# Patient Record
Sex: MALE | Race: BLACK OR AFRICAN AMERICAN | NOT HISPANIC OR LATINO
[De-identification: names, ages, dates, MRNs, and addresses within clinical notes are randomized per-mention and may not be internally consistent; named-entity substitution may affect disease eponyms.]

---

## 2024-03-15 ENCOUNTER — RESULT REVIEW (OUTPATIENT)
Age: 54
End: 2024-03-15

## 2024-03-15 ENCOUNTER — INPATIENT (INPATIENT)
Facility: HOSPITAL | Age: 54
LOS: 1 days | Discharge: LEFT BEFORE DISCHARGE | End: 2024-03-17
Attending: STUDENT IN AN ORGANIZED HEALTH CARE EDUCATION/TRAINING PROGRAM | Admitting: STUDENT IN AN ORGANIZED HEALTH CARE EDUCATION/TRAINING PROGRAM
Payer: MEDICAID

## 2024-03-15 VITALS
OXYGEN SATURATION: 96 % | TEMPERATURE: 98 F | RESPIRATION RATE: 22 BRPM | SYSTOLIC BLOOD PRESSURE: 96 MMHG | HEART RATE: 85 BPM | DIASTOLIC BLOOD PRESSURE: 60 MMHG

## 2024-03-15 DIAGNOSIS — N17.9 ACUTE KIDNEY FAILURE, UNSPECIFIED: ICD-10-CM

## 2024-03-15 DIAGNOSIS — Z29.9 ENCOUNTER FOR PROPHYLACTIC MEASURES, UNSPECIFIED: ICD-10-CM

## 2024-03-15 DIAGNOSIS — J84.9 INTERSTITIAL PULMONARY DISEASE, UNSPECIFIED: ICD-10-CM

## 2024-03-15 DIAGNOSIS — L03.90 CELLULITIS, UNSPECIFIED: ICD-10-CM

## 2024-03-15 DIAGNOSIS — I73.9 PERIPHERAL VASCULAR DISEASE, UNSPECIFIED: ICD-10-CM

## 2024-03-15 DIAGNOSIS — B20 HUMAN IMMUNODEFICIENCY VIRUS [HIV] DISEASE: ICD-10-CM

## 2024-03-15 LAB
ADD ON TEST-SPECIMEN IN LAB: SIGNIFICANT CHANGE UP
ADD ON TEST-SPECIMEN IN LAB: SIGNIFICANT CHANGE UP
ALBUMIN SERPL ELPH-MCNC: 2.9 G/DL — LOW (ref 3.3–5)
ALP SERPL-CCNC: 241 U/L — HIGH (ref 40–120)
ALT FLD-CCNC: 65 U/L — HIGH (ref 4–41)
ANION GAP SERPL CALC-SCNC: 12 MMOL/L — SIGNIFICANT CHANGE UP (ref 7–14)
APPEARANCE UR: CLEAR — SIGNIFICANT CHANGE UP
APTT BLD: 27.4 SEC — SIGNIFICANT CHANGE UP (ref 24.5–35.6)
AST SERPL-CCNC: 35 U/L — SIGNIFICANT CHANGE UP (ref 4–40)
BASE EXCESS BLDV CALC-SCNC: -4 MMOL/L — LOW (ref -2–3)
BASOPHILS # BLD AUTO: 0.1 K/UL — SIGNIFICANT CHANGE UP (ref 0–0.2)
BASOPHILS NFR BLD AUTO: 0.9 % — SIGNIFICANT CHANGE UP (ref 0–2)
BILIRUB SERPL-MCNC: 0.3 MG/DL — SIGNIFICANT CHANGE UP (ref 0.2–1.2)
BILIRUB UR-MCNC: NEGATIVE — SIGNIFICANT CHANGE UP
BLOOD GAS VENOUS COMPREHENSIVE RESULT: SIGNIFICANT CHANGE UP
BUN SERPL-MCNC: 26 MG/DL — HIGH (ref 7–23)
CALCIUM SERPL-MCNC: 9.1 MG/DL — SIGNIFICANT CHANGE UP (ref 8.4–10.5)
CHLORIDE BLDV-SCNC: 108 MMOL/L — SIGNIFICANT CHANGE UP (ref 96–108)
CHLORIDE SERPL-SCNC: 104 MMOL/L — SIGNIFICANT CHANGE UP (ref 98–107)
CO2 BLDV-SCNC: 24.5 MMOL/L — SIGNIFICANT CHANGE UP (ref 22–26)
CO2 SERPL-SCNC: 20 MMOL/L — LOW (ref 22–31)
COLOR SPEC: YELLOW — SIGNIFICANT CHANGE UP
CREAT SERPL-MCNC: 2.21 MG/DL — HIGH (ref 0.5–1.3)
DIFF PNL FLD: NEGATIVE — SIGNIFICANT CHANGE UP
EGFR: 35 ML/MIN/1.73M2 — LOW
EOSINOPHIL # BLD AUTO: 0.06 K/UL — SIGNIFICANT CHANGE UP (ref 0–0.5)
EOSINOPHIL NFR BLD AUTO: 0.5 % — SIGNIFICANT CHANGE UP (ref 0–6)
FLUAV AG NPH QL: SIGNIFICANT CHANGE UP
FLUBV AG NPH QL: SIGNIFICANT CHANGE UP
GAS PNL BLDV: 136 MMOL/L — SIGNIFICANT CHANGE UP (ref 136–145)
GLUCOSE BLDV-MCNC: 100 MG/DL — HIGH (ref 70–99)
GLUCOSE SERPL-MCNC: 101 MG/DL — HIGH (ref 70–99)
GLUCOSE UR QL: NEGATIVE MG/DL — SIGNIFICANT CHANGE UP
HCO3 BLDV-SCNC: 23 MMOL/L — SIGNIFICANT CHANGE UP (ref 22–29)
HCT VFR BLD CALC: 35.6 % — LOW (ref 39–50)
HCT VFR BLDA CALC: 38 % — LOW (ref 39–51)
HGB BLD CALC-MCNC: 12.7 G/DL — SIGNIFICANT CHANGE UP (ref 12.6–17.4)
HGB BLD-MCNC: 12.2 G/DL — LOW (ref 13–17)
IANC: 8.46 K/UL — HIGH (ref 1.8–7.4)
IMM GRANULOCYTES NFR BLD AUTO: 0.7 % — SIGNIFICANT CHANGE UP (ref 0–0.9)
INR BLD: 1 RATIO — SIGNIFICANT CHANGE UP (ref 0.85–1.18)
KETONES UR-MCNC: NEGATIVE MG/DL — SIGNIFICANT CHANGE UP
LACTATE BLDV-MCNC: 1.4 MMOL/L — SIGNIFICANT CHANGE UP (ref 0.5–2)
LEUKOCYTE ESTERASE UR-ACNC: NEGATIVE — SIGNIFICANT CHANGE UP
LYMPHOCYTES # BLD AUTO: 1.93 K/UL — SIGNIFICANT CHANGE UP (ref 1–3.3)
LYMPHOCYTES # BLD AUTO: 16.9 % — SIGNIFICANT CHANGE UP (ref 13–44)
MCHC RBC-ENTMCNC: 33.8 PG — SIGNIFICANT CHANGE UP (ref 27–34)
MCHC RBC-ENTMCNC: 34.3 GM/DL — SIGNIFICANT CHANGE UP (ref 32–36)
MCV RBC AUTO: 98.6 FL — SIGNIFICANT CHANGE UP (ref 80–100)
MONOCYTES # BLD AUTO: 0.78 K/UL — SIGNIFICANT CHANGE UP (ref 0–0.9)
MONOCYTES NFR BLD AUTO: 6.8 % — SIGNIFICANT CHANGE UP (ref 2–14)
NEUTROPHILS # BLD AUTO: 8.46 K/UL — HIGH (ref 1.8–7.4)
NEUTROPHILS NFR BLD AUTO: 74.2 % — SIGNIFICANT CHANGE UP (ref 43–77)
NITRITE UR-MCNC: NEGATIVE — SIGNIFICANT CHANGE UP
NRBC # BLD: 0 /100 WBCS — SIGNIFICANT CHANGE UP (ref 0–0)
NRBC # FLD: 0 K/UL — SIGNIFICANT CHANGE UP (ref 0–0)
NT-PROBNP SERPL-SCNC: 877 PG/ML — HIGH
PCO2 BLDV: 49 MMHG — SIGNIFICANT CHANGE UP (ref 42–55)
PH BLDV: 7.28 — LOW (ref 7.32–7.43)
PH UR: 6 — SIGNIFICANT CHANGE UP (ref 5–8)
PLATELET # BLD AUTO: 410 K/UL — HIGH (ref 150–400)
PO2 BLDV: 28 MMHG — SIGNIFICANT CHANGE UP (ref 25–45)
POTASSIUM BLDV-SCNC: 4 MMOL/L — SIGNIFICANT CHANGE UP (ref 3.5–5.1)
POTASSIUM SERPL-MCNC: 4.1 MMOL/L — SIGNIFICANT CHANGE UP (ref 3.5–5.3)
POTASSIUM SERPL-SCNC: 4.1 MMOL/L — SIGNIFICANT CHANGE UP (ref 3.5–5.3)
PROT SERPL-MCNC: 8.2 G/DL — SIGNIFICANT CHANGE UP (ref 6–8.3)
PROT UR-MCNC: SIGNIFICANT CHANGE UP MG/DL
PROTHROM AB SERPL-ACNC: 11.3 SEC — SIGNIFICANT CHANGE UP (ref 9.5–13)
RBC # BLD: 3.61 M/UL — LOW (ref 4.2–5.8)
RBC # FLD: 16 % — HIGH (ref 10.3–14.5)
RSV RNA NPH QL NAA+NON-PROBE: SIGNIFICANT CHANGE UP
SAO2 % BLDV: 40.4 % — LOW (ref 67–88)
SARS-COV-2 RNA SPEC QL NAA+PROBE: SIGNIFICANT CHANGE UP
SODIUM SERPL-SCNC: 136 MMOL/L — SIGNIFICANT CHANGE UP (ref 135–145)
SP GR SPEC: 1.03 — SIGNIFICANT CHANGE UP (ref 1–1.03)
TROPONIN T, HIGH SENSITIVITY RESULT: 50 NG/L — SIGNIFICANT CHANGE UP
UROBILINOGEN FLD QL: 0.2 MG/DL — SIGNIFICANT CHANGE UP (ref 0.2–1)
WBC # BLD: 11.41 K/UL — HIGH (ref 3.8–10.5)
WBC # FLD AUTO: 11.41 K/UL — HIGH (ref 3.8–10.5)

## 2024-03-15 PROCEDURE — 99285 EMERGENCY DEPT VISIT HI MDM: CPT

## 2024-03-15 PROCEDURE — 99223 1ST HOSP IP/OBS HIGH 75: CPT

## 2024-03-15 PROCEDURE — 93922 UPR/L XTREMITY ART 2 LEVELS: CPT | Mod: 26,59

## 2024-03-15 PROCEDURE — 73630 X-RAY EXAM OF FOOT: CPT | Mod: 26,LT,RT

## 2024-03-15 PROCEDURE — 93970 EXTREMITY STUDY: CPT | Mod: 26

## 2024-03-15 PROCEDURE — 71046 X-RAY EXAM CHEST 2 VIEWS: CPT | Mod: 26

## 2024-03-15 PROCEDURE — 71275 CT ANGIOGRAPHY CHEST: CPT | Mod: 26,MC

## 2024-03-15 PROCEDURE — 93923 UPR/LXTR ART STDY 3+ LVLS: CPT | Mod: 26

## 2024-03-15 RX ORDER — PIPERACILLIN AND TAZOBACTAM 4; .5 G/20ML; G/20ML
3.38 INJECTION, POWDER, LYOPHILIZED, FOR SOLUTION INTRAVENOUS EVERY 8 HOURS
Refills: 0 | Status: DISCONTINUED | OUTPATIENT
Start: 2024-03-15 | End: 2024-03-15

## 2024-03-15 RX ORDER — NICOTINE POLACRILEX 2 MG
1 GUM BUCCAL DAILY
Refills: 0 | Status: DISCONTINUED | OUTPATIENT
Start: 2024-03-15 | End: 2024-03-17

## 2024-03-15 RX ORDER — TRAMADOL HYDROCHLORIDE 50 MG/1
25 TABLET ORAL ONCE
Refills: 0 | Status: DISCONTINUED | OUTPATIENT
Start: 2024-03-15 | End: 2024-03-15

## 2024-03-15 RX ORDER — BICTEGRAVIR SODIUM, EMTRICITABINE, AND TENOFOVIR ALAFENAMIDE FUMARATE 30; 120; 15 MG/1; MG/1; MG/1
1 TABLET ORAL DAILY
Refills: 0 | Status: DISCONTINUED | OUTPATIENT
Start: 2024-03-15 | End: 2024-03-17

## 2024-03-15 RX ORDER — ACETAMINOPHEN 500 MG
650 TABLET ORAL EVERY 6 HOURS
Refills: 0 | Status: DISCONTINUED | OUTPATIENT
Start: 2024-03-15 | End: 2024-03-17

## 2024-03-15 RX ORDER — PIPERACILLIN AND TAZOBACTAM 4; .5 G/20ML; G/20ML
3.38 INJECTION, POWDER, LYOPHILIZED, FOR SOLUTION INTRAVENOUS ONCE
Refills: 0 | Status: COMPLETED | OUTPATIENT
Start: 2024-03-15 | End: 2024-03-15

## 2024-03-15 RX ORDER — SODIUM CHLORIDE 9 MG/ML
1000 INJECTION INTRAMUSCULAR; INTRAVENOUS; SUBCUTANEOUS ONCE
Refills: 0 | Status: COMPLETED | OUTPATIENT
Start: 2024-03-15 | End: 2024-03-15

## 2024-03-15 RX ORDER — ACETAMINOPHEN 500 MG
975 TABLET ORAL ONCE
Refills: 0 | Status: COMPLETED | OUTPATIENT
Start: 2024-03-15 | End: 2024-03-15

## 2024-03-15 RX ORDER — HEPARIN SODIUM 5000 [USP'U]/ML
5000 INJECTION INTRAVENOUS; SUBCUTANEOUS EVERY 8 HOURS
Refills: 0 | Status: DISCONTINUED | OUTPATIENT
Start: 2024-03-15 | End: 2024-03-16

## 2024-03-15 RX ORDER — PIPERACILLIN AND TAZOBACTAM 4; .5 G/20ML; G/20ML
3.38 INJECTION, POWDER, LYOPHILIZED, FOR SOLUTION INTRAVENOUS EVERY 12 HOURS
Refills: 0 | Status: DISCONTINUED | OUTPATIENT
Start: 2024-03-16 | End: 2024-03-17

## 2024-03-15 RX ORDER — LANOLIN ALCOHOL/MO/W.PET/CERES
3 CREAM (GRAM) TOPICAL AT BEDTIME
Refills: 0 | Status: DISCONTINUED | OUTPATIENT
Start: 2024-03-15 | End: 2024-03-17

## 2024-03-15 RX ADMIN — PIPERACILLIN AND TAZOBACTAM 25 GRAM(S): 4; .5 INJECTION, POWDER, LYOPHILIZED, FOR SOLUTION INTRAVENOUS at 19:22

## 2024-03-15 RX ADMIN — TRAMADOL HYDROCHLORIDE 25 MILLIGRAM(S): 50 TABLET ORAL at 22:47

## 2024-03-15 RX ADMIN — PIPERACILLIN AND TAZOBACTAM 200 GRAM(S): 4; .5 INJECTION, POWDER, LYOPHILIZED, FOR SOLUTION INTRAVENOUS at 14:08

## 2024-03-15 RX ADMIN — Medication 975 MILLIGRAM(S): at 11:13

## 2024-03-15 RX ADMIN — Medication 650 MILLIGRAM(S): at 17:39

## 2024-03-15 RX ADMIN — HEPARIN SODIUM 5000 UNIT(S): 5000 INJECTION INTRAVENOUS; SUBCUTANEOUS at 22:47

## 2024-03-15 RX ADMIN — SODIUM CHLORIDE 1000 MILLILITER(S): 9 INJECTION INTRAMUSCULAR; INTRAVENOUS; SUBCUTANEOUS at 11:12

## 2024-03-15 RX ADMIN — Medication 975 MILLIGRAM(S): at 14:44

## 2024-03-15 NOTE — H&P ADULT - ASSESSMENT
53M Hx of HIV on Biktarvy unknown CD4 count, questionable diagnosis of blood clots in bilateral lower extremity presents to the ER after leaving AMA from [facility] with complaint of bilateral feet pain and swelling x 3 weeks. Exam c/w PAD.

## 2024-03-15 NOTE — ED PROVIDER NOTE - CARE PLAN
1 Principal Discharge DX:	Cellulitis   Principal Discharge DX:	Cellulitis  Secondary Diagnosis:	SOB (shortness of breath)

## 2024-03-15 NOTE — H&P ADULT - NSHPPHYSICALEXAM_GEN_ALL_CORE
PHYSICAL EXAM:  GENERAL: NAD, well-groomed, well-developed  HEAD:  Atraumatic, Normocephalic  EYES: EOMI, PERRLA, conjunctiva and sclera clear  ENMT: No tonsillar erythema, exudates, or enlargement; Moist mucous membranes  NECK: Supple, No JVD, Normal thyroid  HEART: Regular rate and rhythm; No murmurs, rubs, or gallops  RESPIRATORY: CTA B/L, No W/R/R  ABDOMEN: Soft, Nontender, Nondistended; Bowel sounds present  NEUROLOGY: A&Ox3, nonfocal, moving all extremities  EXTREMITIES:  1+ b/l LE Pulses, necrotic toes

## 2024-03-15 NOTE — ED PROVIDER NOTE - ATTENDING APP SHARED VISIT CONTRIBUTION OF CARE
53M Hx of HIV on Biktarvy unknown CD4 count, questionable diagnosis of blood clots in bilateral lower extremity presents to the ER after leaving AMA from [facility] with for bilateral feet pain and swelling x 3 weeks. Patient also notes feeling chills, cold.  Denies chest pain, abdominal pain, nausea, vomiting, urinary symptoms.  PE good pulses discolored but warm LEs   Plan  ? whether etiology of LLE infection JOVON   labs blood cultures vascular consults Antibiotics TBA for same and ECHO .

## 2024-03-15 NOTE — H&P ADULT - PROBLEM SELECTOR PLAN 1
- Left forefoot ischemic changes to digits 1-5, not demarcated, no malodor. Maceration of left foot 4th interspace. Left plantar hallux hyperkeratotic lesion and left plantar lateral midfoot hyperkeratotic lesion. No open lesions or acute signs of infection of right foot.   - s/p debridement via Pod (follow up with Dr. Davila ([phone]) within 1 week of discharge)  - Bilateral xray: no OM, no gas, no fracture   - vasc consult placed  - DENIS-PVR

## 2024-03-15 NOTE — ED PROVIDER NOTE - PHYSICAL EXAMINATION
Vital signs reviewed.   CONSTITUTIONAL: ill appearing, tremulous. Speaking in full sentences, responding appropriately to questions.   HEAD: Normocephalic, atraumatic.  EYES: PERRL, EOM intact, conjunctiva and sclera WNL.  ENT: normal nose; no rhinorrhea; normal pharynx with no tonsillar hypertrophy, no erythema, no exudate, no lymphadenopathy.  NECK/LYMPH: Supple; non-tender; no cervical lymphadenopathy.  CARD: Normal S1, S2; no murmurs, rubs, or gallops noted.  RESP: Normal chest excursion with respiration; breath sounds clear and equal bilaterally; no wheezes, rhonchi, or rales.  ABD/GI: soft, non-distended; non-tender; no palpable organomegaly, no pulsatile mass.  EXT/MS: moves all extremities; distal pulses are normal, no pedal edema.  SKIN: Normal for age and race; warm; dry; good turgor; no apparent lesions or exudate noted.  NEURO: Awake, alert, oriented x 3, no gross deficits, CN II-XII grossly intact, no motor or sensory deficit noted.  PSYCH: Normal mood; appropriate affect. Vital signs reviewed.   CONSTITUTIONAL: ill appearing, tremulous. Speaking in full sentences, responding appropriately to questions.   HEAD: Normocephalic, atraumatic.  EYES: PERRL, EOM intact, conjunctiva and sclera WNL.  ENT: normal nose; no rhinorrhea; normal pharynx with no tonsillar hypertrophy, no erythema, no exudate, no lymphadenopathy.  NECK/LYMPH: Supple; non-tender; no cervical lymphadenopathy.  CARD: Normal S1, S2; no murmurs, rubs, or gallops noted.  RESP: Normal chest excursion with respiration; breath sounds clear and equal bilaterally; no wheezes, rhonchi, or rales.  ABD/GI: soft, non-distended; non-tender; no palpable organomegaly, no pulsatile mass.  EXT/MS: moves all extremities; distal pulses are normal, Left toes. erythematous warm, extending up dorsum of foot, blood noted to 5 th digit without noted wound.   SKIN: Normal for age and race; warm; dry; good turgor; no apparent lesions or exudate noted.  NEURO: Awake, alert, oriented x 3, no gross deficits, CN II-XII grossly intact, no motor or sensory deficit noted.  PSYCH: Normal mood; appropriate affect.

## 2024-03-15 NOTE — H&P ADULT - NSHPREVIEWOFSYSTEMS_GEN_ALL_CORE
REVIEW OF SYSTEMS:    CONSTITUTIONAL: chills  EYES/ENT: No visual changes;  No vertigo or throat pain   NECK: No pain or stiffness  RESPIRATORY: No cough, wheezing, hemoptysis; No shortness of breath  CARDIOVASCULAR: No chest pain or palpitations  GASTROINTESTINAL: No abdominal or epigastric pain. No nausea, vomiting, or hematemesis; No diarrhea or constipation. No melena or hematochezia.  GENITOURINARY: No dysuria, frequency or hematuria  NEUROLOGICAL: No numbness or weakness  SKIN: No itching, rashes  MUSCULOSKELETAL: Foot pain

## 2024-03-15 NOTE — ED PROVIDER NOTE - PROGRESS NOTE DETAILS
ROBERTO Heard: Patient was a transfer from [facility], inpatient team aware. Concern for septic embolic, transfer for HERON. patient received cultures and zosyn. Can admit to Dr. Tsai

## 2024-03-15 NOTE — ED PROVIDER NOTE - RATE
Patient arrives ambulatory to unit complaining of possible contractions. Shown to LT02 and advised to obtain a CCU and change into a gown.    77

## 2024-03-15 NOTE — H&P ADULT - NSHPLABSRESULTS_GEN_ALL_CORE
LABORATORY DATA                        12.2   11.41 )-----------( 410      ( 15 Mar 2024 11:13 )             35.6     03-15    136  |  104  |  26<H>  ----------------------------<  101<H>  4.1   |  20<L>  |  2.21<H>    Ca    9.1      15 Mar 2024 11:13    TPro  8.2  /  Alb  2.9<L>  /  TBili  0.3  /  DBili  x   /  AST  35  /  ALT  65<H>  /  AlkPhos  241<H>  03-15    PT/INR - ( 15 Mar 2024 11:13 )   PT: 11.3 sec;   INR: 1.00 ratio         PTT - ( 15 Mar 2024 11:13 )  PTT:27.4 sec      Urinalysis Basic - ( 15 Mar 2024 11:13 )    Color: x / Appearance: x / SG: x / pH: x  Gluc: 101 mg/dL / Ketone: x  / Bili: x / Urobili: x   Blood: x / Protein: x / Nitrite: x   Leuk Esterase: x / RBC: x / WBC x   Sq Epi: x / Non Sq Epi: x / Bacteria: x      CAPILLARY BLOOD GLUCOSE      POCT Blood Glucose.: 135 mg/dL (15 Mar 2024 09:41)      IMAGING REVIEW  < from: Xray Foot AP + Lateral + Oblique, Bilat (03.15.24 @ 12:46) >    IMPRESSION:  No tracking soft tissue gas collections, radiopaque foreign bodies, or   gross radiographic evidence for osteomyelitis.    No fractures or dislocations.    Tarsometatarsal alignment maintained without evidence for a Lisfranc   injury.    Preserved visualized joint spaces and no joint margin erosions.    Slight posterosuperior calcaneal Amy deformities otherwise   unremarkable distal Achilles tendon shadows.    No lytic or blastic lesions.    < end of copied text >    < from: CT Angio Chest PE Protocol w/ IV Cont (03.15.24 @ 12:20) >    IMPRESSION:    No main or lobar pulmonary embolus. The segmental/subsegmental branches   of the pulmonary arteries are not evaluated secondary to poor   opacification, motion, and streak artifact.    Upper lung predominant emphysema.    Multiple air cysts within mid to lower lungs.    < end of copied text >    < from: US Duplex Venous Lower Ext Complete, Bilateral (03.15.24 @ 11:39) >      IMPRESSION:  No evidence of deep venous thrombosis in either lower extremity.    < end of copied text >      MICROBIOLOGY REVIEW      CARDIOLOGY REVIEW

## 2024-03-15 NOTE — H&P ADULT - HISTORY OF PRESENT ILLNESS
53M Hx of HIV on Biktarvy unknown CD4 count, questionable diagnosis of blood clots in bilateral lower extremity presents to the ER after leaving AMA from [facility] with complaint of bilateral feet pain and swelling x 3 weeks. Patient also notes feeling chills, cold.  Denies chest pain, abdominal pain, nausea, vomiting, urinary symptoms.  Pt was seen at [facility] but left AMA in lieu of long wait time to be seen and get a room.

## 2024-03-15 NOTE — CONSULT NOTE ADULT - SUBJECTIVE AND OBJECTIVE BOX
Patient is a 53y old  Male who presents with a chief complaint of left foot pain     HPI:  53-year-old male with past medical history of HIV on Biktarvy unknown CD4 count, questionable diagnosis of blood clots in bilateral lower extremity presents to the ER after leaving AMA from [facility] with complaint of bilateral feet pain and swelling and shortness of breath x 3 weeks.  Patient notes feeling chills, cold.  Denies chest pain, abdominal pain, nausea, vomiting, urinary symptoms.  patient notes he was seen by specialties at [facility], vascular did not find any blockages, patient was supposed to be transferred here for echo? but did not want to wait. so AMAed and came here.    PAST MEDICAL & SURGICAL HISTORY:  HIV disease          MEDICATIONS  (STANDING):  piperacillin/tazobactam IVPB.. 3.375 Gram(s) IV Intermittent Once    MEDICATIONS  (PRN):      Allergies    No Known Allergies    Intolerances        VITALS:    Vital Signs Last 24 Hrs  T(C): 36.5 (15 Mar 2024 11:19), Max: 37.1 (15 Mar 2024 10:33)  T(F): 97.7 (15 Mar 2024 11:19), Max: 98.8 (15 Mar 2024 10:33)  HR: 88 (15 Mar 2024 11:19) (85 - 88)  BP: 120/89 (15 Mar 2024 11:19) (96/60 - 120/89)  BP(mean): --  RR: 18 (15 Mar 2024 11:19) (18 - 22)  SpO2: 98% (15 Mar 2024 11:19) (96% - 98%)    Parameters below as of 15 Mar 2024 11:19  Patient On (Oxygen Delivery Method): room air        LABS:                          12.2   11.41 )-----------( 410      ( 15 Mar 2024 11:13 )             35.6       03-15    136  |  104  |  26<H>  ----------------------------<  101<H>  4.1   |  20<L>  |  2.21<H>    Ca    9.1      15 Mar 2024 11:13    TPro  8.2  /  Alb  2.9<L>  /  TBili  0.3  /  DBili  x   /  AST  35  /  ALT  65<H>  /  AlkPhos  241<H>  03-15      CAPILLARY BLOOD GLUCOSE      POCT Blood Glucose.: 135 mg/dL (15 Mar 2024 09:41)      PT/INR - ( 15 Mar 2024 11:13 )   PT: 11.3 sec;   INR: 1.00 ratio         PTT - ( 15 Mar 2024 11:13 )  PTT:27.4 sec    LOWER EXTREMITY PHYSICAL EXAM:    Vascular: DP/PT 2/4, B/L, CFT <3 seconds B/L, Temperature gradient warm to cool, B/L.   Neuro: Epicritic sensation diminished to the level of digits, B/L.  Musculoskeletal/Ortho: unremarkable   Skin: Left forefoot ischemic changes to digits 1-5, not demarcated, no malodor. Maceration of left foot 4th interspace. No open lesions or acute signs of infection of right foot.       RADIOLOGY & ADDITIONAL STUDIES:    < from: Xray Foot AP + Lateral + Oblique, Bilat (03.15.24 @ 12:46) >    ACC: [id number] EXAM:  XR FOOT COMP MIN 3 VIEWS BI   ORDERED BY: JOLIE ROBISON     PROCEDURE DATE:  03/15/2024          INTERPRETATION:  CLINICAL INDICATION: bilateral foot pain    EXAM:  Frontal oblique lateral views of both feet from 3/15/2024 at 1246. No   similar prior studies available for comparison.    IMPRESSION:  No tracking soft tissue gas collections, radiopaque foreign bodies, or   gross radiographic evidence for osteomyelitis.    No fractures or dislocations.    Tarsometatarsal alignment maintained without evidence for a Lisfranc   injury.    Preserved visualized joint spaces and no joint margin erosions.    Slight posterosuperior calcaneal Amy deformities otherwise   unremarkable distal Achilles tendon shadows.    No lytic or blastic lesions.    --- End of Report ---            KAILEE BEAN MD; Attending Radiologist  This document has been electronically signed. Mar 15 2024  1:11PM    < end of copied text >  
VASCULAR SURGERY CONSULT NOTE  --------------------------------------------------------------------------------------------    HPI:   Mr. Hester is a 53 year old man with PMH of HIV (Biktarvy) and possible DM who reports a history of LLE "blood clot" s/p failed attempted removal and second procedure who was recently admitted to [facility] for 2 weeks for ongoing L foot pain. He reports chronic pain in the L foot that became acutely worse 2 weeks ago for which he has been admitted to OSH- unclear treatments but no vascular intervention. he is baseline ambulatory up until the pain 2 weeks ago which has progressed to rest pain. He denied numbness or tingling. He is an active smoker, 1ppd for 20 years.     ROS: 10-system review is otherwise negative except HPI above.      PAST MEDICAL & SURGICAL HISTORY:  HIV disease      FAMILY HISTORY:  [] Family history not pertinent as reviewed with the patient and family      ALLERGIES: No Known Allergies      SOCIAL HISTORY:   Lives with his wife    --------------------------------------------------------------------------------------------    Vitals:   T(C): 36.7 (03-15-24 @ 19:26), Max: 37.1 (03-15-24 @ 10:33)  HR: 80 (03-15-24 @ 19:26) (66 - 88)  BP: 114/82 (03-15-24 @ 19:26) (96/60 - 120/89)  RR: 18 (03-15-24 @ 19:26) (16 - 22)  SpO2: 99% (03-15-24 @ 19:26) (96% - 99%)  CAPILLARY BLOOD GLUCOSE      POCT Blood Glucose.: 135 mg/dL (15 Mar 2024 09:41)      Physical Examination:  GEN: Upset, resting quietly  NEURO: AAOx3, CN II-XII grossly intact, no focal deficits  PULM: symmetric chest rise bilaterally, no increased WOB  ABD: soft, nontender, nondistended  EXTR: LLE with distal ischemic changes, especially over the 1/2 MTP with violaceous discoloration, some blanching erythema between 1/2 toe, tender to palpation, +DP/PT signals, +palp fem, RLE +DP/PT, some chronic discoloration of the R toes but not acutely inflamed/infected   --------------------------------------------------------------------------------------------    LABS  CBC (03-15 @ 11:13)                              12.2<L>                         11.41<H>  )----------------(  410<H>     74.2  % Neutrophils, 16.9  % Lymphocytes, ANC: 8.46<H>                              35.6<L>    BMP (03-15 @ 11:13)             136     |  104     |  26<H> 		Ca++ --      Ca 9.1                ---------------------------------( 101<H>		Mg --                 4.1     |  20<L>   |  2.21<H>			Ph --        LFTs (03-15 @ 11:13)      TPro 8.2 / Alb 2.9<L> / TBili 0.3 / DBili -- / AST 35 / ALT 65<H> / AlkPhos 241<H>    Coags (03-15 @ 11:13)  aPTT 27.4 / INR 1.00 / PT 11.3        VBG (03-15 @ 11:13)     7.28<L> / 49 / 28 / 23 / -4.0<L> / 40.4<L>%     Lactate: 1.4    --------------------------------------------------------------------------------------------    MICROBIOLOGY  Urinalysis (03-15 @ 11:13):     Color:  / Appearance:  / SG:  / pH:  / Gluc: 101<H> / Ketones:  / Bili:  / Urobili:  / Protein : / Nitrites:  / Leuk.Est:  / RBC:  / WBC:  / Sq Epi:  / Non Sq Epi:  / Bacteria          --------------------------------------------------------------------------------------------    IMAGING  < from: US Duplex Venous Lower Ext Complete, Bilateral (03.15.24 @ 11:39) >  FINDINGS:    RIGHT:  Normal compressibility of the RIGHT common femoral, femoral and popliteal   veins.  Doppler examination shows normal spontaneous and phasic flow.  Limited visualized of the RIGHT calf veins. No RIGHT calf vein thrombosis   detected.    LEFT:  Normal compressibility of the LEFT common femoral, femoral and popliteal   veins.  Doppler examination shows normal spontaneous and phasic flow.  Limited visualized of the LEFT calf veins. No LEFT calf vein thrombosis   detected.    IMPRESSION:  No evidence of deep venous thrombosis in either lower extremity.    < end of copied text >      --------------------------------------------------------------------------------------------    ASSESSMENT:  Mr. Hester is a 53 year old active smoker with PMH of HIV (Biktarvy) and possible DM who reports a history of LLE "blood clot" s/p failed attempted removal and second procedure 2y/a who was recently admitted to [facility] for 2 weeks for acute on chronic L foot pain.    PLAN:    - appreciate podiatry evaluation, no acute intervention at this time  - recommend DENIS/PVR with arterial duplex, will f/u results  - agree with empiric antibiotics w/ WBC 11  - will continue to follow    Tanner Ryan, PGY3  C Team Surgery   [id number]

## 2024-03-15 NOTE — ED ADULT NURSE NOTE - CHIEF COMPLAINT QUOTE
c/oi chest pressure and SOB for 3 weeks, reports was admitted to another hospital and left AMA yesterday, was diginose with blood clots in B/l  lower extremities./ pxh of DM, HIV. pt with tremorts, able to communicate with full and complete sentences, however, visibly becomes dyspneic at the end of a sentence.

## 2024-03-15 NOTE — ED ADULT NURSE REASSESSMENT NOTE - NS ED NURSE REASSESS COMMENT FT1
Wife- Kia- [phone]
Patient is resting comfortably at this time, NAD, RR even and unlabored, NSR on CM, no c/o pain, pending ortho consult, plan of care reviewed, safety maintained, call bell within reach, will continue to monitor patient.

## 2024-03-15 NOTE — ED ADULT NURSE NOTE - OBJECTIVE STATEMENT
Patient presented to ED with a chief complaint of SOB and left and right b/l foot pain. Patient states that he was at another facility this AM and left AMA. States he received antibiotics and pain medications there for predominantly the pain in his left food. Patient state that he has poor circulation in his lower extremities and have been noticing discoloration to his left toes and foot. Patient states that his left foot is tender to touch . States hx of HIV, PAD, "infection in brain". Denies fever, chills, n/v/d, recent sick contacts.

## 2024-03-15 NOTE — ED ADULT NURSE NOTE - NSFALLHARMRISKINTERV_ED_ALL_ED

## 2024-03-15 NOTE — ED PROVIDER NOTE - OBJECTIVE STATEMENT
53-year-old male with past medical history of HIV on Biktarvy unknown CD4 count, questionable diagnosis of blood clots in bilateral lower extremity presents to the ER after leaving AMA from [facility] with complaint of bilateral feet pain and swelling and shortness of breath x 3 weeks.  Patient notes feeling chills, cold.  Denies chest pain, abdominal pain, nausea, vomiting, urinary symptoms. 53-year-old male with past medical history of HIV on Biktarvy unknown CD4 count, questionable diagnosis of blood clots in bilateral lower extremity presents to the ER after leaving AMA from [facility] with complaint of bilateral feet pain and swelling and shortness of breath x 3 weeks.  Patient notes feeling chills, cold.  Denies chest pain, abdominal pain, nausea, vomiting, urinary symptoms.    patient notes he was seen by specialties at [facility], vascular did not find any blockages, patient was supposed to be transferred here for echo? but did not want to wait. so AMAed and came here.

## 2024-03-15 NOTE — H&P ADULT - ATTENDING COMMENTS
53-year-old male with past medical history of HIV on Biktarvy unknown CD4 count, w/ recent admission at [facility] for LLE pain s/p vasc eval, rec'd HERON 2/2 c/f ?microthrombi. Left AMA, now p/w ongoing LLE pain and swelling.      PHYSICAL:  GEN: NAD  CHEST: CTA B/L , normal respiratory effort  HEART: S1S2 normal  EXT: Left foot w/ ischemic changes digits 1-5, +warm to touch, +swelling. No discharge or drainage. Dec peripheral pulses B/L    #LLE Pain  --Suspect i/s/o PAD. Per chart review, OSH w/ c/f ?septic emboli. Unclear if pt w/ bacteremia. Unclear if found to have clots at OSH  --Obtain collateral  --Can resume empiric IV Zosyn pending Cx  --s/p  debridement w/ podiatry. XR w/o OM  --F/u blood cx x 2  --Repeat VA duplex  --Vasc surg eval  --Consider Echo pending above work up     #Leukocytosis  --Noted. Suspect reactive i/s/o ischemic process vs. cellulitis  --XRay w/o evidence of OM  --Trend leukocytosis  --Rest of mgt as above     HIV  --Resume Biktarvy  --Obtain T-Cell subset, Viral load    #Abnormal imaging  --CTA chest w Upper lung predominant emphysema. Also w Multiple air cysts within mid to lower lungs.  --Unclear etiology. 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 Reasonable to r/o opportunistic infections  --At this time w/o respiratory sx  --Obtain Pneumocystis PCR, QuantiFeron,  CMV  --Further testing pending CD4 count      DVT: HSQ if no planned intervention   DIET: DASH/TLC  DISPO: Pending hospital course 53-year-old male with past medical history of HIV on Biktarvy unknown CD4 count, w/ recent admission at [facility] for LLE pain s/p vasc eval, rec'd HERON 2/2 c/f ?microthrombi. Left AMA, now p/w ongoing LLE pain and swelling.      PHYSICAL:  GEN: NAD  CHEST: CTA B/L , normal respiratory effort  HEART: S1S2 normal  EXT: Left foot w/ ischemic changes digits 1-5, +warm to touch, +swelling. No discharge or drainage. Dec peripheral pulses B/L    #LLE Pain  --Suspect i/s/o PAD. Per chart review, OSH w/ c/f ?septic emboli. Unclear if pt w/ bacteremia. Unclear if found to have clots at OSH  --Obtain collateral  --Can resume empiric IV Zosyn pending Cx  --s/p  debridement w/ podiatry. XR w/o OM  --F/u blood cx x 2  --Obtain DENIS /PVR, VA duplex  --Vasc surg eval  --Consider Echo pending above work up     #Leukocytosis  --Noted. Suspect reactive i/s/o ischemic process +/-. cellulitis  --XRay w/o evidence of OM  --Trend leukocytosis  --Rest of mgt as above     HIV  --Resume Biktarvy  --Obtain T-Cell subset, Viral load    #Abnormal imaging  --CTA chest w Upper lung predominant emphysema. Also w Multiple air cysts within mid to lower lungs.  --Unclear etiology. 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 Reasonable to r/o opportunistic infections  --At this time w/o respiratory sx  --Obtain Pneumocystis PCR, QuantiFeron,  CMV  --Further testing pending CD4 count      DVT: HSQ if no planned intervention   DIET: DASH/TLC  DISPO: Pending hospital course

## 2024-03-15 NOTE — ED ADULT NURSE NOTE - NSSUHOSCREENINGYN_ED_ALL_ED
Telephone Encounter by Leilani Corcoran CMA at 06/27/17 07:46 AM     Author:  Leilani Corcoran CMA Service:  (none) Author Type:  Certified Medical Assistant     Filed:  06/27/17 07:47 AM Encounter Date:  6/25/2017 Status:  Signed     :  Leilani Corcoran CMA (Certified Medical Assistant)            Last Visit[HS1.1T]  6/23/16 upcoming 8/17/17[HS1.1M]    Last Refill[HS1.1T]  celecoxib (CELEBREX) 200 MG Cap 180 Cap 1 12/27/2016  No   Sig: TAKE 1 CAPSULE BY MOUTH TWICE DAILY AS DIRECTED[HS1.1C]            Medication has been pended in encounter for provider approval.[HS1.1T]        Revision History        User Key Date/Time User Provider Type Action    > HS1.1 06/27/17 07:47 AM Leilani Corcoran CMA Certified Medical Assistant Sign    C - Copied, M - Manual, T - Template             Yes - the patient is able to be screened

## 2024-03-15 NOTE — ED PROVIDER NOTE - CLINICAL SUMMARY MEDICAL DECISION MAKING FREE TEXT BOX
53-year-old male with past medical history of HIV on Biktarvy unknown CD4 count, questionable diagnosis of blood clots in bilateral lower extremity presents to the ER after leaving AMA from [facility] with complaint of bilateral feet pain and swelling and shortness of breath x 3 weeks.  Patient notes feeling chills, cold.  Denies chest pain, abdominal pain, nausea, vomiting, urinary symptoms.

## 2024-03-15 NOTE — H&P ADULT - PROBLEM SELECTOR PLAN 4
CTA Chest with air cysts present concerning for possible ILD 2/2 HIV vs occult infection  - may warrant biopsy if infection r/o

## 2024-03-15 NOTE — H&P ADULT - TIME BILLING
reviewing laboratory data, consultants' recommendations, documentation in [place], performing medically appropriate examinations/evaluations, discussion with patient/family/RN/ACP/Residents and interdisciplinary staff (such as , social workers, etc), counseling patient/family/care giver, ordering medically appropriate medication, tests, or procedures

## 2024-03-15 NOTE — CONSULT NOTE ADULT - ASSESSMENT
53M presents with left forefoot ischemic changes   - Patient seen and evaluated   - Afebrile, WBC 11.41   - Left forefoot ischemic changes to digits 1-5, not demarcated, no malodor. Maceration of left foot 4th interspace. Left plantar hallux hyperkeratotic lesion and left plantar lateral midfoot hyperkeratotic lesion. No open lesions or acute signs of infection of right foot.   - Bilateral xray: no OM, no gas, no fracture   - Recommend vasc consult   - After obtaining verbal consent, excisional debridement of left foot hyperkeratotic lesion down to and not including dermis with sterile #15 blade, no open wounds. Patient tolerated procedure well.   - Wound care orders placed for daily application of betadine on left forefoot digits  - No acute podiatric intervention at this time, please reconsult as needed  - Patient to follow up with Dr. Davila ([phone]) within 1 week of discharge   - Discussed with attending

## 2024-03-15 NOTE — ED PROVIDER NOTE - CHIEF COMPLAINT
"25 [facility]  ICU ADMISSION NOTE       Patient: Dylan Lee Date of Service: 2/17/2024   male, 67year old  Admit Date: (Not on file)   Attending: Mariana Greenberg MD        ATTENDING PHYSICIAN    Sandeep Neff D.O.    PRIMARY CARE PHYSICIAN  Moon Gonzalez PA-C    CHIEF COMPLAINT  fall    HPI  Dylan Lee is a 67year old male with PMH dementia, colon cancer, who was transferred from [facility] for Gastrointestinal team consultation due to concern of Gastrointestinal bleed, and s/p fall. Per Dr. Paris Median note today: ""History obtained from EMS as well as patient's caregiver who is at the bedside. They report that the patient had been in his usual state of health prior to today. This morning the patient was attempting to get out of bed. They think that he lost his balance and then he fell to his left side and struck his left side of his face against a piece of furniture in his bedroom. He tried to get up for second time and began to fall again but a staff member caught him and placed him back in bed. EMS was called and patient was brought to the ED. On arrival to the ED the patient is somnolent but has no complaints of pain. His caregiver states that at baseline he is confused and has dementia but can walk and talk to people. He does have some difficulty taking care of himself including showering himself. Group home and his brother make his medical decisions. Caregiver denies recent infectious symptoms including fever, cough, vomiting, diarrhea, dysuria. \""  Stool occult was + in ER. On ICU arrival patient is unable to give much history. He seems to try to answer, knows he is in [facility], [address], but can't tell me why he is here, and doesn't answer many of my questions. He seems to be denying abdominal pain, but doesn't answer directly. No n/v reported. He can't tell me if stools have changed. Called brother Alcideshola Cleveland at listed number an it has been disconnected.   I called brother Dionte Chamorro, " legal guardian. He reports patient sounds like he is at his neurologic baseline. He does concur that pt has trouble swallowing at baseline so has a special diet, and worried he is going to choke. PAST MEDICAL HISTORY    Past Medical History:   Diagnosis Date    Diabetes mellitus (CMD)     Essential (primary) hypertension     Gastroesophageal reflux disease     Hyperlipidemia     Malignant neoplasm (CMD)     colon ca    OCD (obsessive compulsive disorder)     Pneumonia     aspiration    Schizophrenia (CMD)     Stroke (CMD)     2008, 2012       SURGICAL HISTORY    Past Surgical History:   Procedure Laterality Date    Bowel resection      sigmoid       FAMILY HISTORY    No family history on file. SOCIAL HISTORY    Social History     Tobacco Use    Smoking status: Former     Current packs/day: 0.50     Average packs/day: 0.5 packs/day for 3.0 years (1.5 ttl pk-yrs)     Types: Cigarettes    Smokeless tobacco: Never   Substance Use Topics    Alcohol use: No    Drug use: No       PRIOR TO ARRIVAL MEDICATIONS  Prior to Admission medications    Medication Sig Start Date End Date Taking? Authorizing Provider   aspirin 81 MG EC tablet Take 1 tablet by mouth daily. At 4 PM    Provider, Outside   hydrOXYzine (ATARAX) 25 MG tablet Take 1 tablet by mouth 2 times daily as needed. Provider, Outside   melatonin 10 MG disintegrating tablet melatonin 10 mg disintegrating tablet   Take 1 tablet every day by oral route in the evening. Provider, Outside   montelukast (SINGULAIR) 10 MG tablet Take 1 tablet by mouth every evening. At 8 PM 11/23/22   Provider, Outside   Cholecalciferol (vitamin D) 50 mcg (2,000 units) capsule Take 50 mcg by mouth every morning. Provider, Outside   nicotinic acid (NIACIN) 500 MG tablet Take 500 mg by mouth every evening. Provider, Outside   donepezil (ARICEPT) 10 MG tablet Take 1 tablet by mouth nightly. 12/8/22   Severa Cobia, APNP   MAGNESIUM PO Take 400 mg by mouth daily.  12/8/20 Provider, Outside   polyethylene glycol (MIRALAX) 17 GM/SCOOP powder Take 17 g every day by oral route in the morning. Provider, Outside   psyllium (METAMUCIL MULTIHEALTH FIBER) 58.12 % packet for oral suspension Take by mouth 2 (two) times a day. 12 PM and 8 PM 10/1/21   Provider, Outside   famotidine (PEPCID) 20 MG tablet Take 1 tablet by mouth nightly. Provider, Outside   acetaminophen (TYLENOL) 325 MG tablet Take 325 mg by mouth every 4 hours as needed for Pain. Provider, Outside   guaifenesin 100 MG/5ML Take 10 mL 3 times per day, as needed    Provider, Outside   atorvastatin (LIPITOR) 80 MG tablet Take 1 tablet by mouth nightly. 12/4/20   Nathalie Mcgowan MD   metFORMIN (GLUCOPHAGE-XR) 500 MG 24 hr tablet TAKE 2 TABS AT 8 AM AND 2 TABS AT 5 PM    Provider, Outside   Multiple Vitamins-Minerals (vitamin - therapeutic multivitamins w/minerals) tablet Take 1 tablet by mouth daily. Provider, Outside   OLANZapine (ZyPREXA) 5 MG tablet Take 5 mg by mouth 2 times daily. 8 AM & NOON    Provider, Outside   lactulose (CHRONULAC) 10 GM/15ML solution Take 20 g by mouth every evening. At 4:00 PM    Provider, Outside   olanzapine (ZYPREXA ZYDIS) 20 MG disintegrating tablet Take 20 mg by mouth nightly. AT 8 PM    Provider, Outside   lansoprazole (PREVACID) 15 MG capsule Take 15 mg by mouth daily. Open capsule and sprinkle on applesauce/cottage cheese    Provider, Outside   OLANZapine (ZyPREXA) 20 MG tablet Take 20 mg by mouth daily. at 4 PM    Provider, Outside   lamotrigine (LAMICTAL) 200 MG tablet Take 200 mg by mouth 2 times daily. Provider, Outside   finasteride (PROSCAR) 5 MG tablet Take 5 mg by mouth daily. Provider, Outside   tamsulosin (FLOMAX) 0.4 MG Cap Take 0.4 mg by mouth nightly. Provider, Outside   clopidogrel (PLAVIX) 75 MG tablet Take 75 mg by mouth daily. Provider, Outside   losartan (COZAAR) 25 MG tablet Take 25 mg by mouth every evening.  At 8:00 PM    Provider, Outside   citalopram (CELEXA) 20 MG tablet Take 20 mg by mouth daily. Provider, Outside          ALLERGIES    ALLERGIES:   Allergen Reactions    Simvastatin MYALGIA       REVIEW OF SYSTEMS    Unable to obtain as above    PHYSICAL EXAM    Vital 24 Hour Range Most Recent Value   Temperature Temp  Min: 97.7 Â°F (36.5 Â°C)  Max: 97.7 Â°F (36.5 Â°C)     Pulse Pulse  Min: 60  Max: 82     Respiratory Resp  Min: 13  Max: 22     Blood Pressure BP  Min: 132/66  Max: 158/72     Pulse Oximetry SpO2  Min: 88 %  Max: 99 %     Art. BP No data recorded     O2 O2 Flow Rate (L/min)  Av L/min  Min: 2 L/min   Min taken time: 24 1012  Max: 2 L/min   Max taken time: 24 1012       Vital Most Recent Value First Value   Weight       Height       BMI   N/A       No intake or output data in the 24 hours ending 24 1306      Examination:  Gen:  laying in bed with eyes closed, does open right eye with verbal stimulation. No acute respiratory distress. Head: left periorbital swelling/ecchymosis is notable and he is unable to open that eye. Has laceration with a couple staples to the left scalp superiorly. Pupils symmetric  ENT: Oral mucous membranes appear moist. No nasal drainage. CV: regular rate and rhythm  Chest wall: Normal chest wall exam, no gross abnormalities or deformities  Lungs: no wheezing or notable rhonchi. No stridor. No accessory muscle use. Abd: soft, non-distended, no guarding. Ext: no clubbing or cyanosis, or edema. Neuro: oriented to self and location. Follows commands in 4 extremities and strength is symmetrical  Msk:  well developed, well nourished. Psych:  calm, suboptimal memory and insight.       Recent labs and imaging personally reviewed:    Recent Labs   Lab 24  0834   WBC 9.4   RBC 3.40*   HGB 7.2*   HCT 25.5*      SEG 72     Recent Labs   Lab 24  0834   SODIUM 143   POTASSIUM 4.0   CHLORIDE 106   CO2 28   BUN 14   CREATININE 0.88   GLUCOSE 216*   CALCIUM 9.0   ALBUMIN 3.7   AST 15   GPT "23   BILIRUBIN 0.4   ALKPT 68   INR 1.0       INR (no units)   Date Value   02/17/2024 1.0     No results found    Invalid input(s): ""FI02\""    Echocardiogram 11/2020  Low normal left ventricular systolic function. Left ventricular ejection fraction, 50 %. Mild left ventricular hypertrophy. Grade I/IV diastolic dysfunction (abnormal relaxation filling pattern), normal to mildly elevated filling pressures. Mild mitral valve regurgitation. Findings might suggest vegetation on the mitral valve. Mild pulmonary valve regurgitation. Recommendadtion  LIZETT for further evaluation of the mitral valve    PSG 4/2021  Summary   Mild obstructive sleep apnea with desaturations and snoring. RDI 9     CT FACIAL BONES WO CONTRAST: 2/17/2024  IMPRESSION: Left periorbital soft tissue injury. No acute appearing fracture. CT CERVICAL SPINE WO CONTRAST 2/17/2024  IMPRESSION: No acute appearing osseous abnormality. Multiple indeterminate thyroid nodules. Due to size, consider ultrasound characterization. CT HEAD WO CONTRAST 2/17/2024  . ... Extensive encephalomalacia left MCA territory and temporo-occipital junction is unchanged. There is also small right frontal lobe focus. IMPRESSION: No significant change or acute appearing intracranial abnormality. Scalp injury. XR CHEST PA OR AP 1 VIEW 2/17/2024  IMPRESSION: No significant change or acute appearing intrathoracic abnormality. IMPRESSIONS:   -Gastrointestinal (GI) bleed, suspect slow bleed given hemodynamic stability.  -acute vs chronic blood loss anemia  -Acute metabolic encephalopathy.  Based on my conversation with [facility], it sounds like he is at his neurologic baseline at this time  -fall while trying to get up from bed. with laceration on head  -dysphagia, chronic issue  -dementia due to Parkinson's disease  -colon cancer with prior sigmoid bowel resection 2013  -chronic constipation (reported in records)  -diabetes mellitus type 2, non insulin dependent " with polyneuropathy  -gastroesophageal reflux  -essential hypertension  -schizophrenia  -prior stroke  -hyperlipidemia         PLANS:  -Admit to ICU on continuous telemetry   -serial hemoglobins. Transfuse for goal hb >7 g/dl  -consult Gastrointestinal (GI) team. Discussed with Dr. Марина Lau. It is my understanding he feels this is a chronic issue, and mentions possibly considering scope on Monday. He is aware the consult in placed for his services.  -ct abd/pelvis w iv contrast. Hold off on po contrast due to some records suggesting SLT evaluation would be appropriate. I did order that.  -consult Dr. Aarti Cabezas  -hold antiplatelets  -ppi bid  -tsh  -continue home bowel regimen  -continue home anti psychotics, aricept once medications are verified.  -Pt and OT evals when able  -continue medications as ordered  -further recommendations pending clinical course  -Glc control: insulin sliding scale  -Prophylaxis:   DVT: scd, chemical deep venous thrombosis ppx contraindicated with bleeding  -Sedation/Analgesia:  -Head-of-bed: Elevated 30 degrees  -Nutrition: NPO   -Code status: need to discuss with guardian  -Disposition: Patients prognosis is guarded  with significant risk of sudden and significant deterioration requiring urgent intervention with close monitoring in the IMCU. Is the patient expected to require a two midnight stay in the hospital?  yes    I have personally examined the patient and reviewed all pertinent data, including monitoring of multiple complex databases, coordinating/discussion with ICU Nurse/RT/Pharmacist, and complex medical decision making. Diagnosis and management discussed with Patient and Family. All questions answered.         6 [facility], DO  2/17/2024 The patient is a 53y Male complaining of shortness of breath.

## 2024-03-15 NOTE — ED PROVIDER NOTE - WR ORDER STATUS 1
Resulted Brow Lift Text: A midfrontal incision was made medially to the defect to allow access to the tissues just superior to the left eyebrow. Following careful dissection inferiorly in a supraperiosteal plane to the level of the left eyebrow, several 3-0 monocryl sutures were used to resuspend the eyebrow orbicularis oculi muscular unit to the superior frontal bone periosteum. This resulted in an appropriate reapproximation of static eyebrow symmetry and correction of the left brow ptosis.

## 2024-03-16 VITALS
OXYGEN SATURATION: 97 % | SYSTOLIC BLOOD PRESSURE: 146 MMHG | TEMPERATURE: 98 F | RESPIRATION RATE: 18 BRPM | DIASTOLIC BLOOD PRESSURE: 60 MMHG | HEART RATE: 65 BPM

## 2024-03-16 LAB
4/8 RATIO: 0.61 RATIO — LOW (ref 0.9–3.6)
A1C WITH ESTIMATED AVERAGE GLUCOSE RESULT: 5.9 % — HIGH (ref 4–5.6)
ABS CD8: 539 CELLS/UL — SIGNIFICANT CHANGE UP (ref 142–740)
ALBUMIN SERPL ELPH-MCNC: 2.6 G/DL — LOW (ref 3.3–5)
ALP SERPL-CCNC: 219 U/L — HIGH (ref 40–120)
ALT FLD-CCNC: 51 U/L — HIGH (ref 4–41)
ANION GAP SERPL CALC-SCNC: 10 MMOL/L — SIGNIFICANT CHANGE UP (ref 7–14)
APTT BLD: 30 SEC — SIGNIFICANT CHANGE UP (ref 24.5–35.6)
AST SERPL-CCNC: 25 U/L — SIGNIFICANT CHANGE UP (ref 4–40)
BASOPHILS # BLD AUTO: 0.1 K/UL — SIGNIFICANT CHANGE UP (ref 0–0.2)
BASOPHILS NFR BLD AUTO: 1.4 % — SIGNIFICANT CHANGE UP (ref 0–2)
BILIRUB SERPL-MCNC: 0.3 MG/DL — SIGNIFICANT CHANGE UP (ref 0.2–1.2)
BUN SERPL-MCNC: 26 MG/DL — HIGH (ref 7–23)
CALCIUM SERPL-MCNC: 8.8 MG/DL — SIGNIFICANT CHANGE UP (ref 8.4–10.5)
CD16+CD56+ CELLS NFR BLD: 12 % — SIGNIFICANT CHANGE UP (ref 5–23)
CD16+CD56+ CELLS NFR SPEC: 203 CELLS/UL — SIGNIFICANT CHANGE UP (ref 71–410)
CD19 BLASTS SPEC-ACNC: 35 % — HIGH (ref 6–24)
CD19 BLASTS SPEC-ACNC: 591 CELLS/UL — HIGH (ref 84–469)
CD3 BLASTS SPEC-ACNC: 50 % — LOW (ref 59–83)
CD3 BLASTS SPEC-ACNC: 860 CELLS/UL — SIGNIFICANT CHANGE UP (ref 672–1870)
CD4 %: 19 % — LOW (ref 30–62)
CD8 %: 31 % — SIGNIFICANT CHANGE UP (ref 12–36)
CHLORIDE SERPL-SCNC: 111 MMOL/L — HIGH (ref 98–107)
CHOLEST SERPL-MCNC: 99 MG/DL — SIGNIFICANT CHANGE UP
CMV IGG FLD QL: >10 U/ML — HIGH
CMV IGG SERPL-IMP: POSITIVE
CMV IGM FLD-ACNC: <8 AU/ML — SIGNIFICANT CHANGE UP
CMV IGM SERPL QL: NEGATIVE — SIGNIFICANT CHANGE UP
CO2 SERPL-SCNC: 20 MMOL/L — LOW (ref 22–31)
CREAT SERPL-MCNC: 1.58 MG/DL — HIGH (ref 0.5–1.3)
EGFR: 52 ML/MIN/1.73M2 — LOW
EOSINOPHIL # BLD AUTO: 0.12 K/UL — SIGNIFICANT CHANGE UP (ref 0–0.5)
EOSINOPHIL NFR BLD AUTO: 1.6 % — SIGNIFICANT CHANGE UP (ref 0–6)
ESTIMATED AVERAGE GLUCOSE: 123 — SIGNIFICANT CHANGE UP
GLUCOSE SERPL-MCNC: 145 MG/DL — HIGH (ref 70–99)
HCT VFR BLD CALC: 32.7 % — LOW (ref 39–50)
HDLC SERPL-MCNC: 43 MG/DL — SIGNIFICANT CHANGE UP
HGB BLD-MCNC: 10.9 G/DL — LOW (ref 13–17)
IANC: 4.91 K/UL — SIGNIFICANT CHANGE UP (ref 1.8–7.4)
IMM GRANULOCYTES NFR BLD AUTO: 0.5 % — SIGNIFICANT CHANGE UP (ref 0–0.9)
INR BLD: 0.96 RATIO — SIGNIFICANT CHANGE UP (ref 0.85–1.18)
LIPID PNL WITH DIRECT LDL SERPL: 47 MG/DL — SIGNIFICANT CHANGE UP
LYMPHOCYTES # BLD AUTO: 1.62 K/UL — SIGNIFICANT CHANGE UP (ref 1–3.3)
LYMPHOCYTES # BLD AUTO: 22.2 % — SIGNIFICANT CHANGE UP (ref 13–44)
MAGNESIUM SERPL-MCNC: 1.6 MG/DL — SIGNIFICANT CHANGE UP (ref 1.6–2.6)
MCHC RBC-ENTMCNC: 32 PG — SIGNIFICANT CHANGE UP (ref 27–34)
MCHC RBC-ENTMCNC: 33.3 GM/DL — SIGNIFICANT CHANGE UP (ref 32–36)
MCV RBC AUTO: 95.9 FL — SIGNIFICANT CHANGE UP (ref 80–100)
MONOCYTES # BLD AUTO: 0.5 K/UL — SIGNIFICANT CHANGE UP (ref 0–0.9)
MONOCYTES NFR BLD AUTO: 6.9 % — SIGNIFICANT CHANGE UP (ref 2–14)
NEUTROPHILS # BLD AUTO: 4.91 K/UL — SIGNIFICANT CHANGE UP (ref 1.8–7.4)
NEUTROPHILS NFR BLD AUTO: 67.4 % — SIGNIFICANT CHANGE UP (ref 43–77)
NON HDL CHOLESTEROL: 56 MG/DL — SIGNIFICANT CHANGE UP
NRBC # BLD: 0 /100 WBCS — SIGNIFICANT CHANGE UP (ref 0–0)
NRBC # FLD: 0 K/UL — SIGNIFICANT CHANGE UP (ref 0–0)
PHOSPHATE SERPL-MCNC: 3.9 MG/DL — SIGNIFICANT CHANGE UP (ref 2.5–4.5)
PLATELET # BLD AUTO: 372 K/UL — SIGNIFICANT CHANGE UP (ref 150–400)
POTASSIUM SERPL-MCNC: 3.7 MMOL/L — SIGNIFICANT CHANGE UP (ref 3.5–5.3)
POTASSIUM SERPL-SCNC: 3.7 MMOL/L — SIGNIFICANT CHANGE UP (ref 3.5–5.3)
PROT SERPL-MCNC: 7.6 G/DL — SIGNIFICANT CHANGE UP (ref 6–8.3)
PROTHROM AB SERPL-ACNC: 10.8 SEC — SIGNIFICANT CHANGE UP (ref 9.5–13)
RBC # BLD: 3.41 M/UL — LOW (ref 4.2–5.8)
RBC # FLD: 16.3 % — HIGH (ref 10.3–14.5)
SODIUM SERPL-SCNC: 141 MMOL/L — SIGNIFICANT CHANGE UP (ref 135–145)
T-CELL CD4 SUBSET PNL BLD: 328 CELLS/UL — LOW (ref 489–1457)
TRIGL SERPL-MCNC: 43 MG/DL — SIGNIFICANT CHANGE UP
WBC # BLD: 7.29 K/UL — SIGNIFICANT CHANGE UP (ref 3.8–10.5)
WBC # FLD AUTO: 7.29 K/UL — SIGNIFICANT CHANGE UP (ref 3.8–10.5)

## 2024-03-16 PROCEDURE — 76770 US EXAM ABDO BACK WALL COMP: CPT | Mod: 26

## 2024-03-16 PROCEDURE — 99233 SBSQ HOSP IP/OBS HIGH 50: CPT

## 2024-03-16 PROCEDURE — 75635 CT ANGIO ABDOMINAL ARTERIES: CPT | Mod: 26

## 2024-03-16 RX ORDER — HEPARIN SODIUM 5000 [USP'U]/ML
INJECTION INTRAVENOUS; SUBCUTANEOUS
Qty: 25000 | Refills: 0 | Status: DISCONTINUED | OUTPATIENT
Start: 2024-03-16 | End: 2024-03-17

## 2024-03-16 RX ORDER — OXYCODONE HYDROCHLORIDE 5 MG/1
2.5 TABLET ORAL ONCE
Refills: 0 | Status: DISCONTINUED | OUTPATIENT
Start: 2024-03-16 | End: 2024-03-16

## 2024-03-16 RX ORDER — ATORVASTATIN CALCIUM 80 MG/1
40 TABLET, FILM COATED ORAL AT BEDTIME
Refills: 0 | Status: DISCONTINUED | OUTPATIENT
Start: 2024-03-16 | End: 2024-03-17

## 2024-03-16 RX ORDER — HEPARIN SODIUM 5000 [USP'U]/ML
6500 INJECTION INTRAVENOUS; SUBCUTANEOUS EVERY 6 HOURS
Refills: 0 | Status: DISCONTINUED | OUTPATIENT
Start: 2024-03-16 | End: 2024-03-17

## 2024-03-16 RX ORDER — ASPIRIN/CALCIUM CARB/MAGNESIUM 324 MG
81 TABLET ORAL DAILY
Refills: 0 | Status: DISCONTINUED | OUTPATIENT
Start: 2024-03-16 | End: 2024-03-17

## 2024-03-16 RX ORDER — HEPARIN SODIUM 5000 [USP'U]/ML
3000 INJECTION INTRAVENOUS; SUBCUTANEOUS EVERY 6 HOURS
Refills: 0 | Status: DISCONTINUED | OUTPATIENT
Start: 2024-03-16 | End: 2024-03-17

## 2024-03-16 RX ADMIN — HEPARIN SODIUM 1500 UNIT(S)/HR: 5000 INJECTION INTRAVENOUS; SUBCUTANEOUS at 19:45

## 2024-03-16 RX ADMIN — ATORVASTATIN CALCIUM 40 MILLIGRAM(S): 80 TABLET, FILM COATED ORAL at 21:52

## 2024-03-16 RX ADMIN — BICTEGRAVIR SODIUM, EMTRICITABINE, AND TENOFOVIR ALAFENAMIDE FUMARATE 1 TABLET(S): 30; 120; 15 TABLET ORAL at 00:00

## 2024-03-16 RX ADMIN — OXYCODONE HYDROCHLORIDE 2.5 MILLIGRAM(S): 5 TABLET ORAL at 21:52

## 2024-03-16 RX ADMIN — HEPARIN SODIUM 5000 UNIT(S): 5000 INJECTION INTRAVENOUS; SUBCUTANEOUS at 06:42

## 2024-03-16 RX ADMIN — OXYCODONE HYDROCHLORIDE 2.5 MILLIGRAM(S): 5 TABLET ORAL at 14:33

## 2024-03-16 RX ADMIN — HEPARIN SODIUM 1500 UNIT(S)/HR: 5000 INJECTION INTRAVENOUS; SUBCUTANEOUS at 16:48

## 2024-03-16 RX ADMIN — PIPERACILLIN AND TAZOBACTAM 25 GRAM(S): 4; .5 INJECTION, POWDER, LYOPHILIZED, FOR SOLUTION INTRAVENOUS at 06:42

## 2024-03-16 RX ADMIN — OXYCODONE HYDROCHLORIDE 2.5 MILLIGRAM(S): 5 TABLET ORAL at 15:33

## 2024-03-16 RX ADMIN — Medication 81 MILLIGRAM(S): at 13:01

## 2024-03-16 RX ADMIN — PIPERACILLIN AND TAZOBACTAM 25 GRAM(S): 4; .5 INJECTION, POWDER, LYOPHILIZED, FOR SOLUTION INTRAVENOUS at 21:52

## 2024-03-16 RX ADMIN — BICTEGRAVIR SODIUM, EMTRICITABINE, AND TENOFOVIR ALAFENAMIDE FUMARATE 1 TABLET(S): 30; 120; 15 TABLET ORAL at 13:01

## 2024-03-16 RX ADMIN — HEPARIN SODIUM 1500 UNIT(S)/HR: 5000 INJECTION INTRAVENOUS; SUBCUTANEOUS at 16:45

## 2024-03-16 NOTE — PATIENT PROFILE ADULT - FALL HARM RISK - FALL HARM RISK
PATIENT INSTRUCTIONS:      Given by:   Nurse    Instructed in:  If yes, include date/comment and person who did the instructions  Follow up with Dr. Pepper in the 3 days. If any new symptoms or symptoms worsen, please contact your primary physician or go to the ER.    Patient/Family verbalized/demonstrated understanding of above Instructions:  yes  __________________________________________________________________________    OBJECTIVE CHECKLIST  Patient/Family has:    All medications brought from home   NA  Valuables from safe                            NA  Prescriptions                                       NA  All personal belongings                       NA  Equipment (oxygen, apnea monitor, wheelchair)     NA  Other:     __________________________________________________________________________  Discharge Survey Information  You may be receiving a survey from [facility].  Our goal is to provide the best patient care in the nation.  With your input, we can achieve this goal.    Type of Discharge: Order  Mode of Discharge:  carry (CHILD)  Method of Transportation:Private Car  Destination:  home  Transfer:  Referral Form:   No  Agency/Organization:  Accompanied by:  Specify relationship under 18 years of age) mother      Discharge date:  2017    9:38 AM    
Other

## 2024-03-16 NOTE — PATIENT PROFILE ADULT - FALL HARM RISK - RISK INTERVENTIONS

## 2024-03-16 NOTE — PROGRESS NOTE ADULT - PROBLEM SELECTOR PLAN 1
- Left forefoot ischemic changes to digits 1-5, not demarcated, no malodor. Maceration of left foot 4th interspace. Left plantar hallux hyperkeratotic lesion and left plantar lateral midfoot hyperkeratotic lesion. No open lesions or acute signs of infection of right foot.   - s/p debridement via Pod (follow up with Dr. Davila ([phone]) within 1 week of discharge)  - Bilateral xray: no OM, no gas, no fracture   - vasc consult placed  - DENIS-PVR

## 2024-03-16 NOTE — PATIENT PROFILE ADULT - NSPROPTRIGHTSUPPORTPERSON_GEN_A_NUR
I have sent diflucan to her pharmacy. She should be seen in the office if there is no improvement in 3-4 days. Thank you
Patient has whitish discharge with odor. Wants to know if can have prescription sent to pharmacy for a yeast infection. Offered an appointment but states does not know when can get off work. Pt Ph [phone]  
Patient informed Diflucan sent to pharmacy. Aware if symptoms not better in 3 to 4 days will need an appointment.  
declines

## 2024-03-16 NOTE — PROGRESS NOTE ADULT - ASSESSMENT
ASSESSMENT:  Mr. Hester is a 53 year old active smoker with PMH of HIV (Biktarvy) and possible DM who reports a history of LLE "blood clot" s/p failed attempted removal and second procedure 2y/a who was recently admitted to [facility] for 2 weeks for acute on chronic L foot pain. Patient has DENIS/PVR result suggestive of arterial embolic occlusion of toe.     PLAN:    - Recommend full anticoagulation  - Imaging: Recommend CT Aorta with runoff, Bilateral lower extremity arterial duplex, and echocardiogram  - Recommend telemetry monitoring, for concern for atrial fibrillation  - Please obtain outpatient records from PMD  - appreciate podiatry evaluation, no acute intervention at this time  - agree with empiric antibiotics w/ WBC 11  - will continue to follow    Tanner Ryan, PGY3  C Team Surgery   [id number]   ASSESSMENT:  Mr. Hester is a 53 year old active smoker with PMH of HIV (Biktarvy) and possible DM who reports a history of LLE "blood clot" s/p failed attempted removal and second procedure 2y/a who was recently admitted to [facility] for 2 weeks for acute on chronic L foot pain. Patient has DENIS/PVR result suggestive of arterial embolic occlusion of toe.     PLAN:    - Recommend full anticoagulation  - Imaging: Recommend CT Aorta with runoff, Bilateral lower extremity arterial duplex, and echocardiogram  - Recommend telemetry monitoring, for concern for atrial fibrillation  - Please obtain outpatient records from PMD  - appreciate podiatry evaluation, no acute intervention at this time  - agree with empiric antibiotics w/ WBC 11  - will continue to follow    C Team Surgery   [id number]   ASSESSMENT:  Mr. Hester is a 53 year old active smoker with PMH of HIV (Biktarvy) and possible DM who reports a history of LLE "blood clot" s/p failed attempted removal and second procedure 2y/a who was recently admitted to [facility] for 2 weeks for acute on chronic L foot pain. Patient has DENIS/PVR result suggestive of arterial embolic occlusion of toe.     PLAN:    - Recommend full anticoagulation  - Recommend aspirin 81mg and statin  - Imaging: Recommend CT Aorta with runoff, Bilateral lower extremity arterial duplex, and echocardiogram  - Recommend telemetry monitoring, for concern for atrial fibrillation  - Please obtain outpatient records from PMD  - appreciate podiatry evaluation, no acute intervention at this time  - agree with empiric antibiotics w/ WBC 11  - will continue to follow    C Team Surgery   [id number]

## 2024-03-16 NOTE — PHYSICAL THERAPY INITIAL EVALUATION ADULT - PERTINENT HX OF CURRENT PROBLEM, REHAB EVAL
Patient is a 53 year old male, PMH stated below, presents with peripheral arterial disease; bilateral feet pain and swelling x 3 weeks.

## 2024-03-16 NOTE — PROGRESS NOTE ADULT - ASSESSMENT
53M Hx of HIV on Biktarvy unknown CD4 count, questionable diagnosis of blood clots in bilateral lower extremity presents to the ER after leaving AMA from [facility] with complaint of bilateral feet pain and swelling x 3 weeks. Exam c/w PAD.

## 2024-03-16 NOTE — PROGRESS NOTE ADULT - SUBJECTIVE AND OBJECTIVE BOX
Suzi Johnson, PGY1    Patient is a 53y old  Male who presents with a chief complaint of PAD (15 Mar 2024 19:36)      SUBJECTIVE / OVERNIGHT EVENTS: NAEO. Pt denies chest pain, SOB, N/V, fever/chills, or changes in bowel movements.    MEDICATIONS  (STANDING):  bictegravir 50 mG/emtricitabine 200 mG/tenofovir alafenamide 25 mG (BIKTARVY) 1 Tablet(s) Oral daily  heparin   Injectable 5000 Unit(s) SubCutaneous every 8 hours  piperacillin/tazobactam IVPB.. 3.375 Gram(s) IV Intermittent every 12 hours    MEDICATIONS  (PRN):  acetaminophen     Tablet .. 650 milliGRAM(s) Oral every 6 hours PRN Temp greater or equal to 38C (100.4F), Mild Pain (1 - 3)  melatonin 3 milliGRAM(s) Oral at bedtime PRN Insomnia  nicotine -   7 mG/24Hr(s) Patch 1 Patch Transdermal daily PRN Nicotine cravings      CAPILLARY BLOOD GLUCOSE      POCT Blood Glucose.: 135 mg/dL (15 Mar 2024 09:41)    I&O's Summary      Vital Signs Last 24 Hrs  T(C): 36.8 (16 Mar 2024 06:58), Max: 37.1 (15 Mar 2024 10:33)  T(F): 98.2 (16 Mar 2024 06:58), Max: 98.8 (15 Mar 2024 10:33)  HR: 73 (16 Mar 2024 06:58) (66 - 88)  BP: 133/88 (16 Mar 2024 06:58) (96/60 - 133/88)  BP(mean): --  RR: 18 (16 Mar 2024 06:58) (16 - 22)  SpO2: 100% (16 Mar 2024 06:58) (96% - 100%)    Parameters below as of 16 Mar 2024 06:58  Patient On (Oxygen Delivery Method): room air        PHYSICAL EXAM:  GENERAL: NAD, well-developed, well-nourished  HEAD: Atraumatic, Normocephalic  EYES: Conjunctiva and sclera clear  CHEST/LUNG: Clear to auscultation bilaterally; No wheezes or crackles  HEART: Normal S1/S2; Regular rate and rhythm; No murmurs, rubs, or gallops  ABDOMEN: Soft, Nontender, Nondistended; Bowel sounds present  EXTREMITIES: No clubbing, cyanosis, or edema  PSYCH: A&Ox3      LABS:                        10.9   7.29  )-----------( 372      ( 16 Mar 2024 06:05 )             32.7      03-16    141  |  111<H>  |  26<H>  ----------------------------<  145<H>  3.7   |  20<L>  |  1.58<H>    Ca    8.8      16 Mar 2024 06:05  Phos  3.9     03-16  Mg     1.60     03-16    TPro  7.6  /  Alb  2.6<L>  /  TBili  0.3  /  DBili  x   /  AST  25  /  ALT  51<H>  /  AlkPhos  219<H>  03-16    PT/INR - ( 15 Mar 2024 11:13 )   PT: 11.3 sec;   INR: 1.00 ratio         PTT - ( 15 Mar 2024 11:13 )  PTT:27.4 sec      Urinalysis Basic - ( 16 Mar 2024 06:05 )    Color: x / Appearance: x / SG: x / pH: x  Gluc: 145 mg/dL / Ketone: x  / Bili: x / Urobili: x   Blood: x / Protein: x / Nitrite: x   Leuk Esterase: x / RBC: x / WBC x   Sq Epi: x / Non Sq Epi: x / Bacteria: x        RADIOLOGY & ADDITIONAL TESTS:

## 2024-03-16 NOTE — PROGRESS NOTE ADULT - SUBJECTIVE AND OBJECTIVE BOX
SUBJECTIVE: Patient seen and examined on AM rounds. No new subjective complaints    Vital Signs Last 24 Hrs  T(C): 36.8 (16 Mar 2024 06:58), Max: 36.8 (16 Mar 2024 03:58)  T(F): 98.2 (16 Mar 2024 06:58), Max: 98.3 (16 Mar 2024 03:58)  HR: 73 (16 Mar 2024 06:58) (66 - 80)  BP: 133/88 (16 Mar 2024 06:58) (106/56 - 133/88)  BP(mean): --  RR: 18 (16 Mar 2024 06:58) (16 - 18)  SpO2: 100% (16 Mar 2024 06:58) (96% - 100%)    Parameters below as of 16 Mar 2024 06:58  Patient On (Oxygen Delivery Method): room air        General Appearance: Appears well, NAD  Neck: Supple  Chest: Equal expansion bilaterally  CV: Pulse regular presently  Abdomen: Soft, nontense  Extremities: LLE with distal ischemic changes, especially over the 1/2 MTP with violaceous discoloration, some blanching erythema between 1/2 toe, tender to palpation, +DP/PT signals, +palp fem, RLE +DP/PT, some chronic discoloration of the R toes but not acutely inflamed/infected     I&O's Summary    I&O's Detail      LABS:                        10.9   7.29  )-----------( 372      ( 16 Mar 2024 06:05 )             32.7     03-16    141  |  111<H>  |  26<H>  ----------------------------<  145<H>  3.7   |  20<L>  |  1.58<H>    Ca    8.8      16 Mar 2024 06:05  Phos  3.9     03-16  Mg     1.60     03-16    TPro  7.6  /  Alb  2.6<L>  /  TBili  0.3  /  DBili  x   /  AST  25  /  ALT  51<H>  /  AlkPhos  219<H>  03-16    PT/INR - ( 15 Mar 2024 11:13 )   PT: 11.3 sec;   INR: 1.00 ratio         PTT - ( 15 Mar 2024 11:13 )  PTT:27.4 sec  Urinalysis Basic - ( 16 Mar 2024 06:05 )    Color: x / Appearance: x / SG: x / pH: x  Gluc: 145 mg/dL / Ketone: x  / Bili: x / Urobili: x   Blood: x / Protein: x / Nitrite: x   Leuk Esterase: x / RBC: x / WBC x   Sq Epi: x / Non Sq Epi: x / Bacteria: x        RADIOLOGY & ADDITIONAL STUDIES:

## 2024-03-16 NOTE — PROGRESS NOTE ADULT - ATTENDING COMMENTS
Initial attending contact date 3/16/24. See resident note written above for details. I reviewed the resident documentation. I have personally seen and examined this patient. I reviewed vitals, labs, medications, and additional imaging. I agree with the above resident's findings and plans as written above with the following additions/statements.    53 yo M PMHx of HTN, CKD, HIV. Recent admission with strep bacteremia (2/26). P/W b/l foot pain, and dry gangrene. Tx here for further evaluation. ABIs concerning for microemboli etiology to presentation.   - c/w empiric Zosyn   - TTE, likely will need HERON  - f/u Bcx  - f/u CT angio abdominal aorta w/run off, b/l LE arterial duplex  - Vascular surgery recs appreciated

## 2024-03-16 NOTE — PHYSICAL THERAPY INITIAL EVALUATION ADULT - ACTIVE RANGE OF MOTION EXAMINATION, REHAB EVAL
brianda. upper extremity Active ROM was WNL (within normal limits)/bilateral lower extremity Active ROM was WNL (within normal limits)

## 2024-03-16 NOTE — PROGRESS NOTE ADULT - PROBLEM SELECTOR PLAN 1
- Left forefoot ischemic changes to digits 1-5, not demarcated, no malodor. Maceration of left foot 4th interspace. Left plantar hallux hyperkeratotic lesion and left plantar lateral midfoot hyperkeratotic lesion. No open lesions or acute signs of infection of right foot.   - s/p debridement via Pod (follow up with Dr. Davila ([phone]) within 1 week of discharge)  - Bilateral xray: no OM, no gas, no fracture   - vasc consult placed  - DENIS-PVR  - heparin drip

## 2024-03-16 NOTE — PROGRESS NOTE ADULT - SUBJECTIVE AND OBJECTIVE BOX
Suzi Johnson, PGY1    Patient is a 53y old  Male who presents with a chief complaint of PAD (15 Mar 2024 19:36)      SUBJECTIVE / OVERNIGHT EVENTS: NAEO. Pt denies chest pain, SOB, N/V, fever/chills, or changes in bowel movements.    MEDICATIONS  (STANDING):  bictegravir 50 mG/emtricitabine 200 mG/tenofovir alafenamide 25 mG (BIKTARVY) 1 Tablet(s) Oral daily  heparin   Injectable 5000 Unit(s) SubCutaneous every 8 hours  piperacillin/tazobactam IVPB.. 3.375 Gram(s) IV Intermittent every 12 hours    MEDICATIONS  (PRN):  acetaminophen     Tablet .. 650 milliGRAM(s) Oral every 6 hours PRN Temp greater or equal to 38C (100.4F), Mild Pain (1 - 3)  melatonin 3 milliGRAM(s) Oral at bedtime PRN Insomnia  nicotine -   7 mG/24Hr(s) Patch 1 Patch Transdermal daily PRN Nicotine cravings      CAPILLARY BLOOD GLUCOSE      POCT Blood Glucose.: 135 mg/dL (15 Mar 2024 09:41)    I&O's Summary      Vital Signs Last 24 Hrs  T(C): 36.8 (16 Mar 2024 11:57), Max: 36.8 (16 Mar 2024 03:58)  T(F): 98.2 (16 Mar 2024 11:57), Max: 98.3 (16 Mar 2024 03:58)  HR: 85 (16 Mar 2024 11:57) (66 - 85)  BP: 123/73 (16 Mar 2024 11:57) (106/56 - 133/88)  BP(mean): --  RR: 18 (16 Mar 2024 11:57) (16 - 18)  SpO2: 98% (16 Mar 2024 11:57) (96% - 100%)    Parameters below as of 16 Mar 2024 06:58  Patient On (Oxygen Delivery Method): room air          PHYSICAL EXAM:  GENERAL: NAD, well-developed, well-nourished  HEAD: Atraumatic, Normocephalic  EYES: Conjunctiva and sclera clear  CHEST/LUNG: Clear to auscultation bilaterally; No wheezes or crackles  HEART: Normal S1/S2; Regular rate and rhythm; No murmurs, rubs, or gallops  ABDOMEN: Soft, Nontender, Nondistended; Bowel sounds present  EXTREMITIES: No clubbing, cyanosis, or edema  PSYCH: A&Ox3      LABS:                        10.9   7.29  )-----------( 372      ( 16 Mar 2024 06:05 )             32.7      03-16    141  |  111<H>  |  26<H>  ----------------------------<  145<H>  3.7   |  20<L>  |  1.58<H>    Ca    8.8      16 Mar 2024 06:05  Phos  3.9     03-16  Mg     1.60     03-16    TPro  7.6  /  Alb  2.6<L>  /  TBili  0.3  /  DBili  x   /  AST  25  /  ALT  51<H>  /  AlkPhos  219<H>  03-16    PT/INR - ( 15 Mar 2024 11:13 )   PT: 11.3 sec;   INR: 1.00 ratio         PTT - ( 15 Mar 2024 11:13 )  PTT:27.4 sec      Urinalysis Basic - ( 16 Mar 2024 06:05 )    Color: x / Appearance: x / SG: x / pH: x  Gluc: 145 mg/dL / Ketone: x  / Bili: x / Urobili: x   Blood: x / Protein: x / Nitrite: x   Leuk Esterase: x / RBC: x / WBC x   Sq Epi: x / Non Sq Epi: x / Bacteria: x        RADIOLOGY & ADDITIONAL TESTS:

## 2024-03-16 NOTE — PROGRESS NOTE ADULT - ASSESSMENT
53M Hx of HIV on Biktarvy unknown CD4 count, questionable diagnosis of blood clots in bilateral lower extremity presents to the ER after leaving AMA from [facility] with complaint of bilateral feet pain and swelling x 3 weeks. Exam c/w PAD.

## 2024-03-16 NOTE — PHYSICAL THERAPY INITIAL EVALUATION ADULT - ADDITIONAL COMMENTS
Pt left semisupine in bed in NAD, all lines intact, call bell in reach, SPO2 100%, and bed alarm on.and RN aware.

## 2024-03-17 ENCOUNTER — TRANSCRIPTION ENCOUNTER (OUTPATIENT)
Age: 54
End: 2024-03-17

## 2024-03-17 LAB
ALBUMIN SERPL ELPH-MCNC: 2.7 G/DL — LOW (ref 3.3–5)
ALP SERPL-CCNC: 194 U/L — HIGH (ref 40–120)
ALT FLD-CCNC: 46 U/L — HIGH (ref 4–41)
ANION GAP SERPL CALC-SCNC: 9 MMOL/L — SIGNIFICANT CHANGE UP (ref 7–14)
APTT BLD: 33.1 SEC — SIGNIFICANT CHANGE UP (ref 24.5–35.6)
AST SERPL-CCNC: 22 U/L — SIGNIFICANT CHANGE UP (ref 4–40)
BASOPHILS # BLD AUTO: 0.1 K/UL — SIGNIFICANT CHANGE UP (ref 0–0.2)
BASOPHILS NFR BLD AUTO: 1.4 % — SIGNIFICANT CHANGE UP (ref 0–2)
BILIRUB SERPL-MCNC: 0.3 MG/DL — SIGNIFICANT CHANGE UP (ref 0.2–1.2)
BUN SERPL-MCNC: 20 MG/DL — SIGNIFICANT CHANGE UP (ref 7–23)
CALCIUM SERPL-MCNC: 8.8 MG/DL — SIGNIFICANT CHANGE UP (ref 8.4–10.5)
CHLORIDE SERPL-SCNC: 107 MMOL/L — SIGNIFICANT CHANGE UP (ref 98–107)
CO2 SERPL-SCNC: 23 MMOL/L — SIGNIFICANT CHANGE UP (ref 22–31)
CREAT SERPL-MCNC: 1.49 MG/DL — HIGH (ref 0.5–1.3)
CULTURE RESULTS: NO GROWTH — SIGNIFICANT CHANGE UP
EGFR: 56 ML/MIN/1.73M2 — LOW
EOSINOPHIL # BLD AUTO: 0.17 K/UL — SIGNIFICANT CHANGE UP (ref 0–0.5)
EOSINOPHIL NFR BLD AUTO: 2.4 % — SIGNIFICANT CHANGE UP (ref 0–6)
GLUCOSE BLDC GLUCOMTR-MCNC: 162 MG/DL — HIGH (ref 70–99)
GLUCOSE SERPL-MCNC: 93 MG/DL — SIGNIFICANT CHANGE UP (ref 70–99)
HCT VFR BLD CALC: 32.2 % — LOW (ref 39–50)
HGB BLD-MCNC: 11.1 G/DL — LOW (ref 13–17)
IANC: 4.03 K/UL — SIGNIFICANT CHANGE UP (ref 1.8–7.4)
IMM GRANULOCYTES NFR BLD AUTO: 0.4 % — SIGNIFICANT CHANGE UP (ref 0–0.9)
LYMPHOCYTES # BLD AUTO: 2.19 K/UL — SIGNIFICANT CHANGE UP (ref 1–3.3)
LYMPHOCYTES # BLD AUTO: 30.5 % — SIGNIFICANT CHANGE UP (ref 13–44)
MAGNESIUM SERPL-MCNC: 1.5 MG/DL — LOW (ref 1.6–2.6)
MCHC RBC-ENTMCNC: 32.8 PG — SIGNIFICANT CHANGE UP (ref 27–34)
MCHC RBC-ENTMCNC: 34.5 GM/DL — SIGNIFICANT CHANGE UP (ref 32–36)
MCV RBC AUTO: 95.3 FL — SIGNIFICANT CHANGE UP (ref 80–100)
MONOCYTES # BLD AUTO: 0.66 K/UL — SIGNIFICANT CHANGE UP (ref 0–0.9)
MONOCYTES NFR BLD AUTO: 9.2 % — SIGNIFICANT CHANGE UP (ref 2–14)
MRSA PCR RESULT.: SIGNIFICANT CHANGE UP
NEUTROPHILS # BLD AUTO: 4.03 K/UL — SIGNIFICANT CHANGE UP (ref 1.8–7.4)
NEUTROPHILS NFR BLD AUTO: 56.1 % — SIGNIFICANT CHANGE UP (ref 43–77)
NRBC # BLD: 0 /100 WBCS — SIGNIFICANT CHANGE UP (ref 0–0)
NRBC # FLD: 0 K/UL — SIGNIFICANT CHANGE UP (ref 0–0)
PHOSPHATE SERPL-MCNC: 3.8 MG/DL — SIGNIFICANT CHANGE UP (ref 2.5–4.5)
PLATELET # BLD AUTO: 382 K/UL — SIGNIFICANT CHANGE UP (ref 150–400)
POTASSIUM SERPL-MCNC: 3.6 MMOL/L — SIGNIFICANT CHANGE UP (ref 3.5–5.3)
POTASSIUM SERPL-SCNC: 3.6 MMOL/L — SIGNIFICANT CHANGE UP (ref 3.5–5.3)
PROT SERPL-MCNC: 7.5 G/DL — SIGNIFICANT CHANGE UP (ref 6–8.3)
RBC # BLD: 3.38 M/UL — LOW (ref 4.2–5.8)
RBC # FLD: 16 % — HIGH (ref 10.3–14.5)
S AUREUS DNA NOSE QL NAA+PROBE: SIGNIFICANT CHANGE UP
SODIUM SERPL-SCNC: 139 MMOL/L — SIGNIFICANT CHANGE UP (ref 135–145)
SPECIMEN SOURCE: SIGNIFICANT CHANGE UP
WBC # BLD: 7.18 K/UL — SIGNIFICANT CHANGE UP (ref 3.8–10.5)
WBC # FLD AUTO: 7.18 K/UL — SIGNIFICANT CHANGE UP (ref 3.8–10.5)

## 2024-03-17 RX ORDER — ACETAMINOPHEN 500 MG
2 TABLET ORAL
Qty: 0 | Refills: 0 | DISCHARGE
Start: 2024-03-17

## 2024-03-17 RX ORDER — ATORVASTATIN CALCIUM 80 MG/1
1 TABLET, FILM COATED ORAL
Qty: 0 | Refills: 0 | DISCHARGE
Start: 2024-03-17

## 2024-03-17 RX ORDER — ASPIRIN/CALCIUM CARB/MAGNESIUM 324 MG
1 TABLET ORAL
Qty: 0 | Refills: 0 | DISCHARGE
Start: 2024-03-17

## 2024-03-17 RX ORDER — APIXABAN 2.5 MG/1
2 TABLET, FILM COATED ORAL
Qty: 240 | Refills: 0
Start: 2024-03-17 | End: 2024-05-15

## 2024-03-17 RX ADMIN — PIPERACILLIN AND TAZOBACTAM 25 GRAM(S): 4; .5 INJECTION, POWDER, LYOPHILIZED, FOR SOLUTION INTRAVENOUS at 06:02

## 2024-03-17 RX ADMIN — HEPARIN SODIUM 1500 UNIT(S)/HR: 5000 INJECTION INTRAVENOUS; SUBCUTANEOUS at 08:47

## 2024-03-17 RX ADMIN — HEPARIN SODIUM 1500 UNIT(S)/HR: 5000 INJECTION INTRAVENOUS; SUBCUTANEOUS at 01:07

## 2024-03-17 NOTE — DISCHARGE NOTE PROVIDER - NSDCDCMDCOMP_GEN_ALL_CORE
Called patient left voicemail that we need more info on medication requested.    This document is complete and the patient is ready for discharge.

## 2024-03-17 NOTE — PROGRESS NOTE ADULT - ASSESSMENT
53M Hx of HIV on Biktarvy unknown CD4 count, questionable diagnosis of blood clots in bilateral lower extremity presents to the ER after leaving AMA from [facility] with complaint of bilateral feet pain and swelling x 3 weeks. Exam c/w PAD.

## 2024-03-17 NOTE — PROGRESS NOTE ADULT - PROBLEM SELECTOR PROBLEM 1
PAD (peripheral artery disease)
Bleeding that does not stop/Swelling that gets worse/Fever greater than (need to indicate Fahrenheit or Celsius)
PAD (peripheral artery disease)
PAD (peripheral artery disease)
Nausea and vomiting that does not stop/Inability to tolerate liquids or foods/Swelling that gets worse/Bleeding that does not stop/Fever greater than (need to indicate Fahrenheit or Celsius)/Pain not relieved by Medications

## 2024-03-17 NOTE — PROGRESS NOTE ADULT - SUBJECTIVE AND OBJECTIVE BOX
General Surgery Progress Note     Objective:  Vitals:  T(C): 36.4 (03-16-24 @ 20:15), Max: 36.4 (03-16-24 @ 20:15)  HR: 65 (03-16-24 @ 20:15) (65 - 65)  BP: 146/60 (03-16-24 @ 20:15) (146/60 - 146/60)  RR: 18 (03-16-24 @ 20:15) (18 - 18)  SpO2: 97% (03-16-24 @ 20:15) (97% - 97%)  Wt(kg): --    03-16 @ 07:01  -  03-17 @ 07:00  --------------------------------------------------------  IN:  Total IN: 0 mL    OUT:    Voided (mL): 1050 mL  Total OUT: 1050 mL    Total NET: -1050 mL          Physical Exam:  General Appearance: no acute distress, NTND   Chest: airway intact, non-labored breathing  CV: no JVD, palpable pulses b/l  Abdomen: soft, non-tender, non-distended, +BS   Extremities: LLE with distal ischemic changes, especially over the 1/2 MTP with violaceous discoloration, some blanching erythema between 1/2 toe, tender to palpation, +DP/PT signals, +palp fem, RLE +DP/PT, some chronic discoloration of the R toes but not acutely inflamed/infected       Labs:                        11.1   7.18  )-----------( 382      ( 17 Mar 2024 06:57 )             32.2     03-17    139  |  107  |  20  ----------------------------<  93  3.6   |  23  |  1.49<H>    Ca    8.8      17 Mar 2024 06:57  Phos  3.8     03-17  Mg     1.50     03-17    TPro  7.5  /  Alb  2.7<L>  /  TBili  0.3  /  DBili  x   /  AST  22  /  ALT  46<H>  /  AlkPhos  194<H>  03-17    LIVER FUNCTIONS - ( 17 Mar 2024 06:57 )  Alb: 2.7 g/dL / Pro: 7.5 g/dL / ALK PHOS: 194 U/L / ALT: 46 U/L / AST: 22 U/L / GGT: x           PT/INR - ( 16 Mar 2024 15:10 )   PT: 10.8 sec;   INR: 0.96 ratio         PTT - ( 17 Mar 2024 06:57 )  PTT:33.1 sec  Urinalysis Basic - ( 17 Mar 2024 06:57 )    Color: x / Appearance: x / SG: x / pH: x  Gluc: 93 mg/dL / Ketone: x  / Bili: x / Urobili: x   Blood: x / Protein: x / Nitrite: x   Leuk Esterase: x / RBC: x / WBC x   Sq Epi: x / Non Sq Epi: x / Bacteria: x

## 2024-03-17 NOTE — DISCHARGE NOTE PROVIDER - ATTENDING ATTESTATION STATEMENT
Rodriguez Cotto TRANSFER - IN REPORT:    Verbal report received from CHICA Platt RN on [id number]  being received from ED for routine progression of care      Report consisted of patients Situation, Background, Assessment and   Recommendations(SBAR). Information from the following report(s) SBAR was reviewed with the receiving nurse. Opportunity for questions and clarification was provided. Assessment completed upon patients arrival to unit and care assumed. I have personally seen and examined the patient. I have collaborated with and supervised the

## 2024-03-17 NOTE — DISCHARGE NOTE PROVIDER - NSDCFUSCHEDAPPT_GEN_ALL_CORE_FT
[birth year]   Ulisses Rubalcava Physician [facility]  [phone]  Scheduled Appointment: 04/15/2024

## 2024-03-17 NOTE — DISCHARGE NOTE PROVIDER - NSDCCPGOAL_GEN_ALL_CORE_FT
"  Reason for Disposition   Other post-op symptom or question (all triage questions negative)    Answer Assessment - Initial Assessment Questions  1. SYMPTOM: "What's the main symptom you're concerned about?" (e.g., pain, fever, vomiting)      About 75% of bandage is soaked with blood  2. ONSET: "When did ________  start?"      About 10 min ago  3. SURGERY: "What surgery was performed?"      Left knee surgery  4. DATE of SURGERY: "When was surgery performed?"       March 29th  5. ANESTHESIA: " What type of anesthesia did you have?" (e.g., general, spinal, epidural, local)      General  6. PAIN: "Is there any pain?" If so, ask: "How bad is it?"  (Scale 1-10; or mild, moderate, severe)      4-5/10  7. FEVER: "Do you have a fever?" If so, ask: "What is your temperature, how was it measured, and when did it start?"      No  8. VOMITING: "Is there any vomiting?" If yes, ask: "How many times?"      No  9. BLEEDING: "Is there any bleeding?" If so, ask: "How much?" and "Where?"      Yes. Covering about 75% of bandage.  10. OTHER SYMPTOMS: "Do you have any other symptoms?" (e.g., drainage from wound, painful urination, constipation)        Constipation    Protocols used: ST POST-OP SYMPTOMS AND [id number]    Patient calling stating aqua seal is about 75% covered with blood when she took ace bandage off. Patient advised to monitor for any continuous active bleeding, cover incision with gauze, and call back if it continues to bleed. Advised to call back with any symptoms listed in discharge summary:   Call MD for:  temperature >100.4      Call MD for:  persistent nausea and vomiting      Call MD for:  severe uncontrolled pain      Call MD for:  difficulty breathing, headache or visual disturbances      Call MD for:  redness, tenderness, or signs of infection (pain, swelling, redness, odor or green/yellow discharge around incision site)      Call MD for:  hives      Call MD for:  persistent dizziness or light-headedness "      Call MD for:  extreme fatigue      Activity as tolerated      Shower on day dressing removed (No bath)           Follow-up Information      Follow up In 2 weeks.         Patient voices understanding. Please contact caller directly to discuss any further care advice.       To get better and follow your care plan as instructed.

## 2024-03-17 NOTE — DISCHARGE NOTE PROVIDER - NSDCCPTREATMENT_GEN_ALL_CORE_FT
PRINCIPAL PROCEDURE  Procedure: CT abdomen  Findings and Treatment:   < end of copied text >  INTERPRETATION:  CLINICAL INFORMATION: Left toe pain and ischemic change.   Evaluate for arterial embolism.  COMPARISON: None.  CONTRAST/COMPLICATIONS:  IV Contrast: Omnipaque 350  90 cc administered   10 cc discarded  Oral Contrast: NONE  Complications: None reported at time of study completion  IMPRESSION:  Prelim:  No evidence for arterial embolism within the bilateral lower extremities.   Patent three-vessel runoff to the left foot.  Rim-enhancing collection inferior to the right metatarsal measuring 1.2 x   1.5 x 1.7 cm.  Follow up final report in AM for more detailed findings.  < from: CT Angio Abd Aorta w/run-off w/ IV Cont (03.16.24 @ 19:07) >        SECONDARY PROCEDURE  Procedure: DENIS (ankle brachial index)  Findings and Treatment:   < end of copied text >  CONCLUSIONS:      1. Right: Right ABIis normal (1.39). Right TBI is normal (0.94), with a digit pressure of 112 mmHg. No significant occlusive arterial disease in the right lower extremity.   2. Left: Left DENIS is mildly elevated (1.46), likely attributed to calcific vessels. Absent leftdigit waveforms. Findings suggest the presence of significant small vessel arterial disease in the left foot.  < from: VA DENIS WITH PVR (03.15.24 @ 17:05) >

## 2024-03-17 NOTE — PROGRESS NOTE ADULT - ASSESSMENT
Mr. Hester is a 53 year old active smoker with PMH of HIV (Biktarvy) and possible DM who reports a history of LLE "blood clot" s/p failed attempted removal and second procedure 2y/a who was recently admitted to [facility] for 2 weeks for acute on chronic L foot pain. Patient has DENIS/PVR result suggestive of arterial embolic occlusion of toe.     PLAN:    - Recommend full anticoagulation  - Recommend aspirin 81mg and statin  - Imaging: CT Aorta with runoff > No evidence for arterial embolism within the bilateral lower extremities. Patent three-vessel runoff to the left foot. Rim-enhancing collection inferior to the right metatarsal measuring 1.2 x 1.5 x 1.7 cm.   - Given neg CTA, likely cardioembolic source, would consider obtaining echo   - podiatry evaluation, no acute intervention at this time  - agree with empiric antibiotics w/ WBC 11  - Vasc sug to s/o please reconsult with questions or concerns    C Team Surgery   [phone]

## 2024-03-17 NOTE — PROGRESS NOTE ADULT - PROBLEM SELECTOR PLAN 2
On home Biktarvy  - CD4 count  - Tcell subset  -  Viral load  - PCP  - Quant  - CMV

## 2024-03-17 NOTE — PROGRESS NOTE ADULT - PROBLEM SELECTOR PLAN 1
- Left forefoot ischemic changes to digits 1-5, not demarcated, no malodor. Maceration of left foot 4th interspace. Left plantar hallux hyperkeratotic lesion and left plantar lateral midfoot hyperkeratotic lesion. No open lesions or acute signs of infection of right foot.   - s/p debridement via Pod (follow up with Dr. Davila ([phone]) within 1 week of discharge)  - Bilateral xray: no OM, no gas, no fracture   - vasc consult placed  - DENIS-PVR  - heparin drip - Left forefoot ischemic changes to digits 1-5, not demarcated, no malodor. Maceration of left foot 4th interspace. Left plantar hallux hyperkeratotic lesion and left plantar lateral midfoot hyperkeratotic lesion. No open lesions or acute signs of infection of right foot.   - s/p debridement via Pod (follow up with Dr. Davila ([phone]) within 1 week of discharge)  - Bilateral xray: no OM, no gas, no fracture   - vasc consult placed  - DENIS-PVR w/ significant L foot small vessel disease  - heparin drip

## 2024-03-17 NOTE — PROGRESS NOTE ADULT - PROBLEM SELECTOR PLAN 3
Cr 2.21; Unclear baseline  - Ulytes  - US kid/bladder

## 2024-03-17 NOTE — DISCHARGE NOTE PROVIDER - NSDCMRMEDTOKEN_GEN_ALL_CORE_FT
acetaminophen 325 mg oral tablet: 2 tab(s) orally every 6 hours As needed Temp greater or equal to 38C (100.4F), Mild Pain (1 - 3)  aspirin 81 mg oral tablet, chewable: 1 tab(s) orally once a day  atorvastatin 40 mg oral tablet: 1 tab(s) orally once a day (at bedtime)  Bactrim  mg-160 mg oral tablet: 1 tab(s) orally 2 times a day  Biktarvy 50 mg-200 mg-25 mg oral tablet: 1 tab(s) orally once a day  Eliquis Starter Pack for Treatment of DVT and PE 5 mg oral tablet: 2 tab(s) orally 2 times a day 10 mg twice daily for 7 days followed by 5 mg twice daily.   aspirin 81 mg oral delayed release tablet: 1 tab(s) orally once a day  atorvastatin 40 mg oral tablet: 1 tab(s) orally once a day (at bedtime)  Biktarvy 50 mg-200 mg-25 mg oral tablet: 1 tab(s) orally once a day  Eliquis 5 mg oral tablet: 1 tab(s) orally 2 times a day  gabapentin 300 mg oral capsule: 1 cap(s) orally once a day (at bedtime)

## 2024-03-17 NOTE — DISCHARGE NOTE PROVIDER - NSDCCPCAREPLAN_GEN_ALL_CORE_FT
PRINCIPAL DISCHARGE DIAGNOSIS  Diagnosis: Cellulitis  Assessment and Plan of Treatment: You were found to have an arterial blood clot in your toe. You also have significant arterial disease of your left leg. You had a debridement with podiatry but are waiting for further intervention. You decided to leave against medical advice. Please return to hospital as soon as possible to avoid wide spread infection and death.

## 2024-03-17 NOTE — PROGRESS NOTE ADULT - SUBJECTIVE AND OBJECTIVE BOX
Patient is a 53y old  Male who presents with a chief complaint of PAD (15 Mar 2024 19:36)      SUBJECTIVE / OVERNIGHT EVENTS: NAEO. Pt denies chest pain, SOB, N/V, fever/chills, or changes in bowel movements.    MEDICATIONS  (STANDING):  bictegravir 50 mG/emtricitabine 200 mG/tenofovir alafenamide 25 mG (BIKTARVY) 1 Tablet(s) Oral daily  heparin   Injectable 5000 Unit(s) SubCutaneous every 8 hours  piperacillin/tazobactam IVPB.. 3.375 Gram(s) IV Intermittent every 12 hours    MEDICATIONS  (PRN):  acetaminophen     Tablet .. 650 milliGRAM(s) Oral every 6 hours PRN Temp greater or equal to 38C (100.4F), Mild Pain (1 - 3)  melatonin 3 milliGRAM(s) Oral at bedtime PRN Insomnia  nicotine -   7 mG/24Hr(s) Patch 1 Patch Transdermal daily PRN Nicotine cravings      CAPILLARY BLOOD GLUCOSE      POCT Blood Glucose.: 135 mg/dL (15 Mar 2024 09:41)    I&O's Summary      Vital Signs Last 24 Hrs  T(C): 36.8 (16 Mar 2024 11:57), Max: 36.8 (16 Mar 2024 03:58)  T(F): 98.2 (16 Mar 2024 11:57), Max: 98.3 (16 Mar 2024 03:58)  HR: 85 (16 Mar 2024 11:57) (66 - 85)  BP: 123/73 (16 Mar 2024 11:57) (106/56 - 133/88)  BP(mean): --  RR: 18 (16 Mar 2024 11:57) (16 - 18)  SpO2: 98% (16 Mar 2024 11:57) (96% - 100%)    Parameters below as of 16 Mar 2024 06:58  Patient On (Oxygen Delivery Method): room air          PHYSICAL EXAM:  GENERAL: NAD, well-developed, well-nourished  HEAD: Atraumatic, Normocephalic  EYES: Conjunctiva and sclera clear  CHEST/LUNG: Clear to auscultation bilaterally; No wheezes or crackles  HEART: Normal S1/S2; Regular rate and rhythm; No murmurs, rubs, or gallops  ABDOMEN: Soft, Nontender, Nondistended; Bowel sounds present  EXTREMITIES: No clubbing, cyanosis, or edema  PSYCH: A&Ox3      LABS:                        10.9   7.29  )-----------( 372      ( 16 Mar 2024 06:05 )             32.7      03-16    141  |  111<H>  |  26<H>  ----------------------------<  145<H>  3.7   |  20<L>  |  1.58<H>    Ca    8.8      16 Mar 2024 06:05  Phos  3.9     03-16  Mg     1.60     03-16    TPro  7.6  /  Alb  2.6<L>  /  TBili  0.3  /  DBili  x   /  AST  25  /  ALT  51<H>  /  AlkPhos  219<H>  03-16    PT/INR - ( 15 Mar 2024 11:13 )   PT: 11.3 sec;   INR: 1.00 ratio         PTT - ( 15 Mar 2024 11:13 )  PTT:27.4 sec      Urinalysis Basic - ( 16 Mar 2024 06:05 )    Color: x / Appearance: x / SG: x / pH: x  Gluc: 145 mg/dL / Ketone: x  / Bili: x / Urobili: x   Blood: x / Protein: x / Nitrite: x   Leuk Esterase: x / RBC: x / WBC x   Sq Epi: x / Non Sq Epi: x / Bacteria: x        RADIOLOGY & ADDITIONAL TESTS:       Patient is a 53y old  Male who presents with a chief complaint of PAD (15 Mar 2024 19:36)      SUBJECTIVE / OVERNIGHT EVENTS: NAEO. Was called by RN due to patient wanting to sign out AMA. Asked patient why he wanted to leave, reports his girlfriend is stealing money from his house which is unlocked. He wants to go lock his house. Pt denies chest pain, SOB, N/V, fever/chills, or changes in bowel movements.     MEDICATIONS  (STANDING):  bictegravir 50 mG/emtricitabine 200 mG/tenofovir alafenamide 25 mG (BIKTARVY) 1 Tablet(s) Oral daily  heparin   Injectable 5000 Unit(s) SubCutaneous every 8 hours  piperacillin/tazobactam IVPB.. 3.375 Gram(s) IV Intermittent every 12 hours    MEDICATIONS  (PRN):  acetaminophen     Tablet .. 650 milliGRAM(s) Oral every 6 hours PRN Temp greater or equal to 38C (100.4F), Mild Pain (1 - 3)  melatonin 3 milliGRAM(s) Oral at bedtime PRN Insomnia  nicotine -   7 mG/24Hr(s) Patch 1 Patch Transdermal daily PRN Nicotine cravings      CAPILLARY BLOOD GLUCOSE      POCT Blood Glucose.: 135 mg/dL (15 Mar 2024 09:41)    I&O's Summary    Vital Signs Last 24 Hrs  T(C): 36.4 (16 Mar 2024 20:15), Max: 36.8 (16 Mar 2024 11:57)  T(F): 97.5 (16 Mar 2024 20:15), Max: 98.2 (16 Mar 2024 11:57)  HR: 65 (16 Mar 2024 20:15) (65 - 85)  BP: 146/60 (16 Mar 2024 20:15) (123/73 - 146/60)  BP(mean): --  RR: 18 (16 Mar 2024 20:15) (18 - 18)  SpO2: 97% (16 Mar 2024 20:15) (97% - 98%)    Parameters below as of 16 Mar 2024 20:15  Patient On (Oxygen Delivery Method): room air    PHYSICAL EXAM:  GENERAL: NAD, well-developed, well-nourished  HEAD: Atraumatic, Normocephalic  EYES: Conjunctiva and sclera clear  CHEST/LUNG: Clear to auscultation bilaterally; No wheezes or crackles  HEART: Normal S1/S2; Regular rate and rhythm; No murmurs, rubs, or gallops  ABDOMEN: Soft, Nontender, Nondistended; Bowel sounds present  EXTREMITIES: No clubbing, cyanosis, or edema  PSYCH: A&Ox3      LABS:    CBC Full  -  ( 17 Mar 2024 06:57 )  WBC Count : 7.18 K/uL  RBC Count : 3.38 M/uL  Hemoglobin : 11.1 g/dL  Hematocrit : 32.2 %  Platelet Count - Automated : 382 K/uL  Mean Cell Volume : 95.3 fL  Mean Cell Hemoglobin : 32.8 pg  Mean Cell Hemoglobin Concentration : 34.5 gm/dL  Auto Neutrophil # : 4.03 K/uL  Auto Lymphocyte # : 2.19 K/uL  Auto Monocyte # : 0.66 K/uL  Auto Eosinophil # : 0.17 K/uL  Auto Basophil # : 0.10 K/uL  Auto Neutrophil % : 56.1 %  Auto Lymphocyte % : 30.5 %  Auto Monocyte % : 9.2 %  Auto Eosinophil % : 2.4 %  Auto Basophil % : 1.4 %    03-17    139  |  107  |  20  ----------------------------<  93  3.6   |  23  |  1.49<H>    Ca    8.8      17 Mar 2024 06:57  Phos  3.8     03-17  Mg     1.50     03-17    TPro  7.5  /  Alb  2.7<L>  /  TBili  0.3  /  DBili  x   /  AST  22  /  ALT  46<H>  /  AlkPhos  194<H>  03-17    Urinalysis Basic - ( 16 Mar 2024 06:05 )    Color: x / Appearance: x / SG: x / pH: x  Gluc: 145 mg/dL / Ketone: x  / Bili: x / Urobili: x   Blood: x / Protein: x / Nitrite: x   Leuk Esterase: x / RBC: x / WBC x   Sq Epi: x / Non Sq Epi: x / Bacteria: x        RADIOLOGY & ADDITIONAL TESTS:

## 2024-03-17 NOTE — CHART NOTE - NSCHARTNOTEFT_GEN_A_CORE
Was called by RN due to patient wanting to sign out AMA. Asked patient why he wanted to leave, reports his girlfriend is stealing money from his house which is unlocked. He wants to go lock his house.    Patient is AAO x 3. I explained at length to the patient the risks of signing out AMA including but not limited to harm, injury or death. I explained the risks, benefits and alternatives to treatment as well as the attendant risks of refusing treatment at this time. I offered to answer any questions and fully answered any such questions. We believe that the patient fully understands what has been explained and answered. I informed hospitalist  .........of this, aware. Patient signed form to sign out AMA and accepts responsibility for any and all results of this decision.      Edvin Zhao, PGY-2  Internal Medicine Was called by RN due to patient wanting to sign out AMA. Asked patient why he wanted to leave, reports his girlfriend is stealing money from his house which is unlocked. He wants to go lock his house.    Patient is AAO x 3. I explained at length to the patient the risks of signing out AMA including but not limited to harm, injury or death. I explained the risks, benefits and alternatives to treatment as well as the attendant risks of refusing treatment at this time. I offered to answer any questions and fully answered any such questions. We believe that the patient fully understands what has been explained and answered. I informed hospitalist Dr. López of this, aware. Patient signed form to sign out AMA and accepts responsibility for any and all results of this decision.      Edvin Zhao, PGY-2  Internal Medicine

## 2024-03-18 LAB
CMV DNA CSF QL NAA+PROBE: ABNORMAL IU/ML
CMV DNA SPEC NAA+PROBE-LOG#: ABNORMAL LOG10IU/ML
HIV-1 VIRAL LOAD RESULT: ABNORMAL
HIV1 RNA # SERPL NAA+PROBE: ABNORMAL COPIES/ML
HIV1 RNA SER-IMP: SIGNIFICANT CHANGE UP
HIV1 RNA SERPL NAA+PROBE-ACNC: ABNORMAL
HIV1 RNA SERPL NAA+PROBE-LOG#: ABNORMAL LG COP/ML

## 2024-03-20 ENCOUNTER — INPATIENT (INPATIENT)
Facility: HOSPITAL | Age: 54
LOS: 4 days | Discharge: HOME | End: 2024-03-25
Attending: INTERNAL MEDICINE | Admitting: INTERNAL MEDICINE
Payer: MEDICAID

## 2024-03-20 VITALS
TEMPERATURE: 98 F | SYSTOLIC BLOOD PRESSURE: 149 MMHG | HEART RATE: 79 BPM | OXYGEN SATURATION: 100 % | HEIGHT: 70 IN | RESPIRATION RATE: 15 BRPM | DIASTOLIC BLOOD PRESSURE: 70 MMHG

## 2024-03-20 DIAGNOSIS — N17.9 ACUTE KIDNEY FAILURE, UNSPECIFIED: ICD-10-CM

## 2024-03-20 DIAGNOSIS — I96 GANGRENE, NOT ELSEWHERE CLASSIFIED: ICD-10-CM

## 2024-03-20 DIAGNOSIS — Z29.9 ENCOUNTER FOR PROPHYLACTIC MEASURES, UNSPECIFIED: ICD-10-CM

## 2024-03-20 DIAGNOSIS — Z90.81 ACQUIRED ABSENCE OF SPLEEN: Chronic | ICD-10-CM

## 2024-03-20 DIAGNOSIS — M79.673 PAIN IN UNSPECIFIED FOOT: ICD-10-CM

## 2024-03-20 DIAGNOSIS — J43.9 EMPHYSEMA, UNSPECIFIED: ICD-10-CM

## 2024-03-20 DIAGNOSIS — B20 HUMAN IMMUNODEFICIENCY VIRUS [HIV] DISEASE: ICD-10-CM

## 2024-03-20 LAB
ALBUMIN SERPL ELPH-MCNC: 2.9 G/DL — LOW (ref 3.3–5)
ALBUMIN SERPL ELPH-MCNC: 3 G/DL — LOW (ref 3.3–5)
ALP SERPL-CCNC: 189 U/L — HIGH (ref 40–120)
ALP SERPL-CCNC: 201 U/L — HIGH (ref 40–120)
ALT FLD-CCNC: 41 U/L — SIGNIFICANT CHANGE UP (ref 4–41)
ALT FLD-CCNC: 48 U/L — HIGH (ref 4–41)
ANION GAP SERPL CALC-SCNC: 13 MMOL/L — SIGNIFICANT CHANGE UP (ref 7–14)
ANION GAP SERPL CALC-SCNC: 8 MMOL/L — SIGNIFICANT CHANGE UP (ref 7–14)
APTT BLD: 33.1 SEC — SIGNIFICANT CHANGE UP (ref 24.5–35.6)
APTT BLD: 41.7 SEC — HIGH (ref 24.5–35.6)
APTT BLD: 64.7 SEC — HIGH (ref 24.5–35.6)
AST SERPL-CCNC: 22 U/L — SIGNIFICANT CHANGE UP (ref 4–40)
AST SERPL-CCNC: 27 U/L — SIGNIFICANT CHANGE UP (ref 4–40)
BASE EXCESS BLDV CALC-SCNC: -2.6 MMOL/L — LOW (ref -2–3)
BASOPHILS # BLD AUTO: 0.06 K/UL — SIGNIFICANT CHANGE UP (ref 0–0.2)
BASOPHILS NFR BLD AUTO: 0.9 % — SIGNIFICANT CHANGE UP (ref 0–2)
BILIRUB SERPL-MCNC: 0.2 MG/DL — SIGNIFICANT CHANGE UP (ref 0.2–1.2)
BILIRUB SERPL-MCNC: 0.3 MG/DL — SIGNIFICANT CHANGE UP (ref 0.2–1.2)
BLD GP AB SCN SERPL QL: NEGATIVE — SIGNIFICANT CHANGE UP
BUN SERPL-MCNC: 21 MG/DL — SIGNIFICANT CHANGE UP (ref 7–23)
BUN SERPL-MCNC: 30 MG/DL — HIGH (ref 7–23)
CA-I SERPL-SCNC: 1.25 MMOL/L — SIGNIFICANT CHANGE UP (ref 1.15–1.33)
CALCIUM SERPL-MCNC: 8.8 MG/DL — SIGNIFICANT CHANGE UP (ref 8.4–10.5)
CALCIUM SERPL-MCNC: 8.8 MG/DL — SIGNIFICANT CHANGE UP (ref 8.4–10.5)
CHLORIDE BLDV-SCNC: 107 MMOL/L — SIGNIFICANT CHANGE UP (ref 96–108)
CHLORIDE SERPL-SCNC: 105 MMOL/L — SIGNIFICANT CHANGE UP (ref 98–107)
CHLORIDE SERPL-SCNC: 110 MMOL/L — HIGH (ref 98–107)
CO2 BLDV-SCNC: 25.6 MMOL/L — SIGNIFICANT CHANGE UP (ref 22–26)
CO2 SERPL-SCNC: 21 MMOL/L — LOW (ref 22–31)
CO2 SERPL-SCNC: 23 MMOL/L — SIGNIFICANT CHANGE UP (ref 22–31)
CREAT SERPL-MCNC: 1.18 MG/DL — SIGNIFICANT CHANGE UP (ref 0.5–1.3)
CREAT SERPL-MCNC: 1.72 MG/DL — HIGH (ref 0.5–1.3)
CULTURE RESULTS: SIGNIFICANT CHANGE UP
CULTURE RESULTS: SIGNIFICANT CHANGE UP
EGFR: 47 ML/MIN/1.73M2 — LOW
EGFR: 74 ML/MIN/1.73M2 — SIGNIFICANT CHANGE UP
EOSINOPHIL # BLD AUTO: 0.16 K/UL — SIGNIFICANT CHANGE UP (ref 0–0.5)
EOSINOPHIL NFR BLD AUTO: 2.4 % — SIGNIFICANT CHANGE UP (ref 0–6)
GAMMA INTERFERON BACKGROUND BLD IA-ACNC: 0.07 IU/ML — SIGNIFICANT CHANGE UP
GAS PNL BLDV: 139 MMOL/L — SIGNIFICANT CHANGE UP (ref 136–145)
GAS PNL BLDV: SIGNIFICANT CHANGE UP
GAS PNL BLDV: SIGNIFICANT CHANGE UP
GLUCOSE BLDV-MCNC: 115 MG/DL — HIGH (ref 70–99)
GLUCOSE SERPL-MCNC: 119 MG/DL — HIGH (ref 70–99)
GLUCOSE SERPL-MCNC: 122 MG/DL — HIGH (ref 70–99)
HCO3 BLDV-SCNC: 24 MMOL/L — SIGNIFICANT CHANGE UP (ref 22–29)
HCT VFR BLD CALC: 32.5 % — LOW (ref 39–50)
HCT VFR BLD CALC: 33.9 % — LOW (ref 39–50)
HCT VFR BLDA CALC: 36 % — LOW (ref 39–51)
HGB BLD CALC-MCNC: 11.9 G/DL — LOW (ref 12.6–17.4)
HGB BLD-MCNC: 11.1 G/DL — LOW (ref 13–17)
HGB BLD-MCNC: 11.2 G/DL — LOW (ref 13–17)
HIV 2 PROVIRAL DNA SERPL QL NAA+PROBE: SIGNIFICANT CHANGE UP
IANC: 4.09 K/UL — SIGNIFICANT CHANGE UP (ref 1.8–7.4)
IMM GRANULOCYTES NFR BLD AUTO: 0.4 % — SIGNIFICANT CHANGE UP (ref 0–0.9)
INR BLD: 0.99 RATIO — SIGNIFICANT CHANGE UP (ref 0.85–1.18)
LACTATE BLDV-MCNC: 1.6 MMOL/L — SIGNIFICANT CHANGE UP (ref 0.5–2)
LYMPHOCYTES # BLD AUTO: 1.72 K/UL — SIGNIFICANT CHANGE UP (ref 1–3.3)
LYMPHOCYTES # BLD AUTO: 25.7 % — SIGNIFICANT CHANGE UP (ref 13–44)
M TB IFN-G BLD-IMP: NEGATIVE — SIGNIFICANT CHANGE UP
M TB IFN-G CD4+ BCKGRND COR BLD-ACNC: 0 IU/ML — SIGNIFICANT CHANGE UP
M TB IFN-G CD4+CD8+ BCKGRND COR BLD-ACNC: 0 IU/ML — SIGNIFICANT CHANGE UP
MAGNESIUM SERPL-MCNC: 1.6 MG/DL — SIGNIFICANT CHANGE UP (ref 1.6–2.6)
MCHC RBC-ENTMCNC: 31.5 PG — SIGNIFICANT CHANGE UP (ref 27–34)
MCHC RBC-ENTMCNC: 32.7 PG — SIGNIFICANT CHANGE UP (ref 27–34)
MCHC RBC-ENTMCNC: 33 GM/DL — SIGNIFICANT CHANGE UP (ref 32–36)
MCHC RBC-ENTMCNC: 34.2 GM/DL — SIGNIFICANT CHANGE UP (ref 32–36)
MCV RBC AUTO: 95.5 FL — SIGNIFICANT CHANGE UP (ref 80–100)
MCV RBC AUTO: 95.9 FL — SIGNIFICANT CHANGE UP (ref 80–100)
MONOCYTES # BLD AUTO: 0.63 K/UL — SIGNIFICANT CHANGE UP (ref 0–0.9)
MONOCYTES NFR BLD AUTO: 9.4 % — SIGNIFICANT CHANGE UP (ref 2–14)
NEUTROPHILS # BLD AUTO: 4.09 K/UL — SIGNIFICANT CHANGE UP (ref 1.8–7.4)
NEUTROPHILS NFR BLD AUTO: 61.2 % — SIGNIFICANT CHANGE UP (ref 43–77)
NRBC # BLD: 0 /100 WBCS — SIGNIFICANT CHANGE UP (ref 0–0)
NRBC # BLD: 0 /100 WBCS — SIGNIFICANT CHANGE UP (ref 0–0)
NRBC # FLD: 0 K/UL — SIGNIFICANT CHANGE UP (ref 0–0)
NRBC # FLD: 0 K/UL — SIGNIFICANT CHANGE UP (ref 0–0)
PCO2 BLDV: 49 MMHG — SIGNIFICANT CHANGE UP (ref 42–55)
PH BLDV: 7.3 — LOW (ref 7.32–7.43)
PHOSPHATE SERPL-MCNC: 2.3 MG/DL — LOW (ref 2.5–4.5)
PLATELET # BLD AUTO: 275 K/UL — SIGNIFICANT CHANGE UP (ref 150–400)
PLATELET # BLD AUTO: 302 K/UL — SIGNIFICANT CHANGE UP (ref 150–400)
PO2 BLDV: 32 MMHG — SIGNIFICANT CHANGE UP (ref 25–45)
POTASSIUM BLDV-SCNC: 3.1 MMOL/L — LOW (ref 3.5–5.1)
POTASSIUM SERPL-MCNC: 3.5 MMOL/L — SIGNIFICANT CHANGE UP (ref 3.5–5.3)
POTASSIUM SERPL-MCNC: 3.5 MMOL/L — SIGNIFICANT CHANGE UP (ref 3.5–5.3)
POTASSIUM SERPL-SCNC: 3.5 MMOL/L — SIGNIFICANT CHANGE UP (ref 3.5–5.3)
POTASSIUM SERPL-SCNC: 3.5 MMOL/L — SIGNIFICANT CHANGE UP (ref 3.5–5.3)
PROT SERPL-MCNC: 7.7 G/DL — SIGNIFICANT CHANGE UP (ref 6–8.3)
PROT SERPL-MCNC: 8.2 G/DL — SIGNIFICANT CHANGE UP (ref 6–8.3)
PROTHROM AB SERPL-ACNC: 11.1 SEC — SIGNIFICANT CHANGE UP (ref 9.5–13)
QUANT TB PLUS MITOGEN MINUS NIL: 7.7 IU/ML — SIGNIFICANT CHANGE UP
RBC # BLD: 3.39 M/UL — LOW (ref 4.2–5.8)
RBC # BLD: 3.55 M/UL — LOW (ref 4.2–5.8)
RBC # FLD: 15.8 % — HIGH (ref 10.3–14.5)
RBC # FLD: 15.9 % — HIGH (ref 10.3–14.5)
RH IG SCN BLD-IMP: POSITIVE — SIGNIFICANT CHANGE UP
SAO2 % BLDV: 45.9 % — LOW (ref 67–88)
SODIUM SERPL-SCNC: 139 MMOL/L — SIGNIFICANT CHANGE UP (ref 135–145)
SODIUM SERPL-SCNC: 141 MMOL/L — SIGNIFICANT CHANGE UP (ref 135–145)
SPECIMEN SOURCE: SIGNIFICANT CHANGE UP
SPECIMEN SOURCE: SIGNIFICANT CHANGE UP
TROPONIN T, HIGH SENSITIVITY RESULT: 35 NG/L — SIGNIFICANT CHANGE UP
WBC # BLD: 6.54 K/UL — SIGNIFICANT CHANGE UP (ref 3.8–10.5)
WBC # BLD: 6.69 K/UL — SIGNIFICANT CHANGE UP (ref 3.8–10.5)
WBC # FLD AUTO: 6.54 K/UL — SIGNIFICANT CHANGE UP (ref 3.8–10.5)
WBC # FLD AUTO: 6.69 K/UL — SIGNIFICANT CHANGE UP (ref 3.8–10.5)

## 2024-03-20 PROCEDURE — 99285 EMERGENCY DEPT VISIT HI MDM: CPT | Mod: 25

## 2024-03-20 PROCEDURE — 73630 X-RAY EXAM OF FOOT: CPT | Mod: 26,LT

## 2024-03-20 PROCEDURE — 71045 X-RAY EXAM CHEST 1 VIEW: CPT | Mod: 26

## 2024-03-20 PROCEDURE — 93971 EXTREMITY STUDY: CPT | Mod: 26,LT

## 2024-03-20 PROCEDURE — 99223 1ST HOSP IP/OBS HIGH 75: CPT | Mod: GC

## 2024-03-20 PROCEDURE — 75635 CT ANGIO ABDOMINAL ARTERIES: CPT | Mod: 26,MC

## 2024-03-20 RX ORDER — ATORVASTATIN CALCIUM 80 MG/1
40 TABLET, FILM COATED ORAL AT BEDTIME
Refills: 0 | Status: DISCONTINUED | OUTPATIENT
Start: 2024-03-20 | End: 2024-03-25

## 2024-03-20 RX ORDER — ACETAMINOPHEN 500 MG
650 TABLET ORAL EVERY 6 HOURS
Refills: 0 | Status: DISCONTINUED | OUTPATIENT
Start: 2024-03-20 | End: 2024-03-25

## 2024-03-20 RX ORDER — PIPERACILLIN AND TAZOBACTAM 4; .5 G/20ML; G/20ML
3.38 INJECTION, POWDER, LYOPHILIZED, FOR SOLUTION INTRAVENOUS ONCE
Refills: 0 | Status: COMPLETED | OUTPATIENT
Start: 2024-03-20 | End: 2024-03-20

## 2024-03-20 RX ORDER — MORPHINE SULFATE 50 MG/1
2 CAPSULE, EXTENDED RELEASE ORAL ONCE
Refills: 0 | Status: DISCONTINUED | OUTPATIENT
Start: 2024-03-20 | End: 2024-03-21

## 2024-03-20 RX ORDER — OXYCODONE HYDROCHLORIDE 5 MG/1
2.5 TABLET ORAL EVERY 6 HOURS
Refills: 0 | Status: DISCONTINUED | OUTPATIENT
Start: 2024-03-20 | End: 2024-03-25

## 2024-03-20 RX ORDER — SODIUM CHLORIDE 9 MG/ML
1000 INJECTION INTRAMUSCULAR; INTRAVENOUS; SUBCUTANEOUS ONCE
Refills: 0 | Status: COMPLETED | OUTPATIENT
Start: 2024-03-20 | End: 2024-03-20

## 2024-03-20 RX ORDER — HYDROMORPHONE HYDROCHLORIDE 2 MG/ML
1 INJECTION INTRAMUSCULAR; INTRAVENOUS; SUBCUTANEOUS ONCE
Refills: 0 | Status: DISCONTINUED | OUTPATIENT
Start: 2024-03-20 | End: 2024-03-20

## 2024-03-20 RX ORDER — HEPARIN SODIUM 5000 [USP'U]/ML
INJECTION INTRAVENOUS; SUBCUTANEOUS
Qty: 25000 | Refills: 0 | Status: DISCONTINUED | OUTPATIENT
Start: 2024-03-20 | End: 2024-03-25

## 2024-03-20 RX ORDER — HEPARIN SODIUM 5000 [USP'U]/ML
6500 INJECTION INTRAVENOUS; SUBCUTANEOUS ONCE
Refills: 0 | Status: COMPLETED | OUTPATIENT
Start: 2024-03-20 | End: 2024-03-20

## 2024-03-20 RX ORDER — BICTEGRAVIR SODIUM, EMTRICITABINE, AND TENOFOVIR ALAFENAMIDE FUMARATE 30; 120; 15 MG/1; MG/1; MG/1
1 TABLET ORAL DAILY
Refills: 0 | Status: DISCONTINUED | OUTPATIENT
Start: 2024-03-20 | End: 2024-03-25

## 2024-03-20 RX ORDER — ASPIRIN/CALCIUM CARB/MAGNESIUM 324 MG
81 TABLET ORAL DAILY
Refills: 0 | Status: DISCONTINUED | OUTPATIENT
Start: 2024-03-20 | End: 2024-03-25

## 2024-03-20 RX ORDER — HEPARIN SODIUM 5000 [USP'U]/ML
3000 INJECTION INTRAVENOUS; SUBCUTANEOUS EVERY 6 HOURS
Refills: 0 | Status: DISCONTINUED | OUTPATIENT
Start: 2024-03-20 | End: 2024-03-25

## 2024-03-20 RX ORDER — CEFAZOLIN SODIUM 1 G
VIAL (EA) INJECTION
Refills: 0 | Status: DISCONTINUED | OUTPATIENT
Start: 2024-03-20 | End: 2024-03-20

## 2024-03-20 RX ORDER — MORPHINE SULFATE 50 MG/1
4 CAPSULE, EXTENDED RELEASE ORAL ONCE
Refills: 0 | Status: DISCONTINUED | OUTPATIENT
Start: 2024-03-20 | End: 2024-03-20

## 2024-03-20 RX ORDER — HEPARIN SODIUM 5000 [USP'U]/ML
6500 INJECTION INTRAVENOUS; SUBCUTANEOUS EVERY 6 HOURS
Refills: 0 | Status: DISCONTINUED | OUTPATIENT
Start: 2024-03-20 | End: 2024-03-25

## 2024-03-20 RX ORDER — ASPIRIN/CALCIUM CARB/MAGNESIUM 324 MG
162 TABLET ORAL ONCE
Refills: 0 | Status: COMPLETED | OUTPATIENT
Start: 2024-03-20 | End: 2024-03-20

## 2024-03-20 RX ORDER — GABAPENTIN 400 MG/1
300 CAPSULE ORAL AT BEDTIME
Refills: 0 | Status: DISCONTINUED | OUTPATIENT
Start: 2024-03-20 | End: 2024-03-25

## 2024-03-20 RX ADMIN — HYDROMORPHONE HYDROCHLORIDE 1 MILLIGRAM(S): 2 INJECTION INTRAMUSCULAR; INTRAVENOUS; SUBCUTANEOUS at 11:39

## 2024-03-20 RX ADMIN — HEPARIN SODIUM 1500 UNIT(S)/HR: 5000 INJECTION INTRAVENOUS; SUBCUTANEOUS at 08:03

## 2024-03-20 RX ADMIN — HEPARIN SODIUM 6500 UNIT(S): 5000 INJECTION INTRAVENOUS; SUBCUTANEOUS at 08:02

## 2024-03-20 RX ADMIN — GABAPENTIN 300 MILLIGRAM(S): 400 CAPSULE ORAL at 23:40

## 2024-03-20 RX ADMIN — HEPARIN SODIUM 1500 UNIT(S)/HR: 5000 INJECTION INTRAVENOUS; SUBCUTANEOUS at 14:50

## 2024-03-20 RX ADMIN — SODIUM CHLORIDE 1000 MILLILITER(S): 9 INJECTION INTRAMUSCULAR; INTRAVENOUS; SUBCUTANEOUS at 04:18

## 2024-03-20 RX ADMIN — Medication 650 MILLIGRAM(S): at 21:55

## 2024-03-20 RX ADMIN — MORPHINE SULFATE 4 MILLIGRAM(S): 50 CAPSULE, EXTENDED RELEASE ORAL at 02:20

## 2024-03-20 RX ADMIN — Medication 81 MILLIGRAM(S): at 18:26

## 2024-03-20 RX ADMIN — ATORVASTATIN CALCIUM 40 MILLIGRAM(S): 80 TABLET, FILM COATED ORAL at 23:41

## 2024-03-20 RX ADMIN — HYDROMORPHONE HYDROCHLORIDE 1 MILLIGRAM(S): 2 INJECTION INTRAMUSCULAR; INTRAVENOUS; SUBCUTANEOUS at 05:34

## 2024-03-20 RX ADMIN — HEPARIN SODIUM 1700 UNIT(S)/HR: 5000 INJECTION INTRAVENOUS; SUBCUTANEOUS at 23:41

## 2024-03-20 RX ADMIN — HYDROMORPHONE HYDROCHLORIDE 1 MILLIGRAM(S): 2 INJECTION INTRAMUSCULAR; INTRAVENOUS; SUBCUTANEOUS at 14:15

## 2024-03-20 RX ADMIN — Medication 162 MILLIGRAM(S): at 02:20

## 2024-03-20 RX ADMIN — HEPARIN SODIUM 3000 UNIT(S): 5000 INJECTION INTRAVENOUS; SUBCUTANEOUS at 23:46

## 2024-03-20 RX ADMIN — PIPERACILLIN AND TAZOBACTAM 200 GRAM(S): 4; .5 INJECTION, POWDER, LYOPHILIZED, FOR SOLUTION INTRAVENOUS at 02:20

## 2024-03-20 RX ADMIN — OXYCODONE HYDROCHLORIDE 2.5 MILLIGRAM(S): 5 TABLET ORAL at 19:39

## 2024-03-20 RX ADMIN — BICTEGRAVIR SODIUM, EMTRICITABINE, AND TENOFOVIR ALAFENAMIDE FUMARATE 1 TABLET(S): 30; 120; 15 TABLET ORAL at 19:08

## 2024-03-20 NOTE — H&P ADULT - NSHPREVIEWOFSYSTEMS_GEN_ALL_CORE
General: no weakness, no fatigue, no change in weight  HEENT: No blurry vision, no congestion, no rhinorrhea, no ear pain, no throat pain, no odynophagia,   Respiratory: No cough, +SOB.   Cardiac: No chest pain, no palpitations  GI: No abdominal pain, no nausea, no vomiting, no constipation, no diarrhea  : No dysuria, no hematuria  MSK: No swelling in extremities, no arthralgias, no back pain. left foot toe discolorations.   Neuro: No headache, no dizziness  Skin: left foot darkening

## 2024-03-20 NOTE — H&P ADULT - PROBLEM SELECTOR PLAN 4
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  - Currently breathing comfortable on RA    Plan:  - Nicotine patch  - Smoking cessation  - Pulm follow up as outpatient
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
## 2024-03-20 NOTE — ED PROVIDER NOTE - CLINICAL SUMMARY MEDICAL DECISION MAKING FREE TEXT BOX
Patient is a 53-year-old history of HIV on Biktarvy, presenting with left foot pain and swelling. Patient with multiple black toes, severe pain. Concern for arterial embolism and necrosis of the toes. Will consult vascular/podiatry. Will get labs, ekg, u/s of the left leg, CTA of the lower extremity.

## 2024-03-20 NOTE — ED ADULT NURSE REASSESSMENT NOTE - NS ED NURSE REASSESS COMMENT FT1
Pt appears to be resting comfortably, NAD, respirations are even and unlabored, denies any complaints at this moment, imaging pending. Safety precautions implemented as per protocol, awaiting further MD orders, plan of care ongoing.

## 2024-03-20 NOTE — ED ADULT TRIAGE NOTE - CHIEF COMPLAINT QUOTE
presents C/O SOB CP. No complaints of  headache, nausea, dizziness, vomiting, fever, chills verbalized. Phx HIV

## 2024-03-20 NOTE — H&P ADULT - PROBLEM SELECTOR PLAN 3
Initially p/w Cr 2.2, now improved s/p 1L NS  - may be VINEET on CKD, previous renal US unremarkable.   - No evidence of embolic disease involving kidneys    Plan:   - BMP daily, consider further fluids if needed

## 2024-03-20 NOTE — ED PROVIDER NOTE - ATTENDING CONTRIBUTION TO CARE
Patient is a 53-year-old male with a history of HIV on Biktarvy here for evaluation of black toes in his left foot.  Patient was admitted from 3/15 - 3/17 for PAD.  At that time,  he had distal ischemic changes in his left foot involving his toes.  He is s/p Left foot hyperkeratotic lesion debridement by  podiatry. DENIS/PVR indicative of arterial embolic occlusion of toe.   Patient was followed by vascular, started on aspirin 81 and statin, started on heparin drip.  CTA Aorta with runoff negative for an acute occlusion.  Patient left AMA 2 days ago because he needed to check on his home.  Since then, he reports he developed left groin pain in the toes and his foot became black.  He denies any fevers or chills.  No nausea or vomiting.      VS noted  Gen. no acute distress, Non toxic   HEENT: EOMI, mmm  Lungs: CTAB/L no C/ W /R   CVS: RRR   Abd; Soft non tender, non distended   Ext:  left foot: 1st toe is black, 2 toe dorsally black, 5th toe black, no crepitus  Skin: no rash  Neuro AAOx3 non focal clear speech  a/p:  Left foot with black appearing toes–patient reports they became more black yesterday.  There is no crepitus on exam.  Patient denies any systemic fevers but complains of chills.  Concern for necrosis.  Plan for labs, cultures, Zosyn, CTA aorta with runoff, podiatry and vascular consult.  - Jess SCHROEDER

## 2024-03-20 NOTE — H&P ADULT - PROBLEM SELECTOR PLAN 2
19-May-2021 23:13 Hx of HIV, on Biktarvy, CD4 > 320, Viral load <30 but detectable.    - c/w Biktarvy

## 2024-03-20 NOTE — ED ADULT NURSE NOTE - OBJECTIVE STATEMENT
Covering RN: pt is AAXO4 and ambulatory at baseline. pt presents to ED w/ chest pain and SOB for few hours. pt PMHx of HIV. pt reports pain to left foot, pt noted to have 3 necrotic toes. pt big toe, pinky and 4th toe. pt has band aid on toe due to injury. pt reports this has developed over weeks. pt unable to ambulate due to pain. pt was sent to ED to r/o blood clots, pt endorsing pain to left groin .pt recently admitted to another hospital for left foot. pt NSR on cardiac monitor, RR are even and unlabored. pt has 20g in LAC, labs drawn and sent and pt medicated as ordered. Safety maintained, bed in lowest position. Call bell within reach. Daughter at bedside. Ongoing eval in progress.

## 2024-03-20 NOTE — H&P ADULT - PROBLEM SELECTOR PLAN 1
P/w ischemic changes to the left toes, initial work-up concerning for arterial embolic disease.   - CT Angio negative for clear arterial occlusion of LE.  - Per Vascular, DENIS/PVR c/w embolic disease, recommending AC with embolic work-up.   - s/p debridement via Pod (follow up with Dr. Davila ([phone]) within 1 week of discharge      - Bilateral xray: no OM, no gas, no fracture   - vasc consult placed  - DENIS-PVR w/ significant L foot small vessel disease  - heparin drip. P/w ischemic changes to the left toes, initial work-up concerning for arterial embolic disease.   - CT Angio negative for clear arterial occlusion of LE.  - Per Vascular, DENIS/PVR c/w embolic disease, recommending AC with embolic work-up.   - s/p debridement via Pod (follow up with Dr. Davila ([phone]) within 1 week of discharge    Plan:  - continue with heparin GGT, consider vascular cards c/s for AC recommendations pending embolic work-up  - No intervention planned per Vascular / Podiatry  - monitor off antibiotic  - Obtain TTE with bubble study, admit to tele, hypercoagulable w/u labs.

## 2024-03-20 NOTE — ED ADULT NURSE NOTE - NSFALLUNIVINTERV_ED_ALL_ED
Bed/Stretcher in lowest position, wheels locked, appropriate side rails in place/Call bell, personal items and telephone in reach/Instruct patient to call for assistance before getting out of bed/chair/stretcher/Non-slip footwear applied when patient is off stretcher/[place] to call system/Physically safe environment - no spills, clutter or unnecessary equipment/Purposeful proactive rounding/Room/bathroom lighting operational, light cord in reach
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
## 2024-03-20 NOTE — CONSULT NOTE ADULT - ASSESSMENT
Mr. Hester is a 53 year old active smoker with PMH of HIV (Biktarvy) and possible DM who reports a history of LLE "blood clot" s/p failed attempted removal and second procedure 2y/a who was recently admitted to [facility] for 2 weeks for acute on chronic L foot pain. Patient has DENIS/PVR result suggestive of arterial embolic occlusion of toe. CT Aorta with runoff > No evidence for arterial embolism within the bilateral lower extremities. Patent three-vessel runoff to the left foot. Rim-enhancing collection inferior to the right metatarsal measuring 1.2 x 1.5 x 1.7 cm.         PLAN:    - Recommend full anticoagulation  - Recommend aspirin 81mg and statin  - final recs pending ED work up and imaging       C Team Surgery   [id number] Mr. Hester is a 53 year old active smoker with PMH of HIV (Biktarvy) and possible DM who reports a history of LLE "blood clot" s/p failed attempted removal and second procedure 2y/a who was recently admitted to [facility] for 2 weeks for acute on chronic L foot pain. Patient has DENIS/PVR result suggestive of arterial embolic occlusion of toe. CT Aorta with runoff > No evidence for arterial embolism within the bilateral lower extremities. Patent three-vessel runoff to the left foot. Rim-enhancing collection inferior to the right metatarsal measuring 1.2 x 1.5 x 1.7 cm.         PLAN:    - Recommend full anticoagulation  - Recommend aspirin 81mg and statin  - Based on recent evaluation, clinical presentation and imaging there was no indication for vascular intervention   - Updated recommendations pending further work up per ED       C Team Surgery   [id number] Mr. Hester is a 53 year old active smoker with PMH of HIV (Biktarvy) and possible DM who reports a history of LLE "blood clot" s/p failed attempted removal and second procedure 2y/a who was recently admitted to [facility] for 2 weeks for acute on chronic L foot pain. Patient has DENIS/PVR result suggestive of arterial embolic occlusion of toe. CT Aorta with runoff > No evidence for arterial embolism within the bilateral lower extremities. Patent three-vessel runoff to the left foot. Rim-enhancing collection inferior to the right metatarsal measuring 1.2 x 1.5 x 1.7 cm.         PLAN:    - Recommend full anticoagulation  - Recommend aspirin 81mg and statin  - Clinical exam unchanged since last evaluation by vascular on 3/17   - Based on recent evaluation, clinical presentation and imaging there was no indication for vascular intervention   - Recommend PODS eval         C Team Surgery   [id number] Mr. Hester is a 53 year old active smoker with PMH of HIV (Biktarvy) and possible DM who reports a history of LLE "blood clot" s/p failed attempted removal and second procedure 2y/a who was recently admitted to [facility] for 2 weeks for acute on chronic L foot pain. Patient has DENIS/PVR result suggestive of arterial embolic occlusion of toe. CT Aorta with runoff > No evidence for arterial embolism within the bilateral lower extremities. Patent three-vessel runoff to the left foot. Rim-enhancing collection inferior to the right metatarsal measuring 1.2 x 1.5 x 1.7 cm.         PLAN:    - Recommend full anticoagulation  - Recommend aspirin 81mg and statin  - Clinical exam unchanged since last evaluation by vascular on 3/17   - Based on recent evaluation, clinical presentation and imaging there was no indication for vascular intervention   - Recommend PODS eval   - Recommend embolic work up         C Team Surgery   [id number]

## 2024-03-20 NOTE — H&P ADULT - ASSESSMENT
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

## 2024-03-20 NOTE — ED PROVIDER NOTE - PHYSICAL EXAMINATION
GENERAL: no acute distress, non-toxic appearing  HEAD: normocephalic, atraumatic  HEENT: PERRLA, EOMI, normal conjunctiva, oral mucosa moist  CARDIAC: regular rate and rhythm, no appreciable murmurs  PULM: clear to ascultation bilaterally, no crackles, rales, rhonchi, or wheezing  GI: abdomen nondistended, soft, nontender  NEURO: alert and oriented x 3, normal speech, no focal motor or sensory deficits, no gross neurologic deficit  MSK: discoloration to 5 digits of the left foot, DP pulse 2+ bilaterally, first toe black, foot warm, no erythema  SKIN: no visible rashes, dry, well-perfused  PSYCH: appropriate mood and affect

## 2024-03-20 NOTE — ED ADULT NURSE REASSESSMENT NOTE - NS ED NURSE REASSESS COMMENT FT1
report received from  KAREN Ludwig. pt remains at baseline mental status A&OX3, RR even unlabored completing full sentences. pt resting in stretcher comfortably at this time, no new complaints offered. pt endorses partial relief of pain at this time. heparin drip noted to be running as ordered 15mL/hr. pt tolerating well, no signs of excessive bleeding noted or reported by pt. pt well appearing.   stretcher lowest position siderails up safety measures in place.  pt admitted, awaiting bed placement at this time

## 2024-03-20 NOTE — H&P ADULT - NSHPLABSRESULTS_GEN_ALL_CORE
LABS:                         11.2   6.54  )-----------( 275      ( 20 Mar 2024 14:50 )             33.9     03-20    139  |  105  |  30<H>  ----------------------------<  122<H>  3.5   |  21<L>  |  1.72<H>    Ca    8.8      20 Mar 2024 02:06    TPro  8.2  /  Alb  3.0<L>  /  TBili  0.3  /  DBili  x   /  AST  27  /  ALT  48<H>  /  AlkPhos  201<H>  03-20    PT/INR - ( 20 Mar 2024 02:06 )   PT: 11.1 sec;   INR: 0.99 ratio         PTT - ( 20 Mar 2024 02:06 )  PTT:33.1 sec  Urinalysis Basic - ( 20 Mar 2024 02:06 )    Color: x / Appearance: x / SG: x / pH: x  Gluc: 122 mg/dL / Ketone: x  / Bili: x / Urobili: x   Blood: x / Protein: x / Nitrite: x   Leuk Esterase: x / RBC: x / WBC x   Sq Epi: x / Non Sq Epi: x / Bacteria: x      RADIOLOGY, EKG & ADDITIONAL TESTS: Reviewed. EKG in NSR with frequent PVCs.
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
## 2024-03-20 NOTE — ED PROVIDER NOTE - PROGRESS NOTE DETAILS
Oliva Kumar MD PGY3: Per chart review patient was suspected to have an arterial embolism in the foot at the last admission, however the CTA showed runoff to the toes. Vascular signed off on patient at that time. Spoke with vascular about patient change in symptoms. Vascular states nothing to do from their standpoint unless a new question arises. Will get repeat CTA to evaluate for new embolism given clinical presentation is consistent ischemia. Patient has a good DP pulse on exam, however expect clot is distal to this. Will also get u/s to r/o DVT given patient groin pain. Patient not on AC since d/c from hospital. Spoke with CT to expedite the scan. Spoke with podiatry. Will see patient. Patient hemodynamically stable. Will give morphine for pain. Patient also noted CP and SOB at home before coming to ED. Will r/o ACS, will get troponin, ekg, chest xray. Oliva Kumar MD PGY3: spoke with CT again to get scan completed. Will start heparin empirically. Surgery and podiatry state no acute intervention at this time. Will need wound care. Concern patient is thrown emboli to feet. Patient not septic, but can not r/o endocarditis. Has hx of HIV. ROBERTO Wyatt: Case endorsed, pending admission. Mami Michelle completed, case reviewed by Dr. Tsai who accept pt for admission to Telemetry.

## 2024-03-20 NOTE — H&P ADULT - NSHPPHYSICALEXAM_GEN_ALL_CORE
.  VITAL SIGNS:  T(C): 36.7 (03-20-24 @ 11:40), Max: 36.8 (03-20-24 @ 02:20)  T(F): 98 (03-20-24 @ 11:40), Max: 98.2 (03-20-24 @ 02:20)  HR: 76 (03-20-24 @ 11:40) (71 - 84)  BP: 110/76 (03-20-24 @ 11:40) (106/44 - 149/70)  BP(mean): --  RR: 17 (03-20-24 @ 11:40) (15 - 18)  SpO2: 100% (03-20-24 @ 11:40) (97% - 100%)  Wt(kg): --    PHYSICAL EXAM:  GENERAL: NAD, well-groomed, well-developed  HEAD:  Atraumatic, Normocephalic  EYES: EOMI, PERRLA, conjunctiva and sclera clear  ENMT: No tonsillar erythema, exudates, or enlargement; Moist mucous membranes, Good dentition, No lesions  NECK: Supple, No JVD, Normal thyroid  CHEST/LUNG: Clear to percussion bilaterally; No rales, rhonchi, wheezing, or rubs  HEART: Regular rate, irregular rhythm; No murmurs, rubs, or gallops  ABDOMEN: Soft, Nontender, Nondistended; Bowel sounds present  VASCULAR:  2+ Peripheral Pulses of the RLE, 1+ in LLE. +ischemic changes of the left toes with intact sensation to base of toes, lack of sensation of tip of large toe. No clubbing, or edema  LYMPH: No lymphadenopathy noted  SKIN: dry gangrene of the left toes noted.   NERVOUS SYSTEM:  Alert & Oriented X3,

## 2024-03-20 NOTE — CONSULT NOTE ADULT - ASSESSMENT
53M presents with left forefoot ischemic changes   - Patient seen and evaluated   - Afebrile, WBC 6.69  - Left forefoot ischemic changes to digits 1-5, not demarcated, no malodor. Left foot dorsal hallux abrasion, mild sanguinous drainage  Left plantar hallux hyperkeratotic lesion and left plantar lateral midfoot hyperkeratotic lesion. No open lesions or acute signs of infection of right foot.   - Left foot xray: no OM, no gas, no fracture   - Left venous duplex: no DVT  - Vasc recs, appreciated. No intervention  - No acute podiatric surgical intervention; please reconsult as needed  - Patient stable for discharge from podiatry standpoint and can follow up with Dr. Davila ([phone]) within 1 week of discharge   - Discussed with attending
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
## 2024-03-21 LAB
A FLAVUS AB FLD QL: NEGATIVE — SIGNIFICANT CHANGE UP
A NIGER AB FLD QL: NEGATIVE — SIGNIFICANT CHANGE UP
A NIGER AB FLD QL: NEGATIVE — SIGNIFICANT CHANGE UP
ALBUMIN SERPL ELPH-MCNC: 2.7 G/DL — LOW (ref 3.3–5)
ALP SERPL-CCNC: 201 U/L — HIGH (ref 40–120)
ALT FLD-CCNC: 42 U/L — HIGH (ref 4–41)
ANION GAP SERPL CALC-SCNC: 7 MMOL/L — SIGNIFICANT CHANGE UP (ref 7–14)
APTT BLD: 56.4 SEC — HIGH (ref 24.5–35.6)
APTT BLD: 58.1 SEC — HIGH (ref 24.5–35.6)
APTT BLD: 73.7 SEC — HIGH (ref 24.5–35.6)
AST SERPL-CCNC: 26 U/L — SIGNIFICANT CHANGE UP (ref 4–40)
BASOPHILS # BLD AUTO: 0.06 K/UL — SIGNIFICANT CHANGE UP (ref 0–0.2)
BASOPHILS NFR BLD AUTO: 1 % — SIGNIFICANT CHANGE UP (ref 0–2)
BILIRUB SERPL-MCNC: <0.2 MG/DL — SIGNIFICANT CHANGE UP (ref 0.2–1.2)
BLD GP AB SCN SERPL QL: NEGATIVE — SIGNIFICANT CHANGE UP
BUN SERPL-MCNC: 23 MG/DL — SIGNIFICANT CHANGE UP (ref 7–23)
CALCIUM SERPL-MCNC: 8.8 MG/DL — SIGNIFICANT CHANGE UP (ref 8.4–10.5)
CHLORIDE SERPL-SCNC: 111 MMOL/L — HIGH (ref 98–107)
CHOLEST SERPL-MCNC: 97 MG/DL — SIGNIFICANT CHANGE UP
CO2 SERPL-SCNC: 22 MMOL/L — SIGNIFICANT CHANGE UP (ref 22–31)
CREAT SERPL-MCNC: 1.28 MG/DL — SIGNIFICANT CHANGE UP (ref 0.5–1.3)
EGFR: 67 ML/MIN/1.73M2 — SIGNIFICANT CHANGE UP
EOSINOPHIL # BLD AUTO: 0.32 K/UL — SIGNIFICANT CHANGE UP (ref 0–0.5)
EOSINOPHIL NFR BLD AUTO: 5.4 % — SIGNIFICANT CHANGE UP (ref 0–6)
GLUCOSE SERPL-MCNC: 106 MG/DL — HIGH (ref 70–99)
HCT VFR BLD CALC: 31.4 % — LOW (ref 39–50)
HCT VFR BLD CALC: 31.7 % — LOW (ref 39–50)
HDLC SERPL-MCNC: 42 MG/DL — SIGNIFICANT CHANGE UP
HGB BLD-MCNC: 10.8 G/DL — LOW (ref 13–17)
HGB BLD-MCNC: 10.8 G/DL — LOW (ref 13–17)
IANC: 2.62 K/UL — SIGNIFICANT CHANGE UP (ref 1.8–7.4)
IMM GRANULOCYTES NFR BLD AUTO: 0.3 % — SIGNIFICANT CHANGE UP (ref 0–0.9)
LIPID PNL WITH DIRECT LDL SERPL: 43 MG/DL — SIGNIFICANT CHANGE UP
LYMPHOCYTES # BLD AUTO: 2.24 K/UL — SIGNIFICANT CHANGE UP (ref 1–3.3)
LYMPHOCYTES # BLD AUTO: 38 % — SIGNIFICANT CHANGE UP (ref 13–44)
MAGNESIUM SERPL-MCNC: 1.6 MG/DL — SIGNIFICANT CHANGE UP (ref 1.6–2.6)
MCHC RBC-ENTMCNC: 32.2 PG — SIGNIFICANT CHANGE UP (ref 27–34)
MCHC RBC-ENTMCNC: 32.6 PG — SIGNIFICANT CHANGE UP (ref 27–34)
MCHC RBC-ENTMCNC: 34.1 GM/DL — SIGNIFICANT CHANGE UP (ref 32–36)
MCHC RBC-ENTMCNC: 34.4 GM/DL — SIGNIFICANT CHANGE UP (ref 32–36)
MCV RBC AUTO: 94.6 FL — SIGNIFICANT CHANGE UP (ref 80–100)
MCV RBC AUTO: 94.9 FL — SIGNIFICANT CHANGE UP (ref 80–100)
MONOCYTES # BLD AUTO: 0.64 K/UL — SIGNIFICANT CHANGE UP (ref 0–0.9)
MONOCYTES NFR BLD AUTO: 10.8 % — SIGNIFICANT CHANGE UP (ref 2–14)
NEUTROPHILS # BLD AUTO: 2.62 K/UL — SIGNIFICANT CHANGE UP (ref 1.8–7.4)
NEUTROPHILS NFR BLD AUTO: 44.5 % — SIGNIFICANT CHANGE UP (ref 43–77)
NON HDL CHOLESTEROL: 55 MG/DL — SIGNIFICANT CHANGE UP
NRBC # BLD: 0 /100 WBCS — SIGNIFICANT CHANGE UP (ref 0–0)
NRBC # BLD: 0 /100 WBCS — SIGNIFICANT CHANGE UP (ref 0–0)
NRBC # FLD: 0 K/UL — SIGNIFICANT CHANGE UP (ref 0–0)
NRBC # FLD: 0 K/UL — SIGNIFICANT CHANGE UP (ref 0–0)
PHOSPHATE SERPL-MCNC: 2.8 MG/DL — SIGNIFICANT CHANGE UP (ref 2.5–4.5)
PLATELET # BLD AUTO: 279 K/UL — SIGNIFICANT CHANGE UP (ref 150–400)
PLATELET # BLD AUTO: 284 K/UL — SIGNIFICANT CHANGE UP (ref 150–400)
POTASSIUM SERPL-MCNC: 3.7 MMOL/L — SIGNIFICANT CHANGE UP (ref 3.5–5.3)
POTASSIUM SERPL-SCNC: 3.7 MMOL/L — SIGNIFICANT CHANGE UP (ref 3.5–5.3)
PROT SERPL-MCNC: 7.3 G/DL — SIGNIFICANT CHANGE UP (ref 6–8.3)
RBC # BLD: 3.31 M/UL — LOW (ref 4.2–5.8)
RBC # BLD: 3.35 M/UL — LOW (ref 4.2–5.8)
RBC # FLD: 15.9 % — HIGH (ref 10.3–14.5)
RBC # FLD: 15.9 % — HIGH (ref 10.3–14.5)
RH IG SCN BLD-IMP: POSITIVE — SIGNIFICANT CHANGE UP
SODIUM SERPL-SCNC: 140 MMOL/L — SIGNIFICANT CHANGE UP (ref 135–145)
TRIGL SERPL-MCNC: 59 MG/DL — SIGNIFICANT CHANGE UP
WBC # BLD: 5.9 K/UL — SIGNIFICANT CHANGE UP (ref 3.8–10.5)
WBC # BLD: 6 K/UL — SIGNIFICANT CHANGE UP (ref 3.8–10.5)
WBC # FLD AUTO: 5.9 K/UL — SIGNIFICANT CHANGE UP (ref 3.8–10.5)
WBC # FLD AUTO: 6 K/UL — SIGNIFICANT CHANGE UP (ref 3.8–10.5)

## 2024-03-21 PROCEDURE — 99232 SBSQ HOSP IP/OBS MODERATE 35: CPT | Mod: GC

## 2024-03-21 RX ORDER — APIXABAN 2.5 MG/1
1 TABLET, FILM COATED ORAL
Qty: 60 | Refills: 0
Start: 2024-03-21 | End: 2024-04-19

## 2024-03-21 RX ORDER — OXYCODONE HYDROCHLORIDE 5 MG/1
5 TABLET ORAL ONCE
Refills: 0 | Status: DISCONTINUED | OUTPATIENT
Start: 2024-03-21 | End: 2024-03-21

## 2024-03-21 RX ADMIN — HEPARIN SODIUM 1700 UNIT(S)/HR: 5000 INJECTION INTRAVENOUS; SUBCUTANEOUS at 08:20

## 2024-03-21 RX ADMIN — OXYCODONE HYDROCHLORIDE 2.5 MILLIGRAM(S): 5 TABLET ORAL at 11:20

## 2024-03-21 RX ADMIN — HEPARIN SODIUM 1900 UNIT(S)/HR: 5000 INJECTION INTRAVENOUS; SUBCUTANEOUS at 19:34

## 2024-03-21 RX ADMIN — Medication 81 MILLIGRAM(S): at 11:19

## 2024-03-21 RX ADMIN — HEPARIN SODIUM 1900 UNIT(S)/HR: 5000 INJECTION INTRAVENOUS; SUBCUTANEOUS at 18:45

## 2024-03-21 RX ADMIN — MORPHINE SULFATE 2 MILLIGRAM(S): 50 CAPSULE, EXTENDED RELEASE ORAL at 00:40

## 2024-03-21 RX ADMIN — GABAPENTIN 300 MILLIGRAM(S): 400 CAPSULE ORAL at 21:24

## 2024-03-21 RX ADMIN — HEPARIN SODIUM 1900 UNIT(S)/HR: 5000 INJECTION INTRAVENOUS; SUBCUTANEOUS at 15:36

## 2024-03-21 RX ADMIN — HEPARIN SODIUM 1900 UNIT(S)/HR: 5000 INJECTION INTRAVENOUS; SUBCUTANEOUS at 19:58

## 2024-03-21 RX ADMIN — OXYCODONE HYDROCHLORIDE 2.5 MILLIGRAM(S): 5 TABLET ORAL at 21:34

## 2024-03-21 RX ADMIN — OXYCODONE HYDROCHLORIDE 2.5 MILLIGRAM(S): 5 TABLET ORAL at 05:23

## 2024-03-21 RX ADMIN — OXYCODONE HYDROCHLORIDE 5 MILLIGRAM(S): 5 TABLET ORAL at 17:19

## 2024-03-21 RX ADMIN — HEPARIN SODIUM 1900 UNIT(S)/HR: 5000 INJECTION INTRAVENOUS; SUBCUTANEOUS at 22:02

## 2024-03-21 RX ADMIN — ATORVASTATIN CALCIUM 40 MILLIGRAM(S): 80 TABLET, FILM COATED ORAL at 21:24

## 2024-03-21 RX ADMIN — HEPARIN SODIUM 1700 UNIT(S)/HR: 5000 INJECTION INTRAVENOUS; SUBCUTANEOUS at 07:01

## 2024-03-21 RX ADMIN — BICTEGRAVIR SODIUM, EMTRICITABINE, AND TENOFOVIR ALAFENAMIDE FUMARATE 1 TABLET(S): 30; 120; 15 TABLET ORAL at 11:19

## 2024-03-21 RX ADMIN — HEPARIN SODIUM 1700 UNIT(S)/HR: 5000 INJECTION INTRAVENOUS; SUBCUTANEOUS at 09:39

## 2024-03-21 RX ADMIN — OXYCODONE HYDROCHLORIDE 2.5 MILLIGRAM(S): 5 TABLET ORAL at 22:34

## 2024-03-21 RX ADMIN — OXYCODONE HYDROCHLORIDE 5 MILLIGRAM(S): 5 TABLET ORAL at 15:35

## 2024-03-21 RX ADMIN — OXYCODONE HYDROCHLORIDE 2.5 MILLIGRAM(S): 5 TABLET ORAL at 14:21

## 2024-03-21 RX ADMIN — MORPHINE SULFATE 2 MILLIGRAM(S): 50 CAPSULE, EXTENDED RELEASE ORAL at 00:00

## 2024-03-21 NOTE — PROGRESS NOTE ADULT - PROBLEM SELECTOR PLAN 1
P/w ischemic changes to the left toes, initial work-up concerning for arterial embolic disease.   - CT Angio negative for clear arterial occlusion of LE.  - Per Vascular, DENIS/PVR c/w embolic disease, recommending AC with embolic work-up.   - s/p debridement via Pod (follow up with Dr. Davila ([phone]) within 1 week of discharge    Plan:  - continue with heparin GGT, consider vascular cards c/s for AC recommendations pending embolic work-up  - No intervention planned per Vascular / Podiatry  - monitor off antibiotic  - Obtain TTE with bubble study, admit to tele, hypercoagulable w/u labs. P/w ischemic changes to the left toes, initial work-up concerning for arterial embolic disease.   - CT Angio negative for clear arterial occlusion of LE.  - Per Vascular, DENIS/PVR c/w embolic disease, recommending AC with embolic work-up.   - s/p debridement via Pod (follow up with Dr. Davila ([phone]) within 1 week of discharge    Plan:  - continue with heparin gtt, will price check eliquis  - No intervention planned per Vascular / Podiatry  - monitor off antibiotic  - Obtain TTE with bubble study, admit to tele, hypercoagulable w/u labs.

## 2024-03-21 NOTE — PROGRESS NOTE ADULT - PROBLEM SELECTOR PLAN 5
DVT: Heparin ggt   Diet: CC  Pharmacy:  Dispo: medically active DVT: Heparin ggt   Diet: CC  Pharmacy:  Dispo: pending TTE

## 2024-03-21 NOTE — PROGRESS NOTE ADULT - SUBJECTIVE AND OBJECTIVE BOX
***************************************************************  Milton Hsu, PGY2  Internal Medicine   TEAMS Preferred  ***************************************************************    LEA MENDOZA  53y  MRN: [id number]  03-20-24 (1d)    Patient is a 53y old  Male who presents with a chief complaint of Foot pain (20 Mar 2024 15:03)      SUBJECTIVE / OVERNIGHT EVENTS:   No acute overnight events. Pt seen and examined at bedside. Denies fevers, chills, CP, SOB, Abdominal pain, N/V, Constipation, Diarrhea. Last BM     12 Point ROS negative with the exception of the above    MEDICATIONS  (STANDING):  aspirin enteric coated 81 milliGRAM(s) Oral daily  atorvastatin 40 milliGRAM(s) Oral at bedtime  bictegravir 50 mG/emtricitabine 200 mG/tenofovir alafenamide 25 mG (BIKTARVY) 1 Tablet(s) Oral daily  gabapentin 300 milliGRAM(s) Oral at bedtime  heparin  Infusion.  Unit(s)/Hr (15 mL/Hr) IV Continuous <Continuous>    MEDICATIONS  (PRN):  acetaminophen     Tablet .. 650 milliGRAM(s) Oral every 6 hours PRN Temp greater or equal to 38C (100.4F), Mild Pain (1 - 3)  heparin   Injectable 6500 Unit(s) IV Push every 6 hours PRN For aPTT less than 40  heparin   Injectable 3000 Unit(s) IV Push every 6 hours PRN For aPTT between 40 - 57  oxyCODONE    IR 2.5 milliGRAM(s) Oral every 6 hours PRN Severe Pain (7 - 10)      OBJECTIVE:  Vital Signs Last 24 Hrs  T(C): 36.6 (21 Mar 2024 05:00), Max: 37.1 (20 Mar 2024 17:42)  T(F): 97.9 (21 Mar 2024 05:00), Max: 98.8 (20 Mar 2024 17:42)  HR: 72 (21 Mar 2024 05:00) (71 - 82)  BP: 118/72 (21 Mar 2024 05:00) (106/71 - 118/72)  BP(mean): --  RR: 16 (21 Mar 2024 05:00) (16 - 18)  SpO2: 98% (21 Mar 2024 05:00) (95% - 100%)    Parameters below as of 21 Mar 2024 05:00  Patient On (Oxygen Delivery Method): room air        I&O's Summary      PHYSICAL EXAM:  GENERAL: Laying comfortably, NAD  HEENT: NCAT, PERRLA, EOMI, no scleral icterus, no LAD  NECK: No JVD, supple  LUNG: CTABL; No wheezes, crackles, or rhonchi  HEART: RRR; normal S1/S2; No murmurs, rubs, or gallops  ABDOMEN: +BS, soft, nontender, nondistended, no HSM; No rebound, guarding, or rigidity  EXTREMITIES:  No LE edema b/l, 2+ Peripheral Pulses, No clubbing or cyanosis  NEUROLOGY: AOx3, non-focal, strength 5/5 in all extremities, sensation intact  PSYCH: calm and cooperative  SKIN: No rashes or lesions    LABS:                        10.8   5.90  )-----------( 279      ( 21 Mar 2024 06:00 )             31.7     Auto Eosinophil # 0.32  / Auto Eosinophil % 5.4   / Auto Neutrophil # 2.62  / Auto Neutrophil % 44.5  / BANDS % x                            11.2   6.54  )-----------( 275      ( 20 Mar 2024 14:50 )             33.9     Auto Eosinophil # x     / Auto Eosinophil % x     / Auto Neutrophil # x     / Auto Neutrophil % x     / BANDS % x                            11.1   6.69  )-----------( 302      ( 20 Mar 2024 02:06 )             32.5     Auto Eosinophil # 0.16  / Auto Eosinophil % 2.4   / Auto Neutrophil # 4.09  / Auto Neutrophil % 61.2  / BANDS % x        03-21    140  |  111<H>  |  23  ----------------------------<  106<H>  3.7   |  22  |  1.28  03-20    141  |  110<H>  |  21  ----------------------------<  119<H>  3.5   |  23  |  1.18  03-20    139  |  105  |  30<H>  ----------------------------<  122<H>  3.5   |  21<L>  |  1.72<H>    Ca    8.8      21 Mar 2024 06:00  Ca    8.8      20 Mar 2024 15:04  Ca    8.8      20 Mar 2024 02:06  Phos  2.8     03-21  Mg     1.60     03-21    TPro  7.3  /  Alb  2.7<L>  /  TBili  <0.2  /  DBili  x   /  AST  26  /  ALT  42<H>  /  AlkPhos  201<H>  03-21  TPro  7.7  /  Alb  2.9<L>  /  TBili  0.2  /  DBili  x   /  AST  22  /  ALT  41  /  AlkPhos  189<H>  03-20  TPro  8.2  /  Alb  3.0<L>  /  TBili  0.3  /  DBili  x   /  AST  27  /  ALT  48<H>  /  AlkPhos  201<H>  03-20    PT/INR - ( 20 Mar 2024 02:06 )   PT: 11.1 sec;   INR: 0.99 ratio         PTT - ( 21 Mar 2024 06:00 )  PTT:73.7 sec      Urinalysis Basic - ( 21 Mar 2024 06:00 )    Color: x / Appearance: x / SG: x / pH: x  Gluc: 106 mg/dL / Ketone: x  / Bili: x / Urobili: x   Blood: x / Protein: x / Nitrite: x   Leuk Esterase: x / RBC: x / WBC x   Sq Epi: x / Non Sq Epi: x / Bacteria: x          CAPILLARY BLOOD GLUCOSE            RADIOLOGY & ADDITIONAL TESTS:     ***************************************************************  Milton Hsu, PGY2  Internal Medicine   TEAMS Preferred  ***************************************************************    LEA MENDOZA  53y  MRN: [id number]  03-20-24 (1d)    Patient is a 53y old  Male who presents with a chief complaint of Foot pain (20 Mar 2024 15:03)      SUBJECTIVE / OVERNIGHT EVENTS:   No acute overnight events. Pt seen and examined at bedside. Denies fevers, chills, CP, SOB, Abdominal pain, N/V, Constipation, Diarrhea. Endorses left foot pain this am.     12 Point ROS negative with the exception of the above    MEDICATIONS  (STANDING):  aspirin enteric coated 81 milliGRAM(s) Oral daily  atorvastatin 40 milliGRAM(s) Oral at bedtime  bictegravir 50 mG/emtricitabine 200 mG/tenofovir alafenamide 25 mG (BIKTARVY) 1 Tablet(s) Oral daily  gabapentin 300 milliGRAM(s) Oral at bedtime  heparin  Infusion.  Unit(s)/Hr (15 mL/Hr) IV Continuous <Continuous>    MEDICATIONS  (PRN):  acetaminophen     Tablet .. 650 milliGRAM(s) Oral every 6 hours PRN Temp greater or equal to 38C (100.4F), Mild Pain (1 - 3)  heparin   Injectable 6500 Unit(s) IV Push every 6 hours PRN For aPTT less than 40  heparin   Injectable 3000 Unit(s) IV Push every 6 hours PRN For aPTT between 40 - 57  oxyCODONE    IR 2.5 milliGRAM(s) Oral every 6 hours PRN Severe Pain (7 - 10)      OBJECTIVE:  Vital Signs Last 24 Hrs  T(C): 36.6 (21 Mar 2024 05:00), Max: 37.1 (20 Mar 2024 17:42)  T(F): 97.9 (21 Mar 2024 05:00), Max: 98.8 (20 Mar 2024 17:42)  HR: 72 (21 Mar 2024 05:00) (71 - 82)  BP: 118/72 (21 Mar 2024 05:00) (106/71 - 118/72)  BP(mean): --  RR: 16 (21 Mar 2024 05:00) (16 - 18)  SpO2: 98% (21 Mar 2024 05:00) (95% - 100%)    Parameters below as of 21 Mar 2024 05:00  Patient On (Oxygen Delivery Method): room air        I&O's Summary      PHYSICAL EXAM:  GENERAL: Laying comfortably, NAD  HEENT: NCAT, PERRLA, EOMI, no scleral icterus, no LAD  NECK: No JVD, supple  LUNG: CTABL; No wheezes, crackles, or rhonchi  HEART: RRR; normal S1/S2; No murmurs, rubs, or gallops  ABDOMEN: +BS, soft, nontender, nondistended, no HSM; No rebound, guarding, or rigidity  EXTREMITIES:  2+ Peripheral Pulses of the RLE, 1+ in LLE. +ischemic changes of the left toes with intact sensation to base of toes, lack of sensation of tip of large toe. +finger clubbing  NEUROLOGY: AOx3, non-focal, strength 5/5 in all extremities, sensation intact  PSYCH: calm and cooperative  SKIN: No rashes or lesions    LABS:                        10.8   5.90  )-----------( 279      ( 21 Mar 2024 06:00 )             31.7     Auto Eosinophil # 0.32  / Auto Eosinophil % 5.4   / Auto Neutrophil # 2.62  / Auto Neutrophil % 44.5  / BANDS % x                            11.2   6.54  )-----------( 275      ( 20 Mar 2024 14:50 )             33.9     Auto Eosinophil # x     / Auto Eosinophil % x     / Auto Neutrophil # x     / Auto Neutrophil % x     / BANDS % x                            11.1   6.69  )-----------( 302      ( 20 Mar 2024 02:06 )             32.5     Auto Eosinophil # 0.16  / Auto Eosinophil % 2.4   / Auto Neutrophil # 4.09  / Auto Neutrophil % 61.2  / BANDS % x        03-21    140  |  111<H>  |  23  ----------------------------<  106<H>  3.7   |  22  |  1.28  03-20    141  |  110<H>  |  21  ----------------------------<  119<H>  3.5   |  23  |  1.18  03-20    139  |  105  |  30<H>  ----------------------------<  122<H>  3.5   |  21<L>  |  1.72<H>    Ca    8.8      21 Mar 2024 06:00  Ca    8.8      20 Mar 2024 15:04  Ca    8.8      20 Mar 2024 02:06  Phos  2.8     03-21  Mg     1.60     03-21    TPro  7.3  /  Alb  2.7<L>  /  TBili  <0.2  /  DBili  x   /  AST  26  /  ALT  42<H>  /  AlkPhos  201<H>  03-21  TPro  7.7  /  Alb  2.9<L>  /  TBili  0.2  /  DBili  x   /  AST  22  /  ALT  41  /  AlkPhos  189<H>  03-20  TPro  8.2  /  Alb  3.0<L>  /  TBili  0.3  /  DBili  x   /  AST  27  /  ALT  48<H>  /  AlkPhos  201<H>  03-20    PT/INR - ( 20 Mar 2024 02:06 )   PT: 11.1 sec;   INR: 0.99 ratio         PTT - ( 21 Mar 2024 06:00 )  PTT:73.7 sec      Urinalysis Basic - ( 21 Mar 2024 06:00 )    Color: x / Appearance: x / SG: x / pH: x  Gluc: 106 mg/dL / Ketone: x  / Bili: x / Urobili: x   Blood: x / Protein: x / Nitrite: x   Leuk Esterase: x / RBC: x / WBC x   Sq Epi: x / Non Sq Epi: x / Bacteria: x          CAPILLARY BLOOD GLUCOSE            RADIOLOGY & ADDITIONAL TESTS:

## 2024-03-22 ENCOUNTER — TRANSCRIPTION ENCOUNTER (OUTPATIENT)
Age: 54
End: 2024-03-22

## 2024-03-22 ENCOUNTER — RESULT REVIEW (OUTPATIENT)
Age: 54
End: 2024-03-22

## 2024-03-22 LAB
ALBUMIN SERPL ELPH-MCNC: 2.7 G/DL — LOW (ref 3.3–5)
ALP SERPL-CCNC: 196 U/L — HIGH (ref 40–120)
ALT FLD-CCNC: 45 U/L — HIGH (ref 4–41)
ANION GAP SERPL CALC-SCNC: 9 MMOL/L — SIGNIFICANT CHANGE UP (ref 7–14)
APTT BLD: 62.5 SEC — HIGH (ref 24.5–35.6)
AST SERPL-CCNC: 28 U/L — SIGNIFICANT CHANGE UP (ref 4–40)
BASOPHILS # BLD AUTO: 0.07 K/UL — SIGNIFICANT CHANGE UP (ref 0–0.2)
BASOPHILS NFR BLD AUTO: 1.2 % — SIGNIFICANT CHANGE UP (ref 0–2)
BILIRUB SERPL-MCNC: <0.2 MG/DL — SIGNIFICANT CHANGE UP (ref 0.2–1.2)
BUN SERPL-MCNC: 18 MG/DL — SIGNIFICANT CHANGE UP (ref 7–23)
CALCIUM SERPL-MCNC: 9 MG/DL — SIGNIFICANT CHANGE UP (ref 8.4–10.5)
CHLORIDE SERPL-SCNC: 106 MMOL/L — SIGNIFICANT CHANGE UP (ref 98–107)
CO2 SERPL-SCNC: 24 MMOL/L — SIGNIFICANT CHANGE UP (ref 22–31)
CREAT SERPL-MCNC: 1.28 MG/DL — SIGNIFICANT CHANGE UP (ref 0.5–1.3)
EGFR: 67 ML/MIN/1.73M2 — SIGNIFICANT CHANGE UP
EOSINOPHIL # BLD AUTO: 0.24 K/UL — SIGNIFICANT CHANGE UP (ref 0–0.5)
EOSINOPHIL NFR BLD AUTO: 4.2 % — SIGNIFICANT CHANGE UP (ref 0–6)
GLUCOSE SERPL-MCNC: 100 MG/DL — HIGH (ref 70–99)
HCT VFR BLD CALC: 32.6 % — LOW (ref 39–50)
HCT VFR BLD CALC: 32.7 % — LOW (ref 39–50)
HGB BLD-MCNC: 10.6 G/DL — LOW (ref 13–17)
HGB BLD-MCNC: 10.7 G/DL — LOW (ref 13–17)
IANC: 2.08 K/UL — SIGNIFICANT CHANGE UP (ref 1.8–7.4)
IMM GRANULOCYTES NFR BLD AUTO: 0.2 % — SIGNIFICANT CHANGE UP (ref 0–0.9)
LYMPHOCYTES # BLD AUTO: 2.62 K/UL — SIGNIFICANT CHANGE UP (ref 1–3.3)
LYMPHOCYTES # BLD AUTO: 46 % — HIGH (ref 13–44)
MAGNESIUM SERPL-MCNC: 1.5 MG/DL — LOW (ref 1.6–2.6)
MCHC RBC-ENTMCNC: 31.2 PG — SIGNIFICANT CHANGE UP (ref 27–34)
MCHC RBC-ENTMCNC: 31.7 PG — SIGNIFICANT CHANGE UP (ref 27–34)
MCHC RBC-ENTMCNC: 32.4 GM/DL — SIGNIFICANT CHANGE UP (ref 32–36)
MCHC RBC-ENTMCNC: 32.8 GM/DL — SIGNIFICANT CHANGE UP (ref 32–36)
MCV RBC AUTO: 96.2 FL — SIGNIFICANT CHANGE UP (ref 80–100)
MCV RBC AUTO: 96.4 FL — SIGNIFICANT CHANGE UP (ref 80–100)
MONOCYTES # BLD AUTO: 0.68 K/UL — SIGNIFICANT CHANGE UP (ref 0–0.9)
MONOCYTES NFR BLD AUTO: 11.9 % — SIGNIFICANT CHANGE UP (ref 2–14)
NEUTROPHILS # BLD AUTO: 2.08 K/UL — SIGNIFICANT CHANGE UP (ref 1.8–7.4)
NEUTROPHILS NFR BLD AUTO: 36.5 % — LOW (ref 43–77)
NRBC # BLD: 0 /100 WBCS — SIGNIFICANT CHANGE UP (ref 0–0)
NRBC # BLD: 0 /100 WBCS — SIGNIFICANT CHANGE UP (ref 0–0)
NRBC # FLD: 0 K/UL — SIGNIFICANT CHANGE UP (ref 0–0)
NRBC # FLD: 0 K/UL — SIGNIFICANT CHANGE UP (ref 0–0)
PHOSPHATE SERPL-MCNC: 3.1 MG/DL — SIGNIFICANT CHANGE UP (ref 2.5–4.5)
PLATELET # BLD AUTO: 268 K/UL — SIGNIFICANT CHANGE UP (ref 150–400)
PLATELET # BLD AUTO: 279 K/UL — SIGNIFICANT CHANGE UP (ref 150–400)
POTASSIUM SERPL-MCNC: 3.8 MMOL/L — SIGNIFICANT CHANGE UP (ref 3.5–5.3)
POTASSIUM SERPL-SCNC: 3.8 MMOL/L — SIGNIFICANT CHANGE UP (ref 3.5–5.3)
PROT SERPL-MCNC: 7.6 G/DL — SIGNIFICANT CHANGE UP (ref 6–8.3)
RBC # BLD: 3.38 M/UL — LOW (ref 4.2–5.8)
RBC # BLD: 3.4 M/UL — LOW (ref 4.2–5.8)
RBC # FLD: 15.5 % — HIGH (ref 10.3–14.5)
RBC # FLD: 15.8 % — HIGH (ref 10.3–14.5)
SODIUM SERPL-SCNC: 139 MMOL/L — SIGNIFICANT CHANGE UP (ref 135–145)
WBC # BLD: 5.7 K/UL — SIGNIFICANT CHANGE UP (ref 3.8–10.5)
WBC # BLD: 5.7 K/UL — SIGNIFICANT CHANGE UP (ref 3.8–10.5)
WBC # FLD AUTO: 5.7 K/UL — SIGNIFICANT CHANGE UP (ref 3.8–10.5)
WBC # FLD AUTO: 5.7 K/UL — SIGNIFICANT CHANGE UP (ref 3.8–10.5)

## 2024-03-22 PROCEDURE — 99232 SBSQ HOSP IP/OBS MODERATE 35: CPT | Mod: GC

## 2024-03-22 PROCEDURE — 93306 TTE W/DOPPLER COMPLETE: CPT | Mod: 26

## 2024-03-22 RX ORDER — MAGNESIUM SULFATE 500 MG/ML
1 VIAL (ML) INJECTION ONCE
Refills: 0 | Status: COMPLETED | OUTPATIENT
Start: 2024-03-22 | End: 2024-03-22

## 2024-03-22 RX ADMIN — Medication 650 MILLIGRAM(S): at 21:36

## 2024-03-22 RX ADMIN — GABAPENTIN 300 MILLIGRAM(S): 400 CAPSULE ORAL at 21:36

## 2024-03-22 RX ADMIN — HEPARIN SODIUM 1900 UNIT(S)/HR: 5000 INJECTION INTRAVENOUS; SUBCUTANEOUS at 09:12

## 2024-03-22 RX ADMIN — OXYCODONE HYDROCHLORIDE 2.5 MILLIGRAM(S): 5 TABLET ORAL at 08:07

## 2024-03-22 RX ADMIN — Medication 100 GRAM(S): at 13:16

## 2024-03-22 RX ADMIN — OXYCODONE HYDROCHLORIDE 2.5 MILLIGRAM(S): 5 TABLET ORAL at 21:35

## 2024-03-22 RX ADMIN — Medication 81 MILLIGRAM(S): at 13:16

## 2024-03-22 RX ADMIN — Medication 650 MILLIGRAM(S): at 22:06

## 2024-03-22 RX ADMIN — HEPARIN SODIUM 1900 UNIT(S)/HR: 5000 INJECTION INTRAVENOUS; SUBCUTANEOUS at 07:35

## 2024-03-22 RX ADMIN — OXYCODONE HYDROCHLORIDE 2.5 MILLIGRAM(S): 5 TABLET ORAL at 13:12

## 2024-03-22 RX ADMIN — OXYCODONE HYDROCHLORIDE 2.5 MILLIGRAM(S): 5 TABLET ORAL at 22:05

## 2024-03-22 RX ADMIN — OXYCODONE HYDROCHLORIDE 2.5 MILLIGRAM(S): 5 TABLET ORAL at 13:17

## 2024-03-22 RX ADMIN — BICTEGRAVIR SODIUM, EMTRICITABINE, AND TENOFOVIR ALAFENAMIDE FUMARATE 1 TABLET(S): 30; 120; 15 TABLET ORAL at 21:36

## 2024-03-22 RX ADMIN — HEPARIN SODIUM 1900 UNIT(S)/HR: 5000 INJECTION INTRAVENOUS; SUBCUTANEOUS at 20:22

## 2024-03-22 RX ADMIN — ATORVASTATIN CALCIUM 40 MILLIGRAM(S): 80 TABLET, FILM COATED ORAL at 21:36

## 2024-03-22 RX ADMIN — HEPARIN SODIUM 1900 UNIT(S)/HR: 5000 INJECTION INTRAVENOUS; SUBCUTANEOUS at 06:09

## 2024-03-22 NOTE — DISCHARGE NOTE PROVIDER - CARE PROVIDERS DIRECT ADDRESSES
[email or web address]
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
## 2024-03-22 NOTE — DISCHARGE NOTE PROVIDER - NSDCFUADDAPPT_GEN_ALL_CORE_FT
APPTS ARE READY TO BE MADE: [X] YES    Best Family or Patient Contact (if needed):    Additional Information about above appointments (if needed):    1: Please follow up with Podiatry for further management of ischemic left foot 2: Please follow up with Vascular Surgery for further management of ischemic left foot 3: Please follow up with PCP for general healthcare maintenance    Other comments or requests:    APPTS ARE READY TO BE MADE: [X] YES    Best Family or Patient Contact (if needed):    Additional Information about above appointments (if needed):    1: Please follow up with Podiatry for further management of ischemic left foot 2: Please follow up with Vascular Surgery for further management of ischemic left foot 3: Please follow up with PCP for general healthcare maintenance    Met with patient face to face and provided the patient with provider referral information, however patient prefers to schedule the appointments on their own.   Other comments or requests:    APPTS ARE READY TO BE MADE: [X] YES    Best Family or Patient Contact (if needed):    Additional Information about above appointments (if needed):    1: Please follow up with Podiatry for further management of ischemic left foot 2: Please follow up with Vascular Surgery for further management of ischemic left foot 3: Please follow up with PCP for general healthcare maintenance    Met with patient face to face and provided the patient with provider referral information, however patient prefers to schedule the appointments on their own.   Other comments or requests:   Podiatry: Follow up with Dr. Davila ([phone]) within 1 week of discharge

## 2024-03-22 NOTE — PROGRESS NOTE ADULT - SUBJECTIVE AND OBJECTIVE BOX
PROGRESS NOTE:     Patient is a 53y old  Male who presents with a chief complaint of Foot pain (21 Mar 2024 07:07)      SUBJECTIVE / OVERNIGHT EVENTS:    OVERNIGHT: No acute overnight events.      Patient was examined at bedside and feels well this morning. Denies fever, chills, chest pain, SOB, nausea, vomiting. ROS otherwise negative and pt is amenable to current treatment plan.      REVIEW OF SYSTEMS:    CONSTITUTIONAL:  No weakness, fevers, or chills  EYES/ENT:  No visual changes, vertigo, or throat pain   NECK:  No pain or stiffness  RESPIRATORY:  No SOB, cough, wheezing, hemoptysis  CARDIOVASCULAR:  No chest pain or palpitations  GASTROINTESTINAL:  No abdominal pain, nausea, vomiting, or hematemesis; No diarrhea or constipation. No melena or hematochezia.  GENITOURINARY:  No dysuria, change in frequency, or hematuria  NEUROLOGICAL:  No numbness or weakness  SKIN:  No itching or rashes      MEDICATIONS  (STANDING):  aspirin enteric coated 81 milliGRAM(s) Oral daily  atorvastatin 40 milliGRAM(s) Oral at bedtime  bictegravir 50 mG/emtricitabine 200 mG/tenofovir alafenamide 25 mG (BIKTARVY) 1 Tablet(s) Oral daily  gabapentin 300 milliGRAM(s) Oral at bedtime  heparin  Infusion.  Unit(s)/Hr (15 mL/Hr) IV Continuous <Continuous>    MEDICATIONS  (PRN):  acetaminophen     Tablet .. 650 milliGRAM(s) Oral every 6 hours PRN Temp greater or equal to 38C (100.4F), Mild Pain (1 - 3)  heparin   Injectable 6500 Unit(s) IV Push every 6 hours PRN For aPTT less than 40  heparin   Injectable 3000 Unit(s) IV Push every 6 hours PRN For aPTT between 40 - 57  oxyCODONE    IR 2.5 milliGRAM(s) Oral every 6 hours PRN Severe Pain (7 - 10)      CAPILLARY BLOOD GLUCOSE        I&O's Summary    21 Mar 2024 07:01  -  22 Mar 2024 07:00  --------------------------------------------------------  IN: 0 mL / OUT: 1000 mL / NET: -1000 mL        PHYSICAL EXAM:  Vital Signs Last 24 Hrs  T(C): 36.7 (22 Mar 2024 04:34), Max: 37 (21 Mar 2024 19:33)  T(F): 98.1 (22 Mar 2024 04:34), Max: 98.6 (21 Mar 2024 19:33)  HR: 73 (22 Mar 2024 04:34) (73 - 78)  BP: 112/64 (22 Mar 2024 04:34) (112/64 - 129/65)  BP(mean): --  RR: 18 (22 Mar 2024 04:34) (18 - 20)  SpO2: 100% (22 Mar 2024 04:34) (97% - 100%)    Parameters below as of 22 Mar 2024 04:34  Patient On (Oxygen Delivery Method): room air        CONSTITUTIONAL: NAD; well-developed  HEENT: PERRL, clear conjunctiva  RESPIRATORY: Normal respiratory effort; lungs are clear to auscultation bilaterally; No Crackles/Rhonchi/Wheezing  CARDIOVASCULAR: Regular rate and rhythm, normal S1 and S2, no murmur/rub/gallop; No lower extremity edema; Peripheral pulses are 2+ bilaterally  ABDOMEN: Nontender to palpation, normoactive bowel sounds, no rebound/guarding; No hepatosplenomegaly  MUSCULOSKELETAL: no clubbing or cyanosis of digits; no joint swelling or tenderness to palpation  EXTREMITY: Lower extremities Non-tender to palpation; non-erythematous B/L  NEURO: A&Ox3; no focal deficits   PSYCH: normal mood; Affect appropriate    LABS:                        10.7   5.70  )-----------( 268      ( 22 Mar 2024 04:37 )             32.6     03-22    139  |  106  |  18  ----------------------------<  100<H>  3.8   |  24  |  1.28    Ca    9.0      22 Mar 2024 04:37  Phos  3.1     03-22  Mg     1.50     03-22    TPro  7.6  /  Alb  2.7<L>  /  TBili  <0.2  /  DBili  x   /  AST  28  /  ALT  45<H>  /  AlkPhos  196<H>  03-22    PTT - ( 22 Mar 2024 04:37 )  PTT:62.5 sec      Urinalysis Basic - ( 22 Mar 2024 04:37 )    Color: x / Appearance: x / SG: x / pH: x  Gluc: 100 mg/dL / Ketone: x  / Bili: x / Urobili: x   Blood: x / Protein: x / Nitrite: x   Leuk Esterase: x / RBC: x / WBC x   Sq Epi: x / Non Sq Epi: x / Bacteria: x        Culture - Blood (collected 20 Mar 2024 02:21)  Source: .Blood Blood-Peripheral  Preliminary Report (21 Mar 2024 09:01):    No growth at 24 hours    Culture - Blood (collected 20 Mar 2024 02:06)  Source: .Blood Blood-Peripheral  Preliminary Report (21 Mar 2024 09:01):    No growth at 24 hours        RADIOLOGY & ADDITIONAL TESTS:    N/A PROGRESS NOTE:     Patient is a 53y old  Male who presents with a chief complaint of Foot pain (21 Mar 2024 07:07)      SUBJECTIVE / OVERNIGHT EVENTS:    OVERNIGHT: No acute overnight events.      Patient was examined at bedside and feels well this morning. Patient reports increased foot pain. Denies fever, chills, chest pain, SOB, nausea, vomiting. ROS otherwise negative and pt is amenable to current treatment plan.      REVIEW OF SYSTEMS:    CONSTITUTIONAL:  No weakness, fevers, or chills  EYES/ENT:  No visual changes, vertigo, or throat pain   NECK:  No pain or stiffness  RESPIRATORY:  No SOB, cough, wheezing, hemoptysis  CARDIOVASCULAR:  No chest pain or palpitations  GASTROINTESTINAL:  No abdominal pain, nausea, vomiting, or hematemesis; No diarrhea or constipation. No melena or hematochezia.  GENITOURINARY:  No dysuria, change in frequency, or hematuria  NEUROLOGICAL:  No numbness or weakness  SKIN:  No itching or rashes      MEDICATIONS  (STANDING):  aspirin enteric coated 81 milliGRAM(s) Oral daily  atorvastatin 40 milliGRAM(s) Oral at bedtime  bictegravir 50 mG/emtricitabine 200 mG/tenofovir alafenamide 25 mG (BIKTARVY) 1 Tablet(s) Oral daily  gabapentin 300 milliGRAM(s) Oral at bedtime  heparin  Infusion.  Unit(s)/Hr (15 mL/Hr) IV Continuous <Continuous>    MEDICATIONS  (PRN):  acetaminophen     Tablet .. 650 milliGRAM(s) Oral every 6 hours PRN Temp greater or equal to 38C (100.4F), Mild Pain (1 - 3)  heparin   Injectable 6500 Unit(s) IV Push every 6 hours PRN For aPTT less than 40  heparin   Injectable 3000 Unit(s) IV Push every 6 hours PRN For aPTT between 40 - 57  oxyCODONE    IR 2.5 milliGRAM(s) Oral every 6 hours PRN Severe Pain (7 - 10)      CAPILLARY BLOOD GLUCOSE        I&O's Summary    21 Mar 2024 07:01  -  22 Mar 2024 07:00  --------------------------------------------------------  IN: 0 mL / OUT: 1000 mL / NET: -1000 mL        PHYSICAL EXAM:  Vital Signs Last 24 Hrs  T(C): 36.7 (22 Mar 2024 04:34), Max: 37 (21 Mar 2024 19:33)  T(F): 98.1 (22 Mar 2024 04:34), Max: 98.6 (21 Mar 2024 19:33)  HR: 73 (22 Mar 2024 04:34) (73 - 78)  BP: 112/64 (22 Mar 2024 04:34) (112/64 - 129/65)  BP(mean): --  RR: 18 (22 Mar 2024 04:34) (18 - 20)  SpO2: 100% (22 Mar 2024 04:34) (97% - 100%)    Parameters below as of 22 Mar 2024 04:34  Patient On (Oxygen Delivery Method): room air        CONSTITUTIONAL: NAD; well-developed  HEENT: PERRL, clear conjunctiva  RESPIRATORY: Normal respiratory effort; lungs are clear to auscultation bilaterally; No Crackles/Rhonchi/Wheezing  CARDIOVASCULAR: Regular rate and rhythm, normal S1 and S2, no murmur/rub/gallop; No lower extremity edema; Peripheral pulses are 2+ bilaterally  ABDOMEN: Nontender to palpation, normoactive bowel sounds, no rebound/guarding; No hepatosplenomegaly  MUSCULOSKELETAL: no clubbing or cyanosis of digits; no joint swelling or tenderness to palpation  EXTREMITY: 2+ Peripheral Pulses of the RLE, 1+ in LLE. +ischemic changes of the left toes with intact sensation to base of toes, lack of sensation of tip of large toe. +finger clubbing  NEURO: A&Ox3; no focal deficits   PSYCH: normal mood; Affect appropriate    LABS:                        10.7   5.70  )-----------( 268      ( 22 Mar 2024 04:37 )             32.6     03-22    139  |  106  |  18  ----------------------------<  100<H>  3.8   |  24  |  1.28    Ca    9.0      22 Mar 2024 04:37  Phos  3.1     03-22  Mg     1.50     03-22    TPro  7.6  /  Alb  2.7<L>  /  TBili  <0.2  /  DBili  x   /  AST  28  /  ALT  45<H>  /  AlkPhos  196<H>  03-22    PTT - ( 22 Mar 2024 04:37 )  PTT:62.5 sec      Urinalysis Basic - ( 22 Mar 2024 04:37 )    Color: x / Appearance: x / SG: x / pH: x  Gluc: 100 mg/dL / Ketone: x  / Bili: x / Urobili: x   Blood: x / Protein: x / Nitrite: x   Leuk Esterase: x / RBC: x / WBC x   Sq Epi: x / Non Sq Epi: x / Bacteria: x        Culture - Blood (collected 20 Mar 2024 02:21)  Source: .Blood Blood-Peripheral  Preliminary Report (21 Mar 2024 09:01):    No growth at 24 hours    Culture - Blood (collected 20 Mar 2024 02:06)  Source: .Blood Blood-Peripheral  Preliminary Report (21 Mar 2024 09:01):    No growth at 24 hours        RADIOLOGY & ADDITIONAL TESTS:    N/A

## 2024-03-22 NOTE — PATIENT PROFILE ADULT - TRANSPORTATION
Peripheral Block      Patient reassessed immediately prior to procedure    Patient location during procedure: holding area  Start time: 12/21/2023 8:05 AM  Stop time: 12/21/2023 8:06 AM  Reason for block: procedure for pain, at surgeon's request, post-op pain management and Request by   Performed by  Anesthesiologist: Marcel Castillo MD  Preanesthetic Checklist  Completed: patient identified, IV checked, site marked, risks and benefits discussed, surgical consent, monitors and equipment checked, pre-op evaluation and timeout performed  Prep:  Pt Position: supine  Sterile barriers:gloves  Prep: ChloraPrep  Patient monitoring: blood pressure monitoring, continuous pulse oximetry and EKG  Procedure    Sedation: yes    Guidance:ultrasound guided and Brachial plexus identified and local anesthetic seen surrounding nerves    ULTRASOUND INTERPRETATION.  Using ultrasound guidance a 22 G gauge needle was placed in close proximity to the nerve, at which point, under ultrasound guidance anesthetic was injected in the area of the nerve and spread of the anesthesia was seen on ultrasound in close proximity thereto.  There were no abnormalities seen on ultrasound; a digital image was taken; and the patient tolerated the procedure with no complications. Images:still images obtained, printed/placed on chart (picture printed and placed in patients chart)  Loss of twitch: 0.5 mA  Laterality:right  Block Type:interscalene  Injection Technique:single-shot  Needle Type:echogenic  Needle Gauge:22 G      Medications Used: bupivacaine liposome (EXPAREL) 1.3 % injection - Peripheral Nerve   10 mL - 12/21/2023 8:05:00 AM  bupivacaine PF (MARCAINE) injection 0.5% - Injection   10 mL - 12/21/2023 8:05:00 AM      Post Assessment  Injection Assessment: negative aspiration for heme, no paresthesia on injection and incremental injection  Patient Tolerance:comfortable throughout block  Complications:no             no

## 2024-03-22 NOTE — DISCHARGE NOTE PROVIDER - PROVIDER TOKENS
PROVIDER:[TOKEN:[[id number]]],FREE:[LAST:[Lauryn],FIRST:[Franky],PHONE:[phone]],FAX:[(   )    -],ADDRESS:[[address] [place] [address]],ESTABLISHEDPATIENT:[T]] PROVIDER:[TOKEN:[[id number]],FOLLOWUP:[1 week]],FREE:[LAST:[Lauryn],FIRST:[Franky],PHONE:[phone]],FAX:[(   )    -],ADDRESS:[[address] [place] [address]],ESTABLISHEDPATIENT:[T]],PROVIDER:[TOKEN:[[id number]]]

## 2024-03-22 NOTE — PHYSICAL THERAPY INITIAL EVALUATION ADULT - LEVEL OF INDEPENDENCE: GAIT, REHAB EVAL
Pt slightly limping with 5 feet of ambulation,. Pt walked rest of the way using rolling walker for safety. Limited 2/2 pain in left foot/contact guard
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
## 2024-03-22 NOTE — PROGRESS NOTE ADULT - PROBLEM SELECTOR PLAN 5
DVT: Heparin ggt   Diet: CC  Pharmacy:  Dispo: pending TTE DVT Ppx: Heparin gtt.  Diet: CC  Dispo: TBD

## 2024-03-22 NOTE — DISCHARGE NOTE PROVIDER - NSDCCPCAREPLAN_GEN_ALL_CORE_FT
PRINCIPAL DISCHARGE DIAGNOSIS  Diagnosis: Pain of foot and toes  Assessment and Plan of Treatment: You were diagnosed with ischemic changes of your left foot. The Imaging showed no signs of active infection. Podiatry and Vascular Surgery teams were consulted and recommended a blood thinner without acute surgical intervention. Please take all medications exactly as prescribed. Please follow up with Podiatry and Vascular Surgery and Podiatry for further management. Please return to the ED if you experience any new or worsening symptoms.  Medication Changes:  - START Eliquis 5mg twice a day  - START Aspirin 81mg once a day  - START Atrovastatin 40mg once a day at bedtime  Follow Up:  - Please follow up with Podiatry for further management of ischemic left foot  - Please follow up with Vascular Surgery for further management of ischemic left foot  - Please follow up with PCP for general healthcare maintenance     PRINCIPAL DISCHARGE DIAGNOSIS  Diagnosis: Pain of foot and toes  Assessment and Plan of Treatment: You were diagnosed with ischemic changes of your left foot. The Imaging showed no signs of active infection. Podiatry and Vascular Surgery teams were consulted and recommended a blood thinner without surgery during this hospitalization. Please take all medications exactly as prescribed. Please follow up with Podiatry and Vascular Surgery for further management. Please return to the ED if you experience any new or worsening symptoms.  Medication Changes:  - START Eliquis 5mg twice a day  - START Aspirin 81mg once a day  - START Atorvastatin 40mg once a day at bedtime  Follow Up:  - Please follow up with Podiatry for further management of ischemic left foot  - Please follow up with Vascular Surgery for further management of ischemic left foot  - Please follow up with PCP for general healthcare maintenance  - Ultrasound of your heart showed possible extracardiac shunt, please follow up with pulmonary after discharge for further workup and evaluation

## 2024-03-22 NOTE — PHYSICAL THERAPY INITIAL EVALUATION ADULT - ADDITIONAL COMMENTS
Pt states he lives alone in an apartment with a flight of steps to enter +steps inside. Pt states his girlfriend occasionally comes and visits. Prior to admission, pt was ambulating independently without any assistive devices.  Post PT evaluation, pt left semi-supine, alarm on, call bell and remote within reach, all precautions maintained, NAD. RN aware.

## 2024-03-22 NOTE — DISCHARGE NOTE PROVIDER - NSDCCPTREATMENT_GEN_ALL_CORE_FT
PRINCIPAL PROCEDURE  Procedure: XR foot left, 3 views  Findings and Treatment:   ACC: [id number] EXAM:  XR FOOT COMP MIN 3 VIEWS LT   ORDERED BY: KELSY WALLIS   PROCEDURE DATE:  03/20/2024    INTERPRETATION:  CLINICAL INDICATION: Left buttocks ischemic changes,   most prominent over the first and second metatarsal phalangeal joint.   Question subcutaneous emphysema.  TECHNIQUE: 3 views left foot.  COMPARISON: X-ray bilateral feet 3/15/2024.  FINDINGS:  No acute fracture. No cortical erosion or periosteal reaction. No   dislocation. Joint spaces are maintained. No focal soft tissue swelling.   No soft tissue gas.  IMPRESSION:  No soft tissue gas or radiographic evidence of osteomyelitis.  --- End of Report ---       PRINCIPAL PROCEDURE  Procedure: XR foot left, 3 views  Findings and Treatment:   ACC: [id number] EXAM:  XR FOOT COMP MIN 3 VIEWS LT   ORDERED BY: KELSY WALLIS   PROCEDURE DATE:  03/20/2024    INTERPRETATION:  CLINICAL INDICATION: Left buttocks ischemic changes,   most prominent over the first and second metatarsal phalangeal joint.   Question subcutaneous emphysema.  TECHNIQUE: 3 views left foot.  COMPARISON: X-ray bilateral feet 3/15/2024.  FINDINGS:  No acute fracture. No cortical erosion or periosteal reaction. No   dislocation. Joint spaces are maintained. No focal soft tissue swelling.   No soft tissue gas.  IMPRESSION:  No soft tissue gas or radiographic evidence of osteomyelitis.  --- End of Report ---        SECONDARY PROCEDURE  Procedure: Transthoracic echocardiography (TTE)  Findings and Treatment: 1. The left ventricular cavity is normal in size. Left ventricular wall thickness is normal. Left ventricular systolic function is normal with a calculated ejection fraction of 60 % by the Vega's biplane method of disks. There are no regional wall motion abnormalities seen. There is normal left ventricular diastolic function.   2. Enlarged right ventricular cavity size and reduced systolic function.   3. Structurally normal mitral valve with normal leaflet excursion. There is trace mitral regurgitation.   4. A bubble study was performed with the intravenous injection of agitated saline contrast. Approximately eight (8) cardiac cycles following their appearance in the right heart, bubbles were seen in the left heart. This may be consistent with an extracardiac shunt (for example, a pulmonary arteriovenous malformation).   5. No prior echocardiogram is available for comparison.

## 2024-03-22 NOTE — PATIENT PROFILE ADULT - FALL HARM RISK - RISK INTERVENTIONS

## 2024-03-22 NOTE — PROGRESS NOTE ADULT - ATTENDING COMMENTS
suspect Buerger's dz d.t long standing smoking  CT-A LE noted, [facility] - no further interventions, plan to cw AC, ASA, Statin, and smoking cessation (per sx no concern for abscess)  awaiting TTE to assure no embolic source as this will also change AC choice  if TTE ok, plan to transition to DOAC - will check coverage w vivo
suspect Buerger's dz d.t long standing smoking  CT-A LE noted, patent vessels - [facility] - no further interventions, plan to cw AC, ASA, Statin, and smoking cessation (per sx no concern for abscess) - ok to dc from sx perspective  awaiting TTE to assure no LV thrombus as this will also change AC choice  if TTE ok, plan to transition to DOAC (Eliquis covered) and dc w out-pt follow up pending PT eval

## 2024-03-22 NOTE — PROGRESS NOTE ADULT - PROBLEM SELECTOR PLAN 1
P/w ischemic changes to the left toes, initial work-up concerning for arterial embolic disease.   - CT Angio negative for clear arterial occlusion of LE.  - Per Vascular, DENIS/PVR c/w embolic disease, recommending AC with embolic work-up.   - s/p debridement via Pod (follow up with Dr. Davila ([phone]) within 1 week of discharge    Plan:  - continue with heparin gtt, will price check eliquis  - No intervention planned per Vascular / Podiatry  - monitor off antibiotic  - Obtain TTE with bubble study, admit to tele, hypercoagulable w/u labs. 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   - CT Angio negative for clear arterial occlusion of LE.  - Per Vascular, DENIS/PVR c/w embolic disease, recommending AC with embolic work-up.   - S/p debridement via Pod (follow up with Dr. Davila ([phone]) within 1 week of discharge  > C/w heparin gtt, plan to start eliquis on discharge  > No intervention planned per Vascular / Podiatry  > Monitor off antibiotic  > Obtain TTE with bubble study, admit to tele, hypercoagulable w/u labs

## 2024-03-22 NOTE — DISCHARGE NOTE PROVIDER - NSDCMRMEDTOKEN_GEN_ALL_CORE_FT
Biktarvy 50 mg-200 mg-25 mg oral tablet: 1 tab(s) orally once a day  Eliquis 5 mg oral tablet: 1 tab(s) orally 2 times a day   aspirin 81 mg oral delayed release tablet: 1 tab(s) orally once a day  atorvastatin 40 mg oral tablet: 1 tab(s) orally once a day (at bedtime)  Biktarvy 50 mg-200 mg-25 mg oral tablet: 1 tab(s) orally once a day  Eliquis 5 mg oral tablet: 1 tab(s) orally 2 times a day  gabapentin 300 mg oral capsule: 1 cap(s) orally once a day (at bedtime)  oxyCODONE 5 mg oral tablet: 0.5 tab(s) orally every 6 hours MDD: 2 tabs

## 2024-03-22 NOTE — PROGRESS NOTE ADULT - PROBLEM SELECTOR PLAN 4
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  - Currently breathing comfortable on RA    Plan:  - Nicotine patch  - Smoking cessation  - Pulm follow up as outpatient - CT Chest showing emphysema, previous notes indicate c/f ?ILD i/s/o HIV.  - Currently breathing comfortable on RA  > Nicotine patch  > Smoking cessation  > Pulm follow up as outpatient

## 2024-03-22 NOTE — DISCHARGE NOTE PROVIDER - NSFOLLOWUPCLINICS_GEN_ALL_ED_FT
[facility]  General Surgery  [address] [place] [address] - 3rd [place] [address]  Phone: [phone]  Fax:

## 2024-03-22 NOTE — PROGRESS NOTE ADULT - PROBLEM SELECTOR PLAN 3
Initially p/w Cr 2.2, now improved s/p 1L NS  - may be VINEET on CKD, previous renal US unremarkable.   - No evidence of embolic disease involving kidneys    Plan:   - BMP daily, consider further fluids if needed - Initially p/w Cr 2.2, now improved s/p 1L NS  - May be VINEET on CKD, previous renal US unremarkable.   - No evidence of embolic disease involving kidneys  > BMP daily, consider further fluids if needed

## 2024-03-22 NOTE — DISCHARGE NOTE PROVIDER - CARE PROVIDER_API CALL
Denise Davila  Podiatric Medicine and Surgery  [address] [facility], [address]  [place] [address]  Phone: [phone]  Fax: [phone]  Follow Up Time:     Franky Combs  [address] [place]  Phone: [phone]  Fax: (   )    -  Established Patient  Follow Up Time:    Denise Davila  Podiatric Medicine and Surgery  [address] [facility], [address]  [place] [address]  Phone: [phone]  Fax: [phone]  Follow Up Time: 1 week    Franky Cmobs  [address] [place], [place]  Phone: [phone]  Fax: (   )    -  Established Patient  Follow Up Time:     Delano Tran  Vascular Surgery  [address] [place] [address], [address]  [place] [address]  Phone: [phone]  Fax: [phone]  Follow Up Time:

## 2024-03-22 NOTE — PROGRESS NOTE ADULT - PROBLEM SELECTOR PLAN 2
Hx of HIV, on Biktarvy, CD4 > 320, Viral load <30 but detectable.    - c/w Biktarvy - Hx of HIV, on Biktarvy, CD4 > 320, viral load <30 but detectable  > C/w Biktarvy

## 2024-03-23 LAB
ALBUMIN SERPL ELPH-MCNC: 2.7 G/DL — LOW (ref 3.3–5)
ALP SERPL-CCNC: 190 U/L — HIGH (ref 40–120)
ALT FLD-CCNC: 38 U/L — SIGNIFICANT CHANGE UP (ref 4–41)
ANION GAP SERPL CALC-SCNC: 9 MMOL/L — SIGNIFICANT CHANGE UP (ref 7–14)
APTT BLD: 66.6 SEC — HIGH (ref 24.5–35.6)
AST SERPL-CCNC: 26 U/L — SIGNIFICANT CHANGE UP (ref 4–40)
BILIRUB SERPL-MCNC: 0.2 MG/DL — SIGNIFICANT CHANGE UP (ref 0.2–1.2)
BUN SERPL-MCNC: 16 MG/DL — SIGNIFICANT CHANGE UP (ref 7–23)
CALCIUM SERPL-MCNC: 9 MG/DL — SIGNIFICANT CHANGE UP (ref 8.4–10.5)
CHLORIDE SERPL-SCNC: 107 MMOL/L — SIGNIFICANT CHANGE UP (ref 98–107)
CO2 SERPL-SCNC: 23 MMOL/L — SIGNIFICANT CHANGE UP (ref 22–31)
CREAT SERPL-MCNC: 1.16 MG/DL — SIGNIFICANT CHANGE UP (ref 0.5–1.3)
EGFR: 75 ML/MIN/1.73M2 — SIGNIFICANT CHANGE UP
GLUCOSE SERPL-MCNC: 106 MG/DL — HIGH (ref 70–99)
HCT VFR BLD CALC: 32.1 % — LOW (ref 39–50)
HGB BLD-MCNC: 11.1 G/DL — LOW (ref 13–17)
MAGNESIUM SERPL-MCNC: 1.5 MG/DL — LOW (ref 1.6–2.6)
MCHC RBC-ENTMCNC: 32.5 PG — SIGNIFICANT CHANGE UP (ref 27–34)
MCHC RBC-ENTMCNC: 34.6 GM/DL — SIGNIFICANT CHANGE UP (ref 32–36)
MCV RBC AUTO: 93.9 FL — SIGNIFICANT CHANGE UP (ref 80–100)
NRBC # BLD: 0 /100 WBCS — SIGNIFICANT CHANGE UP (ref 0–0)
NRBC # FLD: 0 K/UL — SIGNIFICANT CHANGE UP (ref 0–0)
PHOSPHATE SERPL-MCNC: 3.5 MG/DL — SIGNIFICANT CHANGE UP (ref 2.5–4.5)
PLATELET # BLD AUTO: 302 K/UL — SIGNIFICANT CHANGE UP (ref 150–400)
POTASSIUM SERPL-MCNC: 3.4 MMOL/L — LOW (ref 3.5–5.3)
POTASSIUM SERPL-SCNC: 3.4 MMOL/L — LOW (ref 3.5–5.3)
PROT SERPL-MCNC: 7.5 G/DL — SIGNIFICANT CHANGE UP (ref 6–8.3)
RBC # BLD: 3.42 M/UL — LOW (ref 4.2–5.8)
RBC # FLD: 15.5 % — HIGH (ref 10.3–14.5)
SODIUM SERPL-SCNC: 139 MMOL/L — SIGNIFICANT CHANGE UP (ref 135–145)
WBC # BLD: 5.95 K/UL — SIGNIFICANT CHANGE UP (ref 3.8–10.5)
WBC # FLD AUTO: 5.95 K/UL — SIGNIFICANT CHANGE UP (ref 3.8–10.5)

## 2024-03-23 PROCEDURE — 99233 SBSQ HOSP IP/OBS HIGH 50: CPT

## 2024-03-23 RX ORDER — MAGNESIUM OXIDE 400 MG ORAL TABLET 241.3 MG
400 TABLET ORAL
Refills: 0 | Status: COMPLETED | OUTPATIENT
Start: 2024-03-23 | End: 2024-03-24

## 2024-03-23 RX ORDER — POTASSIUM CHLORIDE 20 MEQ
40 PACKET (EA) ORAL ONCE
Refills: 0 | Status: COMPLETED | OUTPATIENT
Start: 2024-03-23 | End: 2024-03-23

## 2024-03-23 RX ADMIN — BICTEGRAVIR SODIUM, EMTRICITABINE, AND TENOFOVIR ALAFENAMIDE FUMARATE 1 TABLET(S): 30; 120; 15 TABLET ORAL at 11:13

## 2024-03-23 RX ADMIN — Medication 650 MILLIGRAM(S): at 09:46

## 2024-03-23 RX ADMIN — OXYCODONE HYDROCHLORIDE 2.5 MILLIGRAM(S): 5 TABLET ORAL at 16:38

## 2024-03-23 RX ADMIN — GABAPENTIN 300 MILLIGRAM(S): 400 CAPSULE ORAL at 22:22

## 2024-03-23 RX ADMIN — OXYCODONE HYDROCHLORIDE 2.5 MILLIGRAM(S): 5 TABLET ORAL at 09:39

## 2024-03-23 RX ADMIN — Medication 650 MILLIGRAM(S): at 17:04

## 2024-03-23 RX ADMIN — MAGNESIUM OXIDE 400 MG ORAL TABLET 400 MILLIGRAM(S): 241.3 TABLET ORAL at 17:08

## 2024-03-23 RX ADMIN — HEPARIN SODIUM 1900 UNIT(S)/HR: 5000 INJECTION INTRAVENOUS; SUBCUTANEOUS at 00:45

## 2024-03-23 RX ADMIN — HEPARIN SODIUM 1900 UNIT(S)/HR: 5000 INJECTION INTRAVENOUS; SUBCUTANEOUS at 07:55

## 2024-03-23 RX ADMIN — ATORVASTATIN CALCIUM 40 MILLIGRAM(S): 80 TABLET, FILM COATED ORAL at 22:22

## 2024-03-23 RX ADMIN — HEPARIN SODIUM 1900 UNIT(S)/HR: 5000 INJECTION INTRAVENOUS; SUBCUTANEOUS at 15:09

## 2024-03-23 RX ADMIN — MAGNESIUM OXIDE 400 MG ORAL TABLET 400 MILLIGRAM(S): 241.3 TABLET ORAL at 11:12

## 2024-03-23 RX ADMIN — Medication 650 MILLIGRAM(S): at 23:21

## 2024-03-23 RX ADMIN — OXYCODONE HYDROCHLORIDE 2.5 MILLIGRAM(S): 5 TABLET ORAL at 10:15

## 2024-03-23 RX ADMIN — OXYCODONE HYDROCHLORIDE 2.5 MILLIGRAM(S): 5 TABLET ORAL at 17:04

## 2024-03-23 RX ADMIN — Medication 81 MILLIGRAM(S): at 11:12

## 2024-03-23 RX ADMIN — Medication 650 MILLIGRAM(S): at 16:39

## 2024-03-23 RX ADMIN — Medication 40 MILLIEQUIVALENT(S): at 11:17

## 2024-03-23 RX ADMIN — HEPARIN SODIUM 1900 UNIT(S)/HR: 5000 INJECTION INTRAVENOUS; SUBCUTANEOUS at 19:16

## 2024-03-23 RX ADMIN — HEPARIN SODIUM 1900 UNIT(S)/HR: 5000 INJECTION INTRAVENOUS; SUBCUTANEOUS at 07:38

## 2024-03-23 RX ADMIN — Medication 650 MILLIGRAM(S): at 10:15

## 2024-03-23 RX ADMIN — Medication 650 MILLIGRAM(S): at 22:21

## 2024-03-23 RX ADMIN — OXYCODONE HYDROCHLORIDE 2.5 MILLIGRAM(S): 5 TABLET ORAL at 22:22

## 2024-03-23 NOTE — PROGRESS NOTE ADULT - PROBLEM SELECTOR PLAN 1
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   - CT Angio negative for clear arterial occlusion of LE.  - Per Vascular, DENIS/PVR c/w embolic disease, recommending AC with embolic work-up.   - S/p debridement via Pod (follow up with Dr. Davila ([phone]) within 1 week of discharge  > C/w heparin gtt, plan to start eliquis on discharge  > No intervention planned per Vascular / Podiatry -- will inquire re-eval as pt favors inpt treatment  > Monitor off antibiotic  > TTE with c/f extracardiac shunt -- discussed with pulm, unlikely related to presentation, no indication for urgent inpt w/u, can pursue outpt pulm eval and pHTN w/u

## 2024-03-23 NOTE — PROGRESS NOTE ADULT - PROBLEM SELECTOR PLAN 4
- CT Chest showing emphysema, previous notes indicate c/f ?ILD i/s/o HIV.  - Currently breathing comfortable on RA  > Nicotine patch  > Smoking cessation  > Pulm follow up as outpatient; should also pursue w/u for pHTN

## 2024-03-23 NOTE — PROGRESS NOTE ADULT - PROBLEM SELECTOR PLAN 3
- Initially p/w Cr 2.2, now improved s/p 1L NS  - May be VINEET on CKD, previous renal US unremarkable.   - No evidence of embolic disease involving kidneys  > BMP daily, consider further fluids if needed

## 2024-03-23 NOTE — PROGRESS NOTE ADULT - SUBJECTIVE AND OBJECTIVE BOX
[facility] Department of Hospital Medicine  Janet Mercado DO  Available on MS Teams  Pager: [phone]    Patient is a 53y old  Male who presents with a chief complaint of Foot pain (22 Mar 2024 16:35)    Subjective:  Pt seen and examined at bedside ambulating to BR with RN. Appeared comfortable.  Seen again at bedside afterwards, generally denying complaints. Attempted to call wife via cell phone but did not answer. Says pain is currently controlled. Frustrated with "lack of response" from vascular surgery team, saying he wants intervention and does not want to "just follow up outpatient". Says he still does not have a good answer of why this is happening. Discussed echo findings and that I spoke with pulmonary team and they do not believe there is any indication for urgent evaluation of extracardiac shunt; can pursue w/u for pHTN but unlikely related to current presentation. Would like to speak with vascular again. No other concerns or complaints.    VITAL SIGNS:  T(C): 36.8 (03-23-24 @ 13:30), Max: 37.3 (03-23-24 @ 09:41)  T(F): 98.3 (03-23-24 @ 13:30), Max: 99.1 (03-23-24 @ 09:41)  HR: 93 (03-23-24 @ 13:30) (72 - 95)  BP: 131/73 (03-23-24 @ 13:30) (115/66 - 131/73)  BP(mean): 95 (03-23-24 @ 06:15) (95 - 95)  RR: 16 (03-23-24 @ 13:30) (16 - 18)  SpO2: 97% (03-23-24 @ 13:30) (96% - 99%)  Wt(kg): --    PHYSICAL EXAM:  Constitutional: resting comfortably in bed; NAD  Head: NC/AT  Eyes: PERRL, EOMI, anicteric sclera  ENT: no nasal discharge; MMM  Neck: supple; no JVD  Respiratory: CTA B/L; no W/R/R; comfortable on RA  Cardiac: +S1/S2; RRR; no M/R/G  Gastrointestinal: soft, NT/ND; no rebound or guarding; +BSx4  Extremities: WWP, no clubbing or cyanosis; no peripheral edema  Dermatologic: skin warm, dry and intact; L foot ischemic changes at toes/forefoot   Neurologic: AAOx3; CNII-XII grossly intact; no focal deficits  Psychiatric: affect and characteristics of appearance, verbalizations, behaviors are appropriate    MEDICATIONS  (STANDING):  aspirin enteric coated 81 milliGRAM(s) Oral daily  atorvastatin 40 milliGRAM(s) Oral at bedtime  bictegravir 50 mG/emtricitabine 200 mG/tenofovir alafenamide 25 mG (BIKTARVY) 1 Tablet(s) Oral daily  gabapentin 300 milliGRAM(s) Oral at bedtime  heparin  Infusion.  Unit(s)/Hr (15 mL/Hr) IV Continuous <Continuous>  magnesium oxide 400 milliGRAM(s) Oral three times a day with meals    MEDICATIONS  (PRN):  acetaminophen     Tablet .. 650 milliGRAM(s) Oral every 6 hours PRN Temp greater or equal to 38C (100.4F), Mild Pain (1 - 3)  heparin   Injectable 6500 Unit(s) IV Push every 6 hours PRN For aPTT less than 40  heparin   Injectable 3000 Unit(s) IV Push every 6 hours PRN For aPTT between 40 - 57  oxyCODONE    IR 2.5 milliGRAM(s) Oral every 6 hours PRN Severe Pain (7 - 10)    LABS:                        11.1   5.95  )-----------( 302      ( 23 Mar 2024 06:03 )             32.1     03-23    139  |  107  |  16  ----------------------------<  106<H>  3.4<L>   |  23  |  1.16    Ca    9.0      23 Mar 2024 06:03  Phos  3.5     03-23  Mg     1.50     03-23    TPro  7.5  /  Alb  2.7<L>  /  TBili  0.2  /  DBili  x   /  AST  26  /  ALT  38  /  AlkPhos  190<H>  03-23    PTT - ( 23 Mar 2024 06:03 )  PTT:66.6 sec  Urinalysis Basic - ( 23 Mar 2024 06:03 )    Color: x / Appearance: x / SG: x / pH: x  Gluc: 106 mg/dL / Ketone: x  / Bili: x / Urobili: x   Blood: x / Protein: x / Nitrite: x   Leuk Esterase: x / RBC: x / WBC x   Sq Epi: x / Non Sq Epi: x / Bacteria: x      CAPILLARY BLOOD GLUCOSE          RADIOLOGY & ADDITIONAL TESTS: Reviewed.

## 2024-03-23 NOTE — PROGRESS NOTE ADULT - TIME BILLING
review of laboratory data, radiology results, consultants' recommendations, documentation in [facility], discussion with patient/ACP and interdisciplinary staff (such as , social workers, etc). Interventions were performed as documented above.

## 2024-03-24 LAB
APTT BLD: 74.1 SEC — HIGH (ref 24.5–35.6)
HCT VFR BLD CALC: 33.4 % — LOW (ref 39–50)
HGB BLD-MCNC: 11.7 G/DL — LOW (ref 13–17)
MCHC RBC-ENTMCNC: 33.1 PG — SIGNIFICANT CHANGE UP (ref 27–34)
MCHC RBC-ENTMCNC: 35 GM/DL — SIGNIFICANT CHANGE UP (ref 32–36)
MCV RBC AUTO: 94.4 FL — SIGNIFICANT CHANGE UP (ref 80–100)
NRBC # BLD: 0 /100 WBCS — SIGNIFICANT CHANGE UP (ref 0–0)
NRBC # FLD: 0 K/UL — SIGNIFICANT CHANGE UP (ref 0–0)
PLATELET # BLD AUTO: 311 K/UL — SIGNIFICANT CHANGE UP (ref 150–400)
RBC # BLD: 3.54 M/UL — LOW (ref 4.2–5.8)
RBC # FLD: 15.8 % — HIGH (ref 10.3–14.5)
WBC # BLD: 8.38 K/UL — SIGNIFICANT CHANGE UP (ref 3.8–10.5)
WBC # FLD AUTO: 8.38 K/UL — SIGNIFICANT CHANGE UP (ref 3.8–10.5)

## 2024-03-24 PROCEDURE — 99233 SBSQ HOSP IP/OBS HIGH 50: CPT

## 2024-03-24 RX ORDER — MORPHINE SULFATE 50 MG/1
2 CAPSULE, EXTENDED RELEASE ORAL ONCE
Refills: 0 | Status: DISCONTINUED | OUTPATIENT
Start: 2024-03-24 | End: 2024-03-24

## 2024-03-24 RX ADMIN — HEPARIN SODIUM 1900 UNIT(S)/HR: 5000 INJECTION INTRAVENOUS; SUBCUTANEOUS at 08:23

## 2024-03-24 RX ADMIN — MAGNESIUM OXIDE 400 MG ORAL TABLET 400 MILLIGRAM(S): 241.3 TABLET ORAL at 11:07

## 2024-03-24 RX ADMIN — OXYCODONE HYDROCHLORIDE 2.5 MILLIGRAM(S): 5 TABLET ORAL at 13:08

## 2024-03-24 RX ADMIN — BICTEGRAVIR SODIUM, EMTRICITABINE, AND TENOFOVIR ALAFENAMIDE FUMARATE 1 TABLET(S): 30; 120; 15 TABLET ORAL at 11:06

## 2024-03-24 RX ADMIN — MORPHINE SULFATE 2 MILLIGRAM(S): 50 CAPSULE, EXTENDED RELEASE ORAL at 23:00

## 2024-03-24 RX ADMIN — Medication 650 MILLIGRAM(S): at 13:07

## 2024-03-24 RX ADMIN — Medication 650 MILLIGRAM(S): at 14:00

## 2024-03-24 RX ADMIN — HEPARIN SODIUM 1900 UNIT(S)/HR: 5000 INJECTION INTRAVENOUS; SUBCUTANEOUS at 18:47

## 2024-03-24 RX ADMIN — OXYCODONE HYDROCHLORIDE 2.5 MILLIGRAM(S): 5 TABLET ORAL at 05:28

## 2024-03-24 RX ADMIN — ATORVASTATIN CALCIUM 40 MILLIGRAM(S): 80 TABLET, FILM COATED ORAL at 22:11

## 2024-03-24 RX ADMIN — HEPARIN SODIUM 1900 UNIT(S)/HR: 5000 INJECTION INTRAVENOUS; SUBCUTANEOUS at 21:39

## 2024-03-24 RX ADMIN — HEPARIN SODIUM 1900 UNIT(S)/HR: 5000 INJECTION INTRAVENOUS; SUBCUTANEOUS at 04:24

## 2024-03-24 RX ADMIN — MORPHINE SULFATE 2 MILLIGRAM(S): 50 CAPSULE, EXTENDED RELEASE ORAL at 22:11

## 2024-03-24 RX ADMIN — Medication 650 MILLIGRAM(S): at 05:28

## 2024-03-24 RX ADMIN — OXYCODONE HYDROCHLORIDE 2.5 MILLIGRAM(S): 5 TABLET ORAL at 14:00

## 2024-03-24 RX ADMIN — GABAPENTIN 300 MILLIGRAM(S): 400 CAPSULE ORAL at 22:11

## 2024-03-24 RX ADMIN — Medication 81 MILLIGRAM(S): at 11:06

## 2024-03-24 NOTE — PROGRESS NOTE ADULT - PROBLEM SELECTOR PLAN 1
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   - CT Angio negative for clear arterial occlusion of LE.  - Per Vascular, DENIS/PVR c/w embolic disease, recommending AC with embolic work-up.   - S/p debridement via Pod (follow up with Dr. Davila ([phone]) within 1 week of discharge  > C/w heparin gtt, plan to start eliquis on discharge  > No intervention planned per Vascular / Podiatry -- will inquire re-eval as pt favors inpt treatment  > Monitor off antibiotic  > TTE with c/f extracardiac shunt -- discussed with pulm, unlikely related to presentation, no indication for urgent inpt w/u, can pursue outpt pulm eval and pHTN w/u

## 2024-03-24 NOTE — PROGRESS NOTE ADULT - SUBJECTIVE AND OBJECTIVE BOX
****************************************  Vane López MD  Available on Microsoft Teams  ****************************************  SUBJECTIVE    Patient seen and examined at bedside. No acute events overnight  Reported  Denied HA, CP, SOB, n/v/d/c , fevers, chills    OBJECTIVE     Vital Signs Last 24 Hrs  T(C): 37.1 (24 Mar 2024 11:14), Max: 37.1 (23 Mar 2024 20:05)  T(F): 98.7 (24 Mar 2024 11:14), Max: 98.7 (23 Mar 2024 20:05)  HR: 85 (24 Mar 2024 11:14) (82 - 93)  BP: 124/67 (24 Mar 2024 11:14) (110/61 - 131/73)  BP(mean): --  RR: 18 (24 Mar 2024 11:14) (16 - 18)  SpO2: 99% (24 Mar 2024 11:14) (94% - 99%)    Parameters below as of 24 Mar 2024 11:14  Patient On (Oxygen Delivery Method): room air        03-23-24 @ 07:01  -  03-24-24 @ 07:00  --------------------------------------------------------  IN: 0 mL / OUT: 1200 mL / NET: -1200 mL      PHYSICAL EXAM:  Constitutional: non-toxic, no distress  HEAD/EYES: anicteric, no conjunctival injection  ENT:  supple, no thrush  Cardiovascular:   normal S1, S2, no murmur, no edema  Respiratory:  clear BS bilaterally, no wheezes, no rales  GI:  soft, non-tender, normal bowel sounds  :  no flores, no CVA tenderness  Musculoskeletal:  no synovitis, normal ROM  Neurologic: awake and alert, normal strength, no focal findings  Skin:  no rash, no erythema, no phlebitis  Heme/Onc: no lymphadenopathy   Psychiatric:  awake, alert, appropriate mood          HOSPITAL MEDICATIONS  aspirin enteric coated 81 milliGRAM(s) Oral daily  heparin  Infusion.  Unit(s)/Hr IV Continuous <Continuous>    bictegravir 50 mG/emtricitabine 200 mG/tenofovir alafenamide 25 mG (BIKTARVY) 1 Tablet(s) Oral daily      atorvastatin 40 milliGRAM(s) Oral at bedtime      acetaminophen     Tablet .. 650 milliGRAM(s) Oral every 6 hours PRN  gabapentin 300 milliGRAM(s) Oral at bedtime  oxyCODONE    IR 2.5 milliGRAM(s) Oral every 6 hours PRN                    LABS                        11.7   8.38  )-----------( 311      ( 24 Mar 2024 05:41 )             33.4       03-23    139  |  107  |  16  ----------------------------<  106<H>  3.4<L>   |  23  |  1.16    Ca    9.0      23 Mar 2024 06:03  Phos  3.5     03-23  Mg     1.50     03-23    TPro  7.5  /  Alb  2.7<L>  /  TBili  0.2  /  DBili  x   /  AST  26  /  ALT  38  /  AlkPhos  190<H>  03-23      Urinalysis Basic - ( 23 Mar 2024 06:03 )    Color: x / Appearance: x / SG: x / pH: x  Gluc: 106 mg/dL / Ketone: x  / Bili: x / Urobili: x   Blood: x / Protein: x / Nitrite: x   Leuk Esterase: x / RBC: x / WBC x   Sq Epi: x / Non Sq Epi: x / Bacteria: x        Follow Up:      RADIOLOGY: ****************************************  Vane López MD  Available on Microsoft Teams  ****************************************  SUBJECTIVE  Patient seen and examined at bedside. No acute events overnight  Denied HA, CP, SOB, n/v/d/c , fevers, chills    OBJECTIVE   Vital Signs Last 24 Hrs  T(C): 37.1 (24 Mar 2024 11:14), Max: 37.1 (23 Mar 2024 20:05)  T(F): 98.7 (24 Mar 2024 11:14), Max: 98.7 (23 Mar 2024 20:05)  HR: 85 (24 Mar 2024 11:14) (82 - 93)  BP: 124/67 (24 Mar 2024 11:14) (110/61 - 131/73)  BP(mean): --  RR: 18 (24 Mar 2024 11:14) (16 - 18)  SpO2: 99% (24 Mar 2024 11:14) (94% - 99%)    Parameters below as of 24 Mar 2024 11:14  Patient On (Oxygen Delivery Method): room air        03-23-24 @ 07:01  -  03-24-24 @ 07:00  --------------------------------------------------------  IN: 0 mL / OUT: 1200 mL / NET: -1200 mL      PHYSICAL EXAM:  Constitutional: resting comfortably in bed; NAD  Head: NC/AT  Eyes: PERRL, EOMI, anicteric sclera  ENT: no nasal discharge; MMM  Neck: supple; no JVD  Respiratory: CTA B/L; no W/R/R; comfortable on RA  Cardiac: +S1/S2; RRR; no M/R/G  Gastrointestinal: soft, NT/ND; no rebound or guarding; +BSx4  Extremities: WWP, no clubbing or cyanosis; no peripheral edema  Dermatologic: skin warm, dry and intact; L foot ischemic changes at toes/forefoot   Neurologic: AAOx3; CNII-XII grossly intact; no focal deficits  Psychiatric: affect and characteristics of appearance, verbalizations, behaviors are appropriate          HOSPITAL MEDICATIONS  aspirin enteric coated 81 milliGRAM(s) Oral daily  heparin  Infusion.  Unit(s)/Hr IV Continuous <Continuous>    bictegravir 50 mG/emtricitabine 200 mG/tenofovir alafenamide 25 mG (BIKTARVY) 1 Tablet(s) Oral daily      atorvastatin 40 milliGRAM(s) Oral at bedtime      acetaminophen     Tablet .. 650 milliGRAM(s) Oral every 6 hours PRN  gabapentin 300 milliGRAM(s) Oral at bedtime  oxyCODONE    IR 2.5 milliGRAM(s) Oral every 6 hours PRN                    LABS                        11.7   8.38  )-----------( 311      ( 24 Mar 2024 05:41 )             33.4       03-23    139  |  107  |  16  ----------------------------<  106<H>  3.4<L>   |  23  |  1.16    Ca    9.0      23 Mar 2024 06:03  Phos  3.5     03-23  Mg     1.50     03-23    TPro  7.5  /  Alb  2.7<L>  /  TBili  0.2  /  DBili  x   /  AST  26  /  ALT  38  /  AlkPhos  190<H>  03-23      Urinalysis Basic - ( 23 Mar 2024 06:03 )    Color: x / Appearance: x / SG: x / pH: x  Gluc: 106 mg/dL / Ketone: x  / Bili: x / Urobili: x   Blood: x / Protein: x / Nitrite: x   Leuk Esterase: x / RBC: x / WBC x   Sq Epi: x / Non Sq Epi: x / Bacteria: x        Follow Up:      RADIOLOGY:

## 2024-03-24 NOTE — PROGRESS NOTE ADULT - PROBLEM SELECTOR PROBLEM 3
VINEET (acute kidney injury)

## 2024-03-25 ENCOUNTER — TRANSCRIPTION ENCOUNTER (OUTPATIENT)
Age: 54
End: 2024-03-25

## 2024-03-25 VITALS
HEART RATE: 91 BPM | SYSTOLIC BLOOD PRESSURE: 127 MMHG | OXYGEN SATURATION: 98 % | RESPIRATION RATE: 18 BRPM | TEMPERATURE: 98 F | DIASTOLIC BLOOD PRESSURE: 68 MMHG

## 2024-03-25 LAB
ANION GAP SERPL CALC-SCNC: 9 MMOL/L — SIGNIFICANT CHANGE UP (ref 7–14)
APTT BLD: 86.9 SEC — HIGH (ref 24.5–35.6)
BUN SERPL-MCNC: 18 MG/DL — SIGNIFICANT CHANGE UP (ref 7–23)
CALCIUM SERPL-MCNC: 9.3 MG/DL — SIGNIFICANT CHANGE UP (ref 8.4–10.5)
CHLORIDE SERPL-SCNC: 102 MMOL/L — SIGNIFICANT CHANGE UP (ref 98–107)
CO2 SERPL-SCNC: 24 MMOL/L — SIGNIFICANT CHANGE UP (ref 22–31)
CREAT SERPL-MCNC: 1.28 MG/DL — SIGNIFICANT CHANGE UP (ref 0.5–1.3)
CULTURE RESULTS: SIGNIFICANT CHANGE UP
CULTURE RESULTS: SIGNIFICANT CHANGE UP
EGFR: 67 ML/MIN/1.73M2 — SIGNIFICANT CHANGE UP
GLUCOSE SERPL-MCNC: 98 MG/DL — SIGNIFICANT CHANGE UP (ref 70–99)
HCT VFR BLD CALC: 32.8 % — LOW (ref 39–50)
HGB BLD-MCNC: 11.5 G/DL — LOW (ref 13–17)
MCHC RBC-ENTMCNC: 32.8 PG — SIGNIFICANT CHANGE UP (ref 27–34)
MCHC RBC-ENTMCNC: 35.1 GM/DL — SIGNIFICANT CHANGE UP (ref 32–36)
MCV RBC AUTO: 93.4 FL — SIGNIFICANT CHANGE UP (ref 80–100)
NRBC # BLD: 0 /100 WBCS — SIGNIFICANT CHANGE UP (ref 0–0)
NRBC # FLD: 0 K/UL — SIGNIFICANT CHANGE UP (ref 0–0)
PLATELET # BLD AUTO: 330 K/UL — SIGNIFICANT CHANGE UP (ref 150–400)
POTASSIUM SERPL-MCNC: 3.8 MMOL/L — SIGNIFICANT CHANGE UP (ref 3.5–5.3)
POTASSIUM SERPL-SCNC: 3.8 MMOL/L — SIGNIFICANT CHANGE UP (ref 3.5–5.3)
RBC # BLD: 3.51 M/UL — LOW (ref 4.2–5.8)
RBC # FLD: 15.4 % — HIGH (ref 10.3–14.5)
SODIUM SERPL-SCNC: 135 MMOL/L — SIGNIFICANT CHANGE UP (ref 135–145)
SPECIMEN SOURCE: SIGNIFICANT CHANGE UP
SPECIMEN SOURCE: SIGNIFICANT CHANGE UP
WBC # BLD: 8.51 K/UL — SIGNIFICANT CHANGE UP (ref 3.8–10.5)
WBC # FLD AUTO: 8.51 K/UL — SIGNIFICANT CHANGE UP (ref 3.8–10.5)

## 2024-03-25 PROCEDURE — 99239 HOSP IP/OBS DSCHRG MGMT >30: CPT

## 2024-03-25 RX ORDER — ATORVASTATIN CALCIUM 80 MG/1
1 TABLET, FILM COATED ORAL
Qty: 30 | Refills: 0
Start: 2024-03-25 | End: 2024-04-23

## 2024-03-25 RX ORDER — ASPIRIN/CALCIUM CARB/MAGNESIUM 324 MG
1 TABLET ORAL
Qty: 30 | Refills: 0
Start: 2024-03-25 | End: 2024-04-23

## 2024-03-25 RX ORDER — GABAPENTIN 400 MG/1
1 CAPSULE ORAL
Qty: 30 | Refills: 0
Start: 2024-03-25 | End: 2024-04-23

## 2024-03-25 RX ORDER — APIXABAN 2.5 MG/1
1 TABLET, FILM COATED ORAL
Qty: 60 | Refills: 0
Start: 2024-03-25 | End: 2024-04-23

## 2024-03-25 RX ORDER — OXYCODONE HYDROCHLORIDE 5 MG/1
5 TABLET ORAL ONCE
Refills: 0 | Status: DISCONTINUED | OUTPATIENT
Start: 2024-03-25 | End: 2024-03-25

## 2024-03-25 RX ORDER — OXYCODONE HYDROCHLORIDE 5 MG/1
0.5 TABLET ORAL
Qty: 6 | Refills: 0
Start: 2024-03-25 | End: 2024-03-27

## 2024-03-25 RX ADMIN — Medication 81 MILLIGRAM(S): at 13:22

## 2024-03-25 RX ADMIN — OXYCODONE HYDROCHLORIDE 5 MILLIGRAM(S): 5 TABLET ORAL at 10:23

## 2024-03-25 RX ADMIN — HEPARIN SODIUM 1900 UNIT(S)/HR: 5000 INJECTION INTRAVENOUS; SUBCUTANEOUS at 08:47

## 2024-03-25 RX ADMIN — OXYCODONE HYDROCHLORIDE 5 MILLIGRAM(S): 5 TABLET ORAL at 11:11

## 2024-03-25 RX ADMIN — HEPARIN SODIUM 1900 UNIT(S)/HR: 5000 INJECTION INTRAVENOUS; SUBCUTANEOUS at 08:45

## 2024-03-25 RX ADMIN — BICTEGRAVIR SODIUM, EMTRICITABINE, AND TENOFOVIR ALAFENAMIDE FUMARATE 1 TABLET(S): 30; 120; 15 TABLET ORAL at 13:22

## 2024-03-25 RX ADMIN — HEPARIN SODIUM 1900 UNIT(S)/HR: 5000 INJECTION INTRAVENOUS; SUBCUTANEOUS at 07:19

## 2024-03-25 NOTE — DISCHARGE NOTE NURSING/CASE MANAGEMENT/SOCIAL WORK - NSDCFUADDAPPT_GEN_ALL_CORE_FT
APPTS ARE READY TO BE MADE: [X] YES    Best Family or Patient Contact (if needed):    Additional Information about above appointments (if needed):    1: Please follow up with Podiatry for further management of ischemic left foot 2: Please follow up with Vascular Surgery for further management of ischemic left foot 3: Please follow up with PCP for general healthcare maintenance    Met with patient face to face and provided the patient with provider referral information, however patient prefers to schedule the appointments on their own.   Other comments or requests:   Podiatry: Follow up with Dr. Davila ([phone]) within 1 week of discharge

## 2024-03-25 NOTE — DISCHARGE NOTE NURSING/CASE MANAGEMENT/SOCIAL WORK - NSDCPEFALRISK_GEN_ALL_CORE
For information on Fall & Injury Prevention, visit: [email or web address] OR  [email or web address] OR  https://www.cdc.gov/steadi/patient.html

## 2024-03-25 NOTE — DISCHARGE NOTE NURSING/CASE MANAGEMENT/SOCIAL WORK - PATIENT PORTAL LINK FT
You can access the FollowMyHealth Patient Portal offered by [facility] by registering at the following website: [email or web address]. By joining "[employer]"’s FollowMyHealth portal, you will also be able to view your health information using other applications (apps) compatible with our system.

## 2024-03-25 NOTE — CHART NOTE - NSCHARTNOTEFT_GEN_A_CORE
Patient will require a rolling walker at home due to his dx of ischemic gangrene left toes to help complete his MRADLs.     Denise Cervantes PA-C  Department of Medicine   In-house pager #[phone]

## 2024-04-03 ENCOUNTER — OUTPATIENT (OUTPATIENT)
Dept: OUTPATIENT SERVICES | Facility: HOSPITAL | Age: 54
LOS: 1 days | End: 2024-04-03
Payer: MEDICAID

## 2024-04-03 ENCOUNTER — APPOINTMENT (OUTPATIENT)
Dept: PODIATRY | Facility: HOSPITAL | Age: 54
End: 2024-04-03

## 2024-04-03 VITALS
BODY MASS INDEX: 28.49 KG/M2 | SYSTOLIC BLOOD PRESSURE: 109 MMHG | WEIGHT: 188 LBS | RESPIRATION RATE: 16 BRPM | TEMPERATURE: 97.5 F | OXYGEN SATURATION: 95 % | HEIGHT: 68 IN | DIASTOLIC BLOOD PRESSURE: 72 MMHG | HEART RATE: 75 BPM

## 2024-04-03 DIAGNOSIS — A48.0 GAS GANGRENE: ICD-10-CM

## 2024-04-03 DIAGNOSIS — M25.50 PAIN IN UNSPECIFIED JOINT: ICD-10-CM

## 2024-04-03 DIAGNOSIS — L97.509 NON-PRESSURE CHRONIC ULCER OF OTHER PART OF UNSPECIFIED FOOT WITH UNSPECIFIED SEVERITY: ICD-10-CM

## 2024-04-03 DIAGNOSIS — Z90.81 ACQUIRED ABSENCE OF SPLEEN: Chronic | ICD-10-CM

## 2024-04-03 PROBLEM — Z00.00 ENCOUNTER FOR PREVENTIVE HEALTH EXAMINATION: Status: ACTIVE | Noted: 2024-04-03

## 2024-04-03 PROCEDURE — G0463: CPT

## 2024-04-03 NOTE — PHYSICAL EXAM
[General Appearance - Alert] : alert [2+] : left foot dorsalis pedis 2+ [Foot Ulcer] : foot ulcer [Oriented To Time, Place, And Person] : oriented to person, place, and time [Ankle Swelling (On Exam)] : not present [] : not present [Varicose Veins Of Lower Extremities] : not present [de-identified] : left foot hallux wound to subQ w/ lesser digits ischemic changes

## 2024-04-03 NOTE — HISTORY OF PRESENT ILLNESS
[FreeTextEntry1] : 53 y.o M with left foot hallux infection and lesser digits ischemic changes x 2 weeks. Pt states he started to notice the changes s/p trauma to the foot. He recalls  hitting the foot on a hard surface. He denies any sensation to his foot. He states that he's never been told that he is a diabetic however was never tested for it. He denies any N/V/D, fever, chills.

## 2024-04-04 ENCOUNTER — INPATIENT (INPATIENT)
Facility: HOSPITAL | Age: 54
LOS: 11 days | Discharge: HOME | End: 2024-04-16
Attending: HOSPITALIST | Admitting: HOSPITALIST
Payer: MEDICAID

## 2024-04-04 VITALS
DIASTOLIC BLOOD PRESSURE: 76 MMHG | RESPIRATION RATE: 18 BRPM | SYSTOLIC BLOOD PRESSURE: 124 MMHG | OXYGEN SATURATION: 97 % | TEMPERATURE: 98 F | HEIGHT: 70 IN | WEIGHT: 188.94 LBS | HEART RATE: 91 BPM

## 2024-04-04 DIAGNOSIS — Z90.81 ACQUIRED ABSENCE OF SPLEEN: Chronic | ICD-10-CM

## 2024-04-04 PROCEDURE — 99285 EMERGENCY DEPT VISIT HI MDM: CPT | Mod: 25

## 2024-04-04 NOTE — ED ADULT TRIAGE NOTE - CHIEF COMPLAINT QUOTE
sent in by podiatrist for evaluation of L foot. pt endorses podiatrist states pt has gangrene. endorsing pain to L foot. denies fevers, chills, CP   hx HIV, DM2, HLD, DVT on eliquis  FS 78 in triage sent in by podiatrist for evaluation of L foot. pt endorses podiatrist states pt has gangrene. endorsing pain to L foot. denies amputations of toes to area denies fevers, chills, CP   hx HIV, DM2, HLD, DVT on eliquis  FS 78 in triage

## 2024-04-05 DIAGNOSIS — B37.0 CANDIDAL STOMATITIS: ICD-10-CM

## 2024-04-05 DIAGNOSIS — N28.1 CYST OF KIDNEY, ACQUIRED: ICD-10-CM

## 2024-04-05 DIAGNOSIS — Z29.9 ENCOUNTER FOR PROPHYLACTIC MEASURES, UNSPECIFIED: ICD-10-CM

## 2024-04-05 DIAGNOSIS — I96 GANGRENE, NOT ELSEWHERE CLASSIFIED: ICD-10-CM

## 2024-04-05 DIAGNOSIS — R73.03 PREDIABETES: ICD-10-CM

## 2024-04-05 DIAGNOSIS — Z86.61 PERSONAL HISTORY OF INFECTIONS OF THE CENTRAL NERVOUS SYSTEM: ICD-10-CM

## 2024-04-05 DIAGNOSIS — B20 HUMAN IMMUNODEFICIENCY VIRUS [HIV] DISEASE: ICD-10-CM

## 2024-04-05 DIAGNOSIS — Z01.818 ENCOUNTER FOR OTHER PREPROCEDURAL EXAMINATION: ICD-10-CM

## 2024-04-05 DIAGNOSIS — Z86.718 PERSONAL HISTORY OF OTHER VENOUS THROMBOSIS AND EMBOLISM: ICD-10-CM

## 2024-04-05 DIAGNOSIS — F19.90 OTHER PSYCHOACTIVE SUBSTANCE USE, UNSPECIFIED, UNCOMPLICATED: ICD-10-CM

## 2024-04-05 LAB
4/8 RATIO: 0.26 RATIO — LOW (ref 0.9–3.6)
ABS CD8: 2305 CELLS/UL — HIGH (ref 142–740)
ADD ON TEST-SPECIMEN IN LAB: SIGNIFICANT CHANGE UP
ALBUMIN SERPL ELPH-MCNC: 3.1 G/DL — LOW (ref 3.3–5)
ALP SERPL-CCNC: 198 U/L — HIGH (ref 40–120)
ALT FLD-CCNC: 51 U/L — HIGH (ref 4–41)
ANION GAP SERPL CALC-SCNC: 10 MMOL/L — SIGNIFICANT CHANGE UP (ref 7–14)
ANION GAP SERPL CALC-SCNC: 11 MMOL/L — SIGNIFICANT CHANGE UP (ref 7–14)
AST SERPL-CCNC: 44 U/L — HIGH (ref 4–40)
BASOPHILS # BLD AUTO: 0.07 K/UL — SIGNIFICANT CHANGE UP (ref 0–0.2)
BASOPHILS NFR BLD AUTO: 0.5 % — SIGNIFICANT CHANGE UP (ref 0–2)
BILIRUB SERPL-MCNC: 0.4 MG/DL — SIGNIFICANT CHANGE UP (ref 0.2–1.2)
BUN SERPL-MCNC: 25 MG/DL — HIGH (ref 7–23)
BUN SERPL-MCNC: 26 MG/DL — HIGH (ref 7–23)
CALCIUM SERPL-MCNC: 8.3 MG/DL — LOW (ref 8.4–10.5)
CALCIUM SERPL-MCNC: 8.8 MG/DL — SIGNIFICANT CHANGE UP (ref 8.4–10.5)
CD16+CD56+ CELLS NFR BLD: 19 % — SIGNIFICANT CHANGE UP (ref 5–23)
CD16+CD56+ CELLS NFR SPEC: 784 CELLS/UL — HIGH (ref 71–410)
CD19 BLASTS SPEC-ACNC: 16 % — SIGNIFICANT CHANGE UP (ref 6–24)
CD19 BLASTS SPEC-ACNC: 676 CELLS/UL — HIGH (ref 84–469)
CD3 BLASTS SPEC-ACNC: 2808 CELLS/UL — HIGH (ref 672–1870)
CD3 BLASTS SPEC-ACNC: 64 % — SIGNIFICANT CHANGE UP (ref 59–83)
CD4 %: 13 % — LOW (ref 30–62)
CD8 %: 50 % — HIGH (ref 12–36)
CHLORIDE SERPL-SCNC: 106 MMOL/L — SIGNIFICANT CHANGE UP (ref 98–107)
CHLORIDE SERPL-SCNC: 108 MMOL/L — HIGH (ref 98–107)
CO2 SERPL-SCNC: 20 MMOL/L — LOW (ref 22–31)
CO2 SERPL-SCNC: 21 MMOL/L — LOW (ref 22–31)
CREAT SERPL-MCNC: 1.38 MG/DL — HIGH (ref 0.5–1.3)
CREAT SERPL-MCNC: 1.51 MG/DL — HIGH (ref 0.5–1.3)
CRP SERPL-MCNC: 10 MG/L — HIGH
EGFR: 55 ML/MIN/1.73M2 — LOW
EGFR: 61 ML/MIN/1.73M2 — SIGNIFICANT CHANGE UP
EOSINOPHIL # BLD AUTO: 0.02 K/UL — SIGNIFICANT CHANGE UP (ref 0–0.5)
EOSINOPHIL NFR BLD AUTO: 0.1 % — SIGNIFICANT CHANGE UP (ref 0–6)
ERYTHROCYTE [SEDIMENTATION RATE] IN BLOOD: 91 MM/HR — HIGH (ref 1–15)
GLUCOSE BLDC GLUCOMTR-MCNC: 86 MG/DL — SIGNIFICANT CHANGE UP (ref 70–99)
GLUCOSE SERPL-MCNC: 123 MG/DL — HIGH (ref 70–99)
GLUCOSE SERPL-MCNC: 86 MG/DL — SIGNIFICANT CHANGE UP (ref 70–99)
GRAM STN FLD: ABNORMAL
HCT VFR BLD CALC: 34.9 % — LOW (ref 39–50)
HGB BLD-MCNC: 11.6 G/DL — LOW (ref 13–17)
IANC: 7.57 K/UL — HIGH (ref 1.8–7.4)
IMM GRANULOCYTES NFR BLD AUTO: 0.6 % — SIGNIFICANT CHANGE UP (ref 0–0.9)
INR BLD: 1.01 RATIO — SIGNIFICANT CHANGE UP (ref 0.85–1.18)
LYMPHOCYTES # BLD AUTO: 34.8 % — SIGNIFICANT CHANGE UP (ref 13–44)
LYMPHOCYTES # BLD AUTO: 4.68 K/UL — HIGH (ref 1–3.3)
MAGNESIUM SERPL-MCNC: 1.7 MG/DL — SIGNIFICANT CHANGE UP (ref 1.6–2.6)
MCHC RBC-ENTMCNC: 32 PG — SIGNIFICANT CHANGE UP (ref 27–34)
MCHC RBC-ENTMCNC: 33.2 GM/DL — SIGNIFICANT CHANGE UP (ref 32–36)
MCV RBC AUTO: 96.4 FL — SIGNIFICANT CHANGE UP (ref 80–100)
MONOCYTES # BLD AUTO: 1.02 K/UL — HIGH (ref 0–0.9)
MONOCYTES NFR BLD AUTO: 7.6 % — SIGNIFICANT CHANGE UP (ref 2–14)
NEUTROPHILS # BLD AUTO: 7.57 K/UL — HIGH (ref 1.8–7.4)
NEUTROPHILS NFR BLD AUTO: 56.4 % — SIGNIFICANT CHANGE UP (ref 43–77)
NRBC # BLD: 0 /100 WBCS — SIGNIFICANT CHANGE UP (ref 0–0)
NRBC # FLD: 0 K/UL — SIGNIFICANT CHANGE UP (ref 0–0)
PLATELET # BLD AUTO: 458 K/UL — HIGH (ref 150–400)
POTASSIUM SERPL-MCNC: 5.8 MMOL/L — HIGH (ref 3.5–5.3)
POTASSIUM SERPL-MCNC: 6 MMOL/L — HIGH (ref 3.5–5.3)
POTASSIUM SERPL-SCNC: 5.8 MMOL/L — HIGH (ref 3.5–5.3)
POTASSIUM SERPL-SCNC: 6 MMOL/L — HIGH (ref 3.5–5.3)
PROT SERPL-MCNC: 8.6 G/DL — HIGH (ref 6–8.3)
PROTHROM AB SERPL-ACNC: 11.4 SEC — SIGNIFICANT CHANGE UP (ref 9.5–13)
RBC # BLD: 3.62 M/UL — LOW (ref 4.2–5.8)
RBC # FLD: 15.3 % — HIGH (ref 10.3–14.5)
SODIUM SERPL-SCNC: 138 MMOL/L — SIGNIFICANT CHANGE UP (ref 135–145)
SODIUM SERPL-SCNC: 138 MMOL/L — SIGNIFICANT CHANGE UP (ref 135–145)
SPECIMEN SOURCE: SIGNIFICANT CHANGE UP
T-CELL CD4 SUBSET PNL BLD: 595 CELLS/UL — SIGNIFICANT CHANGE UP (ref 489–1457)
WBC # BLD: 13.44 K/UL — HIGH (ref 3.8–10.5)
WBC # FLD AUTO: 13.44 K/UL — HIGH (ref 3.8–10.5)

## 2024-04-05 PROCEDURE — 73600 X-RAY EXAM OF ANKLE: CPT | Mod: 26,LT

## 2024-04-05 PROCEDURE — 99223 1ST HOSP IP/OBS HIGH 75: CPT

## 2024-04-05 PROCEDURE — 76770 US EXAM ABDO BACK WALL COMP: CPT | Mod: 26

## 2024-04-05 PROCEDURE — 99223 1ST HOSP IP/OBS HIGH 75: CPT | Mod: GC

## 2024-04-05 PROCEDURE — 74183 MRI ABD W/O CNTR FLWD CNTR: CPT | Mod: 26,59

## 2024-04-05 PROCEDURE — 71045 X-RAY EXAM CHEST 1 VIEW: CPT | Mod: 26,59

## 2024-04-05 PROCEDURE — 73630 X-RAY EXAM OF FOOT: CPT | Mod: 26,LT

## 2024-04-05 RX ORDER — ONDANSETRON 8 MG/1
4 TABLET, FILM COATED ORAL EVERY 8 HOURS
Refills: 0 | Status: DISCONTINUED | OUTPATIENT
Start: 2024-04-05 | End: 2024-04-16

## 2024-04-05 RX ORDER — AMPICILLIN SODIUM AND SULBACTAM SODIUM 250; 125 MG/ML; MG/ML
INJECTION, POWDER, FOR SUSPENSION INTRAMUSCULAR; INTRAVENOUS
Refills: 0 | Status: DISCONTINUED | OUTPATIENT
Start: 2024-04-05 | End: 2024-04-08

## 2024-04-05 RX ORDER — ASPIRIN/CALCIUM CARB/MAGNESIUM 324 MG
81 TABLET ORAL DAILY
Refills: 0 | Status: DISCONTINUED | OUTPATIENT
Start: 2024-04-05 | End: 2024-04-16

## 2024-04-05 RX ORDER — BICTEGRAVIR SODIUM, EMTRICITABINE, AND TENOFOVIR ALAFENAMIDE FUMARATE 30; 120; 15 MG/1; MG/1; MG/1
1 TABLET ORAL DAILY
Refills: 0 | Status: DISCONTINUED | OUTPATIENT
Start: 2024-04-05 | End: 2024-04-16

## 2024-04-05 RX ORDER — APIXABAN 2.5 MG/1
5 TABLET, FILM COATED ORAL
Refills: 0 | Status: DISCONTINUED | OUTPATIENT
Start: 2024-04-05 | End: 2024-04-07

## 2024-04-05 RX ORDER — CLOTRIMAZOLE 10 MG
1 TROCHE MUCOUS MEMBRANE
Refills: 0 | Status: DISCONTINUED | OUTPATIENT
Start: 2024-04-05 | End: 2024-04-06

## 2024-04-05 RX ORDER — OXYCODONE HYDROCHLORIDE 5 MG/1
5 TABLET ORAL ONCE
Refills: 0 | Status: DISCONTINUED | OUTPATIENT
Start: 2024-04-05 | End: 2024-04-05

## 2024-04-05 RX ORDER — ATORVASTATIN CALCIUM 80 MG/1
40 TABLET, FILM COATED ORAL AT BEDTIME
Refills: 0 | Status: DISCONTINUED | OUTPATIENT
Start: 2024-04-05 | End: 2024-04-16

## 2024-04-05 RX ORDER — LANOLIN ALCOHOL/MO/W.PET/CERES
3 CREAM (GRAM) TOPICAL AT BEDTIME
Refills: 0 | Status: DISCONTINUED | OUTPATIENT
Start: 2024-04-05 | End: 2024-04-16

## 2024-04-05 RX ORDER — GABAPENTIN 400 MG/1
300 CAPSULE ORAL AT BEDTIME
Refills: 0 | Status: DISCONTINUED | OUTPATIENT
Start: 2024-04-05 | End: 2024-04-09

## 2024-04-05 RX ORDER — VANCOMYCIN HCL 1 G
750 VIAL (EA) INTRAVENOUS EVERY 12 HOURS
Refills: 0 | Status: DISCONTINUED | OUTPATIENT
Start: 2024-04-05 | End: 2024-04-08

## 2024-04-05 RX ORDER — ACETAMINOPHEN 500 MG
650 TABLET ORAL EVERY 6 HOURS
Refills: 0 | Status: DISCONTINUED | OUTPATIENT
Start: 2024-04-05 | End: 2024-04-16

## 2024-04-05 RX ORDER — AMPICILLIN SODIUM AND SULBACTAM SODIUM 250; 125 MG/ML; MG/ML
3 INJECTION, POWDER, FOR SUSPENSION INTRAMUSCULAR; INTRAVENOUS ONCE
Refills: 0 | Status: COMPLETED | OUTPATIENT
Start: 2024-04-05 | End: 2024-04-05

## 2024-04-05 RX ORDER — AMPICILLIN SODIUM AND SULBACTAM SODIUM 250; 125 MG/ML; MG/ML
3 INJECTION, POWDER, FOR SUSPENSION INTRAMUSCULAR; INTRAVENOUS EVERY 6 HOURS
Refills: 0 | Status: DISCONTINUED | OUTPATIENT
Start: 2024-04-05 | End: 2024-04-08

## 2024-04-05 RX ADMIN — OXYCODONE HYDROCHLORIDE 5 MILLIGRAM(S): 5 TABLET ORAL at 03:59

## 2024-04-05 RX ADMIN — AMPICILLIN SODIUM AND SULBACTAM SODIUM 200 GRAM(S): 250; 125 INJECTION, POWDER, FOR SUSPENSION INTRAMUSCULAR; INTRAVENOUS at 17:38

## 2024-04-05 RX ADMIN — ATORVASTATIN CALCIUM 40 MILLIGRAM(S): 80 TABLET, FILM COATED ORAL at 22:01

## 2024-04-05 RX ADMIN — GABAPENTIN 300 MILLIGRAM(S): 400 CAPSULE ORAL at 22:01

## 2024-04-05 RX ADMIN — AMPICILLIN SODIUM AND SULBACTAM SODIUM 200 GRAM(S): 250; 125 INJECTION, POWDER, FOR SUSPENSION INTRAMUSCULAR; INTRAVENOUS at 11:28

## 2024-04-05 RX ADMIN — Medication 3 MILLIGRAM(S): at 22:01

## 2024-04-05 RX ADMIN — Medication 1 LOZENGE: at 19:01

## 2024-04-05 RX ADMIN — BICTEGRAVIR SODIUM, EMTRICITABINE, AND TENOFOVIR ALAFENAMIDE FUMARATE 1 TABLET(S): 30; 120; 15 TABLET ORAL at 15:15

## 2024-04-05 RX ADMIN — Medication 1 LOZENGE: at 15:41

## 2024-04-05 RX ADMIN — AMPICILLIN SODIUM AND SULBACTAM SODIUM 200 GRAM(S): 250; 125 INJECTION, POWDER, FOR SUSPENSION INTRAMUSCULAR; INTRAVENOUS at 03:51

## 2024-04-05 RX ADMIN — APIXABAN 5 MILLIGRAM(S): 2.5 TABLET, FILM COATED ORAL at 17:38

## 2024-04-05 RX ADMIN — Medication 81 MILLIGRAM(S): at 12:03

## 2024-04-05 RX ADMIN — OXYCODONE HYDROCHLORIDE 5 MILLIGRAM(S): 5 TABLET ORAL at 04:45

## 2024-04-05 RX ADMIN — Medication 250 MILLIGRAM(S): at 18:13

## 2024-04-05 NOTE — CONSULT NOTE ADULT - ASSESSMENT
53M presents with left forefoot ischemic changes   - Patient seen and evaluated   - Afebrile, WBC 13.44. ESR 91, CRP 1.  - Left forefoot ischemic changes to digits 1-5 extends from distal ellen to PIPJ,  not fully demarcated, mild malodor. Maceration of left foot 4th interspace, L foot subluxed hallux toenail with scant pus,  L foot plantar styloid process eschar, No open lesions or acute signs of infection of right foot.   - After obtaining verbal consent, usiing a suture removal kit, L foot hallux toenail was atrumatically removed, no purulence ischemic change to the nailbed, no further tracking or probing, excisional debridement of L foot hallux necrotic skin to the level of epidermis, plantar hallux wound to dermis, no purulence noted, well tolerated.  - Left foot xray: no OM, no gas, no fracture   - Left foot wound culture obtained and sent  - Recommended admit to medicine   - Recommended Vasc consult  - Recommended ID consult  - Pod plan: Local wound care vs L foot transmetatarsal amputation pending demarcation/ vasc recs  - Please document medical clearance for possible podiatry surgery under anesthesia   - Discussed with Attending    53M presents with left forefoot ischemic changes   - Patient seen and evaluated   - Afebrile, WBC 13.44. ESR 91, CRP 1.  - Left forefoot ischemic changes to digits 1-5 extends from distal ellen to PIPJ,  not fully demarcated, mild malodor. Maceration of left foot 4th interspace, L foot subluxed hallux toenail with scant pus,  L foot plantar styloid process eschar, No open lesions or acute signs of infection of right foot.   - After obtaining verbal consent, usiing a suture removal kit, L foot hallux toenail was atrumatically removed, no purulence ischemic change to the nailbed, no further tracking or probing, excisional debridement of L foot hallux necrotic skin to the level of epidermis, plantar hallux wound to dermis, no purulence noted, well tolerated.  - Left foot xray: no OM, no gas, no fracture   - Left foot wound culture obtained and sent  - Recommended admit to medicine   - Recommended IV Unasyn  - Recommended Vasc consult  - Recommended ID consult  - Pod plan: Local wound care vs L foot transmetatarsal amputation pending demarcation/ vasc recs  - Please document medical clearance for possible podiatry surgery under anesthesia   - Discussed with Attending    53M presents with left forefoot wet gangrene  - Patient seen and evaluated   - Afebrile, WBC 13.44. ESR 91, CRP 1.  - Left forefoot ischemic changes to digits 1-5 extends from distal ellen to PIPJ,  not fully demarcated, mild malodor. Maceration of left foot 4th interspace, L foot subluxed hallux toenail with scant pus,  L foot plantar styloid process eschar, No open lesions or acute signs of infection of right foot.   - After obtaining verbal consent, usiing a suture removal kit, L foot hallux toenail was atrumatically removed, no purulence ischemic change to the nailbed, no further tracking or probing, excisional debridement of L foot hallux necrotic skin to the level of epidermis, plantar hallux wound to dermis, no purulence noted, well tolerated.  - Left foot xray: no OM, no gas, no fracture   - Left foot wound culture obtained and sent  - Recommended admit to medicine   - Recommended IV Unasyn  - Recommended Vasc consult  - Recommended ID consult  - Pod plan: Local wound care vs L foot transmetatarsal amputation pending demarcation/ vasc recs  - Please document medical clearance for possible podiatry surgery under anesthesia   - Discussed with Attending

## 2024-04-05 NOTE — H&P ADULT - NSHPPHYSICALEXAM_GEN_ALL_CORE
Physical Exam:   General: NAD  HEENT: PERRL, EOMI, normal sclera and conjunctiva, no oral lesions  Neck: Supple, no JVD  Lungs: CTA bilaterally  Heart: RRR, normal S1S2, no murmurs/rubs/gallops  Abdomen: Soft, ND/NT  Extremities: debrided, gangrenous L foot w/ ichemic changes in all 5 toes, wrapped. Present pedal pulses b/l, weaker on L  Skin: Warm, well-perfused  Neuro: A&O x3, no focal deficits

## 2024-04-05 NOTE — CONSULT NOTE ADULT - SUBJECTIVE AND OBJECTIVE BOX
· HPI Objective Statement: 53-year-old male with a history of HIV, 20-pack-year smoking history (quit 3 months ago), recent admission for meningitis at [facility]SH 3 weeks ago presenting with left foot gangrene.  Patient reports he was evaluated by podiatrist in the outpatient setting who sent him to the emergency department for further evaluation.  Patient has some mild back pain s/p lumbar puncture about 3 weeks ago at OSH for meningitis.  Patient has been taking all of his HIV medications as prescribed and recent HIV load was undetectable.  Patient otherwise has no significant headache, chest pain, shortness of breath, N/V/D/abdominal pain, dysuria, fever/chills.  NKDA.  · Chief Complaint: The patient is a 53y Male complaining of        PAST MEDICAL & SURGICAL HISTORY:  HIV disease      H/O splenectomy          MEDICATIONS  (STANDING):  ampicillin/sulbactam  IVPB      ampicillin/sulbactam  IVPB 3 Gram(s) IV Intermittent once  ampicillin/sulbactam  IVPB 3 Gram(s) IV Intermittent every 6 hours    MEDICATIONS  (PRN):      Allergies    No Known Allergies    Intolerances        VITALS:    Vital Signs Last 24 Hrs  T(C): 36.4 (05 Apr 2024 00:01), Max: 36.4 (04 Apr 2024 22:46)  T(F): 97.6 (05 Apr 2024 00:01), Max: 97.6 (05 Apr 2024 00:01)  HR: 78 (05 Apr 2024 00:01) (78 - 91)  BP: 118/78 (05 Apr 2024 00:01) (118/78 - 124/76)  BP(mean): --  RR: 17 (05 Apr 2024 00:01) (17 - 18)  SpO2: 99% (05 Apr 2024 00:01) (97% - 99%)    Parameters below as of 05 Apr 2024 00:01  Patient On (Oxygen Delivery Method): room air        LABS:                          11.6   13.44 )-----------( [id number]      ( 04 Apr 2024 23:55 )             34.9       04-05    138  |  108<H>  |  25<H>  ----------------------------<  123<H>  5.8<H>   |  20<L>  |  1.38<H>    Ca    8.3<L>      05 Apr 2024 01:50  Mg     1.70     04-04    TPro  8.6<H>  /  Alb  3.1<L>  /  TBili  0.4  /  DBili  x   /  AST  44<H>  /  ALT  51<H>  /  AlkPhos  198<H>  04-04      CAPILLARY BLOOD GLUCOSE      POCT Blood Glucose.: 78 mg/dL (04 Apr 2024 22:51)      PT/INR - ( 04 Apr 2024 23:55 )   PT: 11.4 sec;   INR: 1.01 ratio             LOWER EXTREMITY PHYSICAL EXAM:  Vascular: DP/PT 2/4, B/L, CFT <3 seconds B/L, Temperature gradient warm to cool, B/L.   Neuro: Epicritic sensation diminished to the level of digits, B/L.  Musculoskeletal/Ortho: unremarkable   Skin: Left forefoot ischemic changes to digits 1-5 extends from distal ellen to PIPJ,  not fully demarcated, mild malodor. Maceration of left foot 4th interspace, L foot subluxed hallux toenail with scant pus,  L foot plantar styloid process eschar, No open lesions or acute signs of infection of right foot.   RADIOLOGY & ADDITIONAL STUDIES:

## 2024-04-05 NOTE — ED ADULT NURSE NOTE - NSFALLHARMRISKINTERV_ED_ALL_ED
Assistance OOB with selected safe patient handling equipment if applicable/Assistance with ambulation/Communicate risk of Fall with Harm to all staff, patient, and family/Monitor gait and stability/Provide visual cue: red socks, yellow wristband, yellow gown, etc/Reinforce activity limits and safety measures with patient and family/Bed in lowest position, wheels locked, appropriate side rails in place/Call bell, personal items and telephone in reach/Instruct patient to call for assistance before getting out of bed/chair/stretcher/Non-slip footwear applied when patient is off stretcher/[place] to call system/Physically safe environment - no spills, clutter or unnecessary equipment/Purposeful Proactive Rounding/Room/bathroom lighting operational, light cord in reach

## 2024-04-05 NOTE — ED PROVIDER NOTE - PROGRESS NOTE DETAILS
Lab work notable for 13.4, hemoglobin 6, CRP 10.  X-ray without any gas or acute fracture.  Podiatry consulted and will be down to evaluate the patient. Podiatry evaluated patient and debrided wound.  Some concern for wet gangrene.  Vascular surgery evaluated patient.  No concern for wet gangrene at this time.  Patient was able to have dopplerable pulses on bilateral lower extremities.  Admit to medicine for IV antibiotics.  Podiatry and vascular will continue to follow.

## 2024-04-05 NOTE — H&P ADULT - PROBLEM SELECTOR PLAN 4
Treated at [facility] with 3 weeks of IV abx, patient left AMA  Does not remember what pathogen or what antibiotics were used  Obtaining records Treated at [facility] with 3 weeks of IV abx, patient left AMA  Does not remember what pathogen or what antibiotics were used  No signs or symptoms at the moment  Obtaining records

## 2024-04-05 NOTE — CONSULT NOTE ADULT - SUBJECTIVE AND OBJECTIVE BOX
ID INITIAL CONSULT NOTE     Patient is a 53y old  Male who presents with a chief complaint of wet gangrene (05 Apr 2024 11:42)      HPI:  53-year-old male with a history of HIV, preDM 20-pack-year smoking history, prior cocaine use, prior arterial thromboembolic disease, recent admission for meningitis at [place] in 2/2024 (recevied 3 weeks IV abx, left AMA) presenting with left foot gangrene. Patient has had multiple similar admissions in 3/2024 at this hospital for ischemic L foot Patient reports he was evaluated by podiatrist in the outpatient setting who sent him to the emergency department for further evaluation.  Patient has some mild back pain s/p lumbar puncture at OSH for meningitis.  Patient has been taking all of his HIV medications as prescribed and recent HIV load was undetectable.  Patient otherwise has no significant headache, chest pain, shortness of breath, N/V/D/abdominal pain, dysuria, fever/chills.  NKDA. (05 Apr 2024 11:42)      prior hospital charts reviewed [ X ]  primary team notes reviewed [ X ]  other consultant notes reviewed [ X ]    PAST MEDICAL & SURGICAL HISTORY:  HIV disease      H/O splenectomy          Allergies  No Known Allergies        ANTIMICROBIALS:  ampicillin/sulbactam  IVPB    ampicillin/sulbactam  IVPB 3 every 6 hours  bictegravir 50 mG/emtricitabine 200 mG/tenofovir alafenamide 25 mG (BIKTARVY) 1 daily  clotrimazole Lozenge 1 five times a day  vancomycin  IVPB 750 every 12 hours      Current Abx:     Past Abx:       OTHER MEDS: MEDICATIONS  (STANDING):  acetaminophen     Tablet .. 650 every 6 hours PRN  apixaban 5 two times a day  aspirin enteric coated 81 daily  atorvastatin 40 at bedtime  gabapentin 300 at bedtime  melatonin 3 at bedtime PRN  ondansetron Injectable 4 every 8 hours PRN      SOCIAL HISTORY: [ ] etoh [ ] tobacco [ ] former smoker [ ] IVDU  Former illicit drug use    FAMILY HISTORY:      REVIEW OF SYSTEMS:    CONSTITUTIONAL: No weakness, fevers or chills  EYES/ENT: No visual changes;  No vertigo or throat pain   NECK: No pain or stiffness  RESPIRATORY: No cough, wheezing, hemoptysis; No shortness of breath  CARDIOVASCULAR: No chest pain or palpitations  GASTROINTESTINAL: No abdominal or epigastric pain. No nausea, vomiting, or hematemesis; No diarrhea or constipation. No melena or hematochezia.  GENITOURINARY: No dysuria, frequency or hematuria  NEUROLOGICAL: No numbness or weakness  SKIN: No itching, burning, rashes, or lesions   All other review of systems is negative unless indicated above.    Vital Signs Last 24 Hrs  T(F): 98.9 (04-05-24 @ 13:33), Max: 99 (04-05-24 @ 04:45)    Vital Signs Last 24 Hrs  HR: 92 (04-05-24 @ 13:33) (71 - 92)  BP: 102/54 (04-05-24 @ 13:33) (102/54 - 124/76)  RR: 18 (04-05-24 @ 13:33)  SpO2: 96% (04-05-24 @ 13:33) (95% - 99%)  Wt(kg): --    PHYSICAL EXAM:  GENERAL: NAD, well-developed  HEAD:  Atraumatic, Normocephalic  EYES: EOMI, conjunctiva and sclera clear  CHEST/LUNG: Clear to auscultation bilaterally; No wheeze  HEART: Regular rate and rhythm; No murmurs, rubs, or gallops  ABDOMEN: Soft, Nontender, Nondistended; Bowel sounds present  EXTREMITIES:  L foot wrapped   PSYCH: AAOx3      Labs:                          11.6   13.44 )-----------( [id number]      ( 04 Apr 2024 23:55 )             34.9       04-05    138  |  108<H>  |  25<H>  ----------------------------<  123<H>  5.8<H>   |  20<L>  |  1.38<H>    Ca    8.3<L>      05 Apr 2024 01:50  Mg     1.70     04-04    TPro  8.6<H>  /  Alb  3.1<L>  /  TBili  0.4  /  DBili  x   /  AST  44<H>  /  ALT  51<H>  /  AlkPhos  198<H>  04-04      Urinalysis Basic - ( 05 Apr 2024 01:50 )    Color: x / Appearance: x / SG: x / pH: x  Gluc: 123 mg/dL / Ketone: x  / Bili: x / Urobili: x   Blood: x / Protein: x / Nitrite: x   Leuk Esterase: x / RBC: x / WBC x   Sq Epi: x / Non Sq Epi: x / Bacteria: x          MICROBIOLOGY:  .Abscess Left foot wound culture  04-05-24 --  --    No polymorphonuclear leukocytes per low power field  Few Gram Positive Cocci in Pairs and Chains per oil power field      .Blood Blood-Peripheral  03-20-24   No growth at 5 days  --  --      .Blood Blood-Peripheral  03-20-24   No growth at 5 days  --  --      Clean Catch Clean Catch (Midstream)  03-15-24   No growth  --  --      .Blood Blood-Peripheral  03-15-24   No growth at 5 days  --  --      .Blood Blood-Peripheral  03-15-24   No growth at 5 days  --  --          HIV-1 RNA Quantitative, Viral Load Log: DET.  <1.47 lg /mL (03-16-24 @ 06:05)  CMV IgG Antibody: >10.00 U/mL (03-16-24 @ 06:05)      RADIOLOGY:  XR L foot 4/5  FINDINGS:  No acute fracture or dislocation.  No osseous erosive changes or the periosteal reaction. Suggestion of   irregularity at the first nail.  The joint spaces are preserved with smooth articular margins.      IMPRESSION:  No acute fracture or dislocation.  No osseous erosive changes or periosteal reaction.   ID INITIAL CONSULT NOTE     Patient is a 53y old  Male who presents with a chief complaint of wet gangrene (05 Apr 2024 11:42)      HPI:  53-year-old male with a history of HIV, preDM 20-pack-year smoking history, prior cocaine use, prior arterial thromboembolic disease, recent admission for meningitis at [place] in 2/2024 (recevied 3 weeks IV abx, left AMA) presenting with left foot gangrene. Patient has had multiple similar admissions in 3/2024 at this hospital for ischemic L foot Patient reports he was evaluated by podiatrist in the outpatient setting who sent him to the emergency department for further evaluation.  Patient has some mild back pain s/p lumbar puncture at OSH for meningitis.  Patient has been taking all of his HIV medications as prescribed and recent HIV load was undetectable.  Patient otherwise has no significant headache, chest pain, shortness of breath, N/V/D/abdominal pain, dysuria, fever/chills.  NKDA. (05 Apr 2024 11:42)      prior hospital charts reviewed [ X ]  primary team notes reviewed [ X ]  other consultant notes reviewed [ X ]    PAST MEDICAL & SURGICAL HISTORY:  HIV disease      H/O splenectomy          Allergies  No Known Allergies        ANTIMICROBIALS:  ampicillin/sulbactam  IVPB    ampicillin/sulbactam  IVPB 3 every 6 hours  bictegravir 50 mG/emtricitabine 200 mG/tenofovir alafenamide 25 mG (BIKTARVY) 1 daily  clotrimazole Lozenge 1 five times a day  vancomycin  IVPB 750 every 12 hours      Current Abx:     Past Abx:       OTHER MEDS: MEDICATIONS  (STANDING):  acetaminophen     Tablet .. 650 every 6 hours PRN  apixaban 5 two times a day  aspirin enteric coated 81 daily  atorvastatin 40 at bedtime  gabapentin 300 at bedtime  melatonin 3 at bedtime PRN  ondansetron Injectable 4 every 8 hours PRN      SOCIAL HISTORY: [ ] etoh [ ] tobacco [ ] former smoker [ ] IVDU  Former illicit drug use    FAMILY HISTORY:      REVIEW OF SYSTEMS:    CONSTITUTIONAL: No weakness, fevers or chills  EYES/ENT: No visual changes;  No vertigo or throat pain   NECK: No pain or stiffness  RESPIRATORY: No cough, wheezing, hemoptysis; No shortness of breath  CARDIOVASCULAR: No chest pain or palpitations  GASTROINTESTINAL: No abdominal or epigastric pain. No nausea, vomiting, or hematemesis; No diarrhea or constipation. No melena or hematochezia.  GENITOURINARY: No dysuria, frequency or hematuria  NEUROLOGICAL: No numbness or weakness  SKIN: No itching, burning, rashes, or lesions   All other review of systems is negative unless indicated above.    Vital Signs Last 24 Hrs  T(F): 98.9 (04-05-24 @ 13:33), Max: 99 (04-05-24 @ 04:45)    Vital Signs Last 24 Hrs  HR: 92 (04-05-24 @ 13:33) (71 - 92)  BP: 102/54 (04-05-24 @ 13:33) (102/54 - 124/76)  RR: 18 (04-05-24 @ 13:33)  SpO2: 96% (04-05-24 @ 13:33) (95% - 99%)  Wt(kg): --    PHYSICAL EXAM:  GENERAL: NAD, well-developed  HEAD:  Atraumatic, Normocephalic  EYES: EOMI, conjunctiva and sclera clear  CHEST/LUNG: Clear to auscultation bilaterally; No wheeze  HEART: Regular rate and rhythm; No murmurs, rubs, or gallops  ABDOMEN: Soft, Nontender, Nondistended; Bowel sounds present  EXTREMITIES:  L foot, necrosis of all toes, 1st toe w/ nail removed, no obvious signs of infection, drainage, erythema, malodor   PSYCH: AAOx3      Labs:                          11.6   13.44 )-----------( [id number]      ( 04 Apr 2024 23:55 )             34.9       04-05    138  |  108<H>  |  25<H>  ----------------------------<  123<H>  5.8<H>   |  20<L>  |  1.38<H>    Ca    8.3<L>      05 Apr 2024 01:50  Mg     1.70     04-04    TPro  8.6<H>  /  Alb  3.1<L>  /  TBili  0.4  /  DBili  x   /  AST  44<H>  /  ALT  51<H>  /  AlkPhos  198<H>  04-04      Urinalysis Basic - ( 05 Apr 2024 01:50 )    Color: x / Appearance: x / SG: x / pH: x  Gluc: 123 mg/dL / Ketone: x  / Bili: x / Urobili: x   Blood: x / Protein: x / Nitrite: x   Leuk Esterase: x / RBC: x / WBC x   Sq Epi: x / Non Sq Epi: x / Bacteria: x          MICROBIOLOGY:  .Abscess Left foot wound culture  04-05-24 --  --    No polymorphonuclear leukocytes per low power field  Few Gram Positive Cocci in Pairs and Chains per oil power field      .Blood Blood-Peripheral  03-20-24   No growth at 5 days  --  --      .Blood Blood-Peripheral  03-20-24   No growth at 5 days  --  --      Clean Catch Clean Catch (Midstream)  03-15-24   No growth  --  --      .Blood Blood-Peripheral  03-15-24   No growth at 5 days  --  --      .Blood Blood-Peripheral  03-15-24   No growth at 5 days  --  --          HIV-1 RNA Quantitative, Viral Load Log: DET.  <1.47 lg /mL (03-16-24 @ 06:05)  CMV IgG Antibody: >10.00 U/mL (03-16-24 @ 06:05)      RADIOLOGY:  XR L foot 4/5  FINDINGS:  No acute fracture or dislocation.  No osseous erosive changes or the periosteal reaction. Suggestion of   irregularity at the first nail.  The joint spaces are preserved with smooth articular margins.      IMPRESSION:  No acute fracture or dislocation.  No osseous erosive changes or periosteal reaction.

## 2024-04-05 NOTE — ED ADULT NURSE NOTE - NS_SISCREENINGSR_GEN_ALL_ED
CM has made multiple attempts to contact patient's daughter including many phone calls, PCP notification, and UTR letter.         Negative

## 2024-04-05 NOTE — ED PROVIDER NOTE - OBJECTIVE STATEMENT
53-year-old male with a history of HIV, 20-pack-year smoking history (quit 3 months ago), recent admission for meningitis at [facility]SH 3 weeks ago presenting with left foot gangrene.  Patient reports he was evaluated by podiatrist in the outpatient setting who sent him to the emergency department for further evaluation.  Patient has some mild back pain s/p lumbar puncture about 3 weeks ago at OSH for meningitis.  Patient has been taking all of his HIV medications as prescribed and recent HIV load was undetectable.  Patient otherwise has no significant headache, chest pain, shortness of breath, N/V/D/abdominal pain, dysuria, fever/chills.  NKDA.

## 2024-04-05 NOTE — H&P ADULT - PROBLEM SELECTOR PLAN 5
CD4 count 328 in March  Continuing biktarvy Seen on CT and US, solid mass not ruled out  recommended f/u MRI, pending

## 2024-04-05 NOTE — H&P ADULT - PROBLEM SELECTOR PLAN 10
Preoperative Optimization:    I personally reviewed the patient's labs.  I personally reviewed the patient's EKG and my interpretation is: NSR with PACs, incomplete RBBB  I personally reviewed the patient's CXR/imaging and my interpretation is: clear lungs  The patient's RCRI score is: 0  No Cardiovascular Contraindication to surgery  Continue medications as ordered.  The patient is considered low-risk for low- risk procedure

## 2024-04-05 NOTE — CONSULT NOTE ADULT - ASSESSMENT
This is a 54 y/o M w/ PMHx of HIV (VL UD, , 19% 3/16/24, on biktarvy), pre-DM, former smoker, illicit drug use, PAD, who is presenting with L foot gangrene. Pt was sent in by podiatrist outpatient for further evaluation.     Pt presented last on 3/15-17, was initially at [facility] for meningitis left AMA, presented to [facility] on 3/15 for PAD, s/p debridement of L forefoot, maceration of L foot 4th interpace. Noted to be afebrile, mild leukocytosis 11. CT Aorta w/ runoff w/o acute clot, noted to have rim-enhancing collection within the left   flexor hallucis brevis muscle, measures 3.1 x 1.7 x 1.2 cm. Unfotunately left AMA on 3/17.    Returned on 3/20, with foot discoloration/pain. XR foot w/o OM, vascular surgery c/s, started on Heparin gtt, changed to Eliquis on d/c. Pt was discahrged on 3/25.    Now with c/f L foot gangrene.  In the ER, pt was afebrile, vitals stable. Noted to have leukocytosis to 13, ESR 91, CRP 10.   VINEET to 1.51 (baseline 1.16), hyperkalemia to 6.   Podiatry consulted, culture sent, noted to have L foot ischemic changes, mild malodor, maceration of L foot 4th interpace, L foot halluix toenail removed. Vascular surgery was consulted, no acute intervention, recommended DENIS/PVR,    Workup:   L foot XR w/o findings of OM  L foot Cx, gram stain with GPC in pairs/chains    HIV:   3/17/24 VL UD, , 19%  3/16 QuantiFeron negative     #L foot gangrene   #Gram stain w/ GPC in pairs/chains  #Oral candidiasis    #HIV, well controlled, on Biktarvy   #Leukocytosis   #VINEET   Hyperkalemia   #Renal mass     Overall, 54 y/o M w/ PMHx of HIV (VL UD, , 19% 3/16/24, on Biktarvy) pre-DM, former smoker, illicit drug use, PAD, who is presenting with L foot gangrene, started on empiric Vancomycin/Unasyn. Gram strain with GPC in pairs/chains, possibly enterococcus vs strep.   Seen by vascular, podiatry, pending possible TMA vs local wound care.     Plan:   1. C/w Vancomycin, f/u trough close given VINEET. C/w Unasyn 3 g q6   2. F/u Cx, if no isolation of MRSA, will likely stop Vancomycin   3. C/w Biktarvy   4. Fluconazole 100 mg x 7 days for thrush (noted by team, not seen by me on exam)  5. F/u podiatry recs for potential TMA     NOTE IS INCOMPLETE  This is a 54 y/o M w/ PMHx of HIV (VL UD, , 19% 3/16/24, on Biktarvy) pre-DM, former smoker, illicit drug use, PAD, who is presenting with L foot gangrene. Pt was sent in by podiatrist outpatient for further evaluation.     Pt presented last on 3/15-17, was initially at [facility] for meningitis left AMA, presented to [facility] on 3/15 for PAD, s/p debridement of L forefoot, maceration of L foot 4th interspace Noted to be afebrile, mild leukocytosis 11. CT Aorta w/ runoff w/o acute clot, noted to have rim-enhancing collection within the left   flexor hallucis brevis muscle, measures 3.1 x 1.7 x 1.2 cm. Unfortunately left AMA on 3/17.    Returned on 3/20, with foot discoloration/pain. XR foot w/o OM, vascular surgery c/s, started on Heparin gtt, changed to Eliquis on d/c. Pt was discharged on 3/25.    Now with c/f L foot gangrene.  In the ER, pt was afebrile, vitals stable. Noted to have leukocytosis to 13, ESR 91, CRP 10.   VINEET to 1.51 (baseline 1.16), hyperkalemia to 6.   Podiatry consulted, culture sent, noted to have L foot ischemic changes, mild malodor, maceration of L foot 4th interspace L foot hallux toenail removed. Vascular surgery was consulted, no acute intervention, recommended DENIS/PVR,    Workup:   L foot XR w/o findings of OM  L foot Cx, gram stain with GPC in pairs/chains    HIV:   3/17/24 VL UD, , 19%  3/16 QuantiFeron negative     #L foot gangrene, dry   #Gram stain w/ GPC in pairs/chains  #Oral candidiasis    #HIV, well controlled, on Biktarvy   #Leukocytosis   #VINEET   Hyperkalemia   #Renal mass     Overall, 54 y/o M w/ PMHx of HIV (VL UD, , 19% 3/16/24, on Biktarvy) pre-DM, former smoker, illicit drug use, PAD, who is presenting with L foot gangrene, started on empiric Vancomycin/Unasyn. Gram strain with GPC in pairs/chains, possibly enterococcus vs strep. On exam, appears to be dry gangrene, no purulence, malodor. Clear signs of necrosis.   Seen by vascular, podiatry, pending possible TMA vs local wound care.     Plan:   1. C/w Vancomycin, f/u trough close given VINEET. C/w Unasyn 3 g q6   2. F/u Cx, if no isolation of MRSA, will likely stop Vancomycin   3. C/w Biktarvy   4. Fluconazole 100 mg x 7 days for thrush (noted by team, not seen by me on exam)  5. F/u podiatry recs for potential TMA     Thank you for this consult. Inpatient ID team will follow.    Vik Gonzalez M.D.  Attending Physician  Division of Infectious Diseases  Department of Medicine    Please contact through MS Teams message.  Office: [phone] (after 5 PM or weekend)

## 2024-04-05 NOTE — PROGRESS NOTE ADULT - ASSESSMENT
53M presents with left forefoot wet gangrene  - Patient seen and evaluated   - Afebrile, WBC 13.44. ESR 91, CRP 1.  - 4/4 s/p b/s LF hallux total nail avulsion, wound to dermis, no drainage, no pus. Left forefoot ischemic changes to digits 1-5 extends from distal ellen to PIPJ, not fully demarcated, mild malodor. Maceration of left foot 4th interspace, L foot plantar styloid process eschar, No open lesions or acute signs of infection of right foot.   - Left foot xray: no OM, no gas, no fracture   - Left foot wound culture pending   - Vasc recs, appreciated   - Recommended ID consult  - Pod plan: Local wound care vs L foot transmetatarsal amputation pending demarcation/ vasc recs  - Please document medical clearance for possible podiatry surgery under anesthesia   - Discussed with Attending

## 2024-04-05 NOTE — PROGRESS NOTE ADULT - SUBJECTIVE AND OBJECTIVE BOX
Patient is a 53y old  Male who presents with a chief complaint of      INTERVAL HPI/OVERNIGHT EVENTS:  Patient seen and evaluated at bedside.  Pt is resting comfortable in NAD. Denies N/V/F/C.    Allergies    No Known Allergies    Intolerances        Vital Signs Last 24 Hrs  T(C): 36.9 (05 Apr 2024 08:20), Max: 37.2 (05 Apr 2024 04:45)  T(F): 98.5 (05 Apr 2024 08:20), Max: 99 (05 Apr 2024 04:45)  HR: 71 (05 Apr 2024 08:20) (71 - 91)  BP: 119/75 (05 Apr 2024 08:20) (108/73 - 124/76)  BP(mean): --  RR: 17 (05 Apr 2024 08:20) (16 - 18)  SpO2: 99% (05 Apr 2024 08:20) (95% - 99%)    Parameters below as of 05 Apr 2024 08:20  Patient On (Oxygen Delivery Method): room air        LABS:                        11.6   13.44 )-----------( 458      ( 04 Apr 2024 23:55 )             34.9     04-05    138  |  108<H>  |  25<H>  ----------------------------<  123<H>  5.8<H>   |  20<L>  |  1.38<H>    Ca    8.3<L>      05 Apr 2024 01:50  Mg     1.70     04-04    TPro  8.6<H>  /  Alb  3.1<L>  /  TBili  0.4  /  DBili  x   /  AST  44<H>  /  ALT  51<H>  /  AlkPhos  198<H>  04-04    PT/INR - ( 04 Apr 2024 23:55 )   PT: 11.4 sec;   INR: 1.01 ratio           Urinalysis Basic - ( 05 Apr 2024 01:50 )    Color: x / Appearance: x / SG: x / pH: x  Gluc: 123 mg/dL / Ketone: x  / Bili: x / Urobili: x   Blood: x / Protein: x / Nitrite: x   Leuk Esterase: x / RBC: x / WBC x   Sq Epi: x / Non Sq Epi: x / Bacteria: x      CAPILLARY BLOOD GLUCOSE      POCT Blood Glucose.: 86 mg/dL (05 Apr 2024 09:09)  POCT Blood Glucose.: 78 mg/dL (04 Apr 2024 22:51)      Lower Extremity Physical Exam:  Vascular: DP/PT 2/4, B/L, CFT <3 seconds B/L, Temperature gradient warm to cool, B/L.   Neuro: Epicritic sensation diminished to the level of digits, B/L.  Musculoskeletal/Ortho: unremarkable   Skin: 4/4 s/p b/s LF hallux total nail avulsion, wound to dermis, no drainage, no pus. Left forefoot ischemic changes to digits 1-5 extends from distal ellen to PIPJ, not fully demarcated, mild malodor. Maceration of left foot 4th interspace, L foot plantar styloid process eschar, No open lesions or acute signs of infection of right foot.     RADIOLOGY & ADDITIONAL TESTS:

## 2024-04-05 NOTE — CONSULT NOTE ADULT - SUBJECTIVE AND OBJECTIVE BOX
Surgery Consult Note  Attending: Gui  Service: Vascular Surgery  [id number]/ [id number]      HPI: 53 year old man with PMH of HIV (Biktarvy) and possible DM and embolic occlusion of LLE toe diagnosed on 3/16 and was started on AC,  left AMA on 3/17and then recently presented again on 3/20 with worsening LLE toe since has been off AC. Reported a history of LLE "blood clot" s/p failed attempted removal and second procedure who was recently admitted to [facility] for 2 weeks for ongoing L foot pain. At that time he was recommended to again start full AC with ASA/Statin and embolic workup. TTE at that time showed possible extracardiac shunt but no intervention planned from medical team. Discharged with local wound care and follow up with podiatry.    Patient now presents after concern for LLE first toe infection after he saw his podiatrist outpatient for malodor from the toe. Patient was seen in ED with leukocytosis 13, podiatry at the bedside and debrided to healthy tissue. On evaluation patient does not currently have pain in either extremity Reports he does not have pain on ambulation, currently ambulates with RW independently is able to lay flat without any issue.     DENIS/PVR:    < from: [facility] DENIS WITH PVR (03.15.24 @ 17:05) >  --------------------------------------------------------------------------------  CONCLUSIONS:      1. Right: Right ABIis normal (1.39). Right TBI is normal (0.94), with a digit pressure of 112 mmHg. No significant occlusive arterial disease in the right lower extremity.   2. Left: Left DENIS is mildly elevated (1.46), likely attributed to calcific vessels. Absent leftdigit waveforms. Findings suggest the presence of significant small vessel arterial disease in the left foot.      < end of copied text >        PAST MEDICAL HISTORY:  PAST MEDICAL & SURGICAL HISTORY:  HIV disease      H/O splenectomy          ALLERGIES:  Allergies    No Known Allergies    Intolerances        SOCIAL HISTORY: 20 pack year smoker and marijuana user    FAMILY HISTORY: T2DM  FAMILY HISTORY:      PHYSICAL EXAM:  General: NAD, resting comfortably  HEENT: NC/AT, EOMI, normal hearing, no oral lesions, no LAD, neck supple  Pulmonary: normal resp effort, CTA-B  Cardiovascular: NSR, no murmurs  Abdominal: soft, ND/NT, no organomegaly, no guarding or rebound tenderness  Extremities: LLE with ischemic changes at toes 1-5 with dry gangrene, s/p first toe debridement with nail removal, cracked/dry skin over B/L extremties. RLE without ischemic changes to toes. no malodor noted.    Vascular Pulse Exam:  Right Femoral:  Palpable       Left Femoral:  Palpable  Right Popliteal:  Palpable      Left Popliteal:  Palpable  Right DP:  Palpable                 Left DP:  biphasic signal   Right PT:  Palpable                 Left PT:  biphasic signal     VITAL SIGNS:  Vital Signs Last 24 Hrs  T(C): 37.2 (05 Apr 2024 04:45), Max: 37.2 (05 Apr 2024 04:45)  T(F): 99 (05 Apr 2024 04:45), Max: 99 (05 Apr 2024 04:45)  HR: 76 (05 Apr 2024 04:45) (76 - 91)  BP: 109/61 (05 Apr 2024 04:45) (109/61 - 124/76)  BP(mean): --  RR: 16 (05 Apr 2024 04:45) (16 - 18)  SpO2: 99% (05 Apr 2024 04:45) (97% - 99%)    Parameters below as of 05 Apr 2024 04:45  Patient On (Oxygen Delivery Method): room air        I&O's Summary      LABS:                        11.6   13.44 )-----------( 458      ( 04 Apr 2024 23:55 )             34.9     04-05    138  |  108<H>  |  25<H>  ----------------------------<  123<H>  5.8<H>   |  20<L>  |  1.38<H>    Ca    8.3<L>      05 Apr 2024 01:50  Mg     1.70     04-04    TPro  8.6<H>  /  Alb  3.1<L>  /  TBili  0.4  /  DBili  x   /  AST  44<H>  /  ALT  51<H>  /  AlkPhos  198<H>  04-04    PT/INR - ( 04 Apr 2024 23:55 )   PT: 11.4 sec;   INR: 1.01 ratio           Urinalysis Basic - ( 05 Apr 2024 01:50 )    Color: x / Appearance: x / SG: x / pH: x  Gluc: 123 mg/dL / Ketone: x  / Bili: x / Urobili: x   Blood: x / Protein: x / Nitrite: x   Leuk Esterase: x / RBC: x / WBC x   Sq Epi: x / Non Sq Epi: x / Bacteria: x      CAPILLARY BLOOD GLUCOSE      POCT Blood Glucose.: 78 mg/dL (04 Apr 2024 22:51)    LIVER FUNCTIONS - ( 04 Apr 2024 23:55 )  Alb: 3.1 g/dL / Pro: 8.6 g/dL / ALK PHOS: 198 U/L / ALT: 51 U/L / AST: 44 U/L / GGT: x             CULTURES:      RADIOLOGY & ADDITIONAL STUDIES:  < from: Xray Foot AP + Lateral + Oblique, Left (04.05.24 @ 01:23) >    ******PRELIMINARY REPORT******      ******PRELIMINARY REPORT******         ACC: [id number] EXAM:  XR FOOT COMP MIN 3 VIEWS LT   ORDERED BY: CRYSTAL VILLEGAS     ACC: [id number] EXAM:  XR ANKLE 2 VIEWS LT   ORDERED BY: CRYSTAL VILLEGAS     PROCEDURE DATE: 04/05/2024    ******PRELIMINARY REPORT******      ******PRELIMINARY REPORT******           INTERPRETATION:  CLINICAL INDICATION: Dry gangrene of left foot    TECHNIQUE:  2 views of left ankle and 2 views of left foot.    COMPARISON: Bilateral foot radiographs 3/15/2024    FINDINGS:  No acute fracture or dislocation.  No osseous erosive changes or cortical gas tracking.  The joint spaces are preserved with smooth articular margins.  Tarsometatarsal alignment maintained without evidence for a Lisfranc   injury.    IMPRESSION:  No acute fracture or dislocation.  No osseous erosive changes or cortical gas tracking.      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 Pulse exam unchanged from previous RLE +DP/PT signal and all other pulses palpable. RLE toes not currently concerning for wet gangrene, toes dry, debridement to healthy tissue over first toe of LLE. Previous denis/pvrs concerning for small vessel disease of LLE. Xray of foot with no gas or osseous erosions. Vascular surgery consulted for recs after podiatry debridement.       Plan:  - no acute intervention  - DENIS/PVRs w/ toe pressure  - C/w AC  - Local wound care per pods  - C/w aspirin/Statin  - Will follow      Plan Discussed with Vascular Surgery Fellow on behalf of Dr. Messer    Vascular Surgery   [id number]/ [id number]             Surgery Consult Note  Attending: Gui  Service: Vascular Surgery  [id number]/ [id number]      HPI: 53 year old man with PMH of HIV (Biktarvy) and possible DM and embolic occlusion of LLE toe diagnosed on 3/16 and was started on AC,  left AMA on 3/17and then recently presented again on 3/20 with worsening LLE toe since has been off AC. Reported a history of LLE "blood clot" s/p failed attempted removal and second procedure who was recently admitted to [facility], [place], operated by [facility] for 2 weeks for ongoing L foot pain. At that time he was recommended to again start full AC with ASA/Statin and embolic workup. TTE at that time showed possible extracardiac shunt but no intervention planned from medical team. Discharged with eliquis, asa, statin and local wound care and follow up with podiatry on 3/26.    Patient now presents after concern for LLE first toe infection after he saw his podiatrist outpatient for malodor from the toe. Patient was seen in ED with leukocytosis 13, podiatry at the bedside and debrided to healthy tissue. On evaluation patient does not currently have pain in either extremity Reports he does not have pain on ambulation, currently ambulates with RW independently is able to lay flat without any issue.     DENIS/PVR:    < from: VA DENIS WITH PVR (03.15.24 @ 17:05) >  --------------------------------------------------------------------------------  CONCLUSIONS:      1. Right: Right ABIis normal (1.39). Right TBI is normal (0.94), with a digit pressure of 112 mmHg. No significant occlusive arterial disease in the right lower extremity.   2. Left: Left DENIS is mildly elevated (1.46), likely attributed to calcific vessels. Absent leftdigit waveforms. Findings suggest the presence of significant small vessel arterial disease in the left foot.      < end of copied text >        PAST MEDICAL HISTORY:  PAST MEDICAL & SURGICAL HISTORY:  HIV disease      H/O splenectomy          ALLERGIES:  Allergies    No Known Allergies    Intolerances        SOCIAL HISTORY: 20 pack year smoker and marijuana user    FAMILY HISTORY: T2DM  FAMILY HISTORY:      PHYSICAL EXAM:  General: NAD, resting comfortably  HEENT: NC/AT, EOMI, normal hearing, no oral lesions, no LAD, neck supple  Pulmonary: normal resp effort, CTA-B  Cardiovascular: NSR, no murmurs  Abdominal: soft, ND/NT, no organomegaly, no guarding or rebound tenderness  Extremities: LLE with ischemic changes at toes 1-5 with dry gangrene, s/p first toe debridement with nail removal, cracked/dry skin over B/L extremties. RLE without ischemic changes to toes. no malodor noted.    Vascular Pulse Exam:  Right Femoral:  Palpable       Left Femoral:  Palpable  Right Popliteal:  Palpable      Left Popliteal:  Palpable  Right DP:  Palpable                 Left DP:  biphasic signal   Right PT:  Palpable                 Left PT:  biphasic signal     VITAL SIGNS:  Vital Signs Last 24 Hrs  T(C): 37.2 (05 Apr 2024 04:45), Max: 37.2 (05 Apr 2024 04:45)  T(F): 99 (05 Apr 2024 04:45), Max: 99 (05 Apr 2024 04:45)  HR: 76 (05 Apr 2024 04:45) (76 - 91)  BP: 109/61 (05 Apr 2024 04:45) (109/61 - 124/76)  BP(mean): --  RR: 16 (05 Apr 2024 04:45) (16 - 18)  SpO2: 99% (05 Apr 2024 04:45) (97% - 99%)    Parameters below as of 05 Apr 2024 04:45  Patient On (Oxygen Delivery Method): room air        I&O's Summary      LABS:                        11.6   13.44 )-----------( 458      ( 04 Apr 2024 23:55 )             34.9     04-05    138  |  108<H>  |  25<H>  ----------------------------<  123<H>  5.8<H>   |  20<L>  |  1.38<H>    Ca    8.3<L>      05 Apr 2024 01:50  Mg     1.70     04-04    TPro  8.6<H>  /  Alb  3.1<L>  /  TBili  0.4  /  DBili  x   /  AST  44<H>  /  ALT  51<H>  /  AlkPhos  198<H>  04-04    PT/INR - ( 04 Apr 2024 23:55 )   PT: 11.4 sec;   INR: 1.01 ratio           Urinalysis Basic - ( 05 Apr 2024 01:50 )    Color: x / Appearance: x / SG: x / pH: x  Gluc: 123 mg/dL / Ketone: x  / Bili: x / Urobili: x   Blood: x / Protein: x / Nitrite: x   Leuk Esterase: x / RBC: x / WBC x   Sq Epi: x / Non Sq Epi: x / Bacteria: x      CAPILLARY BLOOD GLUCOSE      POCT Blood Glucose.: 78 mg/dL (04 Apr 2024 22:51)    LIVER FUNCTIONS - ( 04 Apr 2024 23:55 )  Alb: 3.1 g/dL / Pro: 8.6 g/dL / ALK PHOS: 198 U/L / ALT: 51 U/L / AST: 44 U/L / GGT: x             CULTURES:      RADIOLOGY & ADDITIONAL STUDIES:  < from: Xray Foot AP + Lateral + Oblique, Left (04.05.24 @ 01:23) >    ******PRELIMINARY REPORT******      ******PRELIMINARY REPORT******         ACC: [id number] EXAM:  XR FOOT COMP MIN 3 VIEWS LT   ORDERED BY: CRYSTAL VILLEGAS     ACC: [id number] EXAM:  XR ANKLE 2 VIEWS LT   ORDERED BY: CRYSTAL VILLEGAS     PROCEDURE DATE: 04/05/2024    ******PRELIMINARY REPORT******      ******PRELIMINARY REPORT******           INTERPRETATION:  CLINICAL INDICATION: Dry gangrene of left foot    TECHNIQUE:  2 views of left ankle and 2 views of left foot.    COMPARISON: Bilateral foot radiographs 3/15/2024    FINDINGS:  No acute fracture or dislocation.  No osseous erosive changes or cortical gas tracking.  The joint spaces are preserved with smooth articular margins.  Tarsometatarsal alignment maintained without evidence for a Lisfranc   injury.    IMPRESSION:  No acute fracture or dislocation.  No osseous erosive changes or cortical gas tracking.      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 Pulse exam unchanged from previous RLE +DP/PT signal and all other pulses palpable. RLE toes not currently concerning for wet gangrene, toes dry, debridement to healthy tissue over first toe of LLE. Previous denis/pvrs concerning for small vessel disease of LLE. Xray of foot with no gas or osseous erosions. Vascular surgery consulted for recs after podiatry debridement.       Plan:  - no acute intervention  - DENIS/PVRs w/ toe pressure  - C/w AC  - Local wound care per pods  - C/w aspirin/Statin  - Will follow      Plan Discussed with Vascular Surgery Fellow on behalf of Dr. Messer    Vascular Surgery   [id number]/ [id number]             Surgery Consult Note  Attending: Gui  Service: Vascular Surgery  [id number]/ [id number]      HPI: 53 year old man with PMH of HIV (Biktarvy) and possible DM and embolic occlusion of LLE toe diagnosed on 3/16 and was started on AC,  left AMA on 3/17 and then recently presented again on 3/20 with worsening LLE toe pain. Also reports a history of LLE "blood clot" s/p failed endovascular attempted removal and second procedure. He was recommended to again start full AC with ASA/Statin and embolic workup. TTE at that time showed possible extracardiac shunt but no intervention planned from medical team. Discharged with eliquis, asa, statin and local wound care and follow up with podiatry on 3/26.    Patient now presents after concern for LLE first toe infection after he saw his podiatrist outpatient for malodor from the toe. Patient was seen in ED with leukocytosis 13, podiatry at the bedside and debrided to healthy tissue. On evaluation patient does not currently have pain in either extremity Reports he does not have pain on ambulation, currently ambulates with RW independently is able to lay flat without any issue.     DENIS/PVR:    < from: VA DENIS WITH PVR (03.15.24 @ 17:05) >  --------------------------------------------------------------------------------  CONCLUSIONS:      1. Right: Right ABIis normal (1.39). Right TBI is normal (0.94), with a digit pressure of 112 mmHg. No significant occlusive arterial disease in the right lower extremity.   2. Left: Left DENIS is mildly elevated (1.46), likely attributed to calcific vessels. Absent leftdigit waveforms. Findings suggest the presence of significant small vessel arterial disease in the left foot.      < end of copied text >        PAST MEDICAL HISTORY:  PAST MEDICAL & SURGICAL HISTORY:  HIV disease      H/O splenectomy          ALLERGIES:  Allergies    No Known Allergies    Intolerances        SOCIAL HISTORY: 20 pack year smoker and marijuana user    FAMILY HISTORY: T2DM  FAMILY HISTORY:      PHYSICAL EXAM:  General: NAD, resting comfortably  HEENT: NC/AT, EOMI, normal hearing, no oral lesions, no LAD, neck supple  Pulmonary: normal resp effort, CTA-B  Cardiovascular: NSR, no murmurs  Abdominal: soft, ND/NT, no organomegaly, no guarding or rebound tenderness  Extremities: LLE with ischemic changes at toes 1-5 with dry gangrene, s/p first toe debridement with nail removal, cracked/dry skin over B/L extremties. RLE without ischemic changes to toes. no malodor noted.    Vascular Pulse Exam:  Right Femoral:  Palpable       Left Femoral:  Palpable  Right Popliteal:  Palpable      Left Popliteal:  Palpable  Right DP:  Palpable                 Left DP:  biphasic signal   Right PT:  Palpable                 Left PT:  biphasic signal     VITAL SIGNS:  Vital Signs Last 24 Hrs  T(C): 37.2 (05 Apr 2024 04:45), Max: 37.2 (05 Apr 2024 04:45)  T(F): 99 (05 Apr 2024 04:45), Max: 99 (05 Apr 2024 04:45)  HR: 76 (05 Apr 2024 04:45) (76 - 91)  BP: 109/61 (05 Apr 2024 04:45) (109/61 - 124/76)  BP(mean): --  RR: 16 (05 Apr 2024 04:45) (16 - 18)  SpO2: 99% (05 Apr 2024 04:45) (97% - 99%)    Parameters below as of 05 Apr 2024 04:45  Patient On (Oxygen Delivery Method): room air        I&O's Summary      LABS:                        11.6   13.44 )-----------( 458      ( 04 Apr 2024 23:55 )             34.9     04-05    138  |  108<H>  |  25<H>  ----------------------------<  123<H>  5.8<H>   |  20<L>  |  1.38<H>    Ca    8.3<L>      05 Apr 2024 01:50  Mg     1.70     04-04    TPro  8.6<H>  /  Alb  3.1<L>  /  TBili  0.4  /  DBili  x   /  AST  44<H>  /  ALT  51<H>  /  AlkPhos  198<H>  04-04    PT/INR - ( 04 Apr 2024 23:55 )   PT: 11.4 sec;   INR: 1.01 ratio           Urinalysis Basic - ( 05 Apr 2024 01:50 )    Color: x / Appearance: x / SG: x / pH: x  Gluc: 123 mg/dL / Ketone: x  / Bili: x / Urobili: x   Blood: x / Protein: x / Nitrite: x   Leuk Esterase: x / RBC: x / WBC x   Sq Epi: x / Non Sq Epi: x / Bacteria: x      CAPILLARY BLOOD GLUCOSE      POCT Blood Glucose.: 78 mg/dL (04 Apr 2024 22:51)    LIVER FUNCTIONS - ( 04 Apr 2024 23:55 )  Alb: 3.1 g/dL / Pro: 8.6 g/dL / ALK PHOS: 198 U/L / ALT: 51 U/L / AST: 44 U/L / GGT: x             CULTURES:      RADIOLOGY & ADDITIONAL STUDIES:  < from: Xray Foot AP + Lateral + Oblique, Left (04.05.24 @ 01:23) >    ******PRELIMINARY REPORT******      ******PRELIMINARY REPORT******         ACC: [id number] EXAM:  XR FOOT COMP MIN 3 VIEWS LT   ORDERED BY: CRYSTAL VILLEGAS     ACC: [id number] EXAM:  XR ANKLE 2 VIEWS LT   ORDERED BY: CRYSTAL VILLEGAS     PROCEDURE DATE: 04/05/2024    ******PRELIMINARY REPORT******      ******PRELIMINARY REPORT******           INTERPRETATION:  CLINICAL INDICATION: Dry gangrene of left foot    TECHNIQUE:  2 views of left ankle and 2 views of left foot.    COMPARISON: Bilateral foot radiographs 3/15/2024    FINDINGS:  No acute fracture or dislocation.  No osseous erosive changes or cortical gas tracking.  The joint spaces are preserved with smooth articular margins.  Tarsometatarsal alignment maintained without evidence for a Lisfranc   injury.    IMPRESSION:  No acute fracture or dislocation.  No osseous erosive changes or cortical gas tracking.      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 Pulse exam unchanged from previous RLE +DP/PT signal and all other pulses palpable. RLE toes not currently concerning for wet gangrene, toes dry, debridement to healthy tissue over first toe of LLE. Previous denis/pvrs concerning for small vessel disease of LLE. Xray of foot with no gas or osseous erosions. Vascular surgery consulted for recs after podiatry debridement.       Plan:  - no acute intervention  - DENIS/PVRs w/ toe pressure  - C/w AC  - Local wound care per pods  - C/w aspirin/Statin  - Will follow      Plan Discussed with Vascular Surgery Fellow on behalf of Dr. Messer    Vascular Surgery   [id number]/ [id number]

## 2024-04-05 NOTE — ED PROVIDER NOTE - CLINICAL SUMMARY MEDICAL DECISION MAKING FREE TEXT BOX
53-year-old male with a history of HIV, 20-pack-year smoking history (quit 3 months ago), recent admission for meningitis at [facility] 3 weeks ago presenting with left foot gangrene.  Labs, XR LLE ordered.  Will require podiatry consultation.

## 2024-04-05 NOTE — ED ADULT NURSE NOTE - CHIEF COMPLAINT QUOTE
sent in by podiatrist for evaluation of L foot. pt endorses podiatrist states pt has gangrene. endorsing pain to L foot. denies amputations of toes to area denies fevers, chills, CP   hx HIV, DM2, HLD, DVT on eliquis  FS 78 in triage

## 2024-04-05 NOTE — H&P ADULT - ATTENDING COMMENTS
53M hx HIV, preDM, current smoker, hx cocaine use, prior arterial thromboembolic events, a/w sepsis on admission (HR>90 and leukocytosis) 2/2  wet gangrene of L foot. Left foot xray w/ no OM, gas, or fracture  Pt seen by podiatry and s/pL foot hallux toenail removal, and excisional debridement of L foot hallux necrotic skin to the level of epidermis,    -C/w unasyn. Will add vanc. Monitor trough. Obtain MRSA. F/u blood cx and abscess culture.   -c/w wound care   - will continue to appreciate podiatry recs. Per podiatry plan will be for Local wound care vs L foot transmetatarsal amputation pending demarcation/ vasc recs  - Vasc following and rec DENIS/PVR    Rest of care as stated above     Dispo pending further optimization. Pending PT eval as well

## 2024-04-05 NOTE — H&P ADULT - PROBLEM SELECTOR PLAN 1
Followed by podiatry and vascular  vascular not recommending intervention  Podiatry debrided to healthy tissue on 4/5 early am, considering amputation  Abx: usasyn, will add vanc to cover MRSA given healthcare exposures  ID c/s

## 2024-04-05 NOTE — H&P ADULT - PROBLEM SELECTOR PLAN 9
Preoperative Optimization:    I personally reviewed the patient's labs.  I personally reviewed the patient's EKG and my interpretation is: NSR with PACs, incomplete RBBB  I personally reviewed the patient's CXR/imaging and my interpretation is: clear lungs  The patient's RCRI score is: 0  No Cardiovascular Contraindication to surgery  Continue medications as ordered.  The patient is considered low-risk for low- risk procedure DVT: Eliquis  Diet: CC  Dispo: pending PT eval

## 2024-04-05 NOTE — H&P ADULT - NSHPLABSRESULTS_GEN_ALL_CORE
11.6   13.44 )-----------( 458      ( 04 Apr 2024 23:55 )             34.9   04-05    138  |  108<H>  |  25<H>  ----------------------------<  123<H>  5.8<H>   |  20<L>  |  1.38<H>    Ca    8.3<L>      05 Apr 2024 01:50  Mg     1.70     04-04    TPro  8.6<H>  /  Alb  3.1<L>  /  TBili  0.4  /  DBili  x   /  AST  44<H>  /  ALT  51<H>  /  AlkPhos  198<H>  04-04  < from: Xray Ankle 2 Views, Left (04.05.24 @ 01:24) >        < end of copied text >    < from: Xray Foot AP + Lateral + Oblique, Left (04.05.24 @ 01:23) >        < end of copied text >    < from: Xray Chest 1 View- PORTABLE-Urgent (Xray Chest 1 View- PORTABLE-Urgent .) (04.05.24 @ 01:23) >    FINDINGS:    Apical lordotic projection is present limiting evaluation of the lung   bases.  The heart size is not accurately assessed on this projection.  The lungs are free of focal abnormalities.  There is no pneumothorax.  Left upper quadrant clips.    IMPRESSION: Limited study with clear lungs.    < end of copied text >

## 2024-04-05 NOTE — ED ADULT NURSE NOTE - NSFALLRISKFACTORS_ED_ALL_ED
Gangrene LLE/Bone Condition: Including osteoporosis, prolonged steroid use or metastatic bone disease/cancer

## 2024-04-05 NOTE — ED PROVIDER NOTE - ATTENDING CONTRIBUTION TO CARE
I have personally performed a face to face medical and diagnostic evaluation of the patient. I have discussed with and reviewed the Resident's note and agree with the History, ROS, Physical Exam and MDM unless otherwise indicated. A brief summary of my personal evaluation and impression can be found below.    Ashish SCHROEDER: 53-year-old male history of HIV recent admission to outside facility and transferred to [facility] for meningitis, prior DVT on Eliquis per family at bedside,, presents with a chief complaint of concern for worsening left foot gangrene, reports was seen by his podiatrist yesterday was told to come to ED for admission for antibiotics and possible further intervention, he denies any new fevers no nausea vomiting no abdominal pain has back pain at the site of his previous lumbar puncture but can move everything and feel breathing no bowel or bladder retention or incontinence and no new fever.      All other ROS negative, except as above and as per HPI and ROS section.    VITALS: Initial triage and subsequent vitals have been reviewed by me.  GEN APPEARANCE: Alert, non-toxic, well-appearing, NAD.  HEAD: Atraumatic.  EYES: PERRLa, EOMI, vision grossly intact.   NECK: Supple  CV: RRR, S1S2, no c/r/m/g. Cap refill <2 seconds. No bruits.   LUNGS: CTAB. No abnormal breath sounds.  ABDOMEN: Soft, NTND. No guarding or rebound.   MSK/EXT: L foot w multiple digits w dry appear gangrene 1st toe affected most w skin breakdown aground nail, +2 DP LLE  . No CVA ttp. Pelvis stable. No peripheral edema. mild diffuse lower back tenderness   NEURO: Alert, follows commands. Weight bearing normal. Speech normal. Sensation and motor normal x4 extremities. no saddle anesthesia   SKIN: Warm, dry and intact. No rash.  PSYCH: Appropriate    Plan/MDM: Exam vitals are stable nontoxic-appearing with physical exam as above DDx concern for worsening left foot gangrene and patient with immunocompromise risk factors, no fever, no saddle anesthesia no bowel or bladder retention or incontinence low concern for spinal abscess, presentation is appear consistent that of new DVT as is no significant lower extremity swelling compared to other, will get basic labs ESR CRP start antibiotics discussed with podiatry give meds as needed anticipate admission.

## 2024-04-05 NOTE — ED ADULT NURSE REASSESSMENT NOTE - NS ED NURSE REASSESS COMMENT FT1
Pt currently lying in stretcher with eyes closed. Easy to arouse via tactile or verbal stimuli. No signs of acute distress. VSS. Respirations even and unlabored. Endorsing left foot pain, given 5mg of Oxycodone. Offers no further complaints at this time. Started on anti-bx tx. To be admitted.

## 2024-04-05 NOTE — H&P ADULT - ASSESSMENT
53M hx HIV, preDM, current smoker, hx cocaine use, prior arterial thromboembolic events, presents with wet gangrene of L foot, s/p debridement by podiatry with transmetatarsal amputation under consideration. Vascular and podiatry following.

## 2024-04-05 NOTE — H&P ADULT - HISTORY OF PRESENT ILLNESS
53-year-old male with a history of HIV, preDM 20-pack-year smoking history, prior cocaine use, prior arterial thromboembolic disease, recent admission for meningitis at [facility] in 2/2024 (recevied 3 weeks IV abx, left AMA) presenting with left foot gangrene. Patient has had multiple similar admissions in 3/2024 at this hospital for ischemic L foot Patient reports he was evaluated by podiatrist in the outpatient setting who sent him to the emergency department for further evaluation.  Patient has some mild back pain s/p lumbar puncture at OSH for meningitis.  Patient has been taking all of his HIV medications as prescribed and recent HIV load was undetectable.  Patient otherwise has no significant headache, chest pain, shortness of breath, N/V/D/abdominal pain, dysuria, fever/chills.  NKDA.

## 2024-04-05 NOTE — PROVIDER CONTACT NOTE (CRITICAL VALUE NOTIFICATION) - ASSESSMENT
patient reports no changes in well being, denies dizziness, headache, SOB, or any pain, no signs and symptoms of distress noted on assessment. Pt reports no pain in left food, dressing is clean dry and intact.

## 2024-04-05 NOTE — ED PROVIDER NOTE - PHYSICAL EXAMINATION
GENERAL: NAD  HEAD: normocephalic, atraumatic  HEENT: normal conjunctiva, oral mucosa moist, uvula midline, neck supple  CARDIAC: regular rate and rhythm, normal S1S2, no appreciable murmurs  PULM: speaking in full sentences, normal breath sounds, clear to ascultation bilaterally, no rales, rhonchi, wheezing  GI: abdomen nondistended, soft, nontender  : no suprapubic tenderness  NEURO: moving all 4 extremities, no focal deficits, normal speech, AOx3  MSK: no peripheral edema, no calf tenderness b/l, Dry gangrene of left first through fourth digits as well as lateral aspect of left foot  SKIN: well-perfused, extremities warm  PSYCH: appropriate mood and affect

## 2024-04-05 NOTE — ED ADULT NURSE NOTE - OBJECTIVE STATEMENT
Pt is A&O x 4, maintained on bedrest, shows no signs of acute distress. Sent to the ED by his podiatrist for concern for left foot gangrene. Left foot first through fourth toes appear necrotic. Palpable pedal pulses to b/l feet. Light & gross sensation intact in affected extremity. Denies pain at this time. Recently admitted for Meningitis. Hx of HIV, OSH, and DVT on Eliquis. Endorses smoking cigarettes sometimes and marijuana regularly. Denies n/v/d, abdominal discomfort, fevers/chills, SOB or chest discomfort. VSS upon arrival. Respirations even and unlabored. Left AC 20 gauge IV line placed and labs drawn. Pending lab results and XR.

## 2024-04-06 LAB
ALBUMIN SERPL ELPH-MCNC: 2.8 G/DL — LOW (ref 3.3–5)
ALP SERPL-CCNC: 159 U/L — HIGH (ref 40–120)
ALT FLD-CCNC: 37 U/L — SIGNIFICANT CHANGE UP (ref 4–41)
ANION GAP SERPL CALC-SCNC: 10 MMOL/L — SIGNIFICANT CHANGE UP (ref 7–14)
APTT BLD: 32.1 SEC — SIGNIFICANT CHANGE UP (ref 24.5–35.6)
AST SERPL-CCNC: 23 U/L — SIGNIFICANT CHANGE UP (ref 4–40)
BASOPHILS # BLD AUTO: 0.1 K/UL — SIGNIFICANT CHANGE UP (ref 0–0.2)
BASOPHILS NFR BLD AUTO: 1 % — SIGNIFICANT CHANGE UP (ref 0–2)
BILIRUB SERPL-MCNC: 0.2 MG/DL — SIGNIFICANT CHANGE UP (ref 0.2–1.2)
BUN SERPL-MCNC: 19 MG/DL — SIGNIFICANT CHANGE UP (ref 7–23)
CALCIUM SERPL-MCNC: 8.9 MG/DL — SIGNIFICANT CHANGE UP (ref 8.4–10.5)
CHLORIDE SERPL-SCNC: 108 MMOL/L — HIGH (ref 98–107)
CHOLEST SERPL-MCNC: 81 MG/DL — SIGNIFICANT CHANGE UP
CO2 SERPL-SCNC: 22 MMOL/L — SIGNIFICANT CHANGE UP (ref 22–31)
CREAT SERPL-MCNC: 1.24 MG/DL — SIGNIFICANT CHANGE UP (ref 0.5–1.3)
EGFR: 70 ML/MIN/1.73M2 — SIGNIFICANT CHANGE UP
EOSINOPHIL # BLD AUTO: 0.03 K/UL — SIGNIFICANT CHANGE UP (ref 0–0.5)
EOSINOPHIL NFR BLD AUTO: 0.3 % — SIGNIFICANT CHANGE UP (ref 0–6)
GLUCOSE SERPL-MCNC: 94 MG/DL — SIGNIFICANT CHANGE UP (ref 70–99)
HCT VFR BLD CALC: 30.8 % — LOW (ref 39–50)
HDLC SERPL-MCNC: 26 MG/DL — LOW
HGB BLD-MCNC: 10.5 G/DL — LOW (ref 13–17)
IANC: 3.54 K/UL — SIGNIFICANT CHANGE UP (ref 1.8–7.4)
IMM GRANULOCYTES NFR BLD AUTO: 0.5 % — SIGNIFICANT CHANGE UP (ref 0–0.9)
INR BLD: 1.11 RATIO — SIGNIFICANT CHANGE UP (ref 0.85–1.18)
LIPID PNL WITH DIRECT LDL SERPL: 37 MG/DL — SIGNIFICANT CHANGE UP
LYMPHOCYTES # BLD AUTO: 5.29 K/UL — HIGH (ref 1–3.3)
LYMPHOCYTES # BLD AUTO: 51.1 % — HIGH (ref 13–44)
MAGNESIUM SERPL-MCNC: 1.5 MG/DL — LOW (ref 1.6–2.6)
MCHC RBC-ENTMCNC: 32.5 PG — SIGNIFICANT CHANGE UP (ref 27–34)
MCHC RBC-ENTMCNC: 34.1 GM/DL — SIGNIFICANT CHANGE UP (ref 32–36)
MCV RBC AUTO: 95.4 FL — SIGNIFICANT CHANGE UP (ref 80–100)
MONOCYTES # BLD AUTO: 1.35 K/UL — HIGH (ref 0–0.9)
MONOCYTES NFR BLD AUTO: 13 % — SIGNIFICANT CHANGE UP (ref 2–14)
NEUTROPHILS # BLD AUTO: 3.54 K/UL — SIGNIFICANT CHANGE UP (ref 1.8–7.4)
NEUTROPHILS NFR BLD AUTO: 34.1 % — LOW (ref 43–77)
NON HDL CHOLESTEROL: 55 MG/DL — SIGNIFICANT CHANGE UP
NRBC # BLD: 0 /100 WBCS — SIGNIFICANT CHANGE UP (ref 0–0)
NRBC # FLD: 0 K/UL — SIGNIFICANT CHANGE UP (ref 0–0)
PHOSPHATE SERPL-MCNC: 3.1 MG/DL — SIGNIFICANT CHANGE UP (ref 2.5–4.5)
PLATELET # BLD AUTO: 437 K/UL — HIGH (ref 150–400)
POTASSIUM SERPL-MCNC: 3.8 MMOL/L — SIGNIFICANT CHANGE UP (ref 3.5–5.3)
POTASSIUM SERPL-SCNC: 3.8 MMOL/L — SIGNIFICANT CHANGE UP (ref 3.5–5.3)
PROT SERPL-MCNC: 7.6 G/DL — SIGNIFICANT CHANGE UP (ref 6–8.3)
PROTHROM AB SERPL-ACNC: 12.5 SEC — SIGNIFICANT CHANGE UP (ref 9.5–13)
RBC # BLD: 3.23 M/UL — LOW (ref 4.2–5.8)
RBC # FLD: 14.9 % — HIGH (ref 10.3–14.5)
SODIUM SERPL-SCNC: 140 MMOL/L — SIGNIFICANT CHANGE UP (ref 135–145)
TRIGL SERPL-MCNC: 91 MG/DL — SIGNIFICANT CHANGE UP
VANCOMYCIN TROUGH SERPL-MCNC: 7.3 UG/ML — LOW (ref 10–20)
WBC # BLD: 10.36 K/UL — SIGNIFICANT CHANGE UP (ref 3.8–10.5)
WBC # FLD AUTO: 10.36 K/UL — SIGNIFICANT CHANGE UP (ref 3.8–10.5)

## 2024-04-06 PROCEDURE — 99232 SBSQ HOSP IP/OBS MODERATE 35: CPT

## 2024-04-06 PROCEDURE — 99232 SBSQ HOSP IP/OBS MODERATE 35: CPT | Mod: GC

## 2024-04-06 PROCEDURE — 99222 1ST HOSP IP/OBS MODERATE 55: CPT

## 2024-04-06 RX ORDER — CYCLOBENZAPRINE HYDROCHLORIDE 10 MG/1
5 TABLET, FILM COATED ORAL ONCE
Refills: 0 | Status: COMPLETED | OUTPATIENT
Start: 2024-04-06 | End: 2024-04-06

## 2024-04-06 RX ORDER — FLUCONAZOLE 150 MG/1
100 TABLET ORAL DAILY
Refills: 0 | Status: DISCONTINUED | OUTPATIENT
Start: 2024-04-06 | End: 2024-04-10

## 2024-04-06 RX ORDER — MAGNESIUM SULFATE 500 MG/ML
2 VIAL (ML) INJECTION ONCE
Refills: 0 | Status: COMPLETED | OUTPATIENT
Start: 2024-04-06 | End: 2024-04-06

## 2024-04-06 RX ADMIN — AMPICILLIN SODIUM AND SULBACTAM SODIUM 200 GRAM(S): 250; 125 INJECTION, POWDER, FOR SUSPENSION INTRAMUSCULAR; INTRAVENOUS at 12:15

## 2024-04-06 RX ADMIN — Medication 25 GRAM(S): at 09:41

## 2024-04-06 RX ADMIN — CYCLOBENZAPRINE HYDROCHLORIDE 5 MILLIGRAM(S): 10 TABLET, FILM COATED ORAL at 14:45

## 2024-04-06 RX ADMIN — Medication 250 MILLIGRAM(S): at 07:02

## 2024-04-06 RX ADMIN — GABAPENTIN 300 MILLIGRAM(S): 400 CAPSULE ORAL at 21:25

## 2024-04-06 RX ADMIN — APIXABAN 5 MILLIGRAM(S): 2.5 TABLET, FILM COATED ORAL at 06:46

## 2024-04-06 RX ADMIN — ATORVASTATIN CALCIUM 40 MILLIGRAM(S): 80 TABLET, FILM COATED ORAL at 21:25

## 2024-04-06 RX ADMIN — APIXABAN 5 MILLIGRAM(S): 2.5 TABLET, FILM COATED ORAL at 17:34

## 2024-04-06 RX ADMIN — Medication 81 MILLIGRAM(S): at 12:15

## 2024-04-06 RX ADMIN — AMPICILLIN SODIUM AND SULBACTAM SODIUM 200 GRAM(S): 250; 125 INJECTION, POWDER, FOR SUSPENSION INTRAMUSCULAR; INTRAVENOUS at 06:46

## 2024-04-06 RX ADMIN — Medication 250 MILLIGRAM(S): at 18:52

## 2024-04-06 RX ADMIN — Medication 1 LOZENGE: at 01:39

## 2024-04-06 RX ADMIN — BICTEGRAVIR SODIUM, EMTRICITABINE, AND TENOFOVIR ALAFENAMIDE FUMARATE 1 TABLET(S): 30; 120; 15 TABLET ORAL at 12:14

## 2024-04-06 RX ADMIN — AMPICILLIN SODIUM AND SULBACTAM SODIUM 200 GRAM(S): 250; 125 INJECTION, POWDER, FOR SUSPENSION INTRAMUSCULAR; INTRAVENOUS at 01:40

## 2024-04-06 RX ADMIN — AMPICILLIN SODIUM AND SULBACTAM SODIUM 200 GRAM(S): 250; 125 INJECTION, POWDER, FOR SUSPENSION INTRAMUSCULAR; INTRAVENOUS at 17:34

## 2024-04-06 RX ADMIN — FLUCONAZOLE 100 MILLIGRAM(S): 150 TABLET ORAL at 13:12

## 2024-04-06 NOTE — PHYSICAL THERAPY INITIAL EVALUATION ADULT - WEIGHT-BEARING RESTRICTIONS, REHAB EVAL
- with multiple lesions as demonstrated in graphical documentation on physical exam, first seen about 1.5 months ago  - previously bx'd at outside hospital 11/2017, unsure which facility per family, no records. Was reported by Dr. Patricia to be CD30+ lymphoproliferative disorder   - derm consulted for additional skin bx, appreciate bx and recs  - pending results from skin bx on 12/28/17    weight-bearing as tolerated

## 2024-04-06 NOTE — PROGRESS NOTE ADULT - SUBJECTIVE AND OBJECTIVE BOX
Overnight events:   - No acute events    SUBJECTIVE:  Patient was seen and examined on AM rounds. Denies new complaints.    OBJECTIVE:  Vital Signs Last 24 Hrs  T(C): 36.9 (06 Apr 2024 06:24), Max: 37.2 (05 Apr 2024 13:33)  T(F): 98.4 (06 Apr 2024 06:24), Max: 98.9 (05 Apr 2024 13:33)  HR: 75 (06 Apr 2024 06:24) (75 - 92)  BP: 127/78 (06 Apr 2024 06:24) (102/54 - 127/78)  BP(mean): --  RR: 18 (06 Apr 2024 06:24) (18 - 18)  SpO2: 98% (06 Apr 2024 06:24) (96% - 99%)    Parameters below as of 06 Apr 2024 06:24  Patient On (Oxygen Delivery Method): room air      04-05-24 @ 07:01  -  04-06-24 @ 07:00  --------------------------------------------------------  IN: 580 mL / OUT: 1000 mL / NET: -420 mL      Physical Examination:  General: NAD, resting comfortably  HEENT: NC/AT, EOMI, normal hearing  Pulmonary: normal resp effort  Cardiovascular: NSR  Abdominal: soft, ND/NT, no guarding or rebound tenderness  Extremities: LLE with ischemic changes at toes 1-5 with dry gangrene, s/p first toe debridement with nail removal, cracked/dry skin over B/L extremties. RLE without ischemic changes to toes. no malodor noted.    Vascular Pulse Exam:  Right Femoral:  Palpable       Left Femoral:  Palpable  Right Popliteal:  Palpable      Left Popliteal:  Palpable  Right DP:  Palpable                 Left DP:  biphasic signal   Right PT:  Palpable                 Left PT:  biphasic signal     LABS:                        10.5   10.36 )-----------( 437      ( 06 Apr 2024 07:20 )             30.8       04-06    140  |  108<H>  |  19  ----------------------------<  94  3.8   |  22  |  1.24    Ca    8.9      06 Apr 2024 07:20  Phos  3.1     04-06  Mg     1.50     04-06    TPro  7.6  /  Alb  2.8<L>  /  TBili  0.2  /  DBili  x   /  AST  23  /  ALT  37  /  AlkPhos  159<H>  04-06

## 2024-04-06 NOTE — CONSULT NOTE ADULT - TIME BILLING
peripheral arterial disease  chronic limb threatening ischemia  coordination of care  pre-operative planning
Reviewing the chart, interpreting lab data, discussing case with team and patient's wife, interview and examination of patient, and documentation. Pt is at high risk of mortality due to his disease.

## 2024-04-06 NOTE — PROGRESS NOTE ADULT - PROBLEM SELECTOR PLAN 1
Followed by podiatry and vascular  vascular not recommending intervention  Podiatry debrided to healthy tissue on 4/5 early am, considering amputation vs. local wound care  Abx: usasyn, will add vanc to cover MRSA given healthcare exposures  ID c/s

## 2024-04-06 NOTE — PROGRESS NOTE ADULT - SUBJECTIVE AND OBJECTIVE BOX
Infectious Diseases Follow Up:    Patient is a 53y old  Male who presents with a chief complaint of wet gangrene (06 Apr 2024 07:44)      Interval History/ROS:  Afebrile, no acute events   Pt eager to know next step re: possible surgery     Allergies  No Known Allergies        ANTIMICROBIALS:  ampicillin/sulbactam  IVPB    ampicillin/sulbactam  IVPB 3 every 6 hours  bictegravir 50 mG/emtricitabine 200 mG/tenofovir alafenamide 25 mG (BIKTARVY) 1 daily  fluconAZOLE   Tablet 100 daily  vancomycin  IVPB 750 every 12 hours      Current Abx:     Previous Abx     OTHER MEDS:  MEDICATIONS  (STANDING):  acetaminophen     Tablet .. 650 every 6 hours PRN  apixaban 5 two times a day  aspirin enteric coated 81 daily  atorvastatin 40 at bedtime  gabapentin 300 at bedtime  melatonin 3 at bedtime PRN  ondansetron Injectable 4 every 8 hours PRN      Vital Signs Last 24 Hrs  T(C): 36.9 (06 Apr 2024 06:24), Max: 37.2 (05 Apr 2024 13:33)  T(F): 98.4 (06 Apr 2024 06:24), Max: 98.9 (05 Apr 2024 13:33)  HR: 75 (06 Apr 2024 06:24) (75 - 92)  BP: 127/78 (06 Apr 2024 06:24) (102/54 - 127/78)  BP(mean): --  RR: 18 (06 Apr 2024 06:24) (18 - 18)  SpO2: 98% (06 Apr 2024 06:24) (96% - 99%)    Parameters below as of 06 Apr 2024 06:24  Patient On (Oxygen Delivery Method): room air        PHYSICAL EXAM:  GENERAL: NAD, well-developed  HEAD:  Atraumatic, Normocephalic  EYES: EOMI, conjunctiva and sclera clear  CHEST/LUNG: On RA, not in respiratory distress   EXTREMITIES:  L foot dressed   PSYCH: AAOx3                          10.5   10.36 )-----------( 437      ( 06 Apr 2024 07:20 )             30.8       04-06    140  |  108<H>  |  19  ----------------------------<  94  3.8   |  22  |  1.24    Ca    8.9      06 Apr 2024 07:20  Phos  3.1     04-06  Mg     1.50     04-06    TPro  7.6  /  Alb  2.8<L>  /  TBili  0.2  /  DBili  x   /  AST  23  /  ALT  37  /  AlkPhos  159<H>  04-06      Urinalysis Basic - ( 06 Apr 2024 07:20 )    Color: x / Appearance: x / SG: x / pH: x  Gluc: 94 mg/dL / Ketone: x  / Bili: x / Urobili: x   Blood: x / Protein: x / Nitrite: x   Leuk Esterase: x / RBC: x / WBC x   Sq Epi: x / Non Sq Epi: x / Bacteria: x        MICROBIOLOGY:  v  .Abscess Left foot wound culture  04-05-24 --  --    No polymorphonuclear leukocytes per low power field  Few Gram Positive Cocci in Pairs and Chains per oil power field      .Blood Blood-Peripheral  03-20-24   No growth at 5 days  --  --      .Blood Blood-Peripheral  03-20-24   No growth at 5 days  --  --      Clean Catch Clean Catch (Midstream)  03-15-24   No growth  --  --      .Blood Blood-Peripheral  03-15-24   No growth at 5 days  --  --      .Blood Blood-Peripheral  03-15-24   No growth at 5 days  --  --        HIV-1 RNA Quantitative, Viral Load Log: DET.  <1.47 lg /mL (03-16-24 @ 06:05)  CMV IgG Antibody: >10.00 U/mL (03-16-24 @ 06:05)          RADIOLOGY:

## 2024-04-06 NOTE — PROGRESS NOTE ADULT - ASSESSMENT
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 Pulse exam unchanged from previous RLE +DP/PT signal and all other pulses palpable. RLE toes not currently concerning for wet gangrene, toes dry, debridement to healthy tissue over first toe of LLE. Previous ida/pvrs concerning for small vessel disease of LLE. Xray of foot with no gas or osseous erosions. Vascular surgery consulted for recs after podiatry debridement.     Plan:  - LLE Angiogram planning  - PLEASE document medical and cardiology opitmization  - C/w AC  - Local wound care per pods  - C/w aspirin/Statin    Vascular Surgery   [id number]

## 2024-04-06 NOTE — CONSULT NOTE ADULT - SUBJECTIVE AND OBJECTIVE BOX
Vascular Surgery Consult    Consulting attending: Gini    HPI:  53-year-old male with a history of HIV, preDM 20-pack-year smoking history, prior cocaine use, prior arterial thromboembolic disease, recent admission for meningitis at [place] in 2/2024 (recevied 3 weeks IV abx, left AMA) presenting with left foot gangrene. Patient has had multiple similar admissions in 3/2024 at this hospital for ischemic L foot Patient reports he was evaluated by podiatrist in the outpatient setting who sent him to the emergency department for further evaluation.  Patient has some mild back pain s/p lumbar puncture at OSH for meningitis.  Patient has been taking all of his HIV medications as prescribed and recent HIV load was undetectable.  Patient otherwise has no significant headache, chest pain, shortness of breath, N/V/D/abdominal pain, dysuria, fever/chills.  NKDA. (05 Apr 2024 11:42)    Patient now presents after concern for LLE first toe infection after he saw his podiatrist outpatient for malodor from the toe. Patient was seen in ED with leukocytosis 13, podiatry at the bedside and debrided to healthy tissue. On evaluation patient does not currently have pain in either extremity Reports he does not have pain on ambulation, currently ambulates with RW independently is able to lay flat without any issue.        PAST MEDICAL HISTORY:  HIV disease      PAST SURGICAL HISTORY:  H/O splenectomy      MEDICATIONS:  acetaminophen     Tablet .. 650 milliGRAM(s) Oral every 6 hours PRN  ampicillin/sulbactam  IVPB      ampicillin/sulbactam  IVPB 3 Gram(s) IV Intermittent every 6 hours  apixaban 5 milliGRAM(s) Oral two times a day  aspirin enteric coated 81 milliGRAM(s) Oral daily  atorvastatin 40 milliGRAM(s) Oral at bedtime  bictegravir 50 mG/emtricitabine 200 mG/tenofovir alafenamide 25 mG (BIKTARVY) 1 Tablet(s) Oral daily  fluconAZOLE   Tablet 100 milliGRAM(s) Oral daily  gabapentin 300 milliGRAM(s) Oral at bedtime  melatonin 3 milliGRAM(s) Oral at bedtime PRN  ondansetron Injectable 4 milliGRAM(s) IV Push every 8 hours PRN  vancomycin  IVPB 750 milliGRAM(s) IV Intermittent every 12 hours      ALLERGIES:  No Known Allergies      VITALS & I/Os:  Vital Signs Last 24 Hrs  T(C): 36.8 (06 Apr 2024 12:36), Max: 37.1 (05 Apr 2024 22:28)  T(F): 98.2 (06 Apr 2024 12:36), Max: 98.8 (05 Apr 2024 22:28)  HR: 76 (06 Apr 2024 12:36) (75 - 92)  BP: 128/85 (06 Apr 2024 12:36) (118/71 - 128/85)  BP(mean): --  RR: 19 (06 Apr 2024 12:36) (18 - 19)  SpO2: 100% (06 Apr 2024 12:36) (98% - 100%)    Parameters below as of 06 Apr 2024 12:36  Patient On (Oxygen Delivery Method): room air      I&O's Summary    05 Apr 2024 07:01  -  06 Apr 2024 07:00  --------------------------------------------------------  IN: 580 mL / OUT: 1000 mL / NET: -420 mL      PHYSICAL EXAM:  General: NAD, resting comfortably  HEENT: NC/AT, EOMI, normal hearing, no oral lesions, no LAD, neck supple  Pulmonary: normal resp effort, CTA-B  Cardiovascular: NSR, no murmurs  Abdominal: soft, ND/NT, no organomegaly, no guarding or rebound tenderness  Extremities: LLE with ischemic changes at toes 1-5 with dry gangrene, s/p first toe debridement with nail removal, cracked/dry skin over B/L extremties. RLE without ischemic changes to toes. no malodor noted.    Vascular Pulse Exam:  Right Femoral:  Palpable       Left Femoral:  Palpable  Right Popliteal:  Palpable      Left Popliteal:  Palpable  Right DP:  Palpable                 Left DP:  biphasic signal   Right PT:  Palpable                 Left PT:  biphasic signal         LABS:                        10.5   10.36 )-----------( 437      ( 06 Apr 2024 07:20 )             30.8     04-06    140  |  108<H>  |  19  ----------------------------<  94  3.8   |  22  |  1.24    Ca    8.9      06 Apr 2024 07:20  Phos  3.1     04-06  Mg     1.50     04-06    TPro  7.6  /  Alb  2.8<L>  /  TBili  0.2  /  DBili  x   /  AST  23  /  ALT  37  /  AlkPhos  159<H>  04-06    Lactate:    PT/INR - ( 06 Apr 2024 07:20 )   PT: 12.5 sec;   INR: 1.11 ratio         PTT - ( 06 Apr 2024 07:20 )  PTT:32.1 sec          Urinalysis Basic - ( 06 Apr 2024 07:20 )    Color: x / Appearance: x / SG: x / pH: x  Gluc: 94 mg/dL / Ketone: x  / Bili: x / Urobili: x   Blood: x / Protein: x / Nitrite: x   Leuk Esterase: x / RBC: x / WBC x   Sq Epi: x / Non Sq Epi: x / Bacteria: x          IMAGING:

## 2024-04-06 NOTE — PROGRESS NOTE ADULT - ASSESSMENT
This is a 54 y/o M w/ PMHx of HIV (VL UD, , 19% 3/16/24, on Biktarvy) pre-DM, former smoker, illicit drug use, PAD, who is presenting with L foot gangrene. Pt was sent in by podiatrist outpatient for further evaluation.     Pt presented last on 3/15-17, was initially at [facility] for meningitis left AMA, presented to [facility] on 3/15 for PAD, s/p debridement of L forefoot, maceration of L foot 4th interspace Noted to be afebrile, mild leukocytosis 11. CT Aorta w/ runoff w/o acute clot, noted to have rim-enhancing collection within the left   flexor hallucis brevis muscle, measures 3.1 x 1.7 x 1.2 cm. Unfortunately left AMA on 3/17.    Returned on 3/20, with foot discoloration/pain. XR foot w/o OM, vascular surgery c/s, started on Heparin gtt, changed to Eliquis on d/c. Pt was discharged on 3/25.    Now with c/f L foot gangrene.  In the ER, pt was afebrile, vitals stable. Noted to have leukocytosis to 13, ESR 91, CRP 10.   VINEET to 1.51 (baseline 1.16), hyperkalemia to 6.   Podiatry consulted, culture sent, noted to have L foot ischemic changes, mild malodor, maceration of L foot 4th interspace L foot hallux toenail removed. Vascular surgery was consulted, no acute intervention, recommended DENIS/PVR,    Workup:   L foot XR w/o findings of OM  L foot Cx, gram stain with GPC in pairs/chains    HIV:   3/17/24 VL UD, , 19%  3/16 QuantiFeron negative     #L foot gangrene, dry   #Gram stain w/ GPC in pairs/chains  #Oral candidiasis    #HIV, well controlled, on Biktarvy   #Leukocytosis   #VINEET   Hyperkalemia   #Renal mass     Overall, 54 y/o M w/ PMHx of HIV (VL UD, , 19% 3/16/24, on Biktarvy) pre-DM, former smoker, illicit drug use, PAD, who is presenting with L foot gangrene, started on empiric Vancomycin/Unasyn. Gram strain with GPC in pairs/chains, possibly enterococcus vs strep. On exam, appears to be dry gangrene, no purulence, malodor. Clear signs of necrosis.   Seen by vascular, podiatry, pending possible TMA vs local wound care.     Plan:   1. C/w Vancomycin, f/u trough close given VINEET. C/w Unasyn 3 g q6   2. F/u Cx, if no isolation of MRSA, will likely stop Vancomycin   3. C/w Biktarvy   4. Fluconazole 100 mg x 7 days for thrush (noted by team, not seen by me on exam)  5. F/u podiatry recs for potential TMA     Thank you for this consult. Inpatient ID team will follow.    Vik Gonzalez M.D.  Attending Physician  Division of Infectious Diseases  Department of Medicine    Please contact through MS Teams message.  Office: [phone] (after 5 PM or weekend)

## 2024-04-06 NOTE — PROGRESS NOTE ADULT - ATTENDING COMMENTS
53M hx HIV, preDM, current smoker, hx cocaine use, prior arterial thromboembolic events, a/w sepsis on admission (HR>90 and leukocytosis) 2/2  wet gangrene of L foot. Left foot xray w/ no OM, gas, or fracture  Pt seen by podiatry and s/pL foot hallux toenail removal, and excisional debridement of L foot hallux necrotic skin to the level of epidermis,    -C/w unasyn. Will add vanc. Monitor trough. Obtain MRSA. F/u blood cx and abscess culture.   -c/w wound care   - will continue to appreciate podiatry recs. Per podiatry plan will be for Local wound care vs L foot transmetatarsal amputation pending demarcation/ vasc recs  - Vasc following and rec DENIS/PVR, Previous denis/pvrs concerning for small vessel disease of LLE. Xray of foot with no gas or osseous erosions. Vascular surgery  planning LLE Angiogram     Rest of care as stated above   plan of care d/w pt   Dispo pending further optimization. Pending PT eval as well

## 2024-04-06 NOTE — PROGRESS NOTE ADULT - SUBJECTIVE AND OBJECTIVE BOX
Christopher Dejesus, PGY-1  Please contact on Teams    LEA MENDOZA  53y  Male    Reason for Admission:     SUBJECTIVE/INTERVAL EVENTS: No acute events. Pt seen and examined at bedside.    Physical Exam:   General: NAD  HEENT: PERRL, EOMI, normal sclera and conjunctiva, no oral lesions  Neck: Supple, no JVD  Lungs: CTA bilaterally  Heart: RRR, normal S1S2, no murmurs/rubs/gallops  Abdomen: Soft, ND/NT  Extremities: debrided, gangrenous L foot w/ ichemic changes in all 5 toes, wrapped. Present pedal pulses b/l, weaker on L  Skin: Warm, well-perfused  Neuro: A&O x3, no focal deficits    Vital Signs Last 24 Hrs  T(C): 36.9 (06 Apr 2024 06:24), Max: 37.2 (05 Apr 2024 13:33)  T(F): 98.4 (06 Apr 2024 06:24), Max: 98.9 (05 Apr 2024 13:33)  HR: 75 (06 Apr 2024 06:24) (71 - 92)  BP: 127/78 (06 Apr 2024 06:24) (102/54 - 127/78)  BP(mean): --  RR: 18 (06 Apr 2024 06:24) (17 - 18)  SpO2: 98% (06 Apr 2024 06:24) (96% - 99%)    Parameters below as of 06 Apr 2024 06:24  Patient On (Oxygen Delivery Method): room air          Consultant(s) Notes Reviewed:  [x] YES  [ ] NO  Care Discussed with Consultants/Other Providers [x] YES  [ ] NO    LABS:                        11.6   13.44 )-----------( 458      ( 04 Apr 2024 23:55 )             34.9         Urinalysis Basic - ( 05 Apr 2024 01:50 )    Color: x / Appearance: x / SG: x / pH: x  Gluc: 123 mg/dL / Ketone: x  / Bili: x / Urobili: x   Blood: x / Protein: x / Nitrite: x   Leuk Esterase: x / RBC: x / WBC x   Sq Epi: x / Non Sq Epi: x / Bacteria: x        RADIOLOGY & ADDITIONAL TESTS:    Imaging Personally Reviewed:  [x] YES  [ ] NO    MEDICATIONS  (STANDING):  ampicillin/sulbactam  IVPB      ampicillin/sulbactam  IVPB 3 Gram(s) IV Intermittent every 6 hours  apixaban 5 milliGRAM(s) Oral two times a day  aspirin enteric coated 81 milliGRAM(s) Oral daily  atorvastatin 40 milliGRAM(s) Oral at bedtime  bictegravir 50 mG/emtricitabine 200 mG/tenofovir alafenamide 25 mG (BIKTARVY) 1 Tablet(s) Oral daily  fluconAZOLE   Tablet 100 milliGRAM(s) Oral daily  gabapentin 300 milliGRAM(s) Oral at bedtime  vancomycin  IVPB 750 milliGRAM(s) IV Intermittent every 12 hours    MEDICATIONS  (PRN):  acetaminophen     Tablet .. 650 milliGRAM(s) Oral every 6 hours PRN Temp greater or equal to 38C (100.4F), Mild Pain (1 - 3)  melatonin 3 milliGRAM(s) Oral at bedtime PRN Insomnia  ondansetron Injectable 4 milliGRAM(s) IV Push every 8 hours PRN Nausea and/or Vomiting      HEALTH ISSUES - PROBLEM Dx:  Gangrenous toe    Personal history of thromboembolic disease    Oral candidiasis    History of meningitis    Renal cyst    HIV disease    Substance use disorder    Prediabetes    Prophylactic measure    Preoperative examination

## 2024-04-06 NOTE — CONSULT NOTE ADULT - SUBJECTIVE AND OBJECTIVE BOX
Saeid Medina MD  Interventional Cardiology / Advance Heart Failure and Cardiac Transplant Specialist  [place] Office : [phone] [place] [address] [place] [address]  Tel:   [place] Office : [address] [place] [address]  Tel: [phone]         HISTORY OF PRESENTING ILLNESS:  HPI:  53-year-old male with a history of HIV, preDM 20-pack-year smoking history, prior cocaine use, prior arterial thromboembolic disease, recent admission for meningitis at [facility] in 2/2024 (recevied 3 weeks IV abx, left AMA) presenting with left foot gangrene. Patient has had multiple similar admissions in 3/2024 at this hospital for ischemic L foot Patient reports he was evaluated by podiatrist in the outpatient setting who sent him to the emergency department for further evaluation.  Patient has some mild back pain s/p lumbar puncture at OSH for meningitis.  Patient has been taking all of his HIV medications as prescribed and recent HIV load was undetectable.  Patient otherwise has no significant headache, chest pain, shortness of breath, N/V/D/abdominal pain, dysuria, fever/chills.  NKDA.  Cardiology called for clearance for TMA and LE angiogram , pt denies any chest pains or sOB on exertion no h/o cardiac disease in the past      PAST MEDICAL & SURGICAL HISTORY:  HIV disease      H/O splenectomy          SOCIAL HISTORY: Substance Use (street drugs): ( x ) never used  (  ) other:    FAMILY HISTORY:      REVIEW OF SYSTEMS:  CONSTITUTIONAL: No fever, weight loss, or fatigue  EYES: No eye pain, visual disturbances, or discharge  ENMT:  No difficulty hearing, tinnitus, vertigo; No sinus or throat pain  BREASTS: No pain, masses, or nipple discharge  GASTROINTESTINAL: No abdominal or epigastric pain. No nausea, vomiting, or hematemesis; No diarrhea or constipation. No melena or hematochezia.  GENITOURINARY: No dysuria, frequency, hematuria, or incontinence  NEUROLOGICAL: No headaches, memory loss, loss of strength, numbness, or tremors  ENDOCRINE: No heat or cold intolerance; No hair loss  MUSCULOSKELETAL: No joint pain or swelling; No muscle, back, or extremity pain  PSYCHIATRIC: No depression, anxiety, mood swings, or difficulty sleeping  HEME/LYMPH: No easy bruising, or bleeding gums  All others negative    MEDICATIONS:  apixaban 5 milliGRAM(s) Oral two times a day  aspirin enteric coated 81 milliGRAM(s) Oral daily    ampicillin/sulbactam  IVPB 3 Gram(s) IV Intermittent every 6 hours  ampicillin/sulbactam  IVPB      bictegravir 50 mG/emtricitabine 200 mG/tenofovir alafenamide 25 mG (BIKTARVY) 1 Tablet(s) Oral daily  fluconAZOLE   Tablet 100 milliGRAM(s) Oral daily  vancomycin  IVPB 750 milliGRAM(s) IV Intermittent every 12 hours      acetaminophen     Tablet .. 650 milliGRAM(s) Oral every 6 hours PRN  gabapentin 300 milliGRAM(s) Oral at bedtime  melatonin 3 milliGRAM(s) Oral at bedtime PRN  ondansetron Injectable 4 milliGRAM(s) IV Push every 8 hours PRN      atorvastatin 40 milliGRAM(s) Oral at bedtime        FAMILY HISTORY:        Allergies    No Known Allergies    Intolerances    	      PHYSICAL EXAM:  T(C): 36.8 (04-06-24 @ 12:36), Max: 37.1 (04-05-24 @ 22:28)  HR: 76 (04-06-24 @ 12:36) (75 - 92)  BP: 128/85 (04-06-24 @ 12:36) (118/71 - 128/85)  RR: 19 (04-06-24 @ 12:36) (18 - 19)  SpO2: 100% (04-06-24 @ 12:36) (98% - 100%)  Wt(kg): --  I&O's Summary    05 Apr 2024 07:01  -  06 Apr 2024 07:00  --------------------------------------------------------  IN: 580 mL / OUT: 1000 mL / NET: -420 mL        GENERAL: NAD   EYES: EOMI, PERRLA, conjunctiva and sclera clear  ENMT: No tonsillar erythema, exudates, or enlargement; Moist mucous membranes, Good dentition, No lesions  Cardiovascular: Normal S1 S2, No JVD, No murmurs, No edema  Respiratory: Lungs clear to auscultation	  Gastrointestinal:  Soft, Non-tender, + BS	  Extremities: no edema      LABS:	 	    CARDIAC MARKERS:                                  10.5   10.36 )-----------( 437      ( 06 Apr 2024 07:20 )             30.8     04-06    140  |  108<H>  |  19  ----------------------------<  94  3.8   |  22  |  1.24    Ca    8.9      06 Apr 2024 07:20  Phos  3.1     04-06  Mg     1.50     04-06    TPro  7.6  /  Alb  2.8<L>  /  TBili  0.2  /  DBili  x   /  AST  23  /  ALT  37  /  AlkPhos  159<H>  04-06    proBNP:   Lipid Profile:   HgA1c:   TSH:     Consultant(s) Notes Reviewed:  [x ] YES  [ ] NO    Care Discussed with Consultants/Other Providers [ x] YES  [ ] NO    Imaging Personally Reviewed independently:  [x] YES  [ ] NO    All labs, radiologic studies, vitals, orders and medications list reviewed. Patient is seen and examined at bedside. Case discussed with medical team.    ASSESSMENT/PLAN:

## 2024-04-06 NOTE — PHYSICAL THERAPY INITIAL EVALUATION ADULT - LEVEL OF INDEPENDENCE: STAND/SIT, REHAB EVAL
CC: hearing loss     HPI: 89 yr old female with hx breast cancer ,recent herpes zoster that she was taking NSAIDS for. Labs noted for anemia , she had recent endoscopy for anemia on September 18th noted for hiatal hernia, gastritis with superficial ulcerations and erosions in the stomach and duodenum. She was found to be H. pylori +. A CT dated 10/14 reported lytic osseous lesions suspicious for metastatic disease. Patient had bone scan that reported metastatic disease to the mid and lower thoracic spine,  left lower  rib / pelvis and prox right femur. ENT consulted for worsening hearing loss noted by family member during this admission.         PAST MEDICAL & SURGICAL HISTORY:  Unsteady gait  Osteopenia  Injury of head, subsequent encounter: s/p fall 2 years ago   had bleed into area now ok.  Fall, subsequent encounter: 2 years  Malignant neoplasm of left breast in female, estrogen receptor positive, unspecified site of breast: ER/KS positive  HER Negative (per family)  Polymyalgia rheumatica  CAD (coronary artery disease)  Bleeding Duodenal Ulcer: resolved several years ago  Age-Related Hearing Loss: wears hearing aids  Hypercholesteremia  HTN (Hypertension)  Atrial Fibrillation  Closed fracture of patella: left   surgery with pins/screws  4- 5 years ago  S/P colonoscopy: 8 years ago negative  S/P breast biopsy, left  Atherosclerotic Coronary Vascular Disease: coronary stent placement in 2004  Stented Coronary Artery: PCI X 1    Allergies    penicillin (Unknown)    Intolerances      MEDICATIONS  (STANDING):  amLODIPine   Tablet 5 milliGRAM(s) Oral daily  cefTRIAXone   IVPB 1000 milliGRAM(s) IV Intermittent every 24 hours  chlorhexidine 2% Cloths 1 Application(s) Topical daily  docusate sodium 100 milliGRAM(s) Oral daily  folic acid 1 milliGRAM(s) Oral daily  furosemide    Tablet 20 milliGRAM(s) Oral daily  metoprolol succinate ER 25 milliGRAM(s) Oral daily  nortriptyline 25 milliGRAM(s) Oral daily  pantoprazole    Tablet 40 milliGRAM(s) Oral two times a day  polyethylene glycol 3350 17 Gram(s) Oral two times a day  senna 2 Tablet(s) Oral two times a day    MEDICATIONS  (PRN):  acetaminophen    Suspension .. 650 milliGRAM(s) Oral every 4 hours PRN Temp greater or equal to 38C (100.4F), Moderate Pain (4 - 6)  ondansetron Injectable 4 milliGRAM(s) IV Push every 6 hours PRN Nausea and/or Vomiting  simethicone drops 80 milliGRAM(s) Oral two times a day PRN Gas      Social History: Denies smoking or ETOH. Usually lives by self but son moved in to help recently. Has needed wheelchair with current weakness    Family history: FH: breast cancer.    ROS:   ENT: all negative except as noted in HPI   CV: denies palpitations  Pulm: denies SOB, cough, hemoptysis  GI: denies change in apetite, indigestion, n/v  : denies pertinent urinary symptoms, urgency  Neuro: denies numbness/tingling, loss of sensation  Psych: denies anxiety  MS: denies muscle weakness, instability  Heme: denies easy bruising or bleeding  Endo: denies heat/cold intolerance, excessive sweating  Vascular: denies LE edema    Vital Signs Last 24 Hrs  T(C): 37.3 (21 Oct 2019 05:04), Max: 37.3 (20 Oct 2019 23:15)  T(F): 99.1 (21 Oct 2019 05:04), Max: 99.2 (20 Oct 2019 23:15)  HR: 107 (21 Oct 2019 05:04) (107 - 108)  BP: 164/77 (21 Oct 2019 05:04) (145/75 - 164/77)  BP(mean): --  RR: 18 (21 Oct 2019 05:04) (18 - 18)  SpO2: 95% (21 Oct 2019 05:04) (95% - 97%)                          9.3    18.39 )-----------( 475      ( 20 Oct 2019 08:37 )             29.3    10-20    134<L>  |  97  |  19  ----------------------------<  129<H>  3.9   |  24  |  0.43<L>    Ca    8.9      20 Oct 2019 05:46         PHYSICAL EXAM:  Gen: NAD  Skin: No rashes, bruises, or lesions  Head: Normocephalic, Atraumatic  Face: no edema, erythema, or fluctuance. Parotid glands soft without mass  Eyes: no scleral injection  Ears: Right - ear canal clear, TM intact without effusion or erythema. No evidence of any fluid drainage. No mastoid tenderness, erythema, or ear bulging            Left - ear canal clear, TM intact without effusion or erythema. No evidence of any fluid drainage. No mastoid tenderness, erythema, or ear bulging  Nose: Nares bilaterally patent, no discharge  Mouth: No Stridor / Drooling / Trismus.  Mucosa moist, tongue/uvula midline, oropharynx clear  Neck: Flat, supple, no lymphadenopathy, trachea midline, no masses  Lymphatic: No lymphadenopathy  Resp: breathing easily, no stridor  CV: no peripheral edema/cyanosis  GI: nondistended   Peripheral vascular: no JVD or edema  Neuro: facial nerve intact, no facial droop CC: hearing loss     HPI: 89 yr old female with hx breast cancer ,recent herpes zoster that she was taking NSAIDS for. Labs noted for anemia , she had recent endoscopy for anemia on September 18th noted for hiatal hernia, gastritis with superficial ulcerations and erosions in the stomach and duodenum. She was found to be H. pylori +. A CT dated 10/14 reported lytic osseous lesions suspicious for metastatic disease. Patient had bone scan that reported metastatic disease to the mid and lower thoracic spine,  left lower  rib / pelvis and prox right femur. ENT consulted for worsening hearing loss noted by family member during this admission. Pt states she has trouble cleaning the hearing aid.         PAST MEDICAL & SURGICAL HISTORY:  Unsteady gait  Osteopenia  Injury of head, subsequent encounter: s/p fall 2 years ago   had bleed into area now ok.  Fall, subsequent encounter: 2 years  Malignant neoplasm of left breast in female, estrogen receptor positive, unspecified site of breast: ER/GA positive  HER Negative (per family)  Polymyalgia rheumatica  CAD (coronary artery disease)  Bleeding Duodenal Ulcer: resolved several years ago  Age-Related Hearing Loss: wears hearing aids  Hypercholesteremia  HTN (Hypertension)  Atrial Fibrillation  Closed fracture of patella: left   surgery with pins/screws  4- 5 years ago  S/P colonoscopy: 8 years ago negative  S/P breast biopsy, left  Atherosclerotic Coronary Vascular Disease: coronary stent placement in 2004  Stented Coronary Artery: PCI X 1    Allergies    penicillin (Unknown)    Intolerances      MEDICATIONS  (STANDING):  amLODIPine   Tablet 5 milliGRAM(s) Oral daily  cefTRIAXone   IVPB 1000 milliGRAM(s) IV Intermittent every 24 hours  chlorhexidine 2% Cloths 1 Application(s) Topical daily  docusate sodium 100 milliGRAM(s) Oral daily  folic acid 1 milliGRAM(s) Oral daily  furosemide    Tablet 20 milliGRAM(s) Oral daily  metoprolol succinate ER 25 milliGRAM(s) Oral daily  nortriptyline 25 milliGRAM(s) Oral daily  pantoprazole    Tablet 40 milliGRAM(s) Oral two times a day  polyethylene glycol 3350 17 Gram(s) Oral two times a day  senna 2 Tablet(s) Oral two times a day    MEDICATIONS  (PRN):  acetaminophen    Suspension .. 650 milliGRAM(s) Oral every 4 hours PRN Temp greater or equal to 38C (100.4F), Moderate Pain (4 - 6)  ondansetron Injectable 4 milliGRAM(s) IV Push every 6 hours PRN Nausea and/or Vomiting  simethicone drops 80 milliGRAM(s) Oral two times a day PRN Gas      Social History: Denies smoking or ETOH. Usually lives by self but son moved in to help recently. Has needed wheelchair with current weakness    Family history: FH: breast cancer.    ROS:   ENT: all negative except as noted in HPI   CV: denies palpitations  Pulm: denies SOB, cough, hemoptysis  GI: denies change in apetite, indigestion, n/v  : denies pertinent urinary symptoms, urgency  Neuro: denies numbness/tingling, loss of sensation  Psych: denies anxiety  MS: denies muscle weakness, instability  Heme: denies easy bruising or bleeding  Endo: denies heat/cold intolerance, excessive sweating  Vascular: denies LE edema    Vital Signs Last 24 Hrs  T(C): 37.3 (21 Oct 2019 05:04), Max: 37.3 (20 Oct 2019 23:15)  T(F): 99.1 (21 Oct 2019 05:04), Max: 99.2 (20 Oct 2019 23:15)  HR: 107 (21 Oct 2019 05:04) (107 - 108)  BP: 164/77 (21 Oct 2019 05:04) (145/75 - 164/77)  BP(mean): --  RR: 18 (21 Oct 2019 05:04) (18 - 18)  SpO2: 95% (21 Oct 2019 05:04) (95% - 97%)                          9.3    18.39 )-----------( 475      ( 20 Oct 2019 08:37 )             29.3    10-20    134<L>  |  97  |  19  ----------------------------<  129<H>  3.9   |  24  |  0.43<L>    Ca    8.9      20 Oct 2019 05:46         PHYSICAL EXAM:  Gen: NAD  Skin: No rashes, bruises, or lesions  Head: Normocephalic, Atraumatic  Face: no edema, erythema, or fluctuance. Parotid glands soft without mass  Eyes: no scleral injection  Ears: Right - + R hearing aid in place, ear canal clear, TM intact without effusion or erythema. No evidence of any fluid drainage. No mastoid tenderness, erythema, or ear bulging            Left - ear canal clear, TM intact without effusion or erythema. No evidence of any fluid drainage. No mastoid tenderness, erythema, or ear bulging  Nose: Nares bilaterally patent, no discharge  Mouth: No Stridor / Drooling / Trismus.  Mucosa moist, tongue/uvula midline, oropharynx clear  Neck: Flat, supple, no lymphadenopathy, trachea midline, no masses  Lymphatic: No lymphadenopathy  Resp: breathing easily, no stridor  CV: no peripheral edema/cyanosis  GI: nondistended   Peripheral vascular: no JVD or edema  Neuro: facial nerve intact, no facial droop contact guard

## 2024-04-06 NOTE — PHYSICAL THERAPY INITIAL EVALUATION ADULT - ADDITIONAL COMMENTS
Seen 12/27/18  Medication refill escribed per protocol/Dr Fady Estrada       PT states he lives alone in an apartment with 15 steps to enter. Pt adds that his girlfriend occasionally visits.  Prior to this admission, pt was ambulating with a rolling walker. Pt states he is able to negotiate steps at home.  Post PT evaluation, pt left seated in bedside chair, call bell and remote within reach, all precautions maintained, NAD. RN aware.

## 2024-04-06 NOTE — CONSULT NOTE ADULT - ASSESSMENT
EKG - NSR     Echo - Normal LV function reduced RV function + bubble study after 8 beats possible extracardiac shunt     a/p     1) Preop clearance - no cp no SOB, has normal LV function but has reduced RV function, CT pulm angiogram shows no PE in march but was suboptimal study r/o pulmonary shunt as op  , will do out pt workup for RV dysfunction, pt is moderate risk for TMA and LE angiogram     2) h/o thromboembolic disease - on eliquis     3) Left foot gangrene - on vancomycin     4) DVT prophylasix - on sc heparin

## 2024-04-06 NOTE — CONSULT NOTE ADULT - ATTENDING COMMENTS
53 year old man with peripheral arterial disease  chronic limb threatening ischemia of the left lower extremity, [place] 5    SVS Threatened Limb Classification System  WIfI: 1 3 0  Clinical Stage 3  Amputation Risk: Moderate  Potential Benefit of Revascularization: High    will add-on for cath lab on Monday, 04/08 for left leg angiogram, possible revascularization  NPO at midnight  am labs

## 2024-04-06 NOTE — CONSULT NOTE ADULT - ASSESSMENT
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 Pulse exam unchanged from previous RLE +DP/PT signal and all other pulses palpable. RLE toes not currently concerning for wet gangrene, toes dry, debridement to healthy tissue over first toe of LLE. Previous ida/pvrs concerning for small vessel disease of LLE. Xray of foot with no gas or osseous erosions. Vascular surgery consulted for recs after podiatry debridement.     Plan:  - LLE Angiogram planning  - PLEASE document medical and cardiology opitmization  - C/w AC  - Local wound care per pods  - C/w aspirin/Statin    Vascular Surgery   [id number]   53 year old man with PMH of HIV (Biktarvy) and possible DM (last a1c preDM in march) and embolic occlusion of LLE toe s/p embolic workup revealing extra cardiac shunt presents for LLE first toe infection s/p debridment by podiatry in ED. Pulse exam unchanged from previous RLE +DP/PT signal and all other pulses palpable. RLE toes not currently concerning for wet gangrene, toes dry, debridement to healthy tissue over first toe of LLE. Previous ida/pvrs concerning for small vessel disease of LLE. Xray of foot with no gas or osseous erosions. Vascular surgery consulted for recs after podiatry debridement.     Plan:  - LLE Angiogram planning  - PLEASE document medical and cardiology opitmization  - transition apixaban to hep gtt  - Local wound care per pods  - C/w aspirin/Statin    Vascular Surgery   [id number]

## 2024-04-07 LAB
-  AMPICILLIN/SULBACTAM: SIGNIFICANT CHANGE UP
-  CEFAZOLIN: SIGNIFICANT CHANGE UP
-  CLINDAMYCIN: SIGNIFICANT CHANGE UP
-  ERYTHROMYCIN: SIGNIFICANT CHANGE UP
-  GENTAMICIN: SIGNIFICANT CHANGE UP
-  OXACILLIN: SIGNIFICANT CHANGE UP
-  PENICILLIN: SIGNIFICANT CHANGE UP
-  RIFAMPIN: SIGNIFICANT CHANGE UP
-  TETRACYCLINE: SIGNIFICANT CHANGE UP
-  TRIMETHOPRIM/SULFAMETHOXAZOLE: SIGNIFICANT CHANGE UP
-  VANCOMYCIN: SIGNIFICANT CHANGE UP
ALBUMIN SERPL ELPH-MCNC: 2.8 G/DL — LOW (ref 3.3–5)
ALP SERPL-CCNC: 168 U/L — HIGH (ref 40–120)
ALT FLD-CCNC: 57 U/L — HIGH (ref 4–41)
ANION GAP SERPL CALC-SCNC: 10 MMOL/L — SIGNIFICANT CHANGE UP (ref 7–14)
APTT BLD: 30.2 SEC — SIGNIFICANT CHANGE UP (ref 24.5–35.6)
APTT BLD: 54.7 SEC — HIGH (ref 24.5–35.6)
AST SERPL-CCNC: 48 U/L — HIGH (ref 4–40)
BASOPHILS # BLD AUTO: 0.09 K/UL — SIGNIFICANT CHANGE UP (ref 0–0.2)
BASOPHILS NFR BLD AUTO: 0.9 % — SIGNIFICANT CHANGE UP (ref 0–2)
BILIRUB SERPL-MCNC: 0.2 MG/DL — SIGNIFICANT CHANGE UP (ref 0.2–1.2)
BUN SERPL-MCNC: 20 MG/DL — SIGNIFICANT CHANGE UP (ref 7–23)
CALCIUM SERPL-MCNC: 8.8 MG/DL — SIGNIFICANT CHANGE UP (ref 8.4–10.5)
CHLORIDE SERPL-SCNC: 107 MMOL/L — SIGNIFICANT CHANGE UP (ref 98–107)
CO2 SERPL-SCNC: 22 MMOL/L — SIGNIFICANT CHANGE UP (ref 22–31)
CREAT SERPL-MCNC: 1.24 MG/DL — SIGNIFICANT CHANGE UP (ref 0.5–1.3)
EGFR: 70 ML/MIN/1.73M2 — SIGNIFICANT CHANGE UP
EOSINOPHIL # BLD AUTO: 0.06 K/UL — SIGNIFICANT CHANGE UP (ref 0–0.5)
EOSINOPHIL NFR BLD AUTO: 0.6 % — SIGNIFICANT CHANGE UP (ref 0–6)
GLUCOSE SERPL-MCNC: 119 MG/DL — HIGH (ref 70–99)
HCT VFR BLD CALC: 31.9 % — LOW (ref 39–50)
HCT VFR BLD CALC: 32.4 % — LOW (ref 39–50)
HGB BLD-MCNC: 10.6 G/DL — LOW (ref 13–17)
HGB BLD-MCNC: 10.6 G/DL — LOW (ref 13–17)
IANC: 3.06 K/UL — SIGNIFICANT CHANGE UP (ref 1.8–7.4)
IMM GRANULOCYTES NFR BLD AUTO: 0.5 % — SIGNIFICANT CHANGE UP (ref 0–0.9)
LYMPHOCYTES # BLD AUTO: 5.01 K/UL — HIGH (ref 1–3.3)
LYMPHOCYTES # BLD AUTO: 52.8 % — HIGH (ref 13–44)
MAGNESIUM SERPL-MCNC: 1.7 MG/DL — SIGNIFICANT CHANGE UP (ref 1.6–2.6)
MCHC RBC-ENTMCNC: 31.3 PG — SIGNIFICANT CHANGE UP (ref 27–34)
MCHC RBC-ENTMCNC: 31.5 PG — SIGNIFICANT CHANGE UP (ref 27–34)
MCHC RBC-ENTMCNC: 32.7 GM/DL — SIGNIFICANT CHANGE UP (ref 32–36)
MCHC RBC-ENTMCNC: 33.2 GM/DL — SIGNIFICANT CHANGE UP (ref 32–36)
MCV RBC AUTO: 94.7 FL — SIGNIFICANT CHANGE UP (ref 80–100)
MCV RBC AUTO: 95.6 FL — SIGNIFICANT CHANGE UP (ref 80–100)
METHOD TYPE: SIGNIFICANT CHANGE UP
MONOCYTES # BLD AUTO: 1.22 K/UL — HIGH (ref 0–0.9)
MONOCYTES NFR BLD AUTO: 12.9 % — SIGNIFICANT CHANGE UP (ref 2–14)
MRSA PCR RESULT.: SIGNIFICANT CHANGE UP
NEUTROPHILS # BLD AUTO: 3.06 K/UL — SIGNIFICANT CHANGE UP (ref 1.8–7.4)
NEUTROPHILS NFR BLD AUTO: 32.3 % — LOW (ref 43–77)
NRBC # BLD: 0 /100 WBCS — SIGNIFICANT CHANGE UP (ref 0–0)
NRBC # BLD: 0 /100 WBCS — SIGNIFICANT CHANGE UP (ref 0–0)
NRBC # FLD: 0 K/UL — SIGNIFICANT CHANGE UP (ref 0–0)
NRBC # FLD: 0 K/UL — SIGNIFICANT CHANGE UP (ref 0–0)
PHOSPHATE SERPL-MCNC: 3.7 MG/DL — SIGNIFICANT CHANGE UP (ref 2.5–4.5)
PLATELET # BLD AUTO: 447 K/UL — HIGH (ref 150–400)
PLATELET # BLD AUTO: 457 K/UL — HIGH (ref 150–400)
POTASSIUM SERPL-MCNC: 3.5 MMOL/L — SIGNIFICANT CHANGE UP (ref 3.5–5.3)
POTASSIUM SERPL-SCNC: 3.5 MMOL/L — SIGNIFICANT CHANGE UP (ref 3.5–5.3)
PROT SERPL-MCNC: 7.6 G/DL — SIGNIFICANT CHANGE UP (ref 6–8.3)
RBC # BLD: 3.37 M/UL — LOW (ref 4.2–5.8)
RBC # BLD: 3.39 M/UL — LOW (ref 4.2–5.8)
RBC # FLD: 15.1 % — HIGH (ref 10.3–14.5)
RBC # FLD: 15.2 % — HIGH (ref 10.3–14.5)
S AUREUS DNA NOSE QL NAA+PROBE: DETECTED
SODIUM SERPL-SCNC: 139 MMOL/L — SIGNIFICANT CHANGE UP (ref 135–145)
VANCOMYCIN TROUGH SERPL-MCNC: 24.8 UG/ML — HIGH (ref 10–20)
WBC # BLD: 10.81 K/UL — HIGH (ref 3.8–10.5)
WBC # BLD: 9.49 K/UL — SIGNIFICANT CHANGE UP (ref 3.8–10.5)
WBC # FLD AUTO: 10.81 K/UL — HIGH (ref 3.8–10.5)
WBC # FLD AUTO: 9.49 K/UL — SIGNIFICANT CHANGE UP (ref 3.8–10.5)

## 2024-04-07 PROCEDURE — 99232 SBSQ HOSP IP/OBS MODERATE 35: CPT | Mod: GC

## 2024-04-07 RX ORDER — CHLORHEXIDINE GLUCONATE 213 G/1000ML
1 SOLUTION TOPICAL DAILY
Refills: 0 | Status: DISCONTINUED | OUTPATIENT
Start: 2024-04-07 | End: 2024-04-16

## 2024-04-07 RX ORDER — GABAPENTIN 400 MG/1
200 CAPSULE ORAL
Refills: 0 | Status: DISCONTINUED | OUTPATIENT
Start: 2024-04-07 | End: 2024-04-09

## 2024-04-07 RX ORDER — INFLUENZA VIRUS VACCINE 15; 15; 15; 15 UG/.5ML; UG/.5ML; UG/.5ML; UG/.5ML
0.5 SUSPENSION INTRAMUSCULAR ONCE
Refills: 0 | Status: DISCONTINUED | OUTPATIENT
Start: 2024-04-07 | End: 2024-04-16

## 2024-04-07 RX ORDER — HEPARIN SODIUM 5000 [USP'U]/ML
INJECTION INTRAVENOUS; SUBCUTANEOUS
Qty: 25000 | Refills: 0 | Status: DISCONTINUED | OUTPATIENT
Start: 2024-04-07 | End: 2024-04-08

## 2024-04-07 RX ADMIN — Medication 650 MILLIGRAM(S): at 00:59

## 2024-04-07 RX ADMIN — CHLORHEXIDINE GLUCONATE 1 APPLICATION(S): 213 SOLUTION TOPICAL at 18:10

## 2024-04-07 RX ADMIN — Medication 81 MILLIGRAM(S): at 11:37

## 2024-04-07 RX ADMIN — ATORVASTATIN CALCIUM 40 MILLIGRAM(S): 80 TABLET, FILM COATED ORAL at 23:49

## 2024-04-07 RX ADMIN — FLUCONAZOLE 100 MILLIGRAM(S): 150 TABLET ORAL at 11:38

## 2024-04-07 RX ADMIN — AMPICILLIN SODIUM AND SULBACTAM SODIUM 200 GRAM(S): 250; 125 INJECTION, POWDER, FOR SUSPENSION INTRAMUSCULAR; INTRAVENOUS at 00:30

## 2024-04-07 RX ADMIN — Medication 650 MILLIGRAM(S): at 10:28

## 2024-04-07 RX ADMIN — Medication 650 MILLIGRAM(S): at 01:59

## 2024-04-07 RX ADMIN — Medication 250 MILLIGRAM(S): at 06:28

## 2024-04-07 RX ADMIN — Medication 250 MILLIGRAM(S): at 18:01

## 2024-04-07 RX ADMIN — GABAPENTIN 300 MILLIGRAM(S): 400 CAPSULE ORAL at 22:13

## 2024-04-07 RX ADMIN — AMPICILLIN SODIUM AND SULBACTAM SODIUM 200 GRAM(S): 250; 125 INJECTION, POWDER, FOR SUSPENSION INTRAMUSCULAR; INTRAVENOUS at 18:10

## 2024-04-07 RX ADMIN — BICTEGRAVIR SODIUM, EMTRICITABINE, AND TENOFOVIR ALAFENAMIDE FUMARATE 1 TABLET(S): 30; 120; 15 TABLET ORAL at 11:37

## 2024-04-07 RX ADMIN — Medication 650 MILLIGRAM(S): at 11:28

## 2024-04-07 RX ADMIN — HEPARIN SODIUM 1500 UNIT(S)/HR: 5000 INJECTION INTRAVENOUS; SUBCUTANEOUS at 12:17

## 2024-04-07 RX ADMIN — HEPARIN SODIUM 1700 UNIT(S)/HR: 5000 INJECTION INTRAVENOUS; SUBCUTANEOUS at 18:46

## 2024-04-07 RX ADMIN — AMPICILLIN SODIUM AND SULBACTAM SODIUM 200 GRAM(S): 250; 125 INJECTION, POWDER, FOR SUSPENSION INTRAMUSCULAR; INTRAVENOUS at 06:17

## 2024-04-07 RX ADMIN — HEPARIN SODIUM 1700 UNIT(S)/HR: 5000 INJECTION INTRAVENOUS; SUBCUTANEOUS at 20:25

## 2024-04-07 RX ADMIN — APIXABAN 5 MILLIGRAM(S): 2.5 TABLET, FILM COATED ORAL at 06:20

## 2024-04-07 RX ADMIN — AMPICILLIN SODIUM AND SULBACTAM SODIUM 200 GRAM(S): 250; 125 INJECTION, POWDER, FOR SUSPENSION INTRAMUSCULAR; INTRAVENOUS at 11:34

## 2024-04-07 RX ADMIN — Medication 3 MILLIGRAM(S): at 22:13

## 2024-04-07 NOTE — PROGRESS NOTE ADULT - ATTENDING COMMENTS
53M hx HIV, preDM, current smoker, hx cocaine use, prior arterial thromboembolic events, a/w sepsis on admission (HR>90 and leukocytosis) 2/2  wet gangrene of L foot. Left foot xray w/ no OM, gas, or fracture  Pt seen by podiatry and s/pL foot hallux toenail removal, and excisional debridement of L foot hallux necrotic skin to the level of epidermis,  Pt c/o back pain   -C/w unasyn.  Added  vanc. Monitor trough.  trough 7-->24, will repeat prior to next dose   - MRSA negative ,  F/u blood [id number]  and abscess culture with staph Lugdunensis Sensitive to vanco, will f/u further ID recs  -c/w wound care   - will continue to appreciate podiatry recs. Per podiatry plan will be for Local wound care vs L foot transmetatarsal amputation pending demarcation/ vasc recs  - Vasc following and rec DENIS/PVR, Previous denis/pvrs concerning for small vessel disease of LLE. Xray of foot with no gas or osseous erosions. Vascular surgery  planning LLE Angiogram , cardiology f/u appreciated, pt  has normal LV function but has reduced RV function, cards  plan to do   out pt workup for RV dysfunction, pt is moderate risk for TMA and LE angiogram, pt medically optimized   LFTs trended up slightly, will CTM   Back pain- pt on [id number] mg at bedtime , can add 200 mg in am too   Rest of care as stated above   plan of care d/w pt   Dispo pending further optimization.    PT eval - rec home PT

## 2024-04-07 NOTE — PROGRESS NOTE ADULT - PROBLEM SELECTOR PLAN 2
Thromboses to L leg, leading to limb ischemia and gangrene  - full AC w/ Eliquis Thromboses to L leg, leading to limb ischemia and gangrene  - full AC w/ Eliquis, transitioned to heparin in anticipation of LLE angiogram by vascular

## 2024-04-07 NOTE — PROGRESS NOTE ADULT - PROBLEM SELECTOR PLAN 5
Seen on CT and US, solid mass not ruled out  recommended f/u MRI, pending Seen on CT and US, solid mass not ruled out  recommended f/u MRI, noted to have 1.6cm complex cyst in R upper pole, recommended for surveillance imaging ([place] 2F)

## 2024-04-07 NOTE — PROGRESS NOTE ADULT - ASSESSMENT
53M hx HIV, preDM, current smoker, hx cocaine use, prior arterial thromboembolic events, presents with wet gangrene of L foot, s/p debridement by podiatry with transmetatarsal amputation planned for 4/5 AM. Vascular and podiatry following.

## 2024-04-07 NOTE — PROGRESS NOTE ADULT - SUBJECTIVE AND OBJECTIVE BOX
Lindy Nuñez MD  PGY 2 Department of Internal Medicine        Patient is a 53y old  Male who presents with a chief complaint of wet gangrene (06 Apr 2024 16:37)      SUBJECTIVE / OVERNIGHT EVENTS: Pt seen and examined. No acute overnight events. Denies fevers, chills, CP, SOB, Abdominal pain, N/V, Constipation, Diarrhea        MEDICATIONS  (STANDING):  ampicillin/sulbactam  IVPB      ampicillin/sulbactam  IVPB 3 Gram(s) IV Intermittent every 6 hours  apixaban 5 milliGRAM(s) Oral two times a day  aspirin enteric coated 81 milliGRAM(s) Oral daily  atorvastatin 40 milliGRAM(s) Oral at bedtime  bictegravir 50 mG/emtricitabine 200 mG/tenofovir alafenamide 25 mG (BIKTARVY) 1 Tablet(s) Oral daily  fluconAZOLE   Tablet 100 milliGRAM(s) Oral daily  gabapentin 300 milliGRAM(s) Oral at bedtime  vancomycin  IVPB 750 milliGRAM(s) IV Intermittent every 12 hours    MEDICATIONS  (PRN):  acetaminophen     Tablet .. 650 milliGRAM(s) Oral every 6 hours PRN Temp greater or equal to 38C (100.4F), Mild Pain (1 - 3)  melatonin 3 milliGRAM(s) Oral at bedtime PRN Insomnia  ondansetron Injectable 4 milliGRAM(s) IV Push every 8 hours PRN Nausea and/or Vomiting      I&O's Summary    06 Apr 2024 07:01  -  07 Apr 2024 07:00  --------------------------------------------------------  IN: 1020 mL / OUT: 1000 mL / NET: 20 mL        Vital Signs Last 24 Hrs  T(C): 36.7 (07 Apr 2024 05:55), Max: 36.9 (06 Apr 2024 21:55)  T(F): 98.1 (07 Apr 2024 05:55), Max: 98.5 (06 Apr 2024 21:55)  HR: 69 (07 Apr 2024 05:55) (69 - 80)  BP: 129/86 (07 Apr 2024 05:55) (118/72 - 129/86)  BP(mean): --  RR: 18 (07 Apr 2024 05:55) (18 - 19)  SpO2: 98% (07 Apr 2024 05:55) (98% - 100%)    Parameters below as of 07 Apr 2024 05:55  Patient On (Oxygen Delivery Method): room air        CAPILLARY BLOOD GLUCOSE          PHYSICAL EXAM:  GENERAL: NAD, lying in bed comfortably  HEAD:  Atraumatic, normocephalic  EYES: EOMI, PERRLA, conjunctiva clear, no conjunctival pallor, anicteric sclera  NECK: Supple, trachea midline, no JVD  HEART: Regular rate and rhythm, Normal S1 S2, no murmurs, rubs, or gallops  LUNGS: Unlabored respirations. Clear to ascultation bilaterally, no crackles, wheezing, or rhonchi  ABDOMEN: Soft, nondistended, nontender, no rebound or guarding, bowel sounds presents  EXTREMITIES: Warm extremities, no clubbing, cyanosis, or edema, peripheral pulses 2+ bilaterally  MUSCULOSKELETAL: No joint swelling or tenderness to palpation  NEURO: CN 2-12 grossly intact, moves all limbs spontaneously  SKIN: No rashes or lesions         LABS:                        10.5   10.36 )-----------( 437      ( 06 Apr 2024 07:20 )             30.8     Auto Eosinophil # 0.03  / Auto Eosinophil % 0.3   / Auto Neutrophil # 3.54  / Auto Neutrophil % 34.1  / BANDS % x        04-06    140  |  108<H>  |  19  ----------------------------<  94  3.8   |  22  |  1.24    Ca    8.9      06 Apr 2024 07:20  Mg     1.50     04-06  Phos  3.1     04-06  TPro  7.6  /  Alb  2.8<L>  /  TBili  0.2  /  DBili  x   /  AST  23  /  ALT  37  /  AlkPhos  159<H>  04-06    PT/INR - ( 06 Apr 2024 07:20 )   PT: 12.5 sec;   INR: 1.11 ratio         PTT - ( 06 Apr 2024 07:20 )  PTT:32.1 sec      Urinalysis Basic - ( 06 Apr 2024 07:20 )    Color: x / Appearance: x / SG: x / pH: x  Gluc: 94 mg/dL / Ketone: x  / Bili: x / Urobili: x   Blood: x / Protein: x / Nitrite: x   Leuk Esterase: x / RBC: x / WBC x   Sq Epi: x / Non Sq Epi: x / Bacteria: x            RADIOLOGY & ADDITIONAL TESTS:    Imaging Personally Reviewed:    Consultant(s) Notes Reviewed:      Care Discussed with Consultants/Other Providers:   Lindy Nuñez MD  PGY 2 Department of Internal Medicine        Patient is a 53y old  Male who presents with a chief complaint of wet gangrene (06 Apr 2024 16:37)      SUBJECTIVE / OVERNIGHT EVENTS: Pt seen and examined. No acute overnight events. Notes some low back pain w/o alterations in sensorium or strength - improves w/ msucle relaxant. Denies fevers, chills, CP, SOB, Abdominal pain, N/V, Constipation, Diarrhea        MEDICATIONS  (STANDING):  ampicillin/sulbactam  IVPB      ampicillin/sulbactam  IVPB 3 Gram(s) IV Intermittent every 6 hours  apixaban 5 milliGRAM(s) Oral two times a day  aspirin enteric coated 81 milliGRAM(s) Oral daily  atorvastatin 40 milliGRAM(s) Oral at bedtime  bictegravir 50 mG/emtricitabine 200 mG/tenofovir alafenamide 25 mG (BIKTARVY) 1 Tablet(s) Oral daily  fluconAZOLE   Tablet 100 milliGRAM(s) Oral daily  gabapentin 300 milliGRAM(s) Oral at bedtime  vancomycin  IVPB 750 milliGRAM(s) IV Intermittent every 12 hours    MEDICATIONS  (PRN):  acetaminophen     Tablet .. 650 milliGRAM(s) Oral every 6 hours PRN Temp greater or equal to 38C (100.4F), Mild Pain (1 - 3)  melatonin 3 milliGRAM(s) Oral at bedtime PRN Insomnia  ondansetron Injectable 4 milliGRAM(s) IV Push every 8 hours PRN Nausea and/or Vomiting      I&O's Summary    06 Apr 2024 07:01  -  07 Apr 2024 07:00  --------------------------------------------------------  IN: 1020 mL / OUT: 1000 mL / NET: 20 mL        Vital Signs Last 24 Hrs  T(C): 36.7 (07 Apr 2024 05:55), Max: 36.9 (06 Apr 2024 21:55)  T(F): 98.1 (07 Apr 2024 05:55), Max: 98.5 (06 Apr 2024 21:55)  HR: 69 (07 Apr 2024 05:55) (69 - 80)  BP: 129/86 (07 Apr 2024 05:55) (118/72 - 129/86)  BP(mean): --  RR: 18 (07 Apr 2024 05:55) (18 - 19)  SpO2: 98% (07 Apr 2024 05:55) (98% - 100%)    Parameters below as of 07 Apr 2024 05:55  Patient On (Oxygen Delivery Method): room air        CAPILLARY BLOOD GLUCOSE          PHYSICAL EXAM:  GENERAL: NAD, lying in bed comfortably  HEAD:  Atraumatic, normocephalic  EYES: EOMI, PERRLA, conjunctiva clear, no conjunctival pallor, anicteric sclera  NECK: Supple, trachea midline, no JVD  HEART: Regular rate and rhythm, Normal S1 S2, no murmurs, rubs, or gallops  LUNGS: Unlabored respirations. Clear to ascultation bilaterally, no crackles, wheezing, or rhonchi  ABDOMEN: Soft, nondistended, nontender, no rebound or guarding, bowel sounds presents  EXTREMITIES: Debrided, gangrenous L foot - dressing c/d/i. DP present w/ R>L.   MUSCULOSKELETAL: No joint swelling or tenderness to palpation  NEURO: CN 2-12 grossly intact, moves all limbs spontaneously  SKIN: No rashes or lesions         LABS:                        10.5   10.36 )-----------( 437      ( 06 Apr 2024 07:20 )             30.8     Auto Eosinophil # 0.03  / Auto Eosinophil % 0.3   / Auto Neutrophil # 3.54  / Auto Neutrophil % 34.1  / BANDS % x        04-06    140  |  108<H>  |  19  ----------------------------<  94  3.8   |  22  |  1.24    Ca    8.9      06 Apr 2024 07:20  Mg     1.50     04-06  Phos  3.1     04-06  TPro  7.6  /  Alb  2.8<L>  /  TBili  0.2  /  DBili  x   /  AST  23  /  ALT  37  /  AlkPhos  159<H>  04-06    PT/INR - ( 06 Apr 2024 07:20 )   PT: 12.5 sec;   INR: 1.11 ratio         PTT - ( 06 Apr 2024 07:20 )  PTT:32.1 sec      Urinalysis Basic - ( 06 Apr 2024 07:20 )    Color: x / Appearance: x / SG: x / pH: x  Gluc: 94 mg/dL / Ketone: x  / Bili: x / Urobili: x   Blood: x / Protein: x / Nitrite: x   Leuk Esterase: x / RBC: x / WBC x   Sq Epi: x / Non Sq Epi: x / Bacteria: x

## 2024-04-07 NOTE — PROGRESS NOTE ADULT - SUBJECTIVE AND OBJECTIVE BOX
Saeid Medina MD  Interventional Cardiology / Advance Heart Failure and Cardiac Transplant Specialist  [place] Office : [address] [place] [address] [place] [address]  Tel:   [place] Office : [address] [facility] [place] [address]  Tel: [phone]       Pt is lying in bed comfortable not in distress, no chest pains no SOB no palpitations  	  MEDICATIONS:  aspirin enteric coated 81 milliGRAM(s) Oral daily  heparin  Infusion.  Unit(s)/Hr IV Continuous <Continuous>    ampicillin/sulbactam  IVPB      ampicillin/sulbactam  IVPB 3 Gram(s) IV Intermittent every 6 hours  bictegravir 50 mG/emtricitabine 200 mG/tenofovir alafenamide 25 mG (BIKTARVY) 1 Tablet(s) Oral daily  fluconAZOLE   Tablet 100 milliGRAM(s) Oral daily  vancomycin  IVPB 750 milliGRAM(s) IV Intermittent every 12 hours      acetaminophen     Tablet .. 650 milliGRAM(s) Oral every 6 hours PRN  gabapentin 300 milliGRAM(s) Oral at bedtime  melatonin 3 milliGRAM(s) Oral at bedtime PRN  ondansetron Injectable 4 milliGRAM(s) IV Push every 8 hours PRN      atorvastatin 40 milliGRAM(s) Oral at bedtime    chlorhexidine 2% Cloths 1 Application(s) Topical daily  influenza   Vaccine 0.5 milliLiter(s) IntraMuscular once      PAST MEDICAL/SURGICAL HISTORY  PAST MEDICAL & SURGICAL HISTORY:  HIV disease      H/O splenectomy          SOCIAL HISTORY: Substance Use (street drugs): ( x ) never used  (  ) other:    FAMILY HISTORY:      REVIEW OF SYSTEMS:  CONSTITUTIONAL: No fever, weight loss, or fatigue  EYES: No eye pain, visual disturbances, or discharge  ENMT:  No difficulty hearing, tinnitus, vertigo; No sinus or throat pain  BREASTS: No pain, masses, or nipple discharge  GASTROINTESTINAL: No abdominal or epigastric pain. No nausea, vomiting, or hematemesis; No diarrhea or constipation. No melena or hematochezia.  GENITOURINARY: No dysuria, frequency, hematuria, or incontinence  NEUROLOGICAL: No headaches, memory loss, loss of strength, numbness, or tremors  ENDOCRINE: No heat or cold intolerance; No hair loss  MUSCULOSKELETAL: No joint pain or swelling; No muscle, back, or extremity pain  PSYCHIATRIC: No depression, anxiety, mood swings, or difficulty sleeping  HEME/LYMPH: No easy bruising, or bleeding gums       PHYSICAL EXAM:  T(C): 36.7 (04-07-24 @ 05:55), Max: 36.9 (04-06-24 @ 21:55)  HR: 69 (04-07-24 @ 05:55) (69 - 80)  BP: 129/86 (04-07-24 @ 05:55) (118/72 - 129/86)  RR: 18 (04-07-24 @ 05:55) (18 - 18)  SpO2: 98% (04-07-24 @ 05:55) (98% - 100%)  Wt(kg): --  I&O's Summary    06 Apr 2024 07:01  -  07 Apr 2024 07:00  --------------------------------------------------------  IN: 1020 mL / OUT: 1000 mL / NET: 20 mL          GENERAL: NAD   EYES: EOMI, PERRLA, conjunctiva and sclera clear  ENMT: No tonsillar erythema, exudates, or enlargement; Moist mucous membranes, Good dentition, No lesions  Cardiovascular: Normal S1 S2, No JVD, No murmurs, No edema  Respiratory: Lungs clear to auscultation                                    10.6   9.49  )-----------( 447      ( 07 Apr 2024 07:30 )             32.4     04-07    139  |  107  |  20  ----------------------------<  119<H>  3.5   |  22  |  1.24    Ca    8.8      07 Apr 2024 07:30  Phos  3.7     04-07  Mg     1.70     04-07    TPro  7.6  /  Alb  2.8<L>  /  TBili  0.2  /  DBili  x   /  AST  48<H>  /  ALT  57<H>  /  AlkPhos  168<H>  04-07    proBNP:   Lipid Profile:   HgA1c:   TSH:     Consultant(s) Notes Reviewed:  [x ] YES  [ ] NO    Care Discussed with Consultants/Other Providers [ x] YES  [ ] NO    Imaging Personally Reviewed independently:  [x] YES  [ ] NO    All labs, radiologic studies, vitals, orders and medications list reviewed. Patient is seen and examined at bedside. Case discussed with medical team.         Saeid Medina MD  Interventional Cardiology / Advance Heart Failure and Cardiac Transplant Specialist  [place] Office : [address] [place] [address] [place] [address]  Tel:   [place] Office : [address] [facility] [place] [address]  Tel: [phone]       Pt is lying in bed comfortable not in distress, no chest pains no SOB no palpitations  	  MEDICATIONS:  aspirin enteric coated 81 milliGRAM(s) Oral daily  heparin  Infusion.  Unit(s)/Hr IV Continuous <Continuous>    ampicillin/sulbactam  IVPB      ampicillin/sulbactam  IVPB 3 Gram(s) IV Intermittent every 6 hours  bictegravir 50 mG/emtricitabine 200 mG/tenofovir alafenamide 25 mG (BIKTARVY) 1 Tablet(s) Oral daily  fluconAZOLE   Tablet 100 milliGRAM(s) Oral daily  vancomycin  IVPB 750 milliGRAM(s) IV Intermittent every 12 hours      acetaminophen     Tablet .. 650 milliGRAM(s) Oral every 6 hours PRN  gabapentin 300 milliGRAM(s) Oral at bedtime  melatonin 3 milliGRAM(s) Oral at bedtime PRN  ondansetron Injectable 4 milliGRAM(s) IV Push every 8 hours PRN      atorvastatin 40 milliGRAM(s) Oral at bedtime    chlorhexidine 2% Cloths 1 Application(s) Topical daily  influenza   Vaccine 0.5 milliLiter(s) IntraMuscular once      PAST MEDICAL/SURGICAL HISTORY  PAST MEDICAL & SURGICAL HISTORY:  HIV disease      H/O splenectomy          SOCIAL HISTORY: Substance Use (street drugs): ( x ) never used  (  ) other:    FAMILY HISTORY:      REVIEW OF SYSTEMS:  CONSTITUTIONAL: No fever, weight loss, or fatigue  EYES: No eye pain, visual disturbances, or discharge  ENMT:  No difficulty hearing, tinnitus, vertigo; No sinus or throat pain  BREASTS: No pain, masses, or nipple discharge  GASTROINTESTINAL: No abdominal or epigastric pain. No nausea, vomiting, or hematemesis; No diarrhea or constipation. No melena or hematochezia.  GENITOURINARY: No dysuria, frequency, hematuria, or incontinence  NEUROLOGICAL: No headaches, memory loss, loss of strength, numbness, or tremors  ENDOCRINE: No heat or cold intolerance; No hair loss  MUSCULOSKELETAL: No joint pain or swelling; No muscle, back, or extremity pain  PSYCHIATRIC: No depression, anxiety, mood swings, or difficulty sleeping  HEME/LYMPH: No easy bruising, or bleeding gums       PHYSICAL EXAM:  T(C): 36.7 (04-07-24 @ 05:55), Max: 36.9 (04-06-24 @ 21:55)  HR: 69 (04-07-24 @ 05:55) (69 - 80)  BP: 129/86 (04-07-24 @ 05:55) (118/72 - 129/86)  RR: 18 (04-07-24 @ 05:55) (18 - 18)  SpO2: 98% (04-07-24 @ 05:55) (98% - 100%)  Wt(kg): --  I&O's Summary    06 Apr 2024 07:01  -  07 Apr 2024 07:00  --------------------------------------------------------  IN: 1020 mL / OUT: 1000 mL / NET: 20 mL          GENERAL: NAD   EYES: EOMI, PERRLA, conjunctiva and sclera clear  ENMT: No tonsillar erythema, exudates, or enlargement; Moist mucous membranes, Good dentition, No lesions  Cardiovascular: Normal S1 S2, No JVD, No murmurs, No edema  Respiratory: Lungs clear to auscultation  Abd : soft   Ext : no edema                                     10.6   9.49  )-----------( 447      ( 07 Apr 2024 07:30 )             32.4     04-07    139  |  107  |  20  ----------------------------<  119<H>  3.5   |  22  |  1.24    Ca    8.8      07 Apr 2024 07:30  Phos  3.7     04-07  Mg     1.70     04-07    TPro  7.6  /  Alb  2.8<L>  /  TBili  0.2  /  DBili  x   /  AST  48<H>  /  ALT  57<H>  /  AlkPhos  168<H>  04-07    proBNP:   Lipid Profile:   HgA1c:   TSH:     Consultant(s) Notes Reviewed:  [x ] YES  [ ] NO    Care Discussed with Consultants/Other Providers [ x] YES  [ ] NO    Imaging Personally Reviewed independently:  [x] YES  [ ] NO    All labs, radiologic studies, vitals, orders and medications list reviewed. Patient is seen and examined at bedside. Case discussed with medical team.

## 2024-04-07 NOTE — PROGRESS NOTE ADULT - PROBLEM SELECTOR PLAN 1
Followed by podiatry and vascular  vascular not recommending intervention  Podiatry debrided to healthy tissue on 4/5 early am, considering amputation vs. local wound care  Abx: usasyn, will add vanc to cover MRSA given healthcare exposures  ID recs appreciated -> vanc/unasyn for gangrene Followed by podiatry and vascular  Vascular planning for LLE angiogram  Podiatry debrided to healthy tissue on 4/5 early am, considering amputation vs. local wound care  Abx: usasyn, will add vanc to cover MRSA given healthcare exposures  ID recs appreciated -> vanc/unasyn for gangrene

## 2024-04-08 ENCOUNTER — APPOINTMENT (OUTPATIENT)
Dept: VASCULAR SURGERY | Facility: HOSPITAL | Age: 54
End: 2024-04-08

## 2024-04-08 LAB
ALBUMIN SERPL ELPH-MCNC: 2.7 G/DL — LOW (ref 3.3–5)
ALP SERPL-CCNC: 175 U/L — HIGH (ref 40–120)
ALT FLD-CCNC: 64 U/L — HIGH (ref 4–41)
ANION GAP SERPL CALC-SCNC: 10 MMOL/L — SIGNIFICANT CHANGE UP (ref 7–14)
APTT BLD: 66.8 SEC — HIGH (ref 24.5–35.6)
AST SERPL-CCNC: 51 U/L — HIGH (ref 4–40)
BASOPHILS # BLD AUTO: 0.08 K/UL — SIGNIFICANT CHANGE UP (ref 0–0.2)
BASOPHILS NFR BLD AUTO: 0.7 % — SIGNIFICANT CHANGE UP (ref 0–2)
BILIRUB SERPL-MCNC: 0.2 MG/DL — SIGNIFICANT CHANGE UP (ref 0.2–1.2)
BLD GP AB SCN SERPL QL: NEGATIVE — SIGNIFICANT CHANGE UP
BUN SERPL-MCNC: 20 MG/DL — SIGNIFICANT CHANGE UP (ref 7–23)
CALCIUM SERPL-MCNC: 8.5 MG/DL — SIGNIFICANT CHANGE UP (ref 8.4–10.5)
CHLORIDE SERPL-SCNC: 108 MMOL/L — HIGH (ref 98–107)
CO2 SERPL-SCNC: 20 MMOL/L — LOW (ref 22–31)
CREAT SERPL-MCNC: 1.31 MG/DL — HIGH (ref 0.5–1.3)
CULTURE RESULTS: ABNORMAL
EGFR: 65 ML/MIN/1.73M2 — SIGNIFICANT CHANGE UP
EOSINOPHIL # BLD AUTO: 0.08 K/UL — SIGNIFICANT CHANGE UP (ref 0–0.5)
EOSINOPHIL NFR BLD AUTO: 0.7 % — SIGNIFICANT CHANGE UP (ref 0–6)
GLUCOSE SERPL-MCNC: 124 MG/DL — HIGH (ref 70–99)
HCT VFR BLD CALC: 29.7 % — LOW (ref 39–50)
HGB BLD-MCNC: 10.2 G/DL — LOW (ref 13–17)
IANC: 4.96 K/UL — SIGNIFICANT CHANGE UP (ref 1.8–7.4)
IMM GRANULOCYTES NFR BLD AUTO: 0.5 % — SIGNIFICANT CHANGE UP (ref 0–0.9)
INR BLD: 1.05 RATIO — SIGNIFICANT CHANGE UP (ref 0.85–1.18)
LYMPHOCYTES # BLD AUTO: 4.8 K/UL — HIGH (ref 1–3.3)
LYMPHOCYTES # BLD AUTO: 43 % — SIGNIFICANT CHANGE UP (ref 13–44)
MAGNESIUM SERPL-MCNC: 1.3 MG/DL — LOW (ref 1.6–2.6)
MCHC RBC-ENTMCNC: 32.4 PG — SIGNIFICANT CHANGE UP (ref 27–34)
MCHC RBC-ENTMCNC: 34.3 GM/DL — SIGNIFICANT CHANGE UP (ref 32–36)
MCV RBC AUTO: 94.3 FL — SIGNIFICANT CHANGE UP (ref 80–100)
MONOCYTES # BLD AUTO: 1.18 K/UL — HIGH (ref 0–0.9)
MONOCYTES NFR BLD AUTO: 10.6 % — SIGNIFICANT CHANGE UP (ref 2–14)
NEUTROPHILS # BLD AUTO: 4.96 K/UL — SIGNIFICANT CHANGE UP (ref 1.8–7.4)
NEUTROPHILS NFR BLD AUTO: 44.5 % — SIGNIFICANT CHANGE UP (ref 43–77)
NRBC # BLD: 0 /100 WBCS — SIGNIFICANT CHANGE UP (ref 0–0)
NRBC # FLD: 0 K/UL — SIGNIFICANT CHANGE UP (ref 0–0)
ORGANISM # SPEC MICROSCOPIC CNT: ABNORMAL
ORGANISM # SPEC MICROSCOPIC CNT: ABNORMAL
PHOSPHATE SERPL-MCNC: 3.4 MG/DL — SIGNIFICANT CHANGE UP (ref 2.5–4.5)
PLATELET # BLD AUTO: 455 K/UL — HIGH (ref 150–400)
POTASSIUM SERPL-MCNC: 3.7 MMOL/L — SIGNIFICANT CHANGE UP (ref 3.5–5.3)
POTASSIUM SERPL-SCNC: 3.7 MMOL/L — SIGNIFICANT CHANGE UP (ref 3.5–5.3)
PROT SERPL-MCNC: 7.3 G/DL — SIGNIFICANT CHANGE UP (ref 6–8.3)
PROTHROM AB SERPL-ACNC: 11.7 SEC — SIGNIFICANT CHANGE UP (ref 9.5–13)
RBC # BLD: 3.15 M/UL — LOW (ref 4.2–5.8)
RBC # FLD: 15.2 % — HIGH (ref 10.3–14.5)
RH IG SCN BLD-IMP: POSITIVE — SIGNIFICANT CHANGE UP
SODIUM SERPL-SCNC: 138 MMOL/L — SIGNIFICANT CHANGE UP (ref 135–145)
SPECIMEN SOURCE: SIGNIFICANT CHANGE UP
VANCOMYCIN TROUGH SERPL-MCNC: 8.9 UG/ML — LOW (ref 10–20)
WBC # BLD: 11.16 K/UL — HIGH (ref 3.8–10.5)
WBC # FLD AUTO: 11.16 K/UL — HIGH (ref 3.8–10.5)

## 2024-04-08 PROCEDURE — 99152 MOD SED SAME PHYS/QHP 5/>YRS: CPT

## 2024-04-08 PROCEDURE — 76705 ECHO EXAM OF ABDOMEN: CPT | Mod: 26,59

## 2024-04-08 PROCEDURE — 36246 INS CATH ABD/L-EXT ART 2ND: CPT | Mod: LT

## 2024-04-08 PROCEDURE — 99232 SBSQ HOSP IP/OBS MODERATE 35: CPT | Mod: GC

## 2024-04-08 PROCEDURE — 75710 ARTERY X-RAYS ARM/LEG: CPT | Mod: 26,LT

## 2024-04-08 PROCEDURE — 75625 CONTRAST EXAM ABDOMINL AORTA: CPT | Mod: 26

## 2024-04-08 PROCEDURE — 99232 SBSQ HOSP IP/OBS MODERATE 35: CPT

## 2024-04-08 RX ORDER — SENNA PLUS 8.6 MG/1
2 TABLET ORAL AT BEDTIME
Refills: 0 | Status: DISCONTINUED | OUTPATIENT
Start: 2024-04-08 | End: 2024-04-16

## 2024-04-08 RX ORDER — SODIUM CHLORIDE 9 MG/ML
500 INJECTION INTRAMUSCULAR; INTRAVENOUS; SUBCUTANEOUS
Refills: 0 | Status: DISCONTINUED | OUTPATIENT
Start: 2024-04-08 | End: 2024-04-09

## 2024-04-08 RX ORDER — MAGNESIUM SULFATE 500 MG/ML
2 VIAL (ML) INJECTION ONCE
Refills: 0 | Status: DISCONTINUED | OUTPATIENT
Start: 2024-04-08 | End: 2024-04-08

## 2024-04-08 RX ORDER — MAGNESIUM SULFATE 500 MG/ML
2 VIAL (ML) INJECTION ONCE
Refills: 0 | Status: COMPLETED | OUTPATIENT
Start: 2024-04-08 | End: 2024-04-08

## 2024-04-08 RX ORDER — SODIUM CHLORIDE 9 MG/ML
500 INJECTION, SOLUTION INTRAVENOUS ONCE
Refills: 0 | Status: DISCONTINUED | OUTPATIENT
Start: 2024-04-08 | End: 2024-04-08

## 2024-04-08 RX ORDER — VANCOMYCIN HCL 1 G
1000 VIAL (EA) INTRAVENOUS EVERY 12 HOURS
Refills: 0 | Status: DISCONTINUED | OUTPATIENT
Start: 2024-04-08 | End: 2024-04-16

## 2024-04-08 RX ORDER — POLYETHYLENE GLYCOL 3350 17 G/17G
17 POWDER, FOR SOLUTION ORAL DAILY
Refills: 0 | Status: DISCONTINUED | OUTPATIENT
Start: 2024-04-08 | End: 2024-04-16

## 2024-04-08 RX ORDER — APIXABAN 2.5 MG/1
5 TABLET, FILM COATED ORAL
Refills: 0 | Status: DISCONTINUED | OUTPATIENT
Start: 2024-04-08 | End: 2024-04-12

## 2024-04-08 RX ADMIN — AMPICILLIN SODIUM AND SULBACTAM SODIUM 200 GRAM(S): 250; 125 INJECTION, POWDER, FOR SUSPENSION INTRAMUSCULAR; INTRAVENOUS at 11:52

## 2024-04-08 RX ADMIN — AMPICILLIN SODIUM AND SULBACTAM SODIUM 200 GRAM(S): 250; 125 INJECTION, POWDER, FOR SUSPENSION INTRAMUSCULAR; INTRAVENOUS at 00:29

## 2024-04-08 RX ADMIN — GABAPENTIN 300 MILLIGRAM(S): 400 CAPSULE ORAL at 21:42

## 2024-04-08 RX ADMIN — Medication 25 GRAM(S): at 08:12

## 2024-04-08 RX ADMIN — Medication 250 MILLIGRAM(S): at 18:11

## 2024-04-08 RX ADMIN — SENNA PLUS 2 TABLET(S): 8.6 TABLET ORAL at 21:42

## 2024-04-08 RX ADMIN — HEPARIN SODIUM 1700 UNIT(S)/HR: 5000 INJECTION INTRAVENOUS; SUBCUTANEOUS at 02:00

## 2024-04-08 RX ADMIN — HEPARIN SODIUM 1700 UNIT(S)/HR: 5000 INJECTION INTRAVENOUS; SUBCUTANEOUS at 08:16

## 2024-04-08 RX ADMIN — GABAPENTIN 200 MILLIGRAM(S): 400 CAPSULE ORAL at 06:07

## 2024-04-08 RX ADMIN — BICTEGRAVIR SODIUM, EMTRICITABINE, AND TENOFOVIR ALAFENAMIDE FUMARATE 1 TABLET(S): 30; 120; 15 TABLET ORAL at 11:57

## 2024-04-08 RX ADMIN — Medication 250 MILLIGRAM(S): at 06:43

## 2024-04-08 RX ADMIN — ATORVASTATIN CALCIUM 40 MILLIGRAM(S): 80 TABLET, FILM COATED ORAL at 21:42

## 2024-04-08 RX ADMIN — Medication 650 MILLIGRAM(S): at 03:02

## 2024-04-08 RX ADMIN — HEPARIN SODIUM 1700 UNIT(S)/HR: 5000 INJECTION INTRAVENOUS; SUBCUTANEOUS at 01:09

## 2024-04-08 RX ADMIN — CHLORHEXIDINE GLUCONATE 1 APPLICATION(S): 213 SOLUTION TOPICAL at 11:56

## 2024-04-08 RX ADMIN — Medication 650 MILLIGRAM(S): at 02:02

## 2024-04-08 RX ADMIN — SODIUM CHLORIDE 75 MILLILITER(S): 9 INJECTION INTRAMUSCULAR; INTRAVENOUS; SUBCUTANEOUS at 11:20

## 2024-04-08 RX ADMIN — AMPICILLIN SODIUM AND SULBACTAM SODIUM 200 GRAM(S): 250; 125 INJECTION, POWDER, FOR SUSPENSION INTRAMUSCULAR; INTRAVENOUS at 05:53

## 2024-04-08 RX ADMIN — FLUCONAZOLE 100 MILLIGRAM(S): 150 TABLET ORAL at 11:57

## 2024-04-08 RX ADMIN — POLYETHYLENE GLYCOL 3350 17 GRAM(S): 17 POWDER, FOR SOLUTION ORAL at 11:52

## 2024-04-08 RX ADMIN — Medication 81 MILLIGRAM(S): at 11:52

## 2024-04-08 RX ADMIN — APIXABAN 5 MILLIGRAM(S): 2.5 TABLET, FILM COATED ORAL at 18:11

## 2024-04-08 RX ADMIN — Medication 650 MILLIGRAM(S): at 23:32

## 2024-04-08 NOTE — PROGRESS NOTE ADULT - PROBLEM SELECTOR PLAN 5
Seen on CT and US, solid mass not ruled out  recommended f/u MRI, noted to have 1.6cm complex cyst in R upper pole, recommended for surveillance imaging (Romek 2F)

## 2024-04-08 NOTE — PROGRESS NOTE PEDS - ASSESSMENT
This is a 54 y/o M w/ PMHx of HIV (VL UD, , 19% 3/16/24, on Biktarvy) pre-DM, former smoker, illicit drug use, PAD, who is presenting with L foot gangrene. Pt was sent in by podiatrist outpatient for further evaluation.     Pt presented last on 3/15-17, was initially at [facility] for meningitis left AMA, presented to [facility] on 3/15 for PAD, s/p debridement of L forefoot, maceration of L foot 4th interspace Noted to be afebrile, mild leukocytosis 11. CT Aorta w/ runoff w/o acute clot, noted to have rim-enhancing collection within the left   flexor hallucis brevis muscle, measures 3.1 x 1.7 x 1.2 cm. Unfortunately left AMA on 3/17.    Returned on 3/20, with foot discoloration/pain. XR foot w/o OM, vascular surgery c/s, started on Heparin gtt, changed to Eliquis on d/c. Pt was discharged on 3/25.    Now with c/f L foot gangrene.  In the ER, pt was afebrile, vitals stable. Noted to have leukocytosis to 13, ESR 91, CRP 10.   VINEET to 1.51 (baseline 1.16), hyperkalemia to 6.   Podiatry consulted, culture sent, noted to have L foot ischemic changes, mild malodor, maceration of L foot 4th interspace L foot hallux toenail removed. Vascular surgery was consulted, no acute intervention, recommended DENIS/PVR,    Workup:   L foot XR w/o findings of OM  L foot Cx, gram stain with GPC in pairs/chains    HIV:   3/17/24 VL UD, , 19%  3/16 QuantiFeron negative     #L foot gangrene, dry   #Gram stain w/ GPC in pairs/chains  #Oral candidiasis    #HIV, well controlled, on Biktarvy   #Leukocytosis   #VINEET   Hyperkalemia   #Renal mass     Overall, 54 y/o M w/ PMHx of HIV (VL UD, , 19% 3/16/24, on Biktarvy) pre-DM, former smoker, illicit drug use, PAD, who is presenting with L foot gangrene, started on empiric Vancomycin/Unasyn. Gram strain with GPC in pairs/chains, possibly enterococcus vs strep. On exam, appears to be dry gangrene, no purulence, malodor. Clear signs of necrosis.   Seen by vascular, podiatry, pending possible TMA vs local wound care.   Cx with corynebacterium, staph lugdunensis and finegoldia, may all be skin contaminants.      Plan:   1. C/w Vancomycin, f/u trough close given VINEET. Can stop Unasyn  2. C/w Biktarvy   3. Fluconazole 100 mg x 7 days for thrush (noted by team, not seen by me on exam)  4. F/u podiatry recs for potential TMA     Thank you for this consult. Inpatient ID team will follow.    Vik Gonzalez M.D.  Attending Physician  Division of Infectious Diseases  Department of Medicine    Please contact through MS Teams message.  Office: [phone] (after 5 PM or weekend)

## 2024-04-08 NOTE — PROGRESS NOTE ADULT - ATTENDING COMMENTS
53M hx HIV, preDM, current smoker, hx cocaine use, prior arterial thromboembolic events, a/w sepsis on admission (HR>90 and leukocytosis) 2/2  wet gangrene of L foot. Left foot xray w/ no OM, gas, or fracture  Pt seen by podiatry and s/pL foot hallux toenail removal, and excisional debridement of L foot hallux necrotic skin to the level of epidermis,  Pt underwent LLE angiogram today  PE lungs clear, heart regular,  L foot with wound dressing   -C/w vanco/unasyn, cx corynebacterium and resistant lugdunensis , f/u ID recs  repeat vanco trough, 24.8 vanco level not true trough  - MRSA negative, blood cx ngtd  -c/w wound care   - mild huseyin with Cr 1.3, give IVF with LLE angiogram with triple vessel runoff on 4/8, f/u vascular sx  - local wound care per podiatry, plan for L foot TMA pending demarcation/ vasc recs  - Vasc following and rec DENIS/PVR, Previous denis/pvrs concerning for small vessel disease of LLE. Xray of foot with no gas or osseous erosions. s/p LLE Angiogram 4/8  cardiology f/u appreciated, pt with normal LV fxn, but has reduced RV function, cards plan to do   out pt workup for RV dysfunction, pt is moderate risk for TMA and LE angiogram, pt medically optimized   LFTs trended up, check hepatitis panel and RUQ US  Back pain- c/w gabapentin 200mg m, 300mg hs   Rest of care as stated above   plan of care d/w pt   Dispo pending further optimization.    PT eval - rec home PT 53M hx HIV, preDM, current smoker, hx cocaine use, prior arterial thromboembolic events, a/w sepsis on admission (HR>90 and leukocytosis) 2/2  wet gangrene of L foot. Left foot xray w/ no OM, gas, or fracture  Pt seen by podiatry and s/pL foot hallux toenail removal, and excisional debridement of L foot hallux necrotic skin to the level of epidermis,  Pt underwent LLE angiogram today  PE lungs clear, heart regular,  L foot with wound dressing   -C/w vanco/unasyn, cx corynebacterium and resistant lugdunensis , f/u ID recs  repeat vanco trough, 24.8 vanco level not true trough  - MRSA negative, blood cx ngtd  -c/w wound care   - mild huseyin with Cr 1.3, give IVF with LLE angiogram with triple vessel runoff on 4/8,  f/u vascular sx  - local wound care per podiatry, plan for L foot TMA pending demarcation/ vasc recs  - Vasc following, Previous ida/pvrs concerning for small vessel disease of LLE. Xray of foot with no gas or osseous erosions. s/p LLE Angiogram 4/8  cardiology f/u appreciated, pt with normal LV fxn, but has reduced RV function, cards plan to do  out pt workup for RV dysfunction, pt is moderate risk for TMA and LE angiogram, pt medically optimized   can resume eliquis tonight per vascular sx  LFTs trended up, check hepatitis panel and RUQ US  Back pain- c/w gabapentin 200mg m, 300mg hs   Rest of care as stated above   plan of care d/w pt   Dispo pending further optimization.    PT eval - rec home PT

## 2024-04-08 NOTE — PROGRESS NOTE ADULT - SUBJECTIVE AND OBJECTIVE BOX
VASCULAR SURGERY DAILY PROGRESS NOTE:     SUBJECTIVE/ROS:   Patient seen and evaluated on AM rounds       OBJECTIVE:  Vital Signs Last 24 Hrs  T(C): 36.9 (07 Apr 2024 22:08), Max: 36.9 (07 Apr 2024 22:08)  T(F): 98.4 (07 Apr 2024 22:08), Max: 98.4 (07 Apr 2024 22:08)  HR: 73 (07 Apr 2024 22:08) (69 - 73)  BP: 153/88 (07 Apr 2024 22:08) (128/91 - 153/88)  RR: 18 (07 Apr 2024 22:08) (17 - 18)  SpO2: 98% (07 Apr 2024 22:08) (97% - 98%)    Parameters below as of 07 Apr 2024 22:08  Patient On (Oxygen Delivery Method): room air      I&O's Detail    06 Apr 2024 07:01  -  07 Apr 2024 07:00  --------------------------------------------------------  IN:    IV PiggyBack: 300 mL    Oral Fluid: 720 mL  Total IN: 1020 mL    OUT:    Voided (mL): 1000 mL  Total OUT: 1000 mL    Total NET: 20 mL      07 Apr 2024 07:01  -  08 Apr 2024 05:18  --------------------------------------------------------  IN:    IV PiggyBack: 100 mL    Oral Fluid: 240 mL  Total IN: 340 mL    OUT:    Voided (mL): 1000 mL  Total OUT: 1000 mL    Total NET: -660 mL      Daily   MEDICATIONS  (STANDING):  ampicillin/sulbactam  IVPB      ampicillin/sulbactam  IVPB 3 Gram(s) IV Intermittent every 6 hours  aspirin enteric coated 81 milliGRAM(s) Oral daily  atorvastatin 40 milliGRAM(s) Oral at bedtime  bictegravir 50 mG/emtricitabine 200 mG/tenofovir alafenamide 25 mG (BIKTARVY) 1 Tablet(s) Oral daily  chlorhexidine 2% Cloths 1 Application(s) Topical daily  fluconAZOLE   Tablet 100 milliGRAM(s) Oral daily  gabapentin 300 milliGRAM(s) Oral at bedtime  gabapentin 200 milliGRAM(s) Oral <User Schedule>  heparin  Infusion.  Unit(s)/Hr (15 mL/Hr) IV Continuous <Continuous>  influenza   Vaccine 0.5 milliLiter(s) IntraMuscular once  magnesium sulfate  IVPB 2 Gram(s) IV Intermittent once  vancomycin  IVPB 750 milliGRAM(s) IV Intermittent every 12 hours    MEDICATIONS  (PRN):  acetaminophen     Tablet .. 650 milliGRAM(s) Oral every 6 hours PRN Temp greater or equal to 38C (100.4F), Mild Pain (1 - 3)  melatonin 3 milliGRAM(s) Oral at bedtime PRN Insomnia  ondansetron Injectable 4 milliGRAM(s) IV Push every 8 hours PRN Nausea and/or Vomiting      Labs:                        10.2   11.16 )-----------( 455      ( 08 Apr 2024 00:20 )             29.7     04-08    138  |  108<H>  |  20  ----------------------------<  124<H>  3.7   |  20<L>  |  1.31<H>    Ca    8.5      08 Apr 2024 00:20  Phos  3.4     04-08  Mg     1.30     04-08    TPro  7.3  /  Alb  2.7<L>  /  TBili  0.2  /  DBili  x   /  AST  51<H>  /  ALT  64<H>  /  AlkPhos  175<H>  04-08    PT/INR - ( 08 Apr 2024 00:20 )   PT: 11.7 sec;   INR: 1.05 ratio         PTT - ( 08 Apr 2024 00:20 )  PTT:66.8 sec  Urinalysis Basic - ( 08 Apr 2024 00:20 )    Color: x / Appearance: x / SG: x / pH: x  Gluc: 124 mg/dL / Ketone: x  / Bili: x / Urobili: x   Blood: x / Protein: x / Nitrite: x   Leuk Esterase: x / RBC: x / WBC x   Sq Epi: x / Non Sq Epi: x / Bacteria: x          Physical Exam:  General: NAD  Cardiovascular: appears well perfused   Respiratory: respirations non labored on RA  Gastrointestinal: soft, nontender, nondistended  Extremities: FROM, warm. LLE ischemic changes to all digits. B/L palpable femoral pulse. LLE palpable PT pulse.   Neurological: A+Ox3

## 2024-04-08 NOTE — PROGRESS NOTE ADULT - ASSESSMENT
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 Pulse exam unchanged from previous RLE +DP/PT signal and all other pulses palpable. RLE toes not currently concerning for wet gangrene, toes dry, debridement to healthy tissue over first toe of LLE. Previous ida/pvrs concerning for small vessel disease of LLE. Xray of foot with no gas or osseous erosions. Vascular surgery consulted for recs after podiatry debridement.     Recommendations:   - Cath lab today for LLE angiogram, possible intervention      - Pre-op labs reviewed. Keep NPO     - Appreciate medical and cardiac risk stratification   - Continue local wound to LLE per podiatry   - Continue to optimized medical management, ASA and statin   - Remainder care per primary       Vascular Surgery   [id number]

## 2024-04-08 NOTE — MEDICAL STUDENT PROGRESS NOTE(EDUCATION) - ASSESSMENT
Problem 1: Gangrenous toe.     ·  Plan: Followed by podiatry and vascular  Vascular planning for LLE angiogram  Podiatry debrided to healthy tissue on 4/5 early am, considering amputation vs. local wound care  Abx: usasyn, will add vanc to cover MRSA given healthcare exposures  ID recs appreciated -> vanc/unasyn for gangrene.  LLEE angiogram scheduled for 4/8 (NPO at [facility], stop DVT prophylaxis)  Elevated liver enzymes could be in the context of new antibiotic use - continue to monitor     ·  Problem 2 : Personal history of thromboembolic disease.   ·  Plan: Thromboses to L leg, leading to limb ischemia and gangrene  - full AC w/ Eliquis, transitioned to heparin in anticipation of LLE angiogram by vascular      ·  Problem 3: Oral candidiasis.   ·  Plan: fluconazole 100mg x7 days.      ·  Problem 4: History of meningitis.   ·  Plan: Treated at [facility] with 3 weeks of IV abx, patient left AMA  Does not remember what pathogen or what antibiotics were used  No signs or symptoms at the moment  Obtaining records.      ·  Problem 5: Renal cyst.   ·  Plan: Seen on CT and US, solid mass not ruled out  recommended f/u MRI, noted to have 1.6cm complex cyst in R upper pole, recommended for surveillance imaging (Bosniak 2F).    ·  Problem 6: HIV disease.   ·  Plan: CD4 count 328 in March  Continuing biktarvy.      ·  Problem 7: Substance use disorder.   ·  Plan: SBIRT c/s for tobacco and cocaine use.    ·  Problem 8: Prediabetes.   ·  Plan: - CC diet.      ·  Problem 9: Prophylactic measure.   ·  Plan: DVT: Eliquis  Diet: CC  Dispo: pending PT paul.     Problem 1: Gangrenous toe.     Plan:   -Followed by podiatry and vascular  -Vascular planning for LLE angiogram  -Podiatry debrided to healthy tissue on 4/5 early am, considering amputation vs. local wound care  -Abx: usasyn, will add vanc to cover MRSA given healthcare exposures  -ID recs appreciated -> vanc/unasyn for gangrene.  -LLE angiogram scheduled for 4/8 (NPO at [facility], stop DVT prophylaxis)      Problem 2: Personal history of thromboembolic disease.     Plan:   -Thromboses to L leg, leading to limb ischemia and gangrene  -full AC w/ Eliquis, transitioned to heparin in anticipation of LLE angiogram by vascular    Problem 3: Elevated AST and ALT     Plan:  -Elevated liver enzymes could be in the context of new antibiotic use - continue to monitor   -Hepatitis panel, liver panel      Problem 4: Oral candidiasis    Plan:   -fluconazole 100mg x7 days.      Problem 5: History of meningitis.     Plan:   -Treated at [facility] with 3 weeks of IV abx, patient left AMA  -Does not remember what pathogen or what antibiotics were used  -No signs or symptoms at the moment  -Obtaining records.      Problem 6: Renal cyst.     Plan:   -Seen on CT and US, solid mass not ruled out  -recommended f/u MRI, noted to have 1.6cm complex cyst in R upper pole, recommended for surveillance imaging (Bosniak 2F).    Problem 7: HIV disease.     Plan:   -CD4 count 595 up from 328 in March  -Continuing biktarvy.      Problem 8 Substance use disorder    Plan:   -SUSANNE c/s for tobacco and cocaine use    Problem 9: Prediabetes    Plan:   -CC diet  -Continue education and lifestyle modification      Problem 10: Prophylactic measure    Plan:   -DVT: Eliquis  -Diet: Regular CC  -Dispo: pending PT anaal

## 2024-04-08 NOTE — PROGRESS NOTE ADULT - PROBLEM SELECTOR PLAN 2
Thromboses to L leg, leading to limb ischemia and gangrene  - 4/8 LLE angiogram - small vessel disease only, no interventions  - continue AC, eliquis unless further podiatric procedures are planned

## 2024-04-08 NOTE — PROGRESS NOTE PEDS - SUBJECTIVE AND OBJECTIVE BOX
Infectious Diseases Follow Up:    Patient is a 53y old  Male who presents with a chief complaint of wet gangrene (08 Apr 2024 11:39)      Interval History/ROS:  No acute events, planned for LLE angiogram today    Allergies  No Known Allergies        ANTIMICROBIALS:  ampicillin/sulbactam  IVPB 3 every 6 hours  ampicillin/sulbactam  IVPB    bictegravir 50 mG/emtricitabine 200 mG/tenofovir alafenamide 25 mG (BIKTARVY) 1 daily  fluconAZOLE   Tablet 100 daily  vancomycin  IVPB 750 every 12 hours      Current Abx:     Previous Abx     OTHER MEDS:  MEDICATIONS  (STANDING):  acetaminophen     Tablet .. 650 every 6 hours PRN  apixaban 5 two times a day  aspirin enteric coated 81 daily  atorvastatin 40 at bedtime  gabapentin 300 at bedtime  gabapentin 200 <User Schedule>  influenza   Vaccine 0.5 once  melatonin 3 at bedtime PRN  ondansetron Injectable 4 every 8 hours PRN  polyethylene glycol 3350 17 daily  senna 2 at bedtime      Vital Signs Last 24 Hrs  T(C): 36.3 (08 Apr 2024 11:48), Max: 36.9 (07 Apr 2024 22:08)  T(F): 97.4 (08 Apr 2024 11:48), Max: 98.4 (07 Apr 2024 22:08)  HR: 62 (08 Apr 2024 11:48) (62 - 73)  BP: 141/94 (08 Apr 2024 11:48) (128/91 - 153/88)  BP(mean): --  RR: 18 (08 Apr 2024 11:48) (17 - 18)  SpO2: 99% (08 Apr 2024 11:48) (97% - 99%)    Parameters below as of 08 Apr 2024 11:48  Patient On (Oxygen Delivery Method): room air        PHYSICAL EXAM:  GENERAL: NAD, well-developed  HEAD:  Atraumatic, Normocephalic  EYES: EOMI, conjunctiva and sclera clear  CHEST/LUNG: On RA, not in respiratory distress   EXTREMITIES:  L foot dressed   PSYCH: AAOx3                          10.2   11.16 )-----------( 455      ( 08 Apr 2024 00:20 )             29.7       04-08    138  |  108<H>  |  20  ----------------------------<  124<H>  3.7   |  20<L>  |  1.31<H>    Ca    8.5      08 Apr 2024 00:20  Phos  3.4     04-08  Mg     1.30     04-08    TPro  7.3  /  Alb  2.7<L>  /  TBili  0.2  /  DBili  x   /  AST  51<H>  /  ALT  64<H>  /  AlkPhos  175<H>  04-08      Urinalysis Basic - ( 08 Apr 2024 00:20 )    Color: x / Appearance: x / SG: x / pH: x  Gluc: 124 mg/dL / Ketone: x  / Bili: x / Urobili: x   Blood: x / Protein: x / Nitrite: x   Leuk Esterase: x / RBC: x / WBC x   Sq Epi: x / Non Sq Epi: x / Bacteria: x        MICROBIOLOGY:  v  .Abscess Left foot wound culture  04-05-24   Moderate Staphylococcus lugdunensis  Numerous Corynebacterium striatum group "Susceptibilities not performed"  Moderate Finegoldia magna "Susceptibilities not performed"  --  Staphylococcus lugdunensis      .Blood Blood-Peripheral  03-20-24   No growth at 5 days  --  --      .Blood Blood-Peripheral  03-20-24   No growth at 5 days  --  --      Clean Catch Clean Catch (Midstream)  03-15-24   No growth  --  --      .Blood Blood-Peripheral  03-15-24   No growth at 5 days  --  --      .Blood Blood-Peripheral  03-15-24   No growth at 5 days  --  --        HIV-1 RNA Quantitative, Viral Load Log: DET.  <1.47 lg /mL (03-16-24 @ 06:05)  CMV IgG Antibody: >10.00 U/mL (03-16-24 @ 06:05)          RADIOLOGY:

## 2024-04-08 NOTE — PROGRESS NOTE ADULT - SUBJECTIVE AND OBJECTIVE BOX
Christopher Dejesus, PGY-1  Please contact on Teams    LEA MENDOZA  53y  Male    Reason for Admission:     SUBJECTIVE/INTERVAL EVENTS: No acute events. Pt seen and examined at bedside.    Physical Exam:   General: NAD  HEENT: PERRL, EOMI, normal sclera and conjunctiva, no oral lesions  Neck: Supple, no JVD  Lungs: CTA bilaterally  Heart: RRR, normal S1S2, no murmurs/rubs/gallops  Abdomen: Soft, ND/NT  Extremities: 2+ peripheral pulses, bandaged L foo0t  Skin: Warm, well-perfused  Neuro: A&O x3, no focal deficits      Vital Signs Last 24 Hrs  T(C): 36.7 (08 Apr 2024 05:25), Max: 36.9 (07 Apr 2024 22:08)  T(F): 98.1 (08 Apr 2024 05:25), Max: 98.4 (07 Apr 2024 22:08)  HR: 70 (08 Apr 2024 05:25) (70 - 73)  BP: 136/79 (08 Apr 2024 05:25) (128/91 - 153/88)  BP(mean): --  RR: 18 (08 Apr 2024 05:25) (17 - 18)  SpO2: 98% (08 Apr 2024 05:25) (97% - 98%)    Parameters below as of 08 Apr 2024 05:25  Patient On (Oxygen Delivery Method): room air          Consultant(s) Notes Reviewed:  [x] YES  [ ] NO  Care Discussed with Consultants/Other Providers [x] YES  [ ] NO    LABS:                        10.2   11.16 )-----------( 455      ( 08 Apr 2024 00:20 )             29.7                         10.6   10.81 )-----------( 457      ( 07 Apr 2024 18:00 )             31.9                         10.6   9.49  )-----------( 447      ( 07 Apr 2024 07:30 )             32.4                               138    |  108    |  20                  Calcium: 8.5   / iCa: x      (04-08 @ 00:20)    ----------------------------<  124       Magnesium: 1.30                             3.7     |  20     |  1.31             Phosphorous: 3.4      TPro  7.3    /  Alb  2.7    /  TBili  0.2    /  DBili  x      /  AST  51     /  ALT  64     /  AlkPhos  175    08 Apr 2024 00:20    Urinalysis Basic - ( 08 Apr 2024 00:20 )    Color: x / Appearance: x / SG: x / pH: x  Gluc: 124 mg/dL / Ketone: x  / Bili: x / Urobili: x   Blood: x / Protein: x / Nitrite: x   Leuk Esterase: x / RBC: x / WBC x   Sq Epi: x / Non Sq Epi: x / Bacteria: x        RADIOLOGY & ADDITIONAL TESTS:    Imaging Personally Reviewed:  [x] YES  [ ] NO    MEDICATIONS  (STANDING):  ampicillin/sulbactam  IVPB 3 Gram(s) IV Intermittent every 6 hours  ampicillin/sulbactam  IVPB      aspirin enteric coated 81 milliGRAM(s) Oral daily  atorvastatin 40 milliGRAM(s) Oral at bedtime  bictegravir 50 mG/emtricitabine 200 mG/tenofovir alafenamide 25 mG (BIKTARVY) 1 Tablet(s) Oral daily  chlorhexidine 2% Cloths 1 Application(s) Topical daily  fluconAZOLE   Tablet 100 milliGRAM(s) Oral daily  gabapentin 300 milliGRAM(s) Oral at bedtime  gabapentin 200 milliGRAM(s) Oral <User Schedule>  influenza   Vaccine 0.5 milliLiter(s) IntraMuscular once  lactated ringers Bolus 500 milliLiter(s) IV Bolus once  polyethylene glycol 3350 17 Gram(s) Oral daily  senna 2 Tablet(s) Oral at bedtime  sodium chloride 0.9%. 500 milliLiter(s) (75 mL/Hr) IV Continuous <Continuous>  vancomycin  IVPB 750 milliGRAM(s) IV Intermittent every 12 hours    MEDICATIONS  (PRN):  acetaminophen     Tablet .. 650 milliGRAM(s) Oral every 6 hours PRN Temp greater or equal to 38C (100.4F), Mild Pain (1 - 3)  melatonin 3 milliGRAM(s) Oral at bedtime PRN Insomnia  ondansetron Injectable 4 milliGRAM(s) IV Push every 8 hours PRN Nausea and/or Vomiting      HEALTH ISSUES - PROBLEM Dx:  Gangrenous toe    Personal history of thromboembolic disease    Oral candidiasis    History of meningitis    Renal cyst    HIV disease    Substance use disorder    Prediabetes    Prophylactic measure    Preoperative examination

## 2024-04-08 NOTE — PROGRESS NOTE ADULT - PROBLEM SELECTOR PLAN 1
Followed by podiatry and vascular  Vascular planning for LLE angiogram  Podiatry debrided to healthy tissue on 4/5 early am, considering amputation vs. local wound care  Abx: usasyn, will add vanc to cover MRSA given healthcare exposures  ID recs appreciated -> vanc/unasyn for gangrene

## 2024-04-09 DIAGNOSIS — M54.9 DORSALGIA, UNSPECIFIED: ICD-10-CM

## 2024-04-09 LAB
ADD ON TEST-SPECIMEN IN LAB: SIGNIFICANT CHANGE UP
ANION GAP SERPL CALC-SCNC: 14 MMOL/L — SIGNIFICANT CHANGE UP (ref 7–14)
BUN SERPL-MCNC: 20 MG/DL — SIGNIFICANT CHANGE UP (ref 7–23)
CALCIUM SERPL-MCNC: 8.9 MG/DL — SIGNIFICANT CHANGE UP (ref 8.4–10.5)
CHLORIDE SERPL-SCNC: 107 MMOL/L — SIGNIFICANT CHANGE UP (ref 98–107)
CO2 SERPL-SCNC: 19 MMOL/L — LOW (ref 22–31)
CREAT SERPL-MCNC: 1.23 MG/DL — SIGNIFICANT CHANGE UP (ref 0.5–1.3)
EGFR: 70 ML/MIN/1.73M2 — SIGNIFICANT CHANGE UP
GLUCOSE SERPL-MCNC: 125 MG/DL — HIGH (ref 70–99)
HCT VFR BLD CALC: 30.5 % — LOW (ref 39–50)
HGB BLD-MCNC: 10.3 G/DL — LOW (ref 13–17)
MAGNESIUM SERPL-MCNC: 1.4 MG/DL — LOW (ref 1.6–2.6)
MCHC RBC-ENTMCNC: 32.1 PG — SIGNIFICANT CHANGE UP (ref 27–34)
MCHC RBC-ENTMCNC: 33.8 GM/DL — SIGNIFICANT CHANGE UP (ref 32–36)
MCV RBC AUTO: 95 FL — SIGNIFICANT CHANGE UP (ref 80–100)
NRBC # BLD: 0 /100 WBCS — SIGNIFICANT CHANGE UP (ref 0–0)
NRBC # FLD: 0 K/UL — SIGNIFICANT CHANGE UP (ref 0–0)
PHOSPHATE SERPL-MCNC: 3.9 MG/DL — SIGNIFICANT CHANGE UP (ref 2.5–4.5)
PLATELET # BLD AUTO: 448 K/UL — HIGH (ref 150–400)
POTASSIUM SERPL-MCNC: 3.8 MMOL/L — SIGNIFICANT CHANGE UP (ref 3.5–5.3)
POTASSIUM SERPL-SCNC: 3.8 MMOL/L — SIGNIFICANT CHANGE UP (ref 3.5–5.3)
RBC # BLD: 3.21 M/UL — LOW (ref 4.2–5.8)
RBC # FLD: 15.2 % — HIGH (ref 10.3–14.5)
SODIUM SERPL-SCNC: 140 MMOL/L — SIGNIFICANT CHANGE UP (ref 135–145)
VANCOMYCIN TROUGH SERPL-MCNC: 10.2 UG/ML — SIGNIFICANT CHANGE UP (ref 10–20)
WBC # BLD: 9.9 K/UL — SIGNIFICANT CHANGE UP (ref 3.8–10.5)
WBC # FLD AUTO: 9.9 K/UL — SIGNIFICANT CHANGE UP (ref 3.8–10.5)

## 2024-04-09 PROCEDURE — 72158 MRI LUMBAR SPINE W/O & W/DYE: CPT | Mod: 26

## 2024-04-09 PROCEDURE — 99232 SBSQ HOSP IP/OBS MODERATE 35: CPT

## 2024-04-09 PROCEDURE — 99232 SBSQ HOSP IP/OBS MODERATE 35: CPT | Mod: GC

## 2024-04-09 RX ORDER — GABAPENTIN 400 MG/1
300 CAPSULE ORAL THREE TIMES A DAY
Refills: 0 | Status: DISCONTINUED | OUTPATIENT
Start: 2024-04-09 | End: 2024-04-10

## 2024-04-09 RX ORDER — MAGNESIUM SULFATE 500 MG/ML
2 VIAL (ML) INJECTION ONCE
Refills: 0 | Status: COMPLETED | OUTPATIENT
Start: 2024-04-09 | End: 2024-04-09

## 2024-04-09 RX ORDER — CYCLOBENZAPRINE HYDROCHLORIDE 10 MG/1
5 TABLET, FILM COATED ORAL THREE TIMES A DAY
Refills: 0 | Status: DISCONTINUED | OUTPATIENT
Start: 2024-04-09 | End: 2024-04-16

## 2024-04-09 RX ADMIN — APIXABAN 5 MILLIGRAM(S): 2.5 TABLET, FILM COATED ORAL at 19:31

## 2024-04-09 RX ADMIN — GABAPENTIN 200 MILLIGRAM(S): 400 CAPSULE ORAL at 05:52

## 2024-04-09 RX ADMIN — BICTEGRAVIR SODIUM, EMTRICITABINE, AND TENOFOVIR ALAFENAMIDE FUMARATE 1 TABLET(S): 30; 120; 15 TABLET ORAL at 13:01

## 2024-04-09 RX ADMIN — FLUCONAZOLE 100 MILLIGRAM(S): 150 TABLET ORAL at 13:01

## 2024-04-09 RX ADMIN — CYCLOBENZAPRINE HYDROCHLORIDE 5 MILLIGRAM(S): 10 TABLET, FILM COATED ORAL at 13:01

## 2024-04-09 RX ADMIN — CYCLOBENZAPRINE HYDROCHLORIDE 5 MILLIGRAM(S): 10 TABLET, FILM COATED ORAL at 22:16

## 2024-04-09 RX ADMIN — Medication 81 MILLIGRAM(S): at 13:01

## 2024-04-09 RX ADMIN — GABAPENTIN 300 MILLIGRAM(S): 400 CAPSULE ORAL at 13:02

## 2024-04-09 RX ADMIN — Medication 3 MILLIGRAM(S): at 22:17

## 2024-04-09 RX ADMIN — GABAPENTIN 300 MILLIGRAM(S): 400 CAPSULE ORAL at 22:16

## 2024-04-09 RX ADMIN — Medication 150 GRAM(S): at 13:00

## 2024-04-09 RX ADMIN — ATORVASTATIN CALCIUM 40 MILLIGRAM(S): 80 TABLET, FILM COATED ORAL at 22:17

## 2024-04-09 RX ADMIN — SENNA PLUS 2 TABLET(S): 8.6 TABLET ORAL at 22:16

## 2024-04-09 RX ADMIN — Medication 650 MILLIGRAM(S): at 00:30

## 2024-04-09 RX ADMIN — Medication 650 MILLIGRAM(S): at 23:15

## 2024-04-09 RX ADMIN — Medication 650 MILLIGRAM(S): at 05:46

## 2024-04-09 RX ADMIN — Medication 250 MILLIGRAM(S): at 19:31

## 2024-04-09 RX ADMIN — CHLORHEXIDINE GLUCONATE 1 APPLICATION(S): 213 SOLUTION TOPICAL at 13:01

## 2024-04-09 RX ADMIN — Medication 250 MILLIGRAM(S): at 05:46

## 2024-04-09 RX ADMIN — Medication 650 MILLIGRAM(S): at 22:16

## 2024-04-09 RX ADMIN — Medication 650 MILLIGRAM(S): at 06:30

## 2024-04-09 RX ADMIN — APIXABAN 5 MILLIGRAM(S): 2.5 TABLET, FILM COATED ORAL at 05:46

## 2024-04-09 NOTE — PROGRESS NOTE ADULT - ATTENDING COMMENTS
53M hx HIV, preDM, current smoker, hx cocaine use, prior arterial thromboembolic events, a/w sepsis on admission (HR>90 and leukocytosis) 2/2  wet gangrene of L foot. Left foot xray w/ no OM, gas, or fracture  Pt seen by podiatry and s/pL foot hallux toenail removal, and excisional debridement of L foot hallux necrotic skin to the level of epidermis,  Pt underwent LLE angiogram today  PE lungs clear, heart regular,  L foot with wound dressing   -C/w vanco, cx corynebacterium and resistant lugdunensis , dc'd unasyn per ID  trend vanco trough  - MRSA negative, blood cx ngtd  - local wound care per podiatry  - mild huseyin with Cr 1.3, give IVF with LLE angiogram with triple vessel runoff on 4/8,  f/u vascular sx  - local wound care per podiatry, plan for L foot TMA pending demarcation/ vasc recs  - Vasc following, Previous ida/pvrs concerning for small vessel disease of LLE. Xray of foot with no gas or osseous erosions.  - s/p LLE Angiogram 4/8 that showed small vessel disease  vascular surgery plan to repeat angiogram next week (?Monday) for possible intervention (stent)  - f/u podiatry for intervention likely TMA after vascular intervention  - f/up cardiology for clearance;   pt with normal LV fxn, but has reduced RV function, cards plan to do  out pt workup for RV dysfunction  - resumed on eliquis post procedure  - trend LFT, f/up hepatitis panel, RUQ US normal liver and echogenic R kidney  # low back pain: inc gabapentin to 300mg tid, MRI lumbar spine to r/o spinal stenosis    plan of care d/w pt   Dispo pending further optimization.    PT eval - rec home PT 53M hx HIV, preDM, current smoker, hx cocaine use, prior arterial thromboembolic events, a/w sepsis on admission (HR>90 and leukocytosis) 2/2  wet gangrene of L foot. Left foot xray w/ no OM, gas, or fracture  Pt seen by podiatry and s/pL foot hallux toenail removal, and excisional debridement of L foot hallux necrotic skin to the level of epidermis,  Pt c/o worsening low back pain.    PE lungs clear, heart regular,  L foot with wound dressing   -C/w vanco, cx corynebacterium and resistant lugdunensis , dc'd unasyn per ID  trend vanco trough  - MRSA negative, blood cx ngtd  - local wound care per podiatry  - mild huseyin stable Cr 1.3-->1.2, given fluids for angiogram   - local wound care per podiatry, plan for L foot TMA pending demarcation/ vasc recs  - Vasc following, Previous ida/pvrs concerning for small vessel disease of LLE. Xray of foot with no gas or osseous erosions.  - s/p LLE Angiogram 4/8 that showed small vessel disease  vascular surgery plan to repeat angiogram next week (?Monday) for possible intervention (stent)  - f/u podiatry for intervention likely TMA after vascular intervention  - f/up cardiology for clearance;   pt with normal LV fxn, but has reduced RV function, cards plan to do  out pt workup for RV dysfunction  - resumed on eliquis post procedure  - trend LFT, f/up hepatitis panel, RUQ US normal liver and echogenic R kidney  # low back pain: inc gabapentin to 300mg tid, MRI lumbar spine to r/o spinal stenosis    plan of care d/w pt   Dispo pending further optimization.    PT eval - rec home PT

## 2024-04-09 NOTE — PROGRESS NOTE ADULT - PROBLEM SELECTOR PLAN 6
- likely sciatica given +straight leg raise  - MR L spine given patient to stay as inpatient through next week

## 2024-04-09 NOTE — PROGRESS NOTE ADULT - ASSESSMENT
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 Pulse exam unchanged from previous RLE +DP/PT signal and all other pulses palpable. RLE toes not currently concerning for wet gangrene, toes dry, debridement to healthy tissue over first toe of LLE. Prior DENIS/PVRs concerning for small vessel disease of LLE. Xray of foot with no gas or osseous erosions. Vascular surgery consulted for evaluation     Recommendations:   - S/p diagnostic LLE angiogram 4/8, plan for repeat angiogram next week via antegrade access, discussed w/ patient at bedside  - Continue local wound to LLE for now   - Continue to optimized medical management, ASA and statin   - Remainder care per primary       Vascular Surgery   [id number]

## 2024-04-09 NOTE — PROGRESS NOTE ADULT - SUBJECTIVE AND OBJECTIVE BOX
VASCULAR SURGERY DAILY PROGRESS NOTE:     SUBJECTIVE/ROS:   Patient seen and evaluated on AM rounds.        OBJECTIVE:  Vital Signs Last 24 Hrs  T(C): 36.9 (09 Apr 2024 05:32), Max: 36.9 (09 Apr 2024 05:32)  T(F): 98.4 (09 Apr 2024 05:32), Max: 98.4 (09 Apr 2024 05:32)  HR: 67 (09 Apr 2024 05:32) (62 - 81)  BP: 128/85 (09 Apr 2024 05:32) (120/65 - 148/84)  RR: 18 (09 Apr 2024 05:32) (18 - 18)  SpO2: 100% (09 Apr 2024 05:32) (98% - 100%)    Parameters below as of 09 Apr 2024 05:32  Patient On (Oxygen Delivery Method): room air      I&O's Detail  08 Apr 2024 07:01  -  09 Apr 2024 07:00  --------------------------------------------------------  IN:  Total IN: 0 mL    OUT:    Voided (mL): 1300 mL  Total OUT: 1300 mL    Total NET: -1300 mL        Daily   MEDICATIONS  (STANDING):  apixaban 5 milliGRAM(s) Oral two times a day  aspirin enteric coated 81 milliGRAM(s) Oral daily  atorvastatin 40 milliGRAM(s) Oral at bedtime  bictegravir 50 mG/emtricitabine 200 mG/tenofovir alafenamide 25 mG (BIKTARVY) 1 Tablet(s) Oral daily  chlorhexidine 2% Cloths 1 Application(s) Topical daily  fluconAZOLE   Tablet 100 milliGRAM(s) Oral daily  gabapentin 300 milliGRAM(s) Oral three times a day  influenza   Vaccine 0.5 milliLiter(s) IntraMuscular once  magnesium sulfate  IVPB 2 Gram(s) IV Intermittent once  polyethylene glycol 3350 17 Gram(s) Oral daily  senna 2 Tablet(s) Oral at bedtime  sodium chloride 0.9%. 500 milliLiter(s) (75 mL/Hr) IV Continuous <Continuous>  vancomycin  IVPB 1000 milliGRAM(s) IV Intermittent every 12 hours    MEDICATIONS  (PRN):  acetaminophen     Tablet .. 650 milliGRAM(s) Oral every 6 hours PRN Temp greater or equal to 38C (100.4F), Mild Pain (1 - 3)  melatonin 3 milliGRAM(s) Oral at bedtime PRN Insomnia  ondansetron Injectable 4 milliGRAM(s) IV Push every 8 hours PRN Nausea and/or Vomiting      Labs:                        10.3   9.90  )-----------( 448      ( 09 Apr 2024 05:57 )             30.5     04-09    140  |  107  |  20  ----------------------------<  125<H>  3.8   |  19<L>  |  1.23    Ca    8.9      09 Apr 2024 05:57  Phos  3.9     04-09  Mg     1.40     04-09    TPro  7.3  /  Alb  2.7<L>  /  TBili  0.2  /  DBili  x   /  AST  51<H>  /  ALT  64<H>  /  AlkPhos  175<H>  04-08    PT/INR - ( 08 Apr 2024 00:20 )   PT: 11.7 sec;   INR: 1.05 ratio         PTT - ( 08 Apr 2024 00:20 )  PTT:66.8 sec  Urinalysis Basic - ( 09 Apr 2024 05:57 )    Color: x / Appearance: x / SG: x / pH: x  Gluc: 125 mg/dL / Ketone: x  / Bili: x / Urobili: x   Blood: x / Protein: x / Nitrite: x   Leuk Esterase: x / RBC: x / WBC x   Sq Epi: x / Non Sq Epi: x / Bacteria: x        Physical Exam:  General: NAD  Cardiovascular: appears well perfused   Respiratory: respirations non labored on RA  Gastrointestinal: soft, nontender, nondistended  Extremities: FROM, warm. B/L palpable femoral pulses. R groin access site soft, no evidence of hematoma. LLE ischemic changes to all digits. LLE palpable PT/AT pulse  Neurological: A+Ox3

## 2024-04-09 NOTE — PROGRESS NOTE ADULT - SUBJECTIVE AND OBJECTIVE BOX
LEA MENDOZA, MRN [id number], 53ymale      Patient is a 53y old  Male who presents with a chief complaint of wet gangrene (09 Apr 2024 10:55)       INTERVAL HPI/OVERNIGHT EVENTS:  Patient seen and evaluated at bedside.  Pt is resting comfortable in NAD. Denies N/V/F/C.    Allergies    No Known Allergies    Intolerances        Vital Signs Last 24 Hrs  T(C): 36.9 (09 Apr 2024 05:32), Max: 36.9 (09 Apr 2024 05:32)  T(F): 98.4 (09 Apr 2024 05:32), Max: 98.4 (09 Apr 2024 05:32)  HR: 67 (09 Apr 2024 05:32) (62 - 81)  BP: 128/85 (09 Apr 2024 05:32) (120/65 - 148/84)  BP(mean): --  RR: 18 (09 Apr 2024 05:32) (18 - 18)  SpO2: 100% (09 Apr 2024 05:32) (98% - 100%)    Parameters below as of 09 Apr 2024 05:32  Patient On (Oxygen Delivery Method): room air        LABS:                        10.3   9.90  )-----------( 448      ( 09 Apr 2024 05:57 )             30.5     04-09    140  |  107  |  20  ----------------------------<  125<H>  3.8   |  19<L>  |  1.23    Ca    8.9      09 Apr 2024 05:57  Phos  3.9     04-09  Mg     1.40     04-09    TPro  7.3  /  Alb  2.7<L>  /  TBili  0.2  /  DBili  x   /  AST  51<H>  /  ALT  64<H>  /  AlkPhos  175<H>  04-08    PT/INR - ( 08 Apr 2024 00:20 )   PT: 11.7 sec;   INR: 1.05 ratio         PTT - ( 08 Apr 2024 00:20 )  PTT:66.8 sec  Urinalysis Basic - ( 09 Apr 2024 05:57 )    Color: x / Appearance: x / SG: x / pH: x  Gluc: 125 mg/dL / Ketone: x  / Bili: x / Urobili: x   Blood: x / Protein: x / Nitrite: x   Leuk Esterase: x / RBC: x / WBC x   Sq Epi: x / Non Sq Epi: x / Bacteria: x      CAPILLARY BLOOD GLUCOSE          Lower Extremity Physical Exam:  Vascular: DP/PT 2/4, B/L, CFT <3 seconds B/L, Temperature gradient warm to cool, B/L.   Neuro: Epicritic sensation diminished to the level of digits, B/L.  Musculoskeletal/Ortho: unremarkable   Skin: 4/4 s/p b/s LF hallux total nail avulsion, wound to dermis, no drainage, no pus. Left forefoot ischemic changes to digits 1-5 extends from distal ellen to PIPJ, not fully demarcated, mild malodor. Maceration of left foot 4th interspace, L foot plantar styloid process eschar, No open lesions or acute signs of infection of right foot.       RADIOLOGY & ADDITIONAL TESTS:

## 2024-04-09 NOTE — PROGRESS NOTE ADULT - SUBJECTIVE AND OBJECTIVE BOX
Infectious Diseases Follow Up:    Patient is a 53y old  Male who presents with a chief complaint of wet gangrene (09 Apr 2024 11:03)      Interval History/ROS:  No acute events, pending repeat angiogram next week     Allergies  No Known Allergies        ANTIMICROBIALS:  bictegravir 50 mG/emtricitabine 200 mG/tenofovir alafenamide 25 mG (BIKTARVY) 1 daily  fluconAZOLE   Tablet 100 daily  vancomycin  IVPB 1000 every 12 hours      Current Abx:     Previous Abx     OTHER MEDS:  MEDICATIONS  (STANDING):  acetaminophen     Tablet .. 650 every 6 hours PRN  apixaban 5 two times a day  aspirin enteric coated 81 daily  atorvastatin 40 at bedtime  gabapentin 300 three times a day  influenza   Vaccine 0.5 once  melatonin 3 at bedtime PRN  ondansetron Injectable 4 every 8 hours PRN  polyethylene glycol 3350 17 daily  senna 2 at bedtime      Vital Signs Last 24 Hrs  T(C): 36.9 (09 Apr 2024 05:32), Max: 36.9 (09 Apr 2024 05:32)  T(F): 98.4 (09 Apr 2024 05:32), Max: 98.4 (09 Apr 2024 05:32)  HR: 67 (09 Apr 2024 05:32) (66 - 81)  BP: 128/85 (09 Apr 2024 05:32) (120/65 - 148/84)  BP(mean): --  RR: 18 (09 Apr 2024 05:32) (18 - 18)  SpO2: 100% (09 Apr 2024 05:32) (98% - 100%)    Parameters below as of 09 Apr 2024 05:32  Patient On (Oxygen Delivery Method): room air        PHYSICAL EXAM:  GENERAL: NAD, well-developed  HEAD:  Atraumatic, Normocephalic  EYES: EOMI, conjunctiva and sclera clear  CHEST/LUNG: On RA, not in respiratory distress   EXTREMITIES:  L foot dressed   PSYCH: AAOx3                          10.3   9.90  )-----------( 448      ( 09 Apr 2024 05:57 )             30.5       04-09    140  |  107  |  20  ----------------------------<  125<H>  3.8   |  19<L>  |  1.23    Ca    8.9      09 Apr 2024 05:57  Phos  3.9     04-09  Mg     1.40     04-09    TPro  7.3  /  Alb  2.7<L>  /  TBili  0.2  /  DBili  x   /  AST  51<H>  /  ALT  64<H>  /  AlkPhos  175<H>  04-08      Urinalysis Basic - ( 09 Apr 2024 05:57 )    Color: x / Appearance: x / SG: x / pH: x  Gluc: 125 mg/dL / Ketone: x  / Bili: x / Urobili: x   Blood: x / Protein: x / Nitrite: x   Leuk Esterase: x / RBC: x / WBC x   Sq Epi: x / Non Sq Epi: x / Bacteria: x        MICROBIOLOGY:  Vancomycin Level, Trough: 8.9 ug/mL (04-08-24 @ 16:40)  v  .Abscess Left foot wound culture  04-05-24   Moderate Staphylococcus lugdunensis  Numerous Corynebacterium striatum group "Susceptibilities not performed"  Moderate Finegoldia magna "Susceptibilities not performed"  --  Staphylococcus lugdunensis      .Blood Blood-Peripheral  03-20-24   No growth at 5 days  --  --      .Blood Blood-Peripheral  03-20-24   No growth at 5 days  --  --      Clean Catch Clean Catch (Midstream)  03-15-24   No growth  --  --      .Blood Blood-Peripheral  03-15-24   No growth at 5 days  --  --      .Blood Blood-Peripheral  03-15-24   No growth at 5 days  --  --        HIV-1 RNA Quantitative, Viral Load Log: DET.  <1.47 lg /mL (03-16-24 @ 06:05)  CMV IgG Antibody: >10.00 U/mL (03-16-24 @ 06:05)          RADIOLOGY:

## 2024-04-09 NOTE — PROGRESS NOTE ADULT - ASSESSMENT
This is a 54 y/o M w/ PMHx of HIV (VL UD, , 19% 3/16/24, on Biktarvy) pre-DM, former smoker, illicit drug use, PAD, who is presenting with L foot gangrene. Pt was sent in by podiatrist outpatient for further evaluation.     Pt presented last on 3/15-17, was initially at [facility] for meningitis left AMA, presented to [facility] on 3/15 for PAD, s/p debridement of L forefoot, maceration of L foot 4th interspace Noted to be afebrile, mild leukocytosis 11. CT Aorta w/ runoff w/o acute clot, noted to have rim-enhancing collection within the left   flexor hallucis brevis muscle, measures 3.1 x 1.7 x 1.2 cm. Unfortunately left AMA on 3/17.    Returned on 3/20, with foot discoloration/pain. XR foot w/o OM, vascular surgery c/s, started on Heparin gtt, changed to Eliquis on d/c. Pt was discharged on 3/25.    Now with c/f L foot gangrene.  In the ER, pt was afebrile, vitals stable. Noted to have leukocytosis to 13, ESR 91, CRP 10.   VINEET to 1.51 (baseline 1.16), hyperkalemia to 6.   Podiatry consulted, culture sent, noted to have L foot ischemic changes, mild malodor, maceration of L foot 4th interspace L foot hallux toenail removed. Vascular surgery was consulted, no acute intervention, recommended DENIS/PVR,    Workup:   L foot XR w/o findings of OM  L foot Cx, gram stain with GPC in pairs/chains    HIV:   3/17/24 VL UD, , 19%  3/16 QuantiFeron negative     #L foot gangrene, dry   #Gram stain w/ GPC in pairs/chains  #Oral candidiasis    #HIV, well controlled, on Biktarvy   #Leukocytosis   #VINEET   Hyperkalemia   #Renal mass     Overall, 54 y/o M w/ PMHx of HIV (VL UD, , 19% 3/16/24, on Biktarvy) pre-DM, former smoker, illicit drug use, PAD, who is presenting with L foot gangrene, started on empiric Vancomycin/Unasyn. Gram strain with GPC in pairs/chains, possibly enterococcus vs strep. On exam, appears to be dry gangrene, no purulence, malodor. Clear signs of necrosis.   Seen by vascular, podiatry, pending possible TMA vs local wound care.   Cx with corynebacterium, staph lugdunensis and finegoldia, may all be skin contaminants.    S/p LLE angiogram, small vessel disease, repeat planned for next week.     Plan:   1. C/w Vancomycin, f/u trough close given VINEET. Will plan for 2 week course ending on 4/18/24 (can likely finish with Doxycyline/Cephalexin)   2. C/w Biktarvy   3. Fluconazole 100 mg x 7 days for thrush   4. F/u podiatry recs for potential TMA     Thank you for this consult. Inpatient ID team will follow.    Vik Gonzalez M.D.  Attending Physician  Division of Infectious Diseases  Department of Medicine    Please contact through MS Teams message.  Office: [phone] (after 5 PM or weekend)

## 2024-04-09 NOTE — PROGRESS NOTE ADULT - SUBJECTIVE AND OBJECTIVE BOX
Christopher Dejesus, PGY-1  Please contact on Teams    LEA MENDOZA  53y  Male    Reason for Admission:     SUBJECTIVE/INTERVAL EVENTS: No acute events. Pt seen and examined at bedside. Reports back pain.    Physical Exam:   General: NAD  HEENT: PERRL, EOMI, normal sclera and conjunctiva, no oral lesions  Neck: Supple, no JVD  Lungs: CTA bilaterally  Heart: RRR, normal S1S2, no murmurs/rubs/gallops  Abdomen: Soft, ND/NT  Extremities: 2+ peripheral pulses, bandaged L foot, +shooting pain on R straight leg raise  Skin: Warm, well-perfused  Neuro: A&O x3, no focal deficits      Vital Signs Last 24 Hrs  T(C): 36.9 (09 Apr 2024 05:32), Max: 36.9 (09 Apr 2024 05:32)  T(F): 98.4 (09 Apr 2024 05:32), Max: 98.4 (09 Apr 2024 05:32)  HR: 67 (09 Apr 2024 05:32) (66 - 81)  BP: 128/85 (09 Apr 2024 05:32) (120/65 - 148/84)  BP(mean): --  RR: 18 (09 Apr 2024 05:32) (18 - 18)  SpO2: 100% (09 Apr 2024 05:32) (98% - 100%)    Parameters below as of 09 Apr 2024 05:32  Patient On (Oxygen Delivery Method): room air          Consultant(s) Notes Reviewed:  [x] YES  [ ] NO  Care Discussed with Consultants/Other Providers [x] YES  [ ] NO    LABS:                        10.3   9.90  )-----------( 448      ( 09 Apr 2024 05:57 )             30.5                         10.2   11.16 )-----------( 455      ( 08 Apr 2024 00:20 )             29.7                         10.6   10.81 )-----------( 457      ( 07 Apr 2024 18:00 )             31.9                               140    |  107    |  20                  Calcium: 8.9   / iCa: x      (04-09 @ 05:57)    ----------------------------<  125       Magnesium: 1.40                             3.8     |  19     |  1.23             Phosphorous: 3.9        Urinalysis Basic - ( 09 Apr 2024 05:57 )    Color: x / Appearance: x / SG: x / pH: x  Gluc: 125 mg/dL / Ketone: x  / Bili: x / Urobili: x   Blood: x / Protein: x / Nitrite: x   Leuk Esterase: x / RBC: x / WBC x   Sq Epi: x / Non Sq Epi: x / Bacteria: x        RADIOLOGY & ADDITIONAL TESTS:    Imaging Personally Reviewed:  [x] YES  [ ] NO    MEDICATIONS  (STANDING):  apixaban 5 milliGRAM(s) Oral two times a day  aspirin enteric coated 81 milliGRAM(s) Oral daily  atorvastatin 40 milliGRAM(s) Oral at bedtime  bictegravir 50 mG/emtricitabine 200 mG/tenofovir alafenamide 25 mG (BIKTARVY) 1 Tablet(s) Oral daily  chlorhexidine 2% Cloths 1 Application(s) Topical daily  fluconAZOLE   Tablet 100 milliGRAM(s) Oral daily  gabapentin 300 milliGRAM(s) Oral three times a day  influenza   Vaccine 0.5 milliLiter(s) IntraMuscular once  magnesium sulfate  IVPB 2 Gram(s) IV Intermittent once  polyethylene glycol 3350 17 Gram(s) Oral daily  senna 2 Tablet(s) Oral at bedtime  sodium chloride 0.9%. 500 milliLiter(s) (75 mL/Hr) IV Continuous <Continuous>  vancomycin  IVPB 1000 milliGRAM(s) IV Intermittent every 12 hours    MEDICATIONS  (PRN):  acetaminophen     Tablet .. 650 milliGRAM(s) Oral every 6 hours PRN Temp greater or equal to 38C (100.4F), Mild Pain (1 - 3)  melatonin 3 milliGRAM(s) Oral at bedtime PRN Insomnia  ondansetron Injectable 4 milliGRAM(s) IV Push every 8 hours PRN Nausea and/or Vomiting      HEALTH ISSUES - PROBLEM Dx:  Gangrenous toe    Personal history of thromboembolic disease    Oral candidiasis    History of meningitis    Renal cyst    HIV disease    Substance use disorder    Prediabetes    Prophylactic measure    Preoperative examination

## 2024-04-09 NOTE — MEDICAL STUDENT PROGRESS NOTE(EDUCATION) - ASSESSMENT
Assessment: Mr. Hester is a 53 year old, current smoker (20 pack year history), male with a past medical history of HIV (VL undetected, CD, prior cocaine use, prior arterial thromboembolic disease, recent admission for meningitis at [place] in 2/2024 (recevied 3 weeks IV abx, left AMA) presenting with left foot gangrene and lower back pain.         Problem 1: Gangrenous toe.     Plan:   -Followed by podiatry and vascular  -Vascular planning for LLE angiogram  -Podiatry debrided to healthy tissue on 4/5 early am, considering amputation vs. local wound care  -Abx: usasyn, will add vanc to cover MRSA given healthcare exposures  -ID recs appreciated -> vanc/unasyn for gangrene.  -LLE angiogram scheduled for 4/8 (NPO at [facility], stop DVT prophylaxis) - small vessel disease noted   -Stop Unasyn, c/w vanc and follow trough given VINEET   -Waiting on podiatry for final recommendation regarding plan for the toe       Problem 2: Personal history of thromboembolic disease.     Plan:   -Thromboses to L leg, leading to limb ischemia and gangrene  -full AC w/ Eliquis, transitioned to heparin in anticipation of LLE angiogram by vascular    Problem 3: Elevated AST and ALT     Plan:  -Elevated liver enzymes could be in the context of new antibiotic use - continue to monitor   -Hepatitis panel, liver panel    Problem 4: Lower back pain    Plan:  -Most likely follow up outpatient  -Order additional Flexeril     Problem 5: CKD    Plan:  -Parenchymal disease noted on ultrasound with echogenic right kidney - continue to monitor Cr and GFR for worsening kidney function   -Monitor H&H as anemia likely due to CKD    Problem 6: Oral candidiasis    Plan:   -fluconazole 100mg x7 days.      Problem 7: History of meningitis.     Plan:   -Treated at [facility] with 3 weeks of IV abx, patient left AMA  -Does not remember what pathogen or what antibiotics were used  -No signs or symptoms at the moment  -Obtaining records.      Problem 8: Renal cyst.     Plan:   -Seen on CT and US, solid mass not ruled out  -recommended f/u MRI, noted to have 1.6cm complex cyst in R upper pole, recommended for surveillance imaging (Bosniak 2F).      Problem 9: HIV disease.     Plan:   -CD4 count 595 up from 328 in March  -Continuing biktarvy.      Problem 10: Substance use disorder    Plan:   -SUSANNE c/s for tobacco and cocaine use    Problem 11: Prediabetes    Plan:   -CC diet  -Continue education and lifestyle modification      Problem 12: Prophylactic measure    Plan:   -DVT: Eliquis  -Diet: Regular CC  -Dispo: pending PT anaal      Assessment: Mr. Hester is a 53 year old, current smoker (20 pack year history), male with a past medical history of HIV (VL undetected, CD, prior cocaine use, prior arterial thromboembolic disease, recent admission for meningitis at [place] in 2/2024 (recevied 3 weeks IV abx, left AMA) presenting with left foot gangrene and lower back pain.         Problem 1: Gangrenous toe.     Plan:   -Followed by podiatry and vascular  -Vascular planning for LLE angiogram  -Podiatry debrided to healthy tissue on 4/5 early am, considering amputation vs. local wound care  -Abx: usasyn, will add vanc to cover MRSA given healthcare exposures  -ID recs appreciated -> vanc/unasyn for gangrene.  -LLE angiogram scheduled for 4/8 (NPO at [facility], stop DVT prophylaxis) - small vessel disease noted   -Stop Unasyn, c/w vanc and follow trough given VINEET   -Waiting on podiatry for final recommendation regarding plan for the toe       Problem 2: Personal history of thromboembolic disease.     Plan:   -Thromboses to L leg, leading to limb ischemia and gangrene  -full AC w/ Eliquis, transitioned to heparin in anticipation of LLE angiogram by vascular    Problem 3: Elevated AST and ALT     Plan:  -Elevated liver enzymes could be in the context of new antibiotic use - continue to monitor   -Hepatitis panel, liver panel    Problem 4: Lower back pain    Plan:  -Increase Gabapentin  -MRI of lumbar spine   -Most likely follow up outpatient  -Order additional Flexeril     Problem 5: CKD    Plan:  -Parenchymal disease noted on ultrasound with echogenic right kidney - continue to monitor Cr and GFR for worsening kidney function   -Monitor H&H as anemia likely due to CKD    Problem 6: Oral candidiasis    Plan:   -fluconazole 100mg x7 days.      Problem 7: History of meningitis.     Plan:   -Treated at [facility] with 3 weeks of IV abx, patient left AMA  -Does not remember what pathogen or what antibiotics were used  -No signs or symptoms at the moment  -Obtaining records.      Problem 8: Renal cyst.     Plan:   -Seen on CT and US, solid mass not ruled out  -recommended f/u MRI, noted to have 1.6cm complex cyst in R upper pole, recommended for surveillance imaging (Bosniak 2F).      Problem 9: HIV disease.     Plan:   -CD4 count 595 up from 328 in March  -Continuing biktarvy.      Problem 10: Substance use disorder    Plan:   -SUSANNE c/s for tobacco and cocaine use    Problem 11: Prediabetes    Plan:   -CC diet  -Continue education and lifestyle modification      Problem 12: Prophylactic measure    Plan:   -DVT: Eliquis  -Diet: Regular CC  -Dispo: pending PT eval

## 2024-04-09 NOTE — PROGRESS NOTE ADULT - PROBLEM SELECTOR PLAN 1
Followed by podiatry and vascular  Vasc - LLE angiogram 4/8 w/ small vessel disease, no LVO. planning for rpt angio on 4/15 to open up small vessels  Podiatry debrided to healthy tissue on 4/5 early am, considering amputation vs. local wound care pending rpt angio  ID recs appreciated -> vanc continuing, unasyn d/c'd,  2 week course until 4/18, can finish on doxy/cephalexin

## 2024-04-09 NOTE — PROGRESS NOTE ADULT - ASSESSMENT
53M presents with left forefoot dry gangrene  - Patient seen and evaluated   - 4/4 s/p b/s LF hallux total nail avulsion, now full thickness dry gangrene to distal hallux, no drainage, no wet change,  no pus. Left forefoot ischemic changes to digits 1-5 extends from distal ellen to PIPJ, not fully demarcated, mild malodor. L foot plantar styloid process eschar, No open lesions or acute signs of infection of right foot.   - Left foot xray: no OM, no gas, no fracture   - Left foot wound culture reviewed  - Vasc recs, appreciated   - ID recs appreciated.   - Pod plan: Local wound care vs L foot transmetatarsal amputation pending demarcation/ vasc recs  - Please document medical clearance for possible podiatry surgery under anesthesia   please apply Betadine to L foot all digit followed by 4x4 Gauze, and Kerlex. DAILY   - Seen with Attending

## 2024-04-10 LAB
ALBUMIN SERPL ELPH-MCNC: 2.9 G/DL — LOW (ref 3.3–5)
ALP SERPL-CCNC: 190 U/L — HIGH (ref 40–120)
ALT FLD-CCNC: 80 U/L — HIGH (ref 4–41)
ANION GAP SERPL CALC-SCNC: 13 MMOL/L — SIGNIFICANT CHANGE UP (ref 7–14)
AST SERPL-CCNC: 51 U/L — HIGH (ref 4–40)
BASOPHILS # BLD AUTO: 0.1 K/UL — SIGNIFICANT CHANGE UP (ref 0–0.2)
BASOPHILS NFR BLD AUTO: 1.1 % — SIGNIFICANT CHANGE UP (ref 0–2)
BILIRUB SERPL-MCNC: 0.2 MG/DL — SIGNIFICANT CHANGE UP (ref 0.2–1.2)
BUN SERPL-MCNC: 20 MG/DL — SIGNIFICANT CHANGE UP (ref 7–23)
CALCIUM SERPL-MCNC: 8.9 MG/DL — SIGNIFICANT CHANGE UP (ref 8.4–10.5)
CHLORIDE SERPL-SCNC: 107 MMOL/L — SIGNIFICANT CHANGE UP (ref 98–107)
CO2 SERPL-SCNC: 18 MMOL/L — LOW (ref 22–31)
CREAT SERPL-MCNC: 1.19 MG/DL — SIGNIFICANT CHANGE UP (ref 0.5–1.3)
EGFR: 73 ML/MIN/1.73M2 — SIGNIFICANT CHANGE UP
EOSINOPHIL # BLD AUTO: 0.11 K/UL — SIGNIFICANT CHANGE UP (ref 0–0.5)
EOSINOPHIL NFR BLD AUTO: 1.2 % — SIGNIFICANT CHANGE UP (ref 0–6)
GLUCOSE SERPL-MCNC: 126 MG/DL — HIGH (ref 70–99)
HAV IGM SER-ACNC: SIGNIFICANT CHANGE UP
HBV CORE IGM SER-ACNC: SIGNIFICANT CHANGE UP
HBV SURFACE AG SER-ACNC: SIGNIFICANT CHANGE UP
HCT VFR BLD CALC: 33.3 % — LOW (ref 39–50)
HCV AB S/CO SERPL IA: 0.25 S/CO — SIGNIFICANT CHANGE UP (ref 0–0.99)
HCV AB SERPL-IMP: SIGNIFICANT CHANGE UP
HGB BLD-MCNC: 10.9 G/DL — LOW (ref 13–17)
IANC: 3.57 K/UL — SIGNIFICANT CHANGE UP (ref 1.8–7.4)
IMM GRANULOCYTES NFR BLD AUTO: 0.3 % — SIGNIFICANT CHANGE UP (ref 0–0.9)
LYMPHOCYTES # BLD AUTO: 4.27 K/UL — HIGH (ref 1–3.3)
LYMPHOCYTES # BLD AUTO: 47.4 % — HIGH (ref 13–44)
MAGNESIUM SERPL-MCNC: 1.7 MG/DL — SIGNIFICANT CHANGE UP (ref 1.6–2.6)
MCHC RBC-ENTMCNC: 31.4 PG — SIGNIFICANT CHANGE UP (ref 27–34)
MCHC RBC-ENTMCNC: 32.7 GM/DL — SIGNIFICANT CHANGE UP (ref 32–36)
MCV RBC AUTO: 96 FL — SIGNIFICANT CHANGE UP (ref 80–100)
MONOCYTES # BLD AUTO: 0.92 K/UL — HIGH (ref 0–0.9)
MONOCYTES NFR BLD AUTO: 10.2 % — SIGNIFICANT CHANGE UP (ref 2–14)
NEUTROPHILS # BLD AUTO: 3.57 K/UL — SIGNIFICANT CHANGE UP (ref 1.8–7.4)
NEUTROPHILS NFR BLD AUTO: 39.8 % — LOW (ref 43–77)
NRBC # BLD: 0 /100 WBCS — SIGNIFICANT CHANGE UP (ref 0–0)
NRBC # FLD: 0 K/UL — SIGNIFICANT CHANGE UP (ref 0–0)
PHOSPHATE SERPL-MCNC: 3.7 MG/DL — SIGNIFICANT CHANGE UP (ref 2.5–4.5)
PLATELET # BLD AUTO: 442 K/UL — HIGH (ref 150–400)
POTASSIUM SERPL-MCNC: 4 MMOL/L — SIGNIFICANT CHANGE UP (ref 3.5–5.3)
POTASSIUM SERPL-SCNC: 4 MMOL/L — SIGNIFICANT CHANGE UP (ref 3.5–5.3)
PROT SERPL-MCNC: 7.8 G/DL — SIGNIFICANT CHANGE UP (ref 6–8.3)
RBC # BLD: 3.47 M/UL — LOW (ref 4.2–5.8)
RBC # FLD: 15.7 % — HIGH (ref 10.3–14.5)
SODIUM SERPL-SCNC: 138 MMOL/L — SIGNIFICANT CHANGE UP (ref 135–145)
VANCOMYCIN TROUGH SERPL-MCNC: 11.8 UG/ML — SIGNIFICANT CHANGE UP (ref 10–20)
WBC # BLD: 9 K/UL — SIGNIFICANT CHANGE UP (ref 3.8–10.5)
WBC # FLD AUTO: 9 K/UL — SIGNIFICANT CHANGE UP (ref 3.8–10.5)

## 2024-04-10 PROCEDURE — 99232 SBSQ HOSP IP/OBS MODERATE 35: CPT | Mod: GC

## 2024-04-10 PROCEDURE — 99232 SBSQ HOSP IP/OBS MODERATE 35: CPT

## 2024-04-10 RX ORDER — NICOTINE POLACRILEX 2 MG
2 GUM BUCCAL
Refills: 0 | Status: DISCONTINUED | OUTPATIENT
Start: 2024-04-10 | End: 2024-04-16

## 2024-04-10 RX ORDER — NICOTINE POLACRILEX 2 MG
1 GUM BUCCAL DAILY
Refills: 0 | Status: DISCONTINUED | OUTPATIENT
Start: 2024-04-10 | End: 2024-04-16

## 2024-04-10 RX ORDER — GABAPENTIN 400 MG/1
100 CAPSULE ORAL THREE TIMES A DAY
Refills: 0 | Status: DISCONTINUED | OUTPATIENT
Start: 2024-04-10 | End: 2024-04-16

## 2024-04-10 RX ORDER — GABAPENTIN 400 MG/1
100 CAPSULE ORAL THREE TIMES A DAY
Refills: 0 | Status: DISCONTINUED | OUTPATIENT
Start: 2024-04-10 | End: 2024-04-10

## 2024-04-10 RX ADMIN — ATORVASTATIN CALCIUM 40 MILLIGRAM(S): 80 TABLET, FILM COATED ORAL at 21:44

## 2024-04-10 RX ADMIN — Medication 250 MILLIGRAM(S): at 17:04

## 2024-04-10 RX ADMIN — CHLORHEXIDINE GLUCONATE 1 APPLICATION(S): 213 SOLUTION TOPICAL at 12:00

## 2024-04-10 RX ADMIN — Medication 1 PATCH: at 16:49

## 2024-04-10 RX ADMIN — CYCLOBENZAPRINE HYDROCHLORIDE 5 MILLIGRAM(S): 10 TABLET, FILM COATED ORAL at 16:48

## 2024-04-10 RX ADMIN — Medication 250 MILLIGRAM(S): at 06:44

## 2024-04-10 RX ADMIN — APIXABAN 5 MILLIGRAM(S): 2.5 TABLET, FILM COATED ORAL at 17:04

## 2024-04-10 RX ADMIN — APIXABAN 5 MILLIGRAM(S): 2.5 TABLET, FILM COATED ORAL at 06:13

## 2024-04-10 RX ADMIN — Medication 3 MILLIGRAM(S): at 21:44

## 2024-04-10 RX ADMIN — CYCLOBENZAPRINE HYDROCHLORIDE 5 MILLIGRAM(S): 10 TABLET, FILM COATED ORAL at 21:45

## 2024-04-10 RX ADMIN — GABAPENTIN 100 MILLIGRAM(S): 400 CAPSULE ORAL at 21:43

## 2024-04-10 RX ADMIN — GABAPENTIN 300 MILLIGRAM(S): 400 CAPSULE ORAL at 06:12

## 2024-04-10 RX ADMIN — Medication 1 PATCH: at 18:03

## 2024-04-10 RX ADMIN — Medication 81 MILLIGRAM(S): at 12:00

## 2024-04-10 RX ADMIN — GABAPENTIN 100 MILLIGRAM(S): 400 CAPSULE ORAL at 13:22

## 2024-04-10 RX ADMIN — BICTEGRAVIR SODIUM, EMTRICITABINE, AND TENOFOVIR ALAFENAMIDE FUMARATE 1 TABLET(S): 30; 120; 15 TABLET ORAL at 12:00

## 2024-04-10 NOTE — PROGRESS NOTE ADULT - ASSESSMENT
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 Pulse exam unchanged from previous RLE +DP/PT signal and all other pulses palpable. RLE toes not currently concerning for wet gangrene, toes dry, debridement to healthy tissue over first toe of LLE. Prior DENIS/PVRs concerning for small vessel disease of LLE. Xray of foot with no gas or osseous erosions. Vascular surgery consulted for evaluation     Recommendations:   - S/p diagnostic LLE angiogram 4/8, plan for repeat angiogram next week via antegrade access, date TBD   - Continue local wound to LLE for now   - Continue to optimize medical management, ASA and statin   - Remainder care per primary       Vascular Surgery   [id number]

## 2024-04-10 NOTE — PROGRESS NOTE ADULT - SUBJECTIVE AND OBJECTIVE BOX
Infectious Diseases Follow Up:    Patient is a 53y old  Male who presents with a chief complaint of wet gangrene (10 Apr 2024 07:29)      Interval History/ROS:  No acute events ON. Pt is doing okay     Allergies  No Known Allergies        ANTIMICROBIALS:  bictegravir 50 mG/emtricitabine 200 mG/tenofovir alafenamide 25 mG (BIKTARVY) 1 daily  vancomycin  IVPB 1000 every 12 hours      Current Abx:     Previous Abx     OTHER MEDS:  MEDICATIONS  (STANDING):  acetaminophen     Tablet .. 650 every 6 hours PRN  apixaban 5 two times a day  aspirin enteric coated 81 daily  atorvastatin 40 at bedtime  cyclobenzaprine 5 three times a day PRN  gabapentin 100 three times a day  influenza   Vaccine 0.5 once  melatonin 3 at bedtime PRN  ondansetron Injectable 4 every 8 hours PRN  polyethylene glycol 3350 17 daily  senna 2 at bedtime      Vital Signs Last 24 Hrs  T(C): 36.4 (10 Apr 2024 05:44), Max: 36.7 (09 Apr 2024 15:17)  T(F): 97.5 (10 Apr 2024 05:44), Max: 98.1 (09 Apr 2024 15:17)  HR: 81 (10 Apr 2024 05:44) (66 - 81)  BP: 129/85 (10 Apr 2024 05:44) (129/85 - 138/81)  BP(mean): --  RR: 18 (10 Apr 2024 05:44) (18 - 18)  SpO2: 97% (10 Apr 2024 05:44) (97% - 99%)    Parameters below as of 10 Apr 2024 05:44  Patient On (Oxygen Delivery Method): room air      PHYSICAL EXAM:  GENERAL: NAD, well-developed  HEAD:  Atraumatic, Normocephalic  EYES: EOMI, conjunctiva and sclera clear  CHEST/LUNG: On RA, not in respiratory distress   EXTREMITIES:  L foot dressed   PSYCH: AAOx3                          10.9   9.00  )-----------( 442      ( 10 Apr 2024 05:37 )             33.3       04-10    138  |  107  |  20  ----------------------------<  126<H>  4.0   |  18<L>  |  1.19    Ca    8.9      10 Apr 2024 05:37  Phos  3.7     04-10  Mg     1.70     04-10    TPro  7.8  /  Alb  2.9<L>  /  TBili  0.2  /  DBili  x   /  AST  51<H>  /  ALT  80<H>  /  AlkPhos  190<H>  04-10      Urinalysis Basic - ( 10 Apr 2024 05:37 )    Color: x / Appearance: x / SG: x / pH: x  Gluc: 126 mg/dL / Ketone: x  / Bili: x / Urobili: x   Blood: x / Protein: x / Nitrite: x   Leuk Esterase: x / RBC: x / WBC x   Sq Epi: x / Non Sq Epi: x / Bacteria: x        MICROBIOLOGY:  Vancomycin Level, Trough: 11.8 ug/mL (04-10-24 @ 05:37)  Vancomycin Level, Trough: 10.2 ug/mL (04-09-24 @ 17:42)  v  .Abscess Left foot wound culture  04-05-24   Moderate Staphylococcus lugdunensis  Numerous Corynebacterium striatum group "Susceptibilities not performed"  Moderate Finegoldia magna "Susceptibilities not performed"  --  Staphylococcus lugdunensis      .Blood Blood-Peripheral  03-20-24   No growth at 5 days  --  --      .Blood Blood-Peripheral  03-20-24   No growth at 5 days  --  --      Clean Catch Clean Catch (Midstream)  03-15-24   No growth  --  --      .Blood Blood-Peripheral  03-15-24   No growth at 5 days  --  --      .Blood Blood-Peripheral  03-15-24   No growth at 5 days  --  --        HIV-1 RNA Quantitative, Viral Load Log: DET.  <1.47 lg /mL (03-16-24 @ 06:05)  CMV IgG Antibody: >10.00 U/mL (03-16-24 @ 06:05)          RADIOLOGY:

## 2024-04-10 NOTE — PROGRESS NOTE ADULT - SUBJECTIVE AND OBJECTIVE BOX
Christopher Dejesus, PGY-1  Please contact on Teams    LEA MENDOZA  53y  Male    Reason for Admission:     SUBJECTIVE/INTERVAL EVENTS: No acute events. Pt seen and examined at bedside.    Physical Exam:   General: NAD  HEENT: PERRL, EOMI, normal sclera and conjunctiva, no oral lesions  Neck: Supple, no JVD  Lungs: CTA bilaterally  Heart: RRR, normal S1S2, no murmurs/rubs/gallops  Abdomen: Soft, ND/NT  Extremities: 2+ peripheral pulses, bandaged L foot, +shooting pain on R straight leg raise  Skin: Warm, well-perfused  Neuro: A&O x3, no focal deficits      Vital Signs Last 24 Hrs  T(C): 36.4 (10 Apr 2024 05:44), Max: 36.7 (09 Apr 2024 15:17)  T(F): 97.5 (10 Apr 2024 05:44), Max: 98.1 (09 Apr 2024 15:17)  HR: 81 (10 Apr 2024 05:44) (66 - 81)  BP: 129/85 (10 Apr 2024 05:44) (129/85 - 138/81)  BP(mean): --  RR: 18 (10 Apr 2024 05:44) (18 - 18)  SpO2: 97% (10 Apr 2024 05:44) (97% - 99%)    Parameters below as of 10 Apr 2024 05:44  Patient On (Oxygen Delivery Method): room air          Consultant(s) Notes Reviewed:  [x] YES  [ ] NO  Care Discussed with Consultants/Other Providers [x] YES  [ ] NO    LABS:                        10.9   9.00  )-----------( 442      ( 10 Apr 2024 05:37 )             33.3                         10.3   9.90  )-----------( 448      ( 09 Apr 2024 05:57 )             30.5                         10.2   11.16 )-----------( 455      ( 08 Apr 2024 00:20 )             29.7                               138    |  107    |  20                  Calcium: 8.9   / iCa: x      (04-10 @ 05:37)    ----------------------------<  126       Magnesium: 1.70                             4.0     |  18     |  1.19             Phosphorous: 3.7      TPro  7.8    /  Alb  2.9    /  TBili  0.2    /  DBili  x      /  AST  51     /  ALT  80     /  AlkPhos  190    10 Apr 2024 05:37    Urinalysis Basic - ( 10 Apr 2024 05:37 )    Color: x / Appearance: x / SG: x / pH: x  Gluc: 126 mg/dL / Ketone: x  / Bili: x / Urobili: x   Blood: x / Protein: x / Nitrite: x   Leuk Esterase: x / RBC: x / WBC x   Sq Epi: x / Non Sq Epi: x / Bacteria: x        RADIOLOGY & ADDITIONAL TESTS:    Imaging Personally Reviewed:  [x] YES  [ ] NO    MEDICATIONS  (STANDING):  apixaban 5 milliGRAM(s) Oral two times a day  aspirin enteric coated 81 milliGRAM(s) Oral daily  atorvastatin 40 milliGRAM(s) Oral at bedtime  bictegravir 50 mG/emtricitabine 200 mG/tenofovir alafenamide 25 mG (BIKTARVY) 1 Tablet(s) Oral daily  chlorhexidine 2% Cloths 1 Application(s) Topical daily  fluconAZOLE   Tablet 100 milliGRAM(s) Oral daily  gabapentin 300 milliGRAM(s) Oral three times a day  influenza   Vaccine 0.5 milliLiter(s) IntraMuscular once  polyethylene glycol 3350 17 Gram(s) Oral daily  senna 2 Tablet(s) Oral at bedtime  vancomycin  IVPB 1000 milliGRAM(s) IV Intermittent every 12 hours    MEDICATIONS  (PRN):  acetaminophen     Tablet .. 650 milliGRAM(s) Oral every 6 hours PRN Temp greater or equal to 38C (100.4F), Mild Pain (1 - 3)  cyclobenzaprine 5 milliGRAM(s) Oral three times a day PRN Muscle Spasm  melatonin 3 milliGRAM(s) Oral at bedtime PRN Insomnia  ondansetron Injectable 4 milliGRAM(s) IV Push every 8 hours PRN Nausea and/or Vomiting      HEALTH ISSUES - PROBLEM Dx:  Gangrenous toe    Personal history of thromboembolic disease    Oral candidiasis    History of meningitis    Renal cyst    Back pain    HIV disease    Substance use disorder    Prediabetes    Prophylactic measure    Preoperative examination

## 2024-04-10 NOTE — MEDICAL STUDENT PROGRESS NOTE(EDUCATION) - ASSESSMENT
Assessment: Mr. Hester is a 53 year old, current smoker (20 pack year history), male with a past medical history of HIV (VL undetected, CD, prior cocaine use, prior arterial thromboembolic disease, recent admission for meningitis at [place] in 2/2024 (recevied 3 weeks IV abx, left AMA) presenting with left foot gangrene and lower back pain.         Problem 1: Gangrenous toe.     Plan:   -Followed by podiatry and vascular  -Vascular planning for LLE angiogram  -Podiatry debrided to healthy tissue on 4/5 early am, considering amputation vs. local wound care  -Abx: usasyn, will add vanc to cover MRSA given healthcare exposures  -ID recs appreciated -> vanc/unasyn for gangrene.  -LLE angiogram scheduled for 4/8 (NPO at [facility], stop DVT prophylaxis) - small vessel disease noted   -Waiting on podiatry for final recommendation regarding plan for the toe   -Repeat LLE angiogram scheduled for 4/15 - open small vessel disease ahead of amputation   -abx for 2 weeks (-4/18) - continue on vanc and can finish on doxy/cephalexin      Problem 2: Personal history of thromboembolic disease.     Plan:   -Thromboses to L leg, leading to limb ischemia and gangrene  -full AC w/ Eliquis, transitioned to heparin in anticipation of LLE angiogram by vascular    Problem 3: Elevated AST and ALT     Plan:  -Elevated liver enzymes could be in the context of new antibiotic use - continue to monitor   -Hepatitis panel, liver panel    Problem 4: Lower back pain    Plan:  -Increase Gabapentin  -MRI of lumbar spine   -Most likely follow up outpatient  -Order additional Flexeril   -Acetaminophen 650 mg q6h - avoid NSAIDs due to decreased renal function     Problem 5: CKD    Plan:  -Parenchymal disease noted on ultrasound with echogenic right kidney - continue to monitor Cr and GFR for worsening kidney function   -Monitor H&H as anemia likely due to CKD    Problem 6: Oral candidiasis    Plan:   -fluconazole 100mg x7 days.      Problem 7: History of meningitis.     Plan:   -Treated at [facility] with 3 weeks of IV abx, patient left AMA  -Does not remember what pathogen or what antibiotics were used  -No signs or symptoms at the moment  -Obtaining records.      Problem 8: Renal cyst.     Plan:   -Seen on CT and US, solid mass not ruled out  -recommended f/u MRI, noted to have 1.6cm complex cyst in R upper pole, recommended for surveillance imaging (Bosniak 2F).      Problem 9: HIV disease.     Plan:   -CD4 count 595 up from 328 in March  -Continuing biktarvy.  -f/u with social work regarding HASA      Problem 10: Substance use disorder    Plan:   -SBIRT c/s for tobacco and cocaine use  -Patient expressed interest in methods to help quit smoking     Problem 11: Prediabetes    Plan:   -CC diet  -Continue education and lifestyle modification      Problem 12: Prophylactic measure    Plan:   -DVT: Eliquis  -Diet: Regular CC  -Dispo: pending PT eval    Assessment: Mr. Hester is a 53 year old, current smoker (20 pack year history), male with a past medical history of HIV (VL undetected, CD, prior cocaine use, prior arterial thromboembolic disease, recent admission for meningitis at [place] in 2/2024 (recevied 3 weeks IV abx, left AMA) presenting with left foot gangrene and lower back pain.         Problem 1: Gangrenous toe.     Plan:   -Followed by podiatry and vascular  -Vascular planning for LLE angiogram  -Podiatry debrided to healthy tissue on 4/5 early am, considering amputation vs. local wound care  -Abx: usasyn, will add vanc to cover MRSA given healthcare exposures  -ID recs appreciated -> vanc/unasyn for gangrene.  -LLE angiogram scheduled for 4/8 (NPO at [facility], stop DVT prophylaxis) - small vessel disease noted   -Waiting on podiatry for final recommendation regarding plan for the toe   -Repeat LLE angiogram scheduled for 4/15 - open small vessel disease ahead of amputation   -abx for 2 weeks (-4/18) - continue on vanc and can finish on doxy/cephalexin      Problem 2: Personal history of thromboembolic disease.     Plan:   -Thromboses to L leg, leading to limb ischemia and gangrene  -full AC w/ Eliquis, transitioned to heparin in anticipation of LLE angiogram by vascular    Problem 3: Elevated AST and ALT     Plan:  -Elevated liver enzymes could be in the context of new antibiotic use - continue to monitor   -Hepatitis panel, liver panel  -Decrease gabapentin given transaminitis     Problem 4: Lower back pain    Plan:  -MRI of lumbar spine   -Most likely follow up outpatient  -Order additional Flexeril   -Acetaminophen 650 mg q6h - avoid NSAIDs due to decreased renal function   -Repeat MRI 3 months     Problem 5: CKD    Plan:  -Parenchymal disease noted on ultrasound with echogenic right kidney - continue to monitor Cr and GFR for worsening kidney function   -Monitor H&H as anemia likely due to CKD    Problem 6: Oral candidiasis    Plan:   -fluconazole 100mg x7 days  -Stop fluconazole given transaminitis       Problem 7: History of meningitis.     Plan:   -Treated at [facility] with 3 weeks of IV abx, patient left AMA  -Does not remember what pathogen or what antibiotics were used  -No signs or symptoms at the moment  -Obtaining records.      Problem 8: Renal cyst.     Plan:   -Seen on CT and US, solid mass not ruled out  -recommended f/u MRI, noted to have 1.6cm complex cyst in R upper pole, recommended for surveillance imaging (Bosniak 2F).      Problem 9: HIV disease.     Plan:   -CD4 count 595 up from 328 in March  -Continuing biktarvy.  -f/u with social work regarding HASA      Problem 10: Substance use disorder    Plan:   -SBIRT c/s for tobacco and cocaine use  -Patient expressed interest in methods to help quit smoking     Problem 11: Prediabetes    Plan:   -CC diet  -Continue education and lifestyle modification      Problem 12: Prophylactic measure    Plan:   -DVT: Eliquis  -Diet: Regular CC  -Dispo: pending PT eval

## 2024-04-10 NOTE — PROGRESS NOTE ADULT - PROBLEM SELECTOR PLAN 2
Thromboses to L leg, leading to limb ischemia and gangrene  - 4/8 LLE angiogram - small vessel disease only, planning on repeat angiogram as above  - continue AC, eliquis unless further podiatric procedures are planned

## 2024-04-10 NOTE — PROGRESS NOTE ADULT - SUBJECTIVE AND OBJECTIVE BOX
VASCULAR SURGERY DAILY PROGRESS NOTE:     SUBJECTIVE/ROS:    Patient seen and evaluated on AM rounds.        OBJECTIVE:  Vital Signs Last 24 Hrs  T(C): 36.7 (09 Apr 2024 21:39), Max: 36.9 (09 Apr 2024 05:32)  T(F): 98.1 (09 Apr 2024 21:39), Max: 98.4 (09 Apr 2024 05:32)  HR: 66 (09 Apr 2024 21:39) (66 - 68)  BP: 132/84 (09 Apr 2024 21:39) (128/85 - 138/81)  RR: 18 (09 Apr 2024 21:39) (18 - 18)  SpO2: 99% (09 Apr 2024 21:39) (98% - 100%)    Parameters below as of 09 Apr 2024 21:39  Patient On (Oxygen Delivery Method): room air      I&O's Detail    08 Apr 2024 07:01  -  09 Apr 2024 07:00  --------------------------------------------------------  IN:  Total IN: 0 mL    OUT:    Voided (mL): 1300 mL  Total OUT: 1300 mL    Total NET: -1300 mL      09 Apr 2024 07:01  -  10 Apr 2024 05:01  --------------------------------------------------------  IN:  Total IN: 0 mL    OUT:    Voided (mL): 900 mL  Total OUT: 900 mL    Total NET: -900 mL    Daily   MEDICATIONS  (STANDING):  apixaban 5 milliGRAM(s) Oral two times a day  aspirin enteric coated 81 milliGRAM(s) Oral daily  atorvastatin 40 milliGRAM(s) Oral at bedtime  bictegravir 50 mG/emtricitabine 200 mG/tenofovir alafenamide 25 mG (BIKTARVY) 1 Tablet(s) Oral daily  chlorhexidine 2% Cloths 1 Application(s) Topical daily  fluconAZOLE   Tablet 100 milliGRAM(s) Oral daily  gabapentin 300 milliGRAM(s) Oral three times a day  influenza   Vaccine 0.5 milliLiter(s) IntraMuscular once  polyethylene glycol 3350 17 Gram(s) Oral daily  senna 2 Tablet(s) Oral at bedtime  vancomycin  IVPB 1000 milliGRAM(s) IV Intermittent every 12 hours    MEDICATIONS  (PRN):  acetaminophen     Tablet .. 650 milliGRAM(s) Oral every 6 hours PRN Temp greater or equal to 38C (100.4F), Mild Pain (1 - 3)  cyclobenzaprine 5 milliGRAM(s) Oral three times a day PRN Muscle Spasm  melatonin 3 milliGRAM(s) Oral at bedtime PRN Insomnia  ondansetron Injectable 4 milliGRAM(s) IV Push every 8 hours PRN Nausea and/or Vomiting      Labs:                        10.3   9.90  )-----------( 448      ( 09 Apr 2024 05:57 )             30.5     04-09    140  |  107  |  20  ----------------------------<  125<H>  3.8   |  19<L>  |  1.23    Ca    8.9      09 Apr 2024 05:57  Phos  3.9     04-09  Mg     1.40     04-09    TPro  7.4  /  Alb  2.8<L>  /  TBili  <0.2  /  DBili  x   /  AST  54<H>  /  ALT  74<H>  /  AlkPhos  180<H>  04-09      Urinalysis Basic - ( 09 Apr 2024 05:57 )    Color: x / Appearance: x / SG: x / pH: x  Gluc: 125 mg/dL / Ketone: x  / Bili: x / Urobili: x   Blood: x / Protein: x / Nitrite: x   Leuk Esterase: x / RBC: x / WBC x   Sq Epi: x / Non Sq Epi: x / Bacteria: x        Physical Exam:  General: NAD  Cardiovascular: appears well perfused   Respiratory: respirations non labored on RA  Gastrointestinal: soft, nontender, nondistended  Extremities: FROM, warm. B/L palpable femoral pulses. R groin access site soft, no evidence of hematoma. LLE ischemic changes to all digits. LLE palpable PT/AT pulse  Neurological: A+Ox3

## 2024-04-10 NOTE — PROGRESS NOTE ADULT - ATTENDING COMMENTS
53M hx HIV, preDM, current smoker, hx cocaine use, prior arterial thromboembolic events, a/w sepsis on admission (HR>90 and leukocytosis) 2/2  wet gangrene of L foot. Left foot xray w/ no OM, gas, or fracture  Pt seen by podiatry and s/pL foot hallux toenail removal, and excisional debridement of L foot hallux necrotic skin to the level of epidermis,  Pt c/o worsening low back pain.    PE lungs clear, heart regular,  L foot with wound dressing   -C/w vanco, cx corynebacterium and resistant lugdunensis , dc'd unasyn per ID  trend vanco trough  - MRSA negative, blood cx ngtd  - local wound care per podiatry  - mild huseyin resolving, Cr 1.3-->1.1, given fluids for angiogram   - local wound care per podiatry, plan for L foot TMA pending demarcation/ vasc recs  - Vasc following, Previous ida/pvrs concerning for small vessel disease of LLE. Xray of foot with no gas or osseous erosions.  - s/p LLE Angiogram 4/8 that showed small vessel disease  vascular surgery plan to repeat angiogram next week (?Monday) for possible intervention (stent)  - f/u podiatry for intervention likely TMA after vascular intervention  - f/up cardiology for clearance;   pt with normal LV fxn, but has reduced RV function, cards plan to do  out pt workup for RV dysfunction  - resumed on eliquis post procedure  - LFT uptrending, pending hepatitis panel, dc diflucan and reduce gabapentin to 100 mg tid as they are potentially hepatotoxic   RUQ US normal liver and echogenic R kidney  # low back pain d/t spinal stenosis reduce gabapentin in s/o elevated LFT, MRI lumbar spine L4-L5, L5-S1 disc bulges    plan of care d/w pt   Dispo pending further optimization.    PT eval - rec home PT

## 2024-04-10 NOTE — PROGRESS NOTE ADULT - ASSESSMENT
This is a 54 y/o M w/ PMHx of HIV (VL UD, , 19% 3/16/24, on Biktarvy) pre-DM, former smoker, illicit drug use, PAD, who is presenting with L foot gangrene. Pt was sent in by podiatrist outpatient for further evaluation.     Pt presented last on 3/15-17, was initially at [facility] for meningitis left AMA, presented to [facility] on 3/15 for PAD, s/p debridement of L forefoot, maceration of L foot 4th interspace Noted to be afebrile, mild leukocytosis 11. CT Aorta w/ runoff w/o acute clot, noted to have rim-enhancing collection within the left   flexor hallucis brevis muscle, measures 3.1 x 1.7 x 1.2 cm. Unfortunately left AMA on 3/17.    Returned on 3/20, with foot discoloration/pain. XR foot w/o OM, vascular surgery c/s, started on Heparin gtt, changed to Eliquis on d/c. Pt was discharged on 3/25.    Now with c/f L foot gangrene.  In the ER, pt was afebrile, vitals stable. Noted to have leukocytosis to 13, ESR 91, CRP 10.   VINEET to 1.51 (baseline 1.16), hyperkalemia to 6.   Podiatry consulted, culture sent, noted to have L foot ischemic changes, mild malodor, maceration of L foot 4th interspace L foot hallux toenail removed. Vascular surgery was consulted, no acute intervention, recommended DENIS/PVR,    Workup:   L foot XR w/o findings of OM  L foot Cx, gram stain with GPC in pairs/chains    HIV:   3/17/24 VL UD, , 19%  3/16 QuantiFeron negative     #L foot gangrene, dry   #Gram stain w/ GPC in pairs/chains  #Oral candidiasis    #HIV, well controlled, on Biktarvy   #Leukocytosis   #VINEET   Hyperkalemia   #Renal mass     Overall, 54 y/o M w/ PMHx of HIV (VL UD, , 19% 3/16/24, on Biktarvy) pre-DM, former smoker, illicit drug use, PAD, who is presenting with L foot gangrene, started on empiric Vancomycin/Unasyn. Gram strain with GPC in pairs/chains, possibly enterococcus vs strep. On exam, appears to be dry gangrene, no purulence, malodor. Clear signs of necrosis.   Seen by vascular, podiatry, pending possible TMA vs local wound care.   Cx with corynebacterium, staph lugdunensis and finegoldia, may all be skin contaminants.    S/p LLE angiogram, small vessel disease, repeat planned for next week.     Plan:   1. C/w Vancomycin, f/u trough close given VINEET. Will plan for 2 week course ending on 4/18/24 (can likely finish with Doxycyline/Cephalexin if planned for d/c before)    2. C/w Biktarvy   3. Fluconazole 100 mg x 7 days for thrush   4. F/u podiatry recs for potential TMA     Thank you for this consult. Inpatient ID team will follow.    Vik Gonzalez M.D.  Attending Physician  Division of Infectious Diseases  Department of Medicine    Please contact through MS Teams message.  Office: [phone] (after 5 PM or weekend)   This is a 54 y/o M w/ PMHx of HIV (VL UD, , 19% 3/16/24, on Biktarvy) pre-DM, former smoker, illicit drug use, PAD, who is presenting with L foot gangrene. Pt was sent in by podiatrist outpatient for further evaluation.     Pt presented last on 3/15-17, was initially at [facility] for meningitis left AMA, presented to [facility] on 3/15 for PAD, s/p debridement of L forefoot, maceration of L foot 4th interspace Noted to be afebrile, mild leukocytosis 11. CT Aorta w/ runoff w/o acute clot, noted to have rim-enhancing collection within the left   flexor hallucis brevis muscle, measures 3.1 x 1.7 x 1.2 cm. Unfortunately left AMA on 3/17.    Returned on 3/20, with foot discoloration/pain. XR foot w/o OM, vascular surgery c/s, started on Heparin gtt, changed to Eliquis on d/c. Pt was discharged on 3/25.    Now with c/f L foot gangrene.  In the ER, pt was afebrile, vitals stable. Noted to have leukocytosis to 13, ESR 91, CRP 10.   VINEET to 1.51 (baseline 1.16), hyperkalemia to 6.   Podiatry consulted, culture sent, noted to have L foot ischemic changes, mild malodor, maceration of L foot 4th interspace L foot hallux toenail removed. Vascular surgery was consulted, no acute intervention, recommended DENIS/PVR,    Workup:   L foot XR w/o findings of OM  L foot Cx, gram stain with GPC in pairs/chains    HIV:   3/17/24 VL UD, , 19%  3/16 QuantiFeron negative     #L foot gangrene, dry   #Gram stain w/ GPC in pairs/chains  #Oral candidiasis    #HIV, well controlled, on Biktarvy   #Leukocytosis   #VINEET   Hyperkalemia   #Renal mass     Overall, 54 y/o M w/ PMHx of HIV (VL UD, , 19% 3/16/24, on Biktarvy) pre-DM, former smoker, illicit drug use, PAD, who is presenting with L foot gangrene, started on empiric Vancomycin/Unasyn. Gram strain with GPC in pairs/chains, possibly enterococcus vs strep. On exam, appears to be dry gangrene, no purulence, malodor. Clear signs of necrosis.   S/p 7 days of fluconazole for possible thrush  Seen by vascular, podiatry, pending possible TMA vs local wound care.   Cx with corynebacterium, staph lugdunensis and finegoldia, may all be skin contaminants.    S/p LLE angiogram, small vessel disease, repeat planned for next week.     Plan:   1. C/w Vancomycin, f/u trough close given VINEET. Will plan for 2 week course ending on 4/18/24 (can likely finish with Doxycyline/Cephalexin if planned for d/c before)    2. C/w Biktarvy   3. F/u podiatry recs for potential TMA     Thank you for this consult. Inpatient ID team will follow.    Vik Gonzalez M.D.  Attending Physician  Division of Infectious Diseases  Department of Medicine    Please contact through MS Teams message.  Office: [phone] (after 5 PM or weekend)

## 2024-04-11 ENCOUNTER — TRANSCRIPTION ENCOUNTER (OUTPATIENT)
Age: 54
End: 2024-04-11

## 2024-04-11 LAB
ALBUMIN SERPL ELPH-MCNC: 3.1 G/DL — LOW (ref 3.3–5)
ALP SERPL-CCNC: 173 U/L — HIGH (ref 40–120)
ALT FLD-CCNC: 99 U/L — HIGH (ref 4–41)
ANION GAP SERPL CALC-SCNC: 11 MMOL/L — SIGNIFICANT CHANGE UP (ref 7–14)
AST SERPL-CCNC: 48 U/L — HIGH (ref 4–40)
BASOPHILS # BLD AUTO: 0.13 K/UL — SIGNIFICANT CHANGE UP (ref 0–0.2)
BASOPHILS NFR BLD AUTO: 1.2 % — SIGNIFICANT CHANGE UP (ref 0–2)
BILIRUB SERPL-MCNC: 0.3 MG/DL — SIGNIFICANT CHANGE UP (ref 0.2–1.2)
BUN SERPL-MCNC: 15 MG/DL — SIGNIFICANT CHANGE UP (ref 7–23)
CALCIUM SERPL-MCNC: 9.3 MG/DL — SIGNIFICANT CHANGE UP (ref 8.4–10.5)
CHLORIDE SERPL-SCNC: 107 MMOL/L — SIGNIFICANT CHANGE UP (ref 98–107)
CK MB BLD-MCNC: <4.2 % — HIGH (ref 0–2.5)
CK MB CFR SERPL CALC: <1 NG/ML — SIGNIFICANT CHANGE UP
CK SERPL-CCNC: 24 U/L — LOW (ref 30–200)
CO2 SERPL-SCNC: 21 MMOL/L — LOW (ref 22–31)
CREAT SERPL-MCNC: 1.1 MG/DL — SIGNIFICANT CHANGE UP (ref 0.5–1.3)
EGFR: 80 ML/MIN/1.73M2 — SIGNIFICANT CHANGE UP
EOSINOPHIL # BLD AUTO: 0.12 K/UL — SIGNIFICANT CHANGE UP (ref 0–0.5)
EOSINOPHIL NFR BLD AUTO: 1.1 % — SIGNIFICANT CHANGE UP (ref 0–6)
GLUCOSE SERPL-MCNC: 102 MG/DL — HIGH (ref 70–99)
HCT VFR BLD CALC: 31.9 % — LOW (ref 39–50)
HGB BLD-MCNC: 10.5 G/DL — LOW (ref 13–17)
IANC: 4.33 K/UL — SIGNIFICANT CHANGE UP (ref 1.8–7.4)
IMM GRANULOCYTES NFR BLD AUTO: 0.3 % — SIGNIFICANT CHANGE UP (ref 0–0.9)
LYMPHOCYTES # BLD AUTO: 47 % — HIGH (ref 13–44)
LYMPHOCYTES # BLD AUTO: 5.02 K/UL — HIGH (ref 1–3.3)
MAGNESIUM SERPL-MCNC: 1.6 MG/DL — SIGNIFICANT CHANGE UP (ref 1.6–2.6)
MCHC RBC-ENTMCNC: 31.5 PG — SIGNIFICANT CHANGE UP (ref 27–34)
MCHC RBC-ENTMCNC: 32.9 GM/DL — SIGNIFICANT CHANGE UP (ref 32–36)
MCV RBC AUTO: 95.8 FL — SIGNIFICANT CHANGE UP (ref 80–100)
MONOCYTES # BLD AUTO: 1.05 K/UL — HIGH (ref 0–0.9)
MONOCYTES NFR BLD AUTO: 9.8 % — SIGNIFICANT CHANGE UP (ref 2–14)
NEUTROPHILS # BLD AUTO: 4.33 K/UL — SIGNIFICANT CHANGE UP (ref 1.8–7.4)
NEUTROPHILS NFR BLD AUTO: 40.6 % — LOW (ref 43–77)
NRBC # BLD: 0 /100 WBCS — SIGNIFICANT CHANGE UP (ref 0–0)
NRBC # FLD: 0 K/UL — SIGNIFICANT CHANGE UP (ref 0–0)
PHOSPHATE SERPL-MCNC: 3.4 MG/DL — SIGNIFICANT CHANGE UP (ref 2.5–4.5)
PLATELET # BLD AUTO: 440 K/UL — HIGH (ref 150–400)
POTASSIUM SERPL-MCNC: 3.8 MMOL/L — SIGNIFICANT CHANGE UP (ref 3.5–5.3)
POTASSIUM SERPL-SCNC: 3.8 MMOL/L — SIGNIFICANT CHANGE UP (ref 3.5–5.3)
PROT SERPL-MCNC: 7.5 G/DL — SIGNIFICANT CHANGE UP (ref 6–8.3)
RBC # BLD: 3.33 M/UL — LOW (ref 4.2–5.8)
RBC # FLD: 15.7 % — HIGH (ref 10.3–14.5)
SODIUM SERPL-SCNC: 139 MMOL/L — SIGNIFICANT CHANGE UP (ref 135–145)
TROPONIN T, HIGH SENSITIVITY RESULT: 15 NG/L — SIGNIFICANT CHANGE UP
WBC # BLD: 10.68 K/UL — HIGH (ref 3.8–10.5)
WBC # FLD AUTO: 10.68 K/UL — HIGH (ref 3.8–10.5)

## 2024-04-11 PROCEDURE — 99232 SBSQ HOSP IP/OBS MODERATE 35: CPT

## 2024-04-11 PROCEDURE — 93010 ELECTROCARDIOGRAM REPORT: CPT

## 2024-04-11 PROCEDURE — 99232 SBSQ HOSP IP/OBS MODERATE 35: CPT | Mod: GC

## 2024-04-11 RX ORDER — IPRATROPIUM/ALBUTEROL SULFATE 18-103MCG
3 AEROSOL WITH ADAPTER (GRAM) INHALATION ONCE
Refills: 0 | Status: COMPLETED | OUTPATIENT
Start: 2024-04-11 | End: 2024-04-11

## 2024-04-11 RX ORDER — IPRATROPIUM/ALBUTEROL SULFATE 18-103MCG
3 AEROSOL WITH ADAPTER (GRAM) INHALATION EVERY 6 HOURS
Refills: 0 | Status: DISCONTINUED | OUTPATIENT
Start: 2024-04-11 | End: 2024-04-12

## 2024-04-11 RX ORDER — LIDOCAINE 4 G/100G
1 CREAM TOPICAL DAILY
Refills: 0 | Status: DISCONTINUED | OUTPATIENT
Start: 2024-04-11 | End: 2024-04-16

## 2024-04-11 RX ORDER — ALPRAZOLAM 0.25 MG
0.5 TABLET ORAL ONCE
Refills: 0 | Status: DISCONTINUED | OUTPATIENT
Start: 2024-04-11 | End: 2024-04-11

## 2024-04-11 RX ADMIN — Medication 250 MILLIGRAM(S): at 17:22

## 2024-04-11 RX ADMIN — Medication 3 MILLIGRAM(S): at 21:36

## 2024-04-11 RX ADMIN — CYCLOBENZAPRINE HYDROCHLORIDE 5 MILLIGRAM(S): 10 TABLET, FILM COATED ORAL at 06:03

## 2024-04-11 RX ADMIN — LIDOCAINE 1 PATCH: 4 CREAM TOPICAL at 11:48

## 2024-04-11 RX ADMIN — Medication 3 MILLILITER(S): at 22:00

## 2024-04-11 RX ADMIN — APIXABAN 5 MILLIGRAM(S): 2.5 TABLET, FILM COATED ORAL at 17:23

## 2024-04-11 RX ADMIN — Medication 250 MILLIGRAM(S): at 06:01

## 2024-04-11 RX ADMIN — GABAPENTIN 100 MILLIGRAM(S): 400 CAPSULE ORAL at 14:08

## 2024-04-11 RX ADMIN — GABAPENTIN 100 MILLIGRAM(S): 400 CAPSULE ORAL at 21:34

## 2024-04-11 RX ADMIN — Medication 1 PATCH: at 07:27

## 2024-04-11 RX ADMIN — APIXABAN 5 MILLIGRAM(S): 2.5 TABLET, FILM COATED ORAL at 06:03

## 2024-04-11 RX ADMIN — ATORVASTATIN CALCIUM 40 MILLIGRAM(S): 80 TABLET, FILM COATED ORAL at 21:36

## 2024-04-11 RX ADMIN — SENNA PLUS 2 TABLET(S): 8.6 TABLET ORAL at 21:34

## 2024-04-11 RX ADMIN — Medication 0.5 MILLIGRAM(S): at 16:13

## 2024-04-11 RX ADMIN — GABAPENTIN 100 MILLIGRAM(S): 400 CAPSULE ORAL at 06:03

## 2024-04-11 RX ADMIN — Medication 3 MILLILITER(S): at 17:28

## 2024-04-11 RX ADMIN — BICTEGRAVIR SODIUM, EMTRICITABINE, AND TENOFOVIR ALAFENAMIDE FUMARATE 1 TABLET(S): 30; 120; 15 TABLET ORAL at 11:43

## 2024-04-11 RX ADMIN — CHLORHEXIDINE GLUCONATE 1 APPLICATION(S): 213 SOLUTION TOPICAL at 11:44

## 2024-04-11 RX ADMIN — Medication 81 MILLIGRAM(S): at 11:43

## 2024-04-11 RX ADMIN — Medication 1 PATCH: at 11:47

## 2024-04-11 NOTE — PROGRESS NOTE ADULT - PROBLEM SELECTOR PLAN 6
- likely sciatica given +straight leg raise  - MR L spine given patient to stay as inpatient through next week - likely sciatica given +straight leg raise  - MR L spine given patient to stay as inpatient through next week  - lidocaine patch QD

## 2024-04-11 NOTE — PROGRESS NOTE ADULT - PROBLEM SELECTOR PLAN 8
SBIRT c/s for tobacco and cocaine use SBIRT c/s for tobacco and cocaine use  Nicotine patch and gum PRN

## 2024-04-11 NOTE — DISCHARGE NOTE PROVIDER - NSDCCPCAREPLAN_GEN_ALL_CORE_FT
PRINCIPAL DISCHARGE DIAGNOSIS  Diagnosis: Gangrene of toe  Assessment and Plan of Treatment: You had two angiograms of the L leg to attempt to improve the blood flow to your left foot. You were also given antibiotics to ensure that there is no spread of infection from your foot to the rest of your body. Your antibiotic course will finish on April 18. Please follow up with a podiatrist within one week of discharge to monitor the progression of your gangrene to determine a plan for amputation. Side effects of the antibiotics you will be taking include diarrhea, abdominal pain, and sun-sensitivity. If you will be out in the sun, please wear long sleeves, sunglasses, hat, and sunscreen. After stopping antibiotics, any side effects you have should resolve. Please return to the hospital if your foot begins to smell malodorous, is draining pus, has significantly more pain than usual, or if it is injured. Please return to the hospital if you develop fever, chest pain, shortness of breath, loss of consciousness, severe leg pain or swelling.      SECONDARY DISCHARGE DIAGNOSES  Diagnosis: Personal history of thromboembolic disease  Assessment and Plan of Treatment: Because you have a history of blood clots in the leg, please continue taking eliquis 5mg twice a day. There were no blood clots seen in your angiograms.    Diagnosis: Renal cyst  Assessment and Plan of Treatment: A cyst on your R kidney was noted on an MRI scan.  While most of these cysts are benign, there is a small chance it could develop into cancer. Please ask your PCP to repeat an MRI of the abdomen in 6 months to follow up this cyst and monitor if it changes.    Diagnosis: HIV disease  Assessment and Plan of Treatment: Continue taking Biktarvy. A refilled prescription will be given to you at discharge. Your CD4 T cell count was above 500 during this hospitalization. Please follow up with an infectious disease doctor for long-term HIV care. Please seek immediate attention from a doctor if you are unable to continue taking Biktarvy for any reason.    Diagnosis: Left shoulder pain  Assessment and Plan of Treatment: You developed shoulder pain after the angiogram on Monday 4/15. You will be sent with pain medications and lidocaine patches to take at home. Your x ray did not show any fractures or dislocations. Please follow up with an orthopedist or PCP to ensure your shoulder pain improves. Return to the hospital if you develop numbness, pins-and-needles, tingling, or paralysis of the shoulder or arm.

## 2024-04-11 NOTE — PROGRESS NOTE ADULT - SUBJECTIVE AND OBJECTIVE BOX
Infectious Diseases Follow Up:    Patient is a 53y old  Male who presents with a chief complaint of wet gangrene (11 Apr 2024 07:40)      Interval History/ROS:  No acute events noted. Pt feeling okay     Allergies  No Known Allergies        ANTIMICROBIALS:  bictegravir 50 mG/emtricitabine 200 mG/tenofovir alafenamide 25 mG (BIKTARVY) 1 daily  vancomycin  IVPB 1000 every 12 hours      Current Abx:     Previous Abx     OTHER MEDS:  MEDICATIONS  (STANDING):  acetaminophen     Tablet .. 650 every 6 hours PRN  apixaban 5 two times a day  aspirin enteric coated 81 daily  atorvastatin 40 at bedtime  cyclobenzaprine 5 three times a day PRN  gabapentin 100 three times a day  influenza   Vaccine 0.5 once  melatonin 3 at bedtime PRN  ondansetron Injectable 4 every 8 hours PRN  polyethylene glycol 3350 17 daily  senna 2 at bedtime      Vital Signs Last 24 Hrs  T(C): 36.9 (11 Apr 2024 05:26), Max: 36.9 (11 Apr 2024 05:26)  T(F): 98.4 (11 Apr 2024 05:26), Max: 98.4 (11 Apr 2024 05:26)  HR: 81 (11 Apr 2024 05:26) (81 - 95)  BP: 118/86 (11 Apr 2024 05:26) (118/86 - 134/99)  BP(mean): --  RR: 18 (11 Apr 2024 05:26) (18 - 18)  SpO2: 99% (11 Apr 2024 05:26) (97% - 99%)    Parameters below as of 11 Apr 2024 05:26  Patient On (Oxygen Delivery Method): room air        PHYSICAL EXAM:  GENERAL: NAD, well-developed  HEAD:  Atraumatic, Normocephalic  EYES: EOMI, conjunctiva and sclera clear  CHEST/LUNG: On RA, not in respiratory distress   EXTREMITIES:  L foot dressed   PSYCH: AAOx3                          10.5   10.68 )-----------( 440      ( 11 Apr 2024 07:48 )             31.9       04-11    139  |  107  |  15  ----------------------------<  102<H>  3.8   |  21<L>  |  1.10    Ca    9.3      11 Apr 2024 07:48  Phos  3.4     04-11  Mg     1.60     04-11    TPro  7.5  /  Alb  3.1<L>  /  TBili  0.3  /  DBili  x   /  AST  48<H>  /  ALT  99<H>  /  AlkPhos  173<H>  04-11      Urinalysis Basic - ( 11 Apr 2024 07:48 )    Color: x / Appearance: x / SG: x / pH: x  Gluc: 102 mg/dL / Ketone: x  / Bili: x / Urobili: x   Blood: x / Protein: x / Nitrite: x   Leuk Esterase: x / RBC: x / WBC x   Sq Epi: x / Non Sq Epi: x / Bacteria: x        MICROBIOLOGY:  v  .Abscess Left foot wound culture  04-05-24   Moderate Staphylococcus lugdunensis  Numerous Corynebacterium striatum group "Susceptibilities not performed"  Moderate Finegoldia magna "Susceptibilities not performed"  --  Staphylococcus lugdunensis      .Blood Blood-Peripheral  03-20-24   No growth at 5 days  --  --      .Blood Blood-Peripheral  03-20-24   No growth at 5 days  --  --      Clean Catch Clean Catch (Midstream)  03-15-24   No growth  --  --      .Blood Blood-Peripheral  03-15-24   No growth at 5 days  --  --      .Blood Blood-Peripheral  03-15-24   No growth at 5 days  --  --        HIV-1 RNA Quantitative, Viral Load Log: DET.  <1.47 lg /mL (03-16-24 @ 06:05)  CMV IgG Antibody: >10.00 U/mL (03-16-24 @ 06:05)          RADIOLOGY:

## 2024-04-11 NOTE — PROGRESS NOTE ADULT - ATTENDING COMMENTS
53M hx HIV, preDM, current smoker, hx cocaine use, prior arterial thromboembolic events, a/w sepsis on admission (HR>90 and leukocytosis) 2/2  wet gangrene of L foot. Left foot xray w/ no OM, gas, or fracture  Pt seen by podiatry and s/pL foot hallux toenail removal, and excisional debridement of L foot hallux necrotic skin to the level of epidermis,    PE lungs clear, heart regular,  L foot with wound dressing   -C/w vanco, cx corynebacterium and resistant lugdunensis , dc'd unasyn per ID  trend vanco trough  - MRSA negative, blood cx ngtd  - local wound care per podiatry  -  huseyin resolved   - local wound care per podiatry, plan for L foot TMA pending demarcation/ vasc recs  - Vasc following, Previous ida/pvrs concerning for small vessel disease of LLE. Xray of foot with no gas or osseous erosions.  - s/p LLE Angiogram 4/8 that showed small vessel disease  vascular surgery plan to repeat angiogram next week (TBD for intervention (stent)  - f/u podiatry for intervention likely TMA after vascular intervention  - f/up cardiology for clearance;   pt with normal LV fxn, but has reduced RV function, oupt workup per Cards  - resumed on eliquis post procedure  - LFT uptrending, hepatitis panel neg, dc'd diflucan and reduced gabapentin to 100 mg tid as they are potentially hepatotoxic, applied lidocaine patch   RUQ US normal liver and echogenic R kidney  # low back pain d/t spinal stenosis reduce gabapentin in s/o elevated LFT, MRI lumbar spine L4-L5, L5-S1 disc bulges    plan of care d/w pt   Dispo pending further optimization.    PT eval - rec home PT

## 2024-04-11 NOTE — PROGRESS NOTE ADULT - SUBJECTIVE AND OBJECTIVE BOX
VASCULAR SURGERY DAILY PROGRESS NOTE:     SUBJECTIVE/ROS:   Patient evaluated on AM rounds.     OBJECTIVE:  Vital Signs Last 24 Hrs  T(C): 36.9 (2024 05:26), Max: 36.9 (:)  T(F): 98.4 (:), Max: 98.4 (:)  HR: 81 (:) (81 - 95)  BP: 118/86 (:) (118/86 - 134/99)  RR: 18 (:) (18 - 18)  SpO2: 99% (:) (97% - 99%)    Parameters below as of 2024 05:26  Patient On (Oxygen Delivery Method): room air      I&O's Detail    2024 07:01  -  10 Apr 2024 07:00  --------------------------------------------------------  IN:  Total IN: 0 mL    OUT:    Voided (mL): 900 mL  Total OUT: 900 mL    Total NET: -900 mL      10 Apr 2024 07:01  -  2024 05:33  --------------------------------------------------------  IN:  Total IN: 0 mL    OUT:    Voided (mL): 950 mL  Total OUT: 950 mL    Total NET: -950 mL        Daily     Daily Weight in k.3 (10 Apr 2024 05:44)  MEDICATIONS  (STANDING):  apixaban 5 milliGRAM(s) Oral two times a day  aspirin enteric coated 81 milliGRAM(s) Oral daily  atorvastatin 40 milliGRAM(s) Oral at bedtime  bictegravir 50 mG/emtricitabine 200 mG/tenofovir alafenamide 25 mG (BIKTARVY) 1 Tablet(s) Oral daily  chlorhexidine 2% Cloths 1 Application(s) Topical daily  gabapentin 100 milliGRAM(s) Oral three times a day  influenza   Vaccine 0.5 milliLiter(s) IntraMuscular once  nicotine -  14 mG/24Hr(s) Patch 1 Patch Transdermal daily  polyethylene glycol 3350 17 Gram(s) Oral daily  senna 2 Tablet(s) Oral at bedtime  vancomycin  IVPB 1000 milliGRAM(s) IV Intermittent every 12 hours    MEDICATIONS  (PRN):  acetaminophen     Tablet .. 650 milliGRAM(s) Oral every 6 hours PRN Temp greater or equal to 38C (100.4F), Mild Pain (1 - 3)  cyclobenzaprine 5 milliGRAM(s) Oral three times a day PRN Muscle Spasm  melatonin 3 milliGRAM(s) Oral at bedtime PRN Insomnia  nicotine  Polacrilex Gum 2 milliGRAM(s) Oral every 2 hours PRN Smoking Cessation  ondansetron Injectable 4 milliGRAM(s) IV Push every 8 hours PRN Nausea and/or Vomiting      Labs:                        10.9   9.00  )-----------( 442      ( 10 Apr 2024 05:37 )             33.3     04-10    138  |  107  |  20  ----------------------------<  126<H>  4.0   |  18<L>  |  1.19    Ca    8.9      10 Apr 2024 05:37  Phos  3.7     04-10  Mg     1.70     10    TPro  7.8  /  Alb  2.9<L>  /  TBili  0.2  /  DBili  x   /  AST  51<H>  /  ALT  80<H>  /  AlkPhos  190<H>  10      Urinalysis Basic - ( 10 Apr 2024 05:37 )    Color: x / Appearance: x / SG: x / pH: x  Gluc: 126 mg/dL / Ketone: x  / Bili: x / Urobili: x   Blood: x / Protein: x / Nitrite: x   Leuk Esterase: x / RBC: x / WBC x   Sq Epi: x / Non Sq Epi: x / Bacteria: x          Physical Exam:  General: NAD  Cardiovascular: appears well perfused   Respiratory: respirations non labored on RA  Gastrointestinal: soft, nontender, nondistended  Extremities: FROM, warm. B/L palpable femoral pulses. R groin access site soft, no evidence of hematoma. LLE ischemic changes to all digits. LLE palpable PT/AT pulse  Neurological: A+Ox3

## 2024-04-11 NOTE — PROGRESS NOTE ADULT - ASSESSMENT
This is a 52 y/o M w/ PMHx of HIV (VL UD, , 19% 3/16/24, on Biktarvy) pre-DM, former smoker, illicit drug use, PAD, who is presenting with L foot gangrene. Pt was sent in by podiatrist outpatient for further evaluation.     Pt presented last on 3/15-17, was initially at [facility] for meningitis left AMA, presented to [facility] on 3/15 for PAD, s/p debridement of L forefoot, maceration of L foot 4th interspace Noted to be afebrile, mild leukocytosis 11. CT Aorta w/ runoff w/o acute clot, noted to have rim-enhancing collection within the left   flexor hallucis brevis muscle, measures 3.1 x 1.7 x 1.2 cm. Unfortunately left AMA on 3/17.    Returned on 3/20, with foot discoloration/pain. XR foot w/o OM, vascular surgery c/s, started on Heparin gtt, changed to Eliquis on d/c. Pt was discharged on 3/25.    Now with c/f L foot gangrene.  In the ER, pt was afebrile, vitals stable. Noted to have leukocytosis to 13, ESR 91, CRP 10.   VINEET to 1.51 (baseline 1.16), hyperkalemia to 6.   Podiatry consulted, culture sent, noted to have L foot ischemic changes, mild malodor, maceration of L foot 4th interspace L foot hallux toenail removed. Vascular surgery was consulted, no acute intervention, recommended DENIS/PVR,    Workup:   L foot XR w/o findings of OM  L foot Cx, gram stain with GPC in pairs/chains    HIV:   3/17/24 VL UD, , 19%  3/16 QuantiFeron negative     #L foot gangrene, dry   #Gram stain w/ GPC in pairs/chains  #Oral candidiasis    #HIV, well controlled, on Biktarvy   #Leukocytosis   #VINEET   Hyperkalemia   #Renal mass     Overall, 52 y/o M w/ PMHx of HIV (VL UD, , 19% 3/16/24, on Biktarvy) pre-DM, former smoker, illicit drug use, PAD, who is presenting with L foot gangrene, started on empiric Vancomycin/Unasyn. Gram strain with GPC in pairs/chains, possibly enterococcus vs strep. On exam, appears to be dry gangrene, no purulence, malodor. Clear signs of necrosis.   S/p 7 days of fluconazole for possible thrush  Seen by vascular, podiatry, pending possible TMA vs local wound care.   Cx with corynebacterium, staph lugdunensis and finegoldia, may all be skin contaminants.    S/p LLE angiogram, small vessel disease, repeat planned for next week.     Plan:   1. C/w Vancomycin, f/u trough. Will plan for 2 week course ending on 4/18/24 (can likely finish with Doxycyline/Cephalexin if planned for d/c before)    2. C/w Biktarvy   3. F/u podiatry recs for potential TMA     Thank you for this consult. Inpatient ID team will follow.    Vik Gonzalez M.D.  Attending Physician  Division of Infectious Diseases  Department of Medicine    Please contact through MS Teams message.  Office: [phone] (after 5 PM or weekend)

## 2024-04-11 NOTE — DISCHARGE NOTE PROVIDER - NSFOLLOWUPCLINICSTOKEN_GEN_ALL_ED_FT
650164:2 weeks|| ||00\01||False;460048:2 weeks|| ||00\01||False; 779834:2 weeks|| ||00\01||False;442564:2 weeks|| ||00\01||False;895182: || ||00\01||False; 028403:2 weeks|| ||00\01||False;792878:2 weeks|| ||00\01||False;086945: || ||00\01||False;233552:2 weeks|| ||00\01||False; 252512:2 weeks|| ||00\01||False;738562:2 weeks|| ||00\01||False;973714: || ||00\01||False;

## 2024-04-11 NOTE — DISCHARGE NOTE PROVIDER - NSDCMRMEDTOKEN_GEN_ALL_CORE_FT
aspirin 81 mg oral delayed release tablet: 1 tab(s) orally once a day  atorvastatin 40 mg oral tablet: 1 tab(s) orally once a day (at bedtime)  Biktarvy 50 mg-200 mg-25 mg oral tablet: 1 tab(s) orally once a day  Eliquis 5 mg oral tablet: 1 tab(s) orally 2 times a day  gabapentin 300 mg oral capsule: 1 cap(s) orally once a day (at bedtime)   aspirin 81 mg oral delayed release tablet: 1 tab(s) orally once a day  atorvastatin 40 mg oral tablet: 1 tab(s) orally once a day (at bedtime)  Biktarvy 50 mg-200 mg-25 mg oral tablet: 1 tab(s) orally once a day  Eliquis 5 mg oral tablet: 1 tab(s) orally 2 times a day  gabapentin 100 mg oral capsule: 1 cap(s) orally 3 times a day   acetaminophen 650 mg oral tablet, extended release: 1 tab(s) orally every 6 hours as needed for  moderate pain MDD: 4000 mg  aspirin 81 mg oral delayed release tablet: 1 tab(s) orally once a day  atorvastatin 40 mg oral tablet: 1 tab(s) orally once a day (at bedtime)  Biktarvy 50 mg-200 mg-25 mg oral tablet: 1 tab(s) orally once a day  cephalexin 500 mg oral tablet: 1 tab(s) orally every 6 hours Take every 6 hours until finished  doxycycline hyclate 100 mg oral capsule: 1 cap(s) orally every 12 hours Take until finished  Eliquis 5 mg oral tablet: 1 tab(s) orally 2 times a day  gabapentin 100 mg oral capsule: 1 cap(s) orally 3 times a day  lidocaine 4% topical film: Apply topically to affected area once a day as needed for  moderate pain Place on skin once per day for 12 hours at a time for pain MDD: 1 patch  Nicoderm C-Q Clear 14 mg/24 hr transdermal film, extended release: 1 patch transdermally once a day as needed for nicotine withdrawal transdermal

## 2024-04-11 NOTE — DISCHARGE NOTE PROVIDER - NSDCFUSCHEDAPPT_GEN_ALL_CORE_FT
Ulisses Rubalcava Physician [facility]  VASCULAR [phone]  Scheduled Appointment: 04/15/2024

## 2024-04-11 NOTE — MEDICAL STUDENT PROGRESS NOTE(EDUCATION) - ASSESSMENT
Assessment: Mr. Hester is a 53 year old, current smoker (20 pack year history), male with a past medical history of HIV (VL undetected, CD, prior cocaine use, prior arterial thromboembolic disease, recent admission for meningitis at [facility] in 2/2024 received 3 weeks IV abx, left AMA) presenting with left foot gangrene and lower back pain.         Problem 1: Gangrenous toe.     Plan:   -Followed by podiatry and vascular  -Vascular planning for LLE angiogram  -Podiatry debrided to healthy tissue on 4/5 early am, considering amputation vs. local wound care  -Abx: usasyn, will add vanc to cover MRSA given healthcare exposures  -ID recs appreciated -> vanc/unasyn for gangrene.  -LLE angiogram scheduled for 4/8 (NPO at [facility], stop DVT prophylaxis) - small vessel disease noted   -Waiting on podiatry for final recommendation regarding plan for the toe   -Repeat LLE angiogram scheduled for 4/15 - open small vessel disease ahead of amputation   -abx for 2 weeks (-4/18) - continue on vanc and can finish on doxy/cephalexin      Problem 2: Personal history of thromboembolic disease.     Plan:   -Thromboses to L leg, leading to limb ischemia and gangrene  -full AC w/ Eliquis, transitioned to heparin in anticipation of LLE angiogram by vascular    Problem 3: Elevated AST and ALT     Plan:  -Elevated liver enzymes could be in the context of new antibiotic use - continue to monitor   -Hepatitis panel, liver panel  -d/c fluconazole given transaminitis of unknown origin and potential adverse drug side effect     Problem 4: Lower back pain    Plan:  -MRI of lumbar spine   -Most likely follow up outpatient  -Order additional Flexeril   -Acetaminophen 650 mg q6h - avoid NSAIDs due to decreased renal function   -Repeat MRI 3 months   -Lidocaine patch daily     Problem 5: CKD    Plan:  -Parenchymal disease noted on ultrasound with echogenic right kidney - continue to monitor Cr and GFR for worsening kidney function   -Monitor H&H as anemia likely due to CKD    Problem 6: Oral candidiasis    Plan:   -fluconazole 100mg x7 days  -Stop fluconazole given transaminitis       Problem 7: History of meningitis.     Plan:   -Treated at [facility] with 3 weeks of IV abx, patient left AMA  -Does not remember what pathogen or what antibiotics were used  -No signs or symptoms at the moment  -Obtaining records.      Problem 8: Renal cyst.     Plan:   -Seen on CT and US, solid mass not ruled out  -recommended f/u MRI, noted to have 1.6cm complex cyst in R upper pole, recommended for surveillance imaging (Bosniak 2F).      Problem 9: HIV disease.     Plan:   -CD4 count 595 up from 328 in March  -Continuing biktarvy.  -f/u with social work regarding HASA      Problem 10: Substance use disorder    Plan:   -SBIRT c/s for tobacco and cocaine use  -Patient expressed interest in methods to help quit smoking   -Started nicotine patch and gum     Problem 11: Prediabetes    Plan:   -CC diet  -Continue education and lifestyle modification      Problem 12: Prophylactic measure    Plan:   -DVT: Eliquis  -Diet: Regular CC  -Dispo: pending PT paul

## 2024-04-11 NOTE — DISCHARGE NOTE PROVIDER - CARE PROVIDER_API CALL
Chacho Diehl  Orthopaedic Surgery  [address], [address]  [place] [address]  Phone: [phone]  Fax: [phone]  Established Patient  Follow Up Time:     Michele Zimmerman  Podiatric Medicine and Surgery  [address], [address]  [place] [address]  Phone: [phone]  Fax: [phone]  Established Patient  Follow Up Time:    Chacho Diehl  Orthopaedic Surgery  [address], [address]  [place] [address]  Phone: [phone]  Fax: [phone]  Established Patient  Follow Up Time: 1 week    Michele Zimmerman  Podiatric Medicine and Surgery  [address], [address]  [place] [address]  Phone: [phone]  Fax: [phone]  Established Patient  Follow Up Time:

## 2024-04-11 NOTE — PROGRESS NOTE ADULT - PROBLEM SELECTOR PLAN 3
fluconazole 100mg x7 days fluconazole 100mg x7 days  - dc'd fluconazole iso worsening transaminitis w/ unknown etiology and poss adverse effect of fluconazole  RESOLVED

## 2024-04-11 NOTE — DISCHARGE NOTE PROVIDER - NSFOLLOWUPCLINICS_GEN_ALL_ED_FT
Cardiology at [facility]  Cardiology  [address] [place] [address]  Phone: [phone]  Fax:   Follow Up Time: 2 weeks    [facility]  Internal Medicine  [address] [place] [address].  [place] [address]  Phone: [phone]  Fax:   Follow Up Time: 2 weeks     Cardiology at [facility]  Cardiology  [address] [place] [address]  Phone: [phone]  Fax:   Follow Up Time: 2 weeks    [facility]  Internal Medicine  [address] [place] [address]  Phone: [phone]  Fax:   Follow Up Time: 2 weeks    [facility]  Podiatry/Wound Care  [address] [place] [address]  Phone: [phone]  Fax: [phone]     Cardiology at [facility]  Cardiology  [address] [place] [address]  Phone: [phone]  Fax:   Follow Up Time: 2 weeks    Counts include 234 beds at the [facility]  Internal Medicine  [address] [place] [address]  Phone: [phone]  Fax:   Follow Up Time: 2 weeks    [facility]  Podiatry/Wound Care  [address] [place] [address]  Phone: [phone]  Fax: [phone]    [facility] Orthopedic Surgery  Orthopedic Surgery  [address], 3rd & 4th [place] [address]  Phone: [phone]  Fax:   Follow Up Time: 2 weeks     Cardiology at [facility]  Cardiology  [address] [place] [address]  Phone: [phone]  Fax:   Follow Up Time: 2 weeks    [facility]  Internal Medicine  [address] [facility] [place] [address]  Phone: [phone]  Fax:   Follow Up Time: 2 weeks    [facility]  Podiatry/Wound Care  [address] [place] [address]  Phone: [phone]  Fax: [phone]

## 2024-04-11 NOTE — PROGRESS NOTE ADULT - ASSESSMENT
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 Pulse exam unchanged from previous RLE +DP/PT signal and all other pulses palpable. RLE toes not currently concerning for wet gangrene, toes dry, debridement to healthy tissue over first toe of LLE. Prior DENIS/PVRs concerning for small vessel disease of LLE. Xray of foot with no gas or osseous erosions. Vascular surgery consulted for evaluation     Recommendations:   - S/p diagnostic LLE angiogram 4/8, plan for repeat angiogram next week via antegrade access, date TBD     - Appreciate medical and cardiac risk stratification for angiogram   - Continue local wound to LLE for now   - Continue to optimize medical management, ASA and statin   - Remainder care per primary       Vascular Surgery   [id number]

## 2024-04-11 NOTE — DISCHARGE NOTE PROVIDER - NSDCCPTREATMENT_GEN_ALL_CORE_FT
PRINCIPAL PROCEDURE  Procedure: Angiogram, femoral  Findings and Treatment: Brief Operative Note:  Primary Surgeon  <<----- Click to Select Surgeon  Primary Surgeon  Ulisses Rubalcava (Attending)  First Assist  Resident/Fellow  First Assist Name  Ludin Barros (Resident)  Type of Anesthesia  Local  Elective procedure  Yes  Pre-Op Diagnosis  PRE-OP DIAGNOSIS:  PAD (peripheral artery disease) 08-Apr-2024 11:13:22  Samanta, [place]  Post-Op Diagnosis  POST-OP DIAGNOSIS:  PAD (peripheral artery disease) 08-Apr-2024 11:13:32  Samanta, [place]  Procedure  PROCEDURES:  Angiogram, lower extremity, left 08-Apr-2024 11:13:15  Samanta, [place]  Operative Findings  LLE diagnostic angiogram with 3 vessel runoff, right groin access, mynx closure.  Evidence of infection or abscess identified at the start or during the surgical procedure:  No  Specimens  none  Drains  none  Estimated Blood Loss  5 milliLiter(s)  Condition Post op  Stable  Disposition  Recovery        SECONDARY PROCEDURE  Procedure: MRI  Findings and Treatment:   < end of copied text >  1. L3-L4 endplate enhancement and underlying marrow edema are greatest   along the anterior and left lateral margins of the disc space. On recent   body CT of 3/20/2024 there are exuberant marginal osteophytes at this   location suggesting these features are likely degenerative in nature.   Schmorl's node of the L4 superior endplate is an interval finding since   that recent body CT and progressed since MR 4/5/2024 ; this could account   for interval symptoms. The remaining findings are not strongly suggestive   of infection ; however, short-term follow-up evaluation with   gadolinium-enhanced MR is recommended to confirm  2. L4-L5 diffuse disc bulge with superimposed shallow right central disc   protrusion  (disc herniation)  3. L5-S1 diffuse disc bulge with superimposed broad shallow left   subarticular and foraminal disc protrusion  (disc herniation) displaces   the left S1 nerve root in the ventral lateral recess of the canal  4. Complicated right renal lesion.  See recent MR report< from: MR Lumbar Spine w/wo IV Cont (04.09.24 @ 21:31) >      Procedure: MRI  Findings and Treatment:   < end of copied text >  IMPRESSION:  Septated hemorrhagic cyst in the upper pole of the right kidney measuring   1.6 cm, most consistent with Bosniak category 2F. Follow-up imaging is   recommended to assess for stability.< from: MR Abdomen w/wo IV Cont (04.05.24 @ 14:56) >      Procedure: Ultrasound hepatic  Findings and Treatment:   < end of copied text >  TECHNIQUE: Sonography of the right upper quadrant.  FINDINGS:  Liver: Within normal limits.  Bile ducts: No gross intrahepatic biliary ductal dilatation. CBD not well   visualized.  Gallbladder: Within normal limits.  Pancreas: Poorly visualized.  Right kidney: 11.5 cm. No hydronephrosis. Increased echogenicity.  Ascites: None.  IVC: Visualized portions are within normal limits.  IMPRESSION:  Increased echogenicity of right kidney, consistent with underlying   parenchymal disease.  Liver is sonographically within normal limits.< from: US Abdomen Upper Quadrant Right (04.08.24 @ 14:23) >      Procedure: Ultrasound of abdomen with focus on kidney  Findings and Treatment:   < end of copied text >  INTERPRETATION:  CLINICAL INFORMATION: Right renal cyst. Right superior   pole lesion on CT scan.  COMPARISON: None available.  TECHNIQUE: Sonography of the kidneys and bladder.  FINDINGS:  Both kidneys are increased in echogenicity.  Right kidney: 11.7 cm. No calculus or hydronephrosis. Indeterminate   hypoechoic lesion in the upper pole measures 1.9 x 1.4 x 1.3 cm.  Left kidney: 10.8 cm. No renal mass, hydronephrosis or calculi.  Urinary bladder: Within normal limits.  Prostate: Normal size.  IMPRESSION:  Right upper pole 1.9 cm indeterminate renal lesion. Solid renal mass is   not excluded. Further evaluation with MRI with and without contrast is   recommended.< from: US Kidney and Bladder (04.05.24 @ 09:55) >

## 2024-04-11 NOTE — PROGRESS NOTE ADULT - SUBJECTIVE AND OBJECTIVE BOX
Internal Medicine   Bharati Mai | PGY-2    OVERNIGHT EVENTS: No acute overnight events.    SUBJECTIVE:       MEDICATIONS  (STANDING):  apixaban 5 milliGRAM(s) Oral two times a day  aspirin enteric coated 81 milliGRAM(s) Oral daily  atorvastatin 40 milliGRAM(s) Oral at bedtime  bictegravir 50 mG/emtricitabine 200 mG/tenofovir alafenamide 25 mG (BIKTARVY) 1 Tablet(s) Oral daily  chlorhexidine 2% Cloths 1 Application(s) Topical daily  gabapentin 100 milliGRAM(s) Oral three times a day  influenza   Vaccine 0.5 milliLiter(s) IntraMuscular once  nicotine -  14 mG/24Hr(s) Patch 1 Patch Transdermal daily  polyethylene glycol 3350 17 Gram(s) Oral daily  senna 2 Tablet(s) Oral at bedtime  vancomycin  IVPB 1000 milliGRAM(s) IV Intermittent every 12 hours    MEDICATIONS  (PRN):  acetaminophen     Tablet .. 650 milliGRAM(s) Oral every 6 hours PRN Temp greater or equal to 38C (100.4F), Mild Pain (1 - 3)  cyclobenzaprine 5 milliGRAM(s) Oral three times a day PRN Muscle Spasm  melatonin 3 milliGRAM(s) Oral at bedtime PRN Insomnia  nicotine  Polacrilex Gum 2 milliGRAM(s) Oral every 2 hours PRN Smoking Cessation  ondansetron Injectable 4 milliGRAM(s) IV Push every 8 hours PRN Nausea and/or Vomiting        T(F): 98.4 (04-11-24 @ 05:26), Max: 98.4 (04-11-24 @ 05:26)  HR: 81 (04-11-24 @ 05:26) (81 - 95)  BP: 118/86 (04-11-24 @ 05:26) (118/86 - 134/99)  BP(mean): --  RR: 18 (04-11-24 @ 05:26) (18 - 18)  SpO2: 99% (04-11-24 @ 05:26) (97% - 99%)    PHYSICAL EXAM:     GENERAL: NAD, lying in bed comfortably  HEAD:  Atraumatic, Normocephalic  EYES: EOMI, PERRLA, conjunctiva and sclera clear, no nystagmus noted  ENT: Moist mucous membranes,   NECK: Supple, No JVD, trachea midline  CHEST/LUNG: Clear to auscultation bilaterally; No rales, rhonchi, wheezing, or rubs. Unlabored respirations  HEART: Regular rate and rhythm; No murmurs, rubs, or gallops, normal S1/S2  ABDOMEN: normal bowel sounds; Soft, nontender, nondistended, no organomegaly   EXTREMITIES:  2+ Peripheral Pulses, brisk capillary refill. No clubbing, cyanosis, or edema  MSK: No gross deformities noted   Neurological:  A&Ox3, no focal deficits   SKIN: No rashes or lesions  PSYCH: Normal mood, affect     TELEMETRY:    LABS:                        10.9   9.00  )-----------( 442      ( 10 Apr 2024 05:37 )             33.3     04-10    138  |  107  |  20  ----------------------------<  126<H>  4.0   |  18<L>  |  1.19    Ca    8.9      10 Apr 2024 05:37  Phos  3.7     04-10  Mg     1.70     04-10    TPro  7.8  /  Alb  2.9<L>  /  TBili  0.2  /  DBili  x   /  AST  51<H>  /  ALT  80<H>  /  AlkPhos  190<H>  04-10            Creatinine Trend: 1.19<--, 1.23<--, 1.31<--, 1.24<--, 1.24<--, 1.38<--  I&O's Summary    09 Apr 2024 07:01  -  10 Apr 2024 07:00  --------------------------------------------------------  IN: 0 mL / OUT: 900 mL / NET: -900 mL    10 Apr 2024 07:01  -  11 Apr 2024 06:52  --------------------------------------------------------  IN: 0 mL / OUT: 1250 mL / NET: -1250 mL      BNP    RADIOLOGY & ADDITIONAL STUDIES:             Internal Medicine   Bharati Mai | PGY-2    OVERNIGHT EVENTS: No acute overnight events.    SUBJECTIVE:       MEDICATIONS  (STANDING):  apixaban 5 milliGRAM(s) Oral two times a day  aspirin enteric coated 81 milliGRAM(s) Oral daily  atorvastatin 40 milliGRAM(s) Oral at bedtime  bictegravir 50 mG/emtricitabine 200 mG/tenofovir alafenamide 25 mG (BIKTARVY) 1 Tablet(s) Oral daily  chlorhexidine 2% Cloths 1 Application(s) Topical daily  gabapentin 100 milliGRAM(s) Oral three times a day  influenza   Vaccine 0.5 milliLiter(s) IntraMuscular once  nicotine -  14 mG/24Hr(s) Patch 1 Patch Transdermal daily  polyethylene glycol 3350 17 Gram(s) Oral daily  senna 2 Tablet(s) Oral at bedtime  vancomycin  IVPB 1000 milliGRAM(s) IV Intermittent every 12 hours    MEDICATIONS  (PRN):  acetaminophen     Tablet .. 650 milliGRAM(s) Oral every 6 hours PRN Temp greater or equal to 38C (100.4F), Mild Pain (1 - 3)  cyclobenzaprine 5 milliGRAM(s) Oral three times a day PRN Muscle Spasm  melatonin 3 milliGRAM(s) Oral at bedtime PRN Insomnia  nicotine  Polacrilex Gum 2 milliGRAM(s) Oral every 2 hours PRN Smoking Cessation  ondansetron Injectable 4 milliGRAM(s) IV Push every 8 hours PRN Nausea and/or Vomiting        T(F): 98.4 (04-11-24 @ 05:26), Max: 98.4 (04-11-24 @ 05:26)  HR: 81 (04-11-24 @ 05:26) (81 - 95)  BP: 118/86 (04-11-24 @ 05:26) (118/86 - 134/99)  BP(mean): --  RR: 18 (04-11-24 @ 05:26) (18 - 18)  SpO2: 99% (04-11-24 @ 05:26) (97% - 99%)    PHYSICAL EXAM:     GENERAL: +thin, NAD, lying in bed comfortably  EYES: EOMI, conjunctiva and sclera clear, no nystagmus noted  ENT: Moist mucous membranes,   NECK: Supple, No JVD, trachea midline  CHEST/LUNG: Clear to auscultation bilaterally; No rales, rhonchi, wheezing, or rubs. Unlabored respirations  HEART: Regular rate and rhythm; No murmurs, rubs, or gallops, normal S1/S2  ABDOMEN: normal bowel sounds; Soft, nontender, nondistended, no organomegaly   EXTREMITIES:  +bandaged L foot; 2+ Peripheral Pulses, brisk capillary refill. No clubbing, cyanosis, or edema  Neurological:  A&Ox3, no focal deficits   SKIN: No rashes or lesions      TELEMETRY:    LABS:                        10.9   9.00  )-----------( 442      ( 10 Apr 2024 05:37 )             33.3     04-10    138  |  107  |  20  ----------------------------<  126<H>  4.0   |  18<L>  |  1.19    Ca    8.9      10 Apr 2024 05:37  Phos  3.7     04-10  Mg     1.70     04-10    TPro  7.8  /  Alb  2.9<L>  /  TBili  0.2  /  DBili  x   /  AST  51<H>  /  ALT  80<H>  /  AlkPhos  190<H>  04-10            Creatinine Trend: 1.19<--, 1.23<--, 1.31<--, 1.24<--, 1.24<--, 1.38<--  I&O's Summary    09 Apr 2024 07:01  -  10 Apr 2024 07:00  --------------------------------------------------------  IN: 0 mL / OUT: 900 mL / NET: -900 mL    10 Apr 2024 07:01  -  11 Apr 2024 06:52  --------------------------------------------------------  IN: 0 mL / OUT: 1250 mL / NET: -1250 mL      BNP    RADIOLOGY & ADDITIONAL STUDIES:             Internal Medicine   Bharati Ro | PGY-2    OVERNIGHT EVENTS: No acute overnight events.    SUBJECTIVE: Patient was seen and examined at bedside this morning. Pt reports unchanged back pain. Denies any nausea/vomiting/diarrhea, headache, shortness of breath, abdominal pain or chest pain/palpitations. Patient responding appropriately to questions and able to make needs known. Vital signs/imaging/telemetry events reviewed.       MEDICATIONS  (STANDING):  apixaban 5 milliGRAM(s) Oral two times a day  aspirin enteric coated 81 milliGRAM(s) Oral daily  atorvastatin 40 milliGRAM(s) Oral at bedtime  bictegravir 50 mG/emtricitabine 200 mG/tenofovir alafenamide 25 mG (BIKTARVY) 1 Tablet(s) Oral daily  chlorhexidine 2% Cloths 1 Application(s) Topical daily  gabapentin 100 milliGRAM(s) Oral three times a day  influenza   Vaccine 0.5 milliLiter(s) IntraMuscular once  nicotine -  14 mG/24Hr(s) Patch 1 Patch Transdermal daily  polyethylene glycol 3350 17 Gram(s) Oral daily  senna 2 Tablet(s) Oral at bedtime  vancomycin  IVPB 1000 milliGRAM(s) IV Intermittent every 12 hours    MEDICATIONS  (PRN):  acetaminophen     Tablet .. 650 milliGRAM(s) Oral every 6 hours PRN Temp greater or equal to 38C (100.4F), Mild Pain (1 - 3)  cyclobenzaprine 5 milliGRAM(s) Oral three times a day PRN Muscle Spasm  melatonin 3 milliGRAM(s) Oral at bedtime PRN Insomnia  nicotine  Polacrilex Gum 2 milliGRAM(s) Oral every 2 hours PRN Smoking Cessation  ondansetron Injectable 4 milliGRAM(s) IV Push every 8 hours PRN Nausea and/or Vomiting        T(F): 98.4 (04-11-24 @ 05:26), Max: 98.4 (04-11-24 @ 05:26)  HR: 81 (04-11-24 @ 05:26) (81 - 95)  BP: 118/86 (04-11-24 @ 05:26) (118/86 - 134/99)  BP(mean): --  RR: 18 (04-11-24 @ 05:26) (18 - 18)  SpO2: 99% (04-11-24 @ 05:26) (97% - 99%)    PHYSICAL EXAM:     GENERAL: +thin, NAD, lying in bed comfortably  EYES: EOMI, conjunctiva and sclera clear, no nystagmus noted  ENT: Moist mucous membranes,   NECK: Supple, No JVD, trachea midline  CHEST/LUNG: Clear to auscultation bilaterally; No rales, rhonchi, wheezing, or rubs. Unlabored respirations  HEART: Regular rate and rhythm; No murmurs, rubs, or gallops, normal S1/S2  ABDOMEN: normal bowel sounds; Soft, nontender, nondistended, no organomegaly   EXTREMITIES:  +bandaged L foot; 2+ Peripheral Pulses, brisk capillary refill. No clubbing, cyanosis, or edema  Neurological:  A&Ox3, no focal deficits   SKIN: No rashes or lesions      TELEMETRY:    LABS:                        10.9   9.00  )-----------( 442      ( 10 Apr 2024 05:37 )             33.3     04-10    138  |  107  |  20  ----------------------------<  126<H>  4.0   |  18<L>  |  1.19    Ca    8.9      10 Apr 2024 05:37  Phos  3.7     04-10  Mg     1.70     04-10    TPro  7.8  /  Alb  2.9<L>  /  TBili  0.2  /  DBili  x   /  AST  51<H>  /  ALT  80<H>  /  AlkPhos  190<H>  04-10            Creatinine Trend: 1.19<--, 1.23<--, 1.31<--, 1.24<--, 1.24<--, 1.38<--  I&O's Summary    09 Apr 2024 07:01  -  10 Apr 2024 07:00  --------------------------------------------------------  IN: 0 mL / OUT: 900 mL / NET: -900 mL    10 Apr 2024 07:01  -  11 Apr 2024 06:52  --------------------------------------------------------  IN: 0 mL / OUT: 1250 mL / NET: -1250 mL      BNP    RADIOLOGY & ADDITIONAL STUDIES:

## 2024-04-11 NOTE — DISCHARGE NOTE PROVIDER - HOSPITAL COURSE
Report received from Nurse Simental, patient is still on hold for discharge due to COVID.    HPI:  53-year-old male with a history of HIV, preDM 20-pack-year smoking history, prior cocaine use, prior arterial thromboembolic disease, recent admission for meningitis at [facility] in 2/2024 (recevied 3 weeks IV abx, left AMA) presenting with left foot gangrene. Patient has had multiple similar admissions in 3/2024 at this hospital for ischemic L foot Patient reports he was evaluated by podiatrist in the outpatient setting who sent him to the emergency department for further evaluation.  Patient has some mild back pain s/p lumbar puncture at OSH for meningitis.  Patient has been taking all of his HIV medications as prescribed and recent HIV load was undetectable.  Patient otherwise has no significant headache, chest pain, shortness of breath, N/V/D/abdominal pain, dysuria, fever/chills.  NKDA. (05 Apr 2024 11:42)    Hospital Course: Patient admitted for gangrenous L foot. Debrided by podiatry on admission. Went for traditional angiogram on 4/8, which discovered no large vessel disease, but significant small vessel disease, with plan on return to angio lab in 1 week to revascularize. Podiatry to provide further recommendations regarding possible TMA after the second angiogram. Treated with unasyn, escalated to vanc on recommendation of ID. To be discharged on doxy/cephalexin until 4/18. Repeat angio on 4/15: . Patient to follow up as outpatient with podiatry due to strong preference to leave hospital early.    Review of prior imaging revealed R renal cyst seen on CT which was recommended to be followed up with repeat imaging. Ultrasound and MR performed, classified as complex Bosniak 2F cyst, with follow up MRI recommended in 6 months (10/2024).    Noted to have possible thrush on admission, started on nystatin swish and swallow, switched to fluconazole at recommendation of ID. Treatment stopped early due to transaminitis. Thrush appears clinically resolved on discharge. RUQ sono and hepatitis panel sent for transaminitis, both negative for any cause.    MR lumbar spine performed for chronic back pain with +straight leg raise, consistent with degenerative disease, will need outpatient PT.    The patient is afebrile, hemodynamically stable and medically optimized for discharge to home with follow up with vascular, podiatry, cardiology, ID, and PCP. Patient encouraged to remain inpatient to receive final podiatry recs, though he was insistent on leaving and has capacity to make that decision. Encouraged to follow up closely as outpatient with podiatry. Patient expressed understanding and agreement. On day of discharge, patient is clinically stable with no new exam findings or acute symptoms compared to prior. The patient was seen by the attending physician on the date of discharge and deemed stable and acceptable for discharge. The patient's chronic medical conditions were treated accordingly per the patient's home medication regimen. The patient's medication reconciliation (with changes made to chronic medications), follow up appointments, discharge orders, instructions, and significant lab and diagnostic studies are as noted.    Important Medication Changes and Reason:  cephalexin  doxycycline      Active or Pending Issues Requiring Follow-up:  Podiatry follow up for gangrenous foot  PCP follow up for routine care, repeat MRI of abdomen in 6 months  ID follow up for HIV infection    Advanced Directives:   [X] Full code  [ ] DNR  [ ] Hospice       HPI:  53-year-old male with a history of HIV, preDM 20-pack-year smoking history, prior cocaine use, prior arterial thromboembolic disease, recent admission for meningitis at [facility] in 2/2024 (recevied 3 weeks IV abx, left AMA) presenting with left foot gangrene. Patient has had multiple similar admissions in 3/2024 at this hospital for ischemic L foot Patient reports he was evaluated by podiatrist in the outpatient setting who sent him to the emergency department for further evaluation.  Patient has some mild back pain s/p lumbar puncture at OSH for meningitis.  Patient has been taking all of his HIV medications as prescribed and recent HIV load was undetectable.  Patient otherwise has no significant headache, chest pain, shortness of breath, N/V/D/abdominal pain, dysuria, fever/chills.  NKDA. (05 Apr 2024 11:42)    Hospital Course: Patient admitted for gangrenous L foot. Debrided by podiatry on admission. Went for traditional angiogram on 4/8, which discovered no large vessel disease, but significant small vessel disease, with plan on return to angio lab in 1 week to revascularize. Podiatry to provide further recommendations regarding possible TMA after the second angiogram. Treated with unasyn, escalated to vanc on recommendation of ID. To be discharged on doxy/cephalexin until 4/18. Repeat angio on 4/15: . Patient to follow up as outpatient with podiatry due to strong preference to leave hospital early.    Review of prior imaging revealed R renal cyst seen on CT which was recommended to be followed up with repeat imaging. Ultrasound and MR performed, classified as complex Bosniak 2F cyst, with follow up MRI recommended in 6 months (10/2024).    Noted to have possible thrush on admission, started on nystatin swish and swallow, switched to fluconazole at recommendation of ID. Treatment stopped early due to transaminitis. Thrush appears clinically resolved on discharge. RUQ sono and hepatitis panel sent for transaminitis, both negative for any cause.    MR lumbar spine performed for chronic back pain with +straight leg raise, consistent with degenerative disease, will need outpatient PT.    Developed L shoulder pain after 2nd angiogram, with reduced, pain-limited ROM in flexion and abduction. Pain improved after 1 day, and with tylenol, lido patch. XR performed, ruled out dislocation. Plan for outpatient follow up with orthopedist to monitor improvement.    The patient is afebrile, hemodynamically stable and medically optimized for discharge to home with follow up with vascular, podiatry, cardiology, ID, and PCP. Patient encouraged to remain inpatient to receive final podiatry recs, though he was insistent on leaving and has capacity to make that decision. Encouraged to follow up closely as outpatient with podiatry. Patient expressed understanding and agreement. On day of discharge, patient is clinically stable with no new exam findings or acute symptoms compared to prior. The patient was seen by the attending physician on the date of discharge and deemed stable and acceptable for discharge. The patient's chronic medical conditions were treated accordingly per the patient's home medication regimen. The patient's medication reconciliation (with changes made to chronic medications), follow up appointments, discharge orders, instructions, and significant lab and diagnostic studies are as noted.    Important Medication Changes and Reason:  cephalexin 500 q6 through 4/18  doxycycline 100 q12 through 4/18      Active or Pending Issues Requiring Follow-up:  Podiatry follow up for gangrenous foot  PCP follow up for routine care, repeat MRI of abdomen in 6 months  ID follow up for HIV infection  Vascular follow up post-angiogram  Ortho follow up for shoulder pain    Advanced Directives:   [X] Full code  [ ] DNR  [ ] Hospice       HPI:  53-year-old male with a history of HIV, preDM 20-pack-year smoking history, prior cocaine use, prior arterial thromboembolic disease, recent admission for meningitis at [place] in 2/2024 (recevied 3 weeks IV abx, left AMA) presenting with left foot gangrene. Patient has had multiple similar admissions in 3/2024 at this hospital for ischemic L foot Patient reports he was evaluated by podiatrist in the outpatient setting who sent him to the emergency department for further evaluation.  Patient has some mild back pain s/p lumbar puncture at OSH for meningitis.  Patient has been taking all of his HIV medications as prescribed and recent HIV load was undetectable.  Patient otherwise has no significant headache, chest pain, shortness of breath, N/V/D/abdominal pain, dysuria, fever/chills.  NKDA. (05 Apr 2024 11:42)    Hospital Course: Patient admitted for gangrenous L foot. Debrided by podiatry on admission. Went for traditional angiogram on 4/8, which discovered no large vessel disease, but significant small vessel disease, with plan on return to angio lab in 1 week to revascularize. Podiatry to provide further recommendations regarding possible TMA after the second angiogram. Treated with unasyn, escalated to vanc on recommendation of ID. To be discharged on doxy/cephalexin until 4/18. Repeat angio on 4/15: . Patient to follow up as outpatient with podiatry due to strong preference to leave hospital early.    Review of prior imaging revealed R renal cyst seen on CT which was recommended to be followed up with repeat imaging. Ultrasound and MR performed, classified as complex Bosniak 2F cyst, with follow up MRI recommended in 6 months (10/2024).    Noted to have possible thrush on admission, started on nystatin swish and swallow, switched to fluconazole at recommendation of ID. Treatment stopped early due to transaminitis. Thrush appears clinically resolved on discharge. RUQ sono and hepatitis panel sent for transaminitis, both negative for any cause.    MR lumbar spine performed for chronic back pain with +straight leg raise, consistent with degenerative disease, will need outpatient PT.    Developed L shoulder pain after 2nd angiogram, with reduced, pain-limited ROM in flexion and abduction. Pain improved after 1 day, and with tylenol, lido patch. XR performed, ruled out dislocation. Plan for outpatient follow up with orthopedist to monitor improvement.    The patient is afebrile, hemodynamically stable and medically optimized for discharge to home with follow up with vascular, podiatry, cardiology, ID, and PCP. Encouraged to follow up closely as outpatient with podiatry. Patient expressed understanding and agreement. On day of discharge, patient is clinically stable with no new exam findings or acute symptoms compared to prior. The patient was seen by the attending physician on the date of discharge and deemed stable and acceptable for discharge. The patient's chronic medical conditions were treated accordingly per the patient's home medication regimen. The patient's medication reconciliation (with changes made to chronic medications), follow up appointments, discharge orders, instructions, and significant lab and diagnostic studies are as noted.    Important Medication Changes and Reason:  cephalexin 500 q6 through 4/18  doxycycline 100 q12 through 4/18      Active or Pending Issues Requiring Follow-up:  Podiatry follow up for gangrenous foot  PCP follow up for routine care, repeat MRI of abdomen in 6 months  ID follow up for HIV infection  Vascular follow up post-angiogram  Ortho follow up for shoulder pain    Advanced Directives:   [X] Full code  [ ] DNR  [ ] Hospice       HPI:  53-year-old male with a history of HIV, preDM 20-pack-year smoking history, prior cocaine use, prior arterial thromboembolic disease, recent admission for meningitis at [place] [facility] in 2/2024 (recevied 3 weeks IV abx, left AMA) presenting with left foot gangrene. Patient has had multiple similar admissions in 3/2024 at this hospital for ischemic L foot Patient reports he was evaluated by podiatrist in the outpatient setting who sent him to the emergency department for further evaluation.  Patient has some mild back pain s/p lumbar puncture at OSH for meningitis.  Patient has been taking all of his HIV medications as prescribed and recent HIV load was undetectable.  Patient otherwise has no significant headache, chest pain, shortness of breath, N/V/D/abdominal pain, dysuria, fever/chills.  NKDA. (05 Apr 2024 11:42)    Hospital Course: Patient admitted for gangrenous L foot. Debrided by podiatry on admission. Went for traditional angiogram on 4/8, which discovered no large vessel disease, but significant small vessel disease, with plan on return to angio lab in 1 week to revascularize. Podiatry to provide further recommendations regarding possible TMA after the second angiogram. Treated with unasyn, escalated to vanc on recommendation of ID. To be discharged on doxy/cephalexin until 4/18. Repeat angio on 4/15: . Patient to follow up as outpatient with podiatry due to strong preference to leave hospital early.    Review of prior imaging revealed R renal cyst seen on CT which was recommended to be followed up with repeat imaging. Ultrasound and MR performed, classified as complex Bosniak 2F cyst, with follow up MRI recommended in 6 months (10/2024).    Noted to have possible thrush on admission, started on nystatin swish and swallow, switched to fluconazole at recommendation of ID. Treatment stopped early due to transaminitis. Thrush appears clinically resolved on discharge. RUQ sono and hepatitis panel sent for transaminitis, both negative for any cause.    MR lumbar spine performed for chronic back pain with +straight leg raise, consistent with degenerative disease, will need outpatient PT.    Developed L shoulder pain after 2nd angiogram, with reduced, pain-limited ROM in flexion and abduction. Pain improved after 1 day, and with tylenol, lido patch. XR performed, ruled out dislocation. Plan for outpatient follow up with orthopedist to monitor improvement.    The patient is afebrile, hemodynamically stable and medically optimized for discharge to home with follow up with vascular, podiatry, cardiology, ID, and PCP. Encouraged to follow up closely as outpatient with podiatry. Patient expressed understanding and agreement. On day of discharge, patient is clinically stable with no new exam findings or acute symptoms compared to prior. The patient was seen by the attending physician on the date of discharge and deemed stable and acceptable for discharge. The patient's chronic medical conditions were treated accordingly per the patient's home medication regimen. The patient's medication reconciliation (with changes made to chronic medications), follow up appointments, discharge orders, instructions, and significant lab and diagnostic studies are as noted.    Important Medication Changes and Reason:  cephalexin 500 q6 through 4/18  doxycycline 100 q12 through 4/18  gabapentin dose decreased to 100 q8 due to elevated liver enzymes      Active or Pending Issues Requiring Follow-up:  Podiatry follow up for gangrenous foot  PCP follow up for routine care, repeat MRI of abdomen in 6 months  ID follow up for HIV infection  Vascular follow up post-angiogram  Ortho follow up for shoulder pain    Advanced Directives:   [X] Full code  [ ] DNR  [ ] Hospice

## 2024-04-11 NOTE — DISCHARGE NOTE PROVIDER - NSDCQMERRANDS_GEN_ALL_CORE
Anticoagulation Clinic - Remote Progress Note  HOME MONITOR (mdINR)  Frequency of Monitoring: weekly, every Wednesday    Indication: afib  Referring Provider: Cyndi  Initial Warfarin Start Date: 15 years ago  Goal INR: 2-3  Current Drug Interactions: aspirin, levothyroxine, ropinirole  CHADS-VASc: 5 (age, gender, HTN, CHF)    Diet: frequent, consistent  Alcohol: none  Tobacco: none  OTC Pain Medication: Excedrin (meloxicam)    1st clinic visit: 10/18/17    INR History:  Date  10/4 10/9 10/18 10/25 11/1 11/8 11/15 11/22 11/29 12/6 12/11 12/13   Total Weekly Dose 16.5 mg 14mg 14mg 14mg 11mg 9mg 10mg 10mg 9mg 9mg 9mg 10mg 11mg   INR  1.8 2.5 3.8 3.4 1.9 2.2 3.3 2.7 2.5 1.8 1.84 2.5   Notes pre- amio amio start 9/27 (BID)  clinic    Macrobid    PAT      Date 12/20 12/27 1/3/18 1/8 1/15 1/24 1/31 2/7 2/14 2/21 2/28 3/7   Total Weekly Dose 10mg 9mg 12mg 13mg 14mg 14mg 14mg 14mg 14mg 14mg 14mg 14mg   INR 2.2 1.7 1.7 1.9 2.2 2.6 2.7 2.4 2.9 2.5 1.9 2.0   Notes amio stop 12/14 1 missed dose boost              Date 3/14 3/21 3/28 4/4 4/11           Total Weekly Dose 14 mg 14 mg 13mg 13mg 12mg           INR 3.4 3.3 2.9 3.8            Notes recom LGV decr dose                Phone Interview:  Tablet Strength: pt has 2mg tablets  Current Maintenance Dose: 2mg daily  Patient Contact Info: [phone] (Nazia Britton, daughter)  Lab Contact Info: [phone] mdINR    Patient Findings   Negatives Signs/symptoms of thrombosis, Signs/symptoms of bleeding, Laboratory test error suspected, Change in health, Change in alcohol use, Change in activity, Upcoming invasive procedure, Emergency department visit, Upcoming dental procedure, Missed doses, Extra doses, Change in medications, Change in diet/appetite, Hospital admission, Bruising, Other complaints   Comments Spoke with pt's daughter, Nazia. She says Mrs. Judge diet has remained consistent and there have been no changes in medications.      Plan:  1. INR is  supratherepeutic today at 3.8, so instructed Mrs. Judge's daughter to decrease warfarin to 2mg daily except 1mg on WedSat (a 7.7% weekly dose decrease).  Pt has been moderately well controlled on doses between 13-14mg in the past, so will need to monitor closely if INR drops too low.   2. Repeat INR in 1 week. Patient typically tests every Wednesday.   3. Verbal information provided over the phone to patient's daughter, Nazia, who RBV dosing instructions, expresses understanding with teach back, and has no further questions at this time.     Eugene Pérez, Pharmacy Intern  4/4/2018  2:02 PM     I, Lita Pascual, PharmD, have reviewed the note in full and agree with the assessment and plan.  04/04/18  2:34 PM   Yes

## 2024-04-11 NOTE — DISCHARGE NOTE PROVIDER - PROVIDER TOKENS
PROVIDER:[TOKEN:[[id number]],ESTABLISHEDPATIENT:[T]],PROVIDER:[TOKEN:[[id number]],ESTABLISHEDPATIENT:[T]] PROVIDER:[TOKEN:[[id number]],FOLLOWUP:[1 week],ESTABLISHEDPATIENT:[T]],PROVIDER:[TOKEN:[[id number]],ESTABLISHEDPATIENT:[T]]

## 2024-04-11 NOTE — DISCHARGE NOTE PROVIDER - NSDCFUADDAPPT_GEN_ALL_CORE_FT
APPTS ARE READY TO BE MADE: [ ] YES    Best Family or Patient Contact (if needed):  [phone]    Additional Information about above appointments (if needed):    1: Please follow up with primary care within 2 weeks of being discharged from the hospital.  2: Please follow up with cardiology within 2 weeks of being discharged from the hospital to assess your reduced heart function.   3:     Other comments or requests:    APPTS ARE READY TO BE MADE: [X] YES    Best Family or Patient Contact (if needed):  [phone]    Additional Information about above appointments (if needed):    1: Please follow up with primary care within 2 weeks of being discharged from the hospital.  2: Please follow up with cardiology within 2 weeks of being discharged from the hospital to assess your reduced heart function.   3: Please follow up with podiatry within 2 weeks of being discharged from the hospital.    Other comments or requests:    APPTS ARE READY TO BE MADE: [X] YES    Best Family or Patient Contact (if needed):  [phone]    Additional Information about above appointments (if needed):    1: Please follow up with primary care within 2 weeks of being discharged from the hospital.  2: Please follow up with cardiology within 2 weeks of being discharged from the hospital to assess your reduced heart function.   3: Please follow up with podiatry within 2 weeks of being discharged from the hospital.    Other comments or requests:     Podiatry Discharge Instructions:  Follow up: Please follow up with Dr. Michele Zimmerman within 1 week of discharge from the hospital, please call [phone] for appointment and discuss that you recently were seen in the hospital.  Wound Care: Please apply betadine to the left foot followed by 4x4 gauze and una daily.  Weight bearing: Please weight bear as tolerated in a surgical shoe to the left foot.  Antibiotics: Please continue as instructed.   APPTS ARE READY TO BE MADE: [X] YES    Best Family or Patient Contact (if needed):  [phone]    Additional Information about above appointments (if needed):    1: Please follow up with primary care within 2 weeks of being discharged from the hospital.  2: Please follow up with cardiology within 2 weeks of being discharged from the hospital to assess your reduced heart function.   3: Please follow up with podiatry within 2 weeks of being discharged from the hospital.  4: Please follow up with orthopedics within 2 weeks of being discharged from the hospital.    Other comments or requests:     Podiatry Discharge Instructions:  Follow up: Please follow up with Dr. Michele Zimmerman within 1 week of discharge from the hospital, please call [phone] for appointment and discuss that you recently were seen in the hospital.  Wound Care: Please apply betadine to the left foot followed by 4x4 gauze and una daily.  Weight bearing: Please weight bear as tolerated in a surgical shoe to the left foot.  Antibiotics: Please continue as instructed.   APPTS ARE READY TO BE MADE: [X] YES    Best Family or Patient Contact (if needed):  [phone]    Additional Information about above appointments (if needed):    1: Please follow up with primary care within 2 weeks of being discharged from the hospital.  2: Please follow up with cardiology within 2 weeks of being discharged from the hospital to assess your reduced heart function.   3: Please follow up with podiatry within 2 weeks of being discharged from the hospital.  4: Please follow up with orthopedics within 1 week of being discharged from the hospital. (Dr. Diehl)    Other comments or requests:     Podiatry Discharge Instructions:  Follow up: Please follow up with Dr. Michele Zimmerman within 1 week of discharge from the hospital, please call [phone] for appointment and discuss that you recently were seen in the hospital.  Wound Care: Please apply betadine to the left foot followed by 4x4 gauze and una daily.  Weight bearing: Please weight bear as tolerated in a surgical shoe to the left foot.  Antibiotics: Please continue as instructed.   APPTS ARE READY TO BE MADE: [X] YES    Best Family or Patient Contact (if needed):  [phone]    Additional Information about above appointments (if needed):    1: Please follow up with primary care within 2 weeks of being discharged from the hospital.  2: Please follow up with cardiology within 2 weeks of being discharged from the hospital to assess your reduced heart function.   3: Please follow up with podiatry within 2 weeks of being discharged from the hospital.  4: Please follow up with orthopedics within 1 week of being discharged from the hospital. (Dr. Diehl)    Other comments or requests:     Podiatry Discharge Instructions:  Follow up: Please follow up with Dr. Michele Zimmerman within 1 week of discharge from the hospital, please call [phone] for appointment and discuss that you recently were seen in the hospital.  Wound Care: Please apply betadine to the left foot followed by 4x4 gauze and una daily.  Weight bearing: Please weight bear as tolerated in a surgical shoe to the left foot.  Antibiotics: Please continue as instructed.    Provided patient with provider referral information, however patient prefers to schedule the appointments on their own.

## 2024-04-12 LAB
ALBUMIN SERPL ELPH-MCNC: 3.1 G/DL — LOW (ref 3.3–5)
ALP SERPL-CCNC: 189 U/L — HIGH (ref 40–120)
ALT FLD-CCNC: 95 U/L — HIGH (ref 4–41)
ANION GAP SERPL CALC-SCNC: 13 MMOL/L — SIGNIFICANT CHANGE UP (ref 7–14)
APTT BLD: 27.8 SEC — SIGNIFICANT CHANGE UP (ref 24.5–35.6)
AST SERPL-CCNC: 47 U/L — HIGH (ref 4–40)
BASOPHILS # BLD AUTO: 0.14 K/UL — SIGNIFICANT CHANGE UP (ref 0–0.2)
BASOPHILS NFR BLD AUTO: 1.2 % — SIGNIFICANT CHANGE UP (ref 0–2)
BILIRUB SERPL-MCNC: 0.2 MG/DL — SIGNIFICANT CHANGE UP (ref 0.2–1.2)
BLD GP AB SCN SERPL QL: NEGATIVE — SIGNIFICANT CHANGE UP
BUN SERPL-MCNC: 16 MG/DL — SIGNIFICANT CHANGE UP (ref 7–23)
CALCIUM SERPL-MCNC: 9 MG/DL — SIGNIFICANT CHANGE UP (ref 8.4–10.5)
CHLORIDE SERPL-SCNC: 105 MMOL/L — SIGNIFICANT CHANGE UP (ref 98–107)
CO2 SERPL-SCNC: 19 MMOL/L — LOW (ref 22–31)
CREAT SERPL-MCNC: 1.12 MG/DL — SIGNIFICANT CHANGE UP (ref 0.5–1.3)
EGFR: 79 ML/MIN/1.73M2 — SIGNIFICANT CHANGE UP
EOSINOPHIL # BLD AUTO: 0.09 K/UL — SIGNIFICANT CHANGE UP (ref 0–0.5)
EOSINOPHIL NFR BLD AUTO: 0.8 % — SIGNIFICANT CHANGE UP (ref 0–6)
GLUCOSE SERPL-MCNC: 113 MG/DL — HIGH (ref 70–99)
HCT VFR BLD CALC: 30.2 % — LOW (ref 39–50)
HCT VFR BLD CALC: 32 % — LOW (ref 39–50)
HGB BLD-MCNC: 10.1 G/DL — LOW (ref 13–17)
HGB BLD-MCNC: 10.7 G/DL — LOW (ref 13–17)
IANC: 5.09 K/UL — SIGNIFICANT CHANGE UP (ref 1.8–7.4)
IMM GRANULOCYTES NFR BLD AUTO: 0.4 % — SIGNIFICANT CHANGE UP (ref 0–0.9)
LYMPHOCYTES # BLD AUTO: 4.71 K/UL — HIGH (ref 1–3.3)
LYMPHOCYTES # BLD AUTO: 40.6 % — SIGNIFICANT CHANGE UP (ref 13–44)
MAGNESIUM SERPL-MCNC: 1.5 MG/DL — LOW (ref 1.6–2.6)
MCHC RBC-ENTMCNC: 31.9 PG — SIGNIFICANT CHANGE UP (ref 27–34)
MCHC RBC-ENTMCNC: 32.4 PG — SIGNIFICANT CHANGE UP (ref 27–34)
MCHC RBC-ENTMCNC: 33.4 GM/DL — SIGNIFICANT CHANGE UP (ref 32–36)
MCHC RBC-ENTMCNC: 33.4 GM/DL — SIGNIFICANT CHANGE UP (ref 32–36)
MCV RBC AUTO: 95.5 FL — SIGNIFICANT CHANGE UP (ref 80–100)
MCV RBC AUTO: 96.8 FL — SIGNIFICANT CHANGE UP (ref 80–100)
MONOCYTES # BLD AUTO: 1.52 K/UL — HIGH (ref 0–0.9)
MONOCYTES NFR BLD AUTO: 13.1 % — SIGNIFICANT CHANGE UP (ref 2–14)
NEUTROPHILS # BLD AUTO: 5.09 K/UL — SIGNIFICANT CHANGE UP (ref 1.8–7.4)
NEUTROPHILS NFR BLD AUTO: 43.9 % — SIGNIFICANT CHANGE UP (ref 43–77)
NRBC # BLD: 0 /100 WBCS — SIGNIFICANT CHANGE UP (ref 0–0)
NRBC # BLD: 0 /100 WBCS — SIGNIFICANT CHANGE UP (ref 0–0)
NRBC # FLD: 0 K/UL — SIGNIFICANT CHANGE UP (ref 0–0)
NRBC # FLD: 0 K/UL — SIGNIFICANT CHANGE UP (ref 0–0)
PHOSPHATE SERPL-MCNC: 3.9 MG/DL — SIGNIFICANT CHANGE UP (ref 2.5–4.5)
PLATELET # BLD AUTO: 383 K/UL — SIGNIFICANT CHANGE UP (ref 150–400)
PLATELET # BLD AUTO: 408 K/UL — HIGH (ref 150–400)
POTASSIUM SERPL-MCNC: 3.6 MMOL/L — SIGNIFICANT CHANGE UP (ref 3.5–5.3)
POTASSIUM SERPL-SCNC: 3.6 MMOL/L — SIGNIFICANT CHANGE UP (ref 3.5–5.3)
PROT SERPL-MCNC: 7.5 G/DL — SIGNIFICANT CHANGE UP (ref 6–8.3)
RBC # BLD: 3.12 M/UL — LOW (ref 4.2–5.8)
RBC # BLD: 3.35 M/UL — LOW (ref 4.2–5.8)
RBC # FLD: 15.7 % — HIGH (ref 10.3–14.5)
RBC # FLD: 15.9 % — HIGH (ref 10.3–14.5)
RH IG SCN BLD-IMP: POSITIVE — SIGNIFICANT CHANGE UP
SODIUM SERPL-SCNC: 137 MMOL/L — SIGNIFICANT CHANGE UP (ref 135–145)
TROPONIN T, HIGH SENSITIVITY RESULT: 16 NG/L — SIGNIFICANT CHANGE UP
TROPONIN T, HIGH SENSITIVITY RESULT: 16 NG/L — SIGNIFICANT CHANGE UP
VANCOMYCIN TROUGH SERPL-MCNC: 12.7 UG/ML — SIGNIFICANT CHANGE UP (ref 10–20)
WBC # BLD: 11.6 K/UL — HIGH (ref 3.8–10.5)
WBC # BLD: 9.81 K/UL — SIGNIFICANT CHANGE UP (ref 3.8–10.5)
WBC # FLD AUTO: 11.6 K/UL — HIGH (ref 3.8–10.5)
WBC # FLD AUTO: 9.81 K/UL — SIGNIFICANT CHANGE UP (ref 3.8–10.5)

## 2024-04-12 PROCEDURE — 99232 SBSQ HOSP IP/OBS MODERATE 35: CPT

## 2024-04-12 PROCEDURE — 99232 SBSQ HOSP IP/OBS MODERATE 35: CPT | Mod: GC

## 2024-04-12 RX ORDER — HEPARIN SODIUM 5000 [USP'U]/ML
3000 INJECTION INTRAVENOUS; SUBCUTANEOUS EVERY 6 HOURS
Refills: 0 | Status: DISCONTINUED | OUTPATIENT
Start: 2024-04-12 | End: 2024-04-15

## 2024-04-12 RX ORDER — HEPARIN SODIUM 5000 [USP'U]/ML
INJECTION INTRAVENOUS; SUBCUTANEOUS
Qty: 25000 | Refills: 0 | Status: DISCONTINUED | OUTPATIENT
Start: 2024-04-12 | End: 2024-04-15

## 2024-04-12 RX ORDER — ALBUTEROL 90 UG/1
1 AEROSOL, METERED ORAL EVERY 4 HOURS
Refills: 0 | Status: COMPLETED | OUTPATIENT
Start: 2024-04-12 | End: 2025-03-11

## 2024-04-12 RX ORDER — HEPARIN SODIUM 5000 [USP'U]/ML
6500 INJECTION INTRAVENOUS; SUBCUTANEOUS EVERY 6 HOURS
Refills: 0 | Status: DISCONTINUED | OUTPATIENT
Start: 2024-04-12 | End: 2024-04-15

## 2024-04-12 RX ORDER — IPRATROPIUM/ALBUTEROL SULFATE 18-103MCG
3 AEROSOL WITH ADAPTER (GRAM) INHALATION ONCE
Refills: 0 | Status: COMPLETED | OUTPATIENT
Start: 2024-04-12 | End: 2024-04-12

## 2024-04-12 RX ORDER — ALBUTEROL 90 UG/1
1 AEROSOL, METERED ORAL EVERY 4 HOURS
Refills: 0 | Status: DISCONTINUED | OUTPATIENT
Start: 2024-04-12 | End: 2024-04-16

## 2024-04-12 RX ORDER — MAGNESIUM SULFATE 500 MG/ML
2 VIAL (ML) INJECTION ONCE
Refills: 0 | Status: COMPLETED | OUTPATIENT
Start: 2024-04-12 | End: 2024-04-12

## 2024-04-12 RX ADMIN — ATORVASTATIN CALCIUM 40 MILLIGRAM(S): 80 TABLET, FILM COATED ORAL at 22:30

## 2024-04-12 RX ADMIN — HEPARIN SODIUM 1500 UNIT(S)/HR: 5000 INJECTION INTRAVENOUS; SUBCUTANEOUS at 20:19

## 2024-04-12 RX ADMIN — LIDOCAINE 1 PATCH: 4 CREAM TOPICAL at 20:56

## 2024-04-12 RX ADMIN — GABAPENTIN 100 MILLIGRAM(S): 400 CAPSULE ORAL at 05:17

## 2024-04-12 RX ADMIN — APIXABAN 5 MILLIGRAM(S): 2.5 TABLET, FILM COATED ORAL at 05:17

## 2024-04-12 RX ADMIN — Medication 1 PATCH: at 08:31

## 2024-04-12 RX ADMIN — Medication 250 MILLIGRAM(S): at 05:17

## 2024-04-12 RX ADMIN — BICTEGRAVIR SODIUM, EMTRICITABINE, AND TENOFOVIR ALAFENAMIDE FUMARATE 1 TABLET(S): 30; 120; 15 TABLET ORAL at 12:44

## 2024-04-12 RX ADMIN — Medication 25 GRAM(S): at 10:27

## 2024-04-12 RX ADMIN — CHLORHEXIDINE GLUCONATE 1 APPLICATION(S): 213 SOLUTION TOPICAL at 14:49

## 2024-04-12 RX ADMIN — LIDOCAINE 1 PATCH: 4 CREAM TOPICAL at 12:44

## 2024-04-12 RX ADMIN — Medication 3 MILLILITER(S): at 02:54

## 2024-04-12 RX ADMIN — LIDOCAINE 1 PATCH: 4 CREAM TOPICAL at 23:59

## 2024-04-12 RX ADMIN — HEPARIN SODIUM 1500 UNIT(S)/HR: 5000 INJECTION INTRAVENOUS; SUBCUTANEOUS at 18:17

## 2024-04-12 RX ADMIN — Medication 81 MILLIGRAM(S): at 12:44

## 2024-04-12 RX ADMIN — Medication 250 MILLIGRAM(S): at 17:22

## 2024-04-12 RX ADMIN — Medication 1 PATCH: at 11:08

## 2024-04-12 RX ADMIN — Medication 3 MILLILITER(S): at 15:47

## 2024-04-12 RX ADMIN — GABAPENTIN 100 MILLIGRAM(S): 400 CAPSULE ORAL at 22:29

## 2024-04-12 RX ADMIN — POLYETHYLENE GLYCOL 3350 17 GRAM(S): 17 POWDER, FOR SOLUTION ORAL at 12:44

## 2024-04-12 RX ADMIN — GABAPENTIN 100 MILLIGRAM(S): 400 CAPSULE ORAL at 14:49

## 2024-04-12 RX ADMIN — Medication 3 MILLILITER(S): at 10:01

## 2024-04-12 RX ADMIN — Medication 1 PATCH: at 12:43

## 2024-04-12 RX ADMIN — Medication 1 PATCH: at 20:57

## 2024-04-12 NOTE — PROGRESS NOTE ADULT - SUBJECTIVE AND OBJECTIVE BOX
Christopher Dejesus, PGY-1  Please contact on Teams    LEA MENDOZA  53y  Male    Reason for Admission:     SUBJECTIVE/INTERVAL EVENTS: No acute events. Pt seen and examined at bedside.    Physical Exam:   GENERAL: +thin, NAD, lying in bed comfortably  EYES: EOMI, conjunctiva and sclera clear, no nystagmus noted  ENT: Moist mucous membranes,   NECK: Supple, No JVD, trachea midline  CHEST/LUNG: Clear to auscultation bilaterally; No rales, rhonchi, wheezing, or rubs. Unlabored respirations  HEART: Regular rate and rhythm; No murmurs, rubs, or gallops, normal S1/S2  ABDOMEN: normal bowel sounds; Soft, nontender, nondistended, no organomegaly   EXTREMITIES:  +bandaged L foot; 2+ Peripheral Pulses, brisk capillary refill. No clubbing, cyanosis, or edema  Neurological:  A&Ox3, no focal deficits   SKIN: No rashes or lesions      Vital Signs Last 24 Hrs  T(C): 36.8 (12 Apr 2024 06:00), Max: 37.1 (11 Apr 2024 17:14)  T(F): 98.2 (12 Apr 2024 06:00), Max: 98.7 (11 Apr 2024 17:14)  HR: 87 (12 Apr 2024 06:00) (76 - 97)  BP: 107/63 (12 Apr 2024 06:00) (104/67 - 128/82)  BP(mean): --  RR: 18 (12 Apr 2024 06:00) (18 - 18)  SpO2: 100% (12 Apr 2024 06:00) (97% - 100%)    Parameters below as of 12 Apr 2024 06:00  Patient On (Oxygen Delivery Method): room air          Consultant(s) Notes Reviewed:  [x] YES  [ ] NO  Care Discussed with Consultants/Other Providers [x] YES  [ ] NO    LABS:                        10.5   10.68 )-----------( 440      ( 11 Apr 2024 07:48 )             31.9                         10.9   9.00  )-----------( 442      ( 10 Apr 2024 05:37 )             33.3         Urinalysis Basic - ( 11 Apr 2024 07:48 )    Color: x / Appearance: x / SG: x / pH: x  Gluc: 102 mg/dL / Ketone: x  / Bili: x / Urobili: x   Blood: x / Protein: x / Nitrite: x   Leuk Esterase: x / RBC: x / WBC x   Sq Epi: x / Non Sq Epi: x / Bacteria: x        RADIOLOGY & ADDITIONAL TESTS:    Imaging Personally Reviewed:  [x] YES  [ ] NO    MEDICATIONS  (STANDING):  albuterol/ipratropium for Nebulization 3 milliLiter(s) Nebulizer every 6 hours  apixaban 5 milliGRAM(s) Oral two times a day  aspirin enteric coated 81 milliGRAM(s) Oral daily  atorvastatin 40 milliGRAM(s) Oral at bedtime  bictegravir 50 mG/emtricitabine 200 mG/tenofovir alafenamide 25 mG (BIKTARVY) 1 Tablet(s) Oral daily  chlorhexidine 2% Cloths 1 Application(s) Topical daily  gabapentin 100 milliGRAM(s) Oral three times a day  influenza   Vaccine 0.5 milliLiter(s) IntraMuscular once  lidocaine   4% Patch 1 Patch Transdermal daily  nicotine -  14 mG/24Hr(s) Patch 1 Patch Transdermal daily  polyethylene glycol 3350 17 Gram(s) Oral daily  senna 2 Tablet(s) Oral at bedtime  vancomycin  IVPB 1000 milliGRAM(s) IV Intermittent every 12 hours    MEDICATIONS  (PRN):  acetaminophen     Tablet .. 650 milliGRAM(s) Oral every 6 hours PRN Temp greater or equal to 38C (100.4F), Mild Pain (1 - 3)  cyclobenzaprine 5 milliGRAM(s) Oral three times a day PRN Muscle Spasm  melatonin 3 milliGRAM(s) Oral at bedtime PRN Insomnia  nicotine  Polacrilex Gum 2 milliGRAM(s) Oral every 2 hours PRN Smoking Cessation  ondansetron Injectable 4 milliGRAM(s) IV Push every 8 hours PRN Nausea and/or Vomiting      HEALTH ISSUES - PROBLEM Dx:  Gangrenous toe    Personal history of thromboembolic disease    Oral candidiasis    History of meningitis    Renal cyst    Back pain    HIV disease    Substance use disorder    Prediabetes    Prophylactic measure    Preoperative examination

## 2024-04-12 NOTE — PROGRESS NOTE ADULT - ASSESSMENT
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 Pulse exam unchanged from previous RLE +DP/PT signal and all other pulses palpable. RLE toes not currently concerning for wet gangrene, toes dry, debridement to healthy tissue over first toe of LLE. Prior DENIS/PVRs concerning for small vessel disease of LLE. Xray of foot with no gas or osseous erosions. Vascular surgery consulted for evaluation     Recommendations:   - S/p diagnostic LLE angiogram 4/8, plan for repeat angiogram next week via antegrade access, date TBD     - Appreciate medical and cardiac risk stratification for angiogram   - Continue local wound to LLE for now   - Continue to optimize medical management, ASA and statin   - Remainder care per primary       Vascular Surgery   [id number]

## 2024-04-12 NOTE — MEDICAL STUDENT PROGRESS NOTE(EDUCATION) - ASSESSMENT
Assessment: Mr. Hester is a 53 year old, current smoker (20 pack year history), male with a past medical history of HIV (VL undetected, CD, prior cocaine use, prior arterial thromboembolic disease, recent admission for meningitis at [facility] in 2/2024 received 3 weeks IV abx, left AMA) presenting with left foot gangrene and lower back pain.         Problem 1: Gangrenous toe.     Plan:   -Followed by podiatry and vascular  -Vascular planning for LLE angiogram  -Podiatry debrided to healthy tissue on 4/5 early am, considering amputation vs. local wound care  -Abx: usasyn, will add vanc to cover MRSA given healthcare exposures  -ID recs appreciated -> vanc/unasyn for gangrene.  -LLE angiogram scheduled for 4/8 (NPO at [facility], stop DVT prophylaxis) - small vessel disease noted   -Waiting on podiatry for final recommendation regarding plan for the toe   -Repeat LLE angiogram scheduled for 4/15 - open small vessel disease ahead of amputation   -abx for 2 weeks (-4/18) - continue on vanc and can finish on doxy/cephalexin        Problem 2: Personal history of thromboembolic disease.     Plan:   -Thromboses to L leg, leading to limb ischemia and gangrene  -full AC w/ Eliquis, transitioned to heparin in anticipation of LLE angiogram by vascular    Problem 3: Elevated AST and ALT     Plan:  -Elevated liver enzymes could be in the context of new antibiotic use - continue to monitor   -Hepatitis panel, liver panel  -d/c fluconazole given transaminitis of unknown origin and potential adverse drug side effect     Problem 4: Lower back pain    Plan:  -MRI of lumbar spine   -Most likely follow up outpatient  -Order additional Flexeril   -Acetaminophen 650 mg q6h - avoid NSAIDs due to decreased renal function   -Repeat MRI 3 months   -Lidocaine patch daily     Problem 5: Chest Pain     Plan:  -ECG  -Troponin/CKMB - negative   -BNP  -Alprazolam for anxiety   -Nebulizer treatment   -Continue to monitor for any changes     Problem 6: CKD    Plan:  -Parenchymal disease noted on ultrasound with echogenic right kidney - continue to monitor Cr and GFR for worsening kidney function   -Monitor H&H as anemia likely due to CKD    Problem 7: Oral candidiasis    Plan:   -fluconazole 100mg x7 days  -Stop fluconazole given transaminitis       Problem 8: History of meningitis.     Plan:   -Treated at [facility] with 3 weeks of IV abx, patient left AMA  -Does not remember what pathogen or what antibiotics were used  -No signs or symptoms at the moment  -Obtaining records.      Problem 9: Renal cyst.     Plan:   -Seen on CT and US, solid mass not ruled out  -recommended f/u MRI, noted to have 1.6cm complex cyst in R upper pole, recommended for surveillance imaging (Bosniak 2F).      Problem 10: HIV disease.     Plan:   -CD4 count 595 up from 328 in March  -Continuing biktarvy.  -f/u with social work regarding HASA      Problem 11: Substance use disorder    Plan:   -SBIRT c/s for tobacco and cocaine use  -Patient expressed interest in methods to help quit smoking   -Started nicotine patch and gum     Problem 12: Prediabetes    Plan:   -CC diet  -Continue education and lifestyle modification      Problem 13: Prophylactic measure    Plan:   -DVT: Eliquis  -Diet: Regular CC  -Dispo: pending PT paul

## 2024-04-12 NOTE — PROGRESS NOTE ADULT - ATTENDING COMMENTS
53M hx HIV, preDM, current smoker, hx cocaine use, prior arterial thromboembolic events, a/w sepsis on admission (HR>90 and leukocytosis) 2/2  wet gangrene of L foot. Left foot xray w/ no OM, gas, or fracture  Pt seen by podiatry and s/pL foot hallux toenail removal, and excisional debridement of L foot hallux necrotic skin to the level of epidermis,    PE lungs clear, heart regular,  L foot with wound dressing  -C/w vanco, cx corynebacterium and resistant lugdunensis , dc'd unasyn per ID. trend vanco trough  - MRSA negative, blood cx ngtd  - local wound care per podiatry, plan for L foot TMA pending demarcation/ vasc recs  - Vasc following, Previous ida/pvrs concerning for small vessel disease of LLE. Xray of foot with no gas or osseous erosions.  - s/p LLE Angiogram 4/8 that showed small vessel disease. vascular surgery plan to repeat angiogram next week (TBD for intervention (stent)  - f/up cardiology for clearance;  pt with normal LV fxn, but has reduced RV function, oupt workup per Cards  - resumed on eliquis post procedure  - LFT stable, hepatitis panel neg, dc'd diflucan and reduced gabapentin to 100 mg tid as they are potentially hepatotoxic, applied lidocaine patch   RUQ US normal liver and echogenic R kidney  # low back pain d/t spinal stenosis reduce gabapentin in s/o elevated LFT, MRI lumbar spine L4-L5, L5-S1 disc bulges    plan of care d/w pt   Dispo pending further optimization.   PT eval - rec home PT .

## 2024-04-12 NOTE — PROGRESS NOTE ADULT - SUBJECTIVE AND OBJECTIVE BOX
Infectious Diseases Follow Up:    Patient is a 53y old  Male who presents with a chief complaint of wet gangrene (12 Apr 2024 08:17)      Interval History/ROS:  No acute events, pt w/o acute complaints     Allergies  No Known Allergies        ANTIMICROBIALS:  bictegravir 50 mG/emtricitabine 200 mG/tenofovir alafenamide 25 mG (BIKTARVY) 1 daily  vancomycin  IVPB 1000 every 12 hours      Current Abx:     Previous Abx     OTHER MEDS:  MEDICATIONS  (STANDING):  acetaminophen     Tablet .. 650 every 6 hours PRN  albuterol/ipratropium for Nebulization 3 every 6 hours  apixaban 5 two times a day  aspirin enteric coated 81 daily  atorvastatin 40 at bedtime  cyclobenzaprine 5 three times a day PRN  gabapentin 100 three times a day  influenza   Vaccine 0.5 once  melatonin 3 at bedtime PRN  ondansetron Injectable 4 every 8 hours PRN  polyethylene glycol 3350 17 daily  senna 2 at bedtime      Vital Signs Last 24 Hrs  T(C): 36.8 (12 Apr 2024 06:00), Max: 37.1 (11 Apr 2024 17:14)  T(F): 98.2 (12 Apr 2024 06:00), Max: 98.7 (11 Apr 2024 17:14)  HR: 87 (12 Apr 2024 06:00) (76 - 97)  BP: 107/63 (12 Apr 2024 06:00) (104/67 - 128/82)  BP(mean): --  RR: 18 (12 Apr 2024 06:00) (18 - 18)  SpO2: 100% (12 Apr 2024 06:00) (97% - 100%)    Parameters below as of 12 Apr 2024 06:00  Patient On (Oxygen Delivery Method): room air        PHYSICAL EXAM:  GENERAL: NAD, well-developed  HEAD:  Atraumatic, Normocephalic  EYES: EOMI, conjunctiva and sclera clear  CHEST/LUNG: On RA, not in respiratory distress   EXTREMITIES:  L foot dressed   PSYCH: AAOx3                          10.1   11.60 )-----------( 408      ( 12 Apr 2024 06:58 )             30.2       04-12    137  |  105  |  16  ----------------------------<  113<H>  3.6   |  19<L>  |  1.12    Ca    9.0      12 Apr 2024 06:58  Phos  3.9     04-12  Mg     1.50     04-12    TPro  7.5  /  Alb  3.1<L>  /  TBili  0.2  /  DBili  x   /  AST  47<H>  /  ALT  95<H>  /  AlkPhos  189<H>  04-12      Urinalysis Basic - ( 12 Apr 2024 06:58 )    Color: x / Appearance: x / SG: x / pH: x  Gluc: 113 mg/dL / Ketone: x  / Bili: x / Urobili: x   Blood: x / Protein: x / Nitrite: x   Leuk Esterase: x / RBC: x / WBC x   Sq Epi: x / Non Sq Epi: x / Bacteria: x        MICROBIOLOGY:  v  .Abscess Left foot wound culture  04-05-24   Moderate Staphylococcus lugdunensis  Numerous Corynebacterium striatum group "Susceptibilities not performed"  Moderate Finegoldia magna "Susceptibilities not performed"  --  Staphylococcus lugdunensis      .Blood Blood-Peripheral  03-20-24   No growth at 5 days  --  --      .Blood Blood-Peripheral  03-20-24   No growth at 5 days  --  --      Clean Catch Clean Catch (Midstream)  03-15-24   No growth  --  --      .Blood Blood-Peripheral  03-15-24   No growth at 5 days  --  --      .Blood Blood-Peripheral  03-15-24   No growth at 5 days  --  --        HIV-1 RNA Quantitative, Viral Load Log: DET.  <1.47 lg /mL (03-16-24 @ 06:05)  CMV IgG Antibody: >10.00 U/mL (03-16-24 @ 06:05)          RADIOLOGY:

## 2024-04-12 NOTE — PROGRESS NOTE ADULT - ASSESSMENT
This is a 52 y/o M w/ PMHx of HIV (VL UD, , 19% 3/16/24, on Biktarvy) pre-DM, former smoker, illicit drug use, PAD, who is presenting with L foot gangrene. Pt was sent in by podiatrist outpatient for further evaluation.     Pt presented last on 3/15-17, was initially at [facility] for meningitis left AMA, presented to [facility] on 3/15 for PAD, s/p debridement of L forefoot, maceration of L foot 4th interspace Noted to be afebrile, mild leukocytosis 11. CT Aorta w/ runoff w/o acute clot, noted to have rim-enhancing collection within the left   flexor hallucis brevis muscle, measures 3.1 x 1.7 x 1.2 cm. Unfortunately left AMA on 3/17.    Returned on 3/20, with foot discoloration/pain. XR foot w/o OM, vascular surgery c/s, started on Heparin gtt, changed to Eliquis on d/c. Pt was discharged on 3/25.    Now with c/f L foot gangrene.  In the ER, pt was afebrile, vitals stable. Noted to have leukocytosis to 13, ESR 91, CRP 10.   VINEET to 1.51 (baseline 1.16), hyperkalemia to 6.   Podiatry consulted, culture sent, noted to have L foot ischemic changes, mild malodor, maceration of L foot 4th interspace L foot hallux toenail removed. Vascular surgery was consulted, no acute intervention, recommended DENIS/PVR,    Workup:   L foot XR w/o findings of OM  L foot Cx, gram stain with GPC in pairs/chains    HIV:   3/17/24 VL UD, , 19%  3/16 QuantiFeron negative     #L foot gangrene, dry   #Gram stain w/ GPC in pairs/chains  #Oral candidiasis    #HIV, well controlled, on Biktarvy   #Leukocytosis   #VINEET   Hyperkalemia   #Renal mass     Overall, 52 y/o M w/ PMHx of HIV (VL UD, , 19% 3/16/24, on Biktarvy) pre-DM, former smoker, illicit drug use, PAD, who is presenting with L foot gangrene, started on empiric Vancomycin/Unasyn. Gram strain with GPC in pairs/chains, possibly enterococcus vs strep. On exam, appears to be dry gangrene, no purulence, malodor. Clear signs of necrosis.   S/p 7 days of fluconazole for possible thrush  Seen by vascular, podiatry, pending possible TMA vs local wound care.   Cx with corynebacterium, staph lugdunensis and finegoldia, may all be skin contaminants.    S/p LLE angiogram, small vessel disease, repeat planned for next week.     Plan:   1. C/w Vancomycin, f/u trough. Will plan for 2 week course ending on 4/18/24 (can likely finish with Doxycyline/Cephalexin if planned for d/c before)    2. C/w Biktarvy   3. F/u podiatry recs for potential TMA     Thank you for this consult. Inpatient ID consult team will sign off.    Further changes in lab values, imaging studies, or clinical status will not be known to ID inpatient consultants unless specifically communicated by primary team.    Vik Gonzalez MD  Attending Physician  Division of Infectious Diseases  Department of Medicine    Please contact through MS Teams message.  Office: [phone] (after 5 PM or weekend)

## 2024-04-12 NOTE — PROGRESS NOTE ADULT - PROBLEM SELECTOR PLAN 6
- likely sciatica given +straight leg raise  - MR L spine given patient to stay as inpatient through next week  - lidocaine patch QD

## 2024-04-12 NOTE — PROGRESS NOTE ADULT - SUBJECTIVE AND OBJECTIVE BOX
VASCULAR SURGERY DAILY PROGRESS NOTE:     SUBJECTIVE/ROS:   Patient seen and evaluated on AM rounds.          OBJECTIVE:  Vital Signs Last 24 Hrs  T(C): 37 (11 Apr 2024 22:26), Max: 37.1 (11 Apr 2024 17:14)  T(F): 98.6 (11 Apr 2024 22:26), Max: 98.7 (11 Apr 2024 17:14)  HR: 87 (11 Apr 2024 22:26) (76 - 97)  BP: 104/67 (11 Apr 2024 22:26) (104/67 - 128/82)  RR: 18 (11 Apr 2024 22:26) (18 - 18)  SpO2: 97% (11 Apr 2024 22:26) (97% - 99%)    Parameters below as of 12 Apr 2024 03:08  Patient On (Oxygen Delivery Method): room air      I&O's Detail    10 Apr 2024 07:01  -  11 Apr 2024 07:00  --------------------------------------------------------  IN:  Total IN: 0 mL    OUT:    Voided (mL): 1250 mL  Total OUT: 1250 mL    Total NET: -1250 mL      11 Apr 2024 07:01  -  12 Apr 2024 05:55  --------------------------------------------------------  IN:    IV PiggyBack: 250 mL    Oral Fluid: 600 mL  Total IN: 850 mL    OUT:    Voided (mL): 1500 mL  Total OUT: 1500 mL    Total NET: -650 mL      Daily   MEDICATIONS  (STANDING):  albuterol/ipratropium for Nebulization 3 milliLiter(s) Nebulizer every 6 hours  apixaban 5 milliGRAM(s) Oral two times a day  aspirin enteric coated 81 milliGRAM(s) Oral daily  atorvastatin 40 milliGRAM(s) Oral at bedtime  bictegravir 50 mG/emtricitabine 200 mG/tenofovir alafenamide 25 mG (BIKTARVY) 1 Tablet(s) Oral daily  chlorhexidine 2% Cloths 1 Application(s) Topical daily  gabapentin 100 milliGRAM(s) Oral three times a day  influenza   Vaccine 0.5 milliLiter(s) IntraMuscular once  lidocaine   4% Patch 1 Patch Transdermal daily  nicotine -  14 mG/24Hr(s) Patch 1 Patch Transdermal daily  polyethylene glycol 3350 17 Gram(s) Oral daily  senna 2 Tablet(s) Oral at bedtime  vancomycin  IVPB 1000 milliGRAM(s) IV Intermittent every 12 hours    MEDICATIONS  (PRN):  acetaminophen     Tablet .. 650 milliGRAM(s) Oral every 6 hours PRN Temp greater or equal to 38C (100.4F), Mild Pain (1 - 3)  cyclobenzaprine 5 milliGRAM(s) Oral three times a day PRN Muscle Spasm  melatonin 3 milliGRAM(s) Oral at bedtime PRN Insomnia  nicotine  Polacrilex Gum 2 milliGRAM(s) Oral every 2 hours PRN Smoking Cessation  ondansetron Injectable 4 milliGRAM(s) IV Push every 8 hours PRN Nausea and/or Vomiting      Labs:                        10.5   10.68 )-----------( 440      ( 11 Apr 2024 07:48 )             31.9     04-11    139  |  107  |  15  ----------------------------<  102<H>  3.8   |  21<L>  |  1.10    Ca    9.3      11 Apr 2024 07:48  Phos  3.4     04-11  Mg     1.60     04-11    TPro  7.5  /  Alb  3.1<L>  /  TBili  0.3  /  DBili  x   /  AST  48<H>  /  ALT  99<H>  /  AlkPhos  173<H>  04-11      Urinalysis Basic - ( 11 Apr 2024 07:48 )    Color: x / Appearance: x / SG: x / pH: x  Gluc: 102 mg/dL / Ketone: x  / Bili: x / Urobili: x   Blood: x / Protein: x / Nitrite: x   Leuk Esterase: x / RBC: x / WBC x   Sq Epi: x / Non Sq Epi: x / Bacteria: x          Physical Exam:  General: NAD  Cardiovascular: appears well perfused   Respiratory: respirations non labored on RA  Gastrointestinal: soft, nontender, nondistended  Extremities: B/L palpable femoral pulses.  LLE ischemic changes to all digits. LLE palpable PT/AT pulse  Neurological: A+Ox3

## 2024-04-12 NOTE — PROGRESS NOTE ADULT - SUBJECTIVE AND OBJECTIVE BOX
Saeid Medina MD  Interventional Cardiology / Endovascular Specialist  [place] Office : [phone] [place] [address] [place] [address]  Tel:   [place] Office : [phone] [facility] [place] [address]  Tel: [phone]    Pt is lying in bed comfortable not in distress, no chest pains no SOB no palpitations.  	  MEDICATIONS:  aspirin enteric coated 81 milliGRAM(s) Oral daily  heparin   Injectable 3000 Unit(s) IV Push every 6 hours PRN  heparin   Injectable 6500 Unit(s) IV Push every 6 hours PRN  heparin  Infusion.  Unit(s)/Hr IV Continuous <Continuous>    bictegravir 50 mG/emtricitabine 200 mG/tenofovir alafenamide 25 mG (BIKTARVY) 1 Tablet(s) Oral daily  vancomycin  IVPB 1000 milliGRAM(s) IV Intermittent every 12 hours    albuterol    90 MICROgram(s) HFA Inhaler 1 Puff(s) Inhalation every 4 hours PRN    acetaminophen     Tablet .. 650 milliGRAM(s) Oral every 6 hours PRN  cyclobenzaprine 5 milliGRAM(s) Oral three times a day PRN  gabapentin 100 milliGRAM(s) Oral three times a day  melatonin 3 milliGRAM(s) Oral at bedtime PRN  ondansetron Injectable 4 milliGRAM(s) IV Push every 8 hours PRN    polyethylene glycol 3350 17 Gram(s) Oral daily  senna 2 Tablet(s) Oral at bedtime    atorvastatin 40 milliGRAM(s) Oral at bedtime    chlorhexidine 2% Cloths 1 Application(s) Topical daily  influenza   Vaccine 0.5 milliLiter(s) IntraMuscular once  lidocaine   4% Patch 1 Patch Transdermal daily      PAST MEDICAL/SURGICAL HISTORY  PAST MEDICAL & SURGICAL HISTORY:  HIV disease      H/O splenectomy          SOCIAL HISTORY: Substance Use (street drugs): ( x ) never used  (  ) other:    FAMILY HISTORY:      REVIEW OF SYSTEMS:  CONSTITUTIONAL: No fever, weight loss, or fatigue  EYES: No eye pain, visual disturbances, or discharge  ENMT:  No difficulty hearing, tinnitus, vertigo; No sinus or throat pain  BREASTS: No pain, masses, or nipple discharge  GASTROINTESTINAL: No abdominal or epigastric pain. No nausea, vomiting, or hematemesis; No diarrhea or constipation. No melena or hematochezia.  GENITOURINARY: No dysuria, frequency, hematuria, or incontinence  NEUROLOGICAL: No headaches, memory loss, loss of strength, numbness, or tremors  ENDOCRINE: No heat or cold intolerance; No hair loss  MUSCULOSKELETAL: No joint pain or swelling; No muscle, back, or extremity pain  PSYCHIATRIC: No depression, anxiety, mood swings, or difficulty sleeping  HEME/LYMPH: No easy bruising, or bleeding gums  All others negative    PHYSICAL EXAM:  T(C): 36.8 (04-12-24 @ 15:11), Max: 37.1 (04-11-24 @ 17:14)  HR: 73 (04-12-24 @ 15:50) (73 - 97)  BP: 114/84 (04-12-24 @ 15:11) (104/67 - 119/85)  RR: 18 (04-12-24 @ 15:11) (18 - 18)  SpO2: 97% (04-12-24 @ 15:50) (97% - 100%)  Wt(kg): --  I&O's Summary    11 Apr 2024 07:01  -  12 Apr 2024 07:00  --------------------------------------------------------  IN: 850 mL / OUT: 1500 mL / NET: -650 mL          GENERAL: NAD   EYES: EOMI, PERRLA, conjunctiva and sclera clear  ENMT: No tonsillar erythema, exudates, or enlargement; Moist mucous membranes, Good dentition, No lesions  Cardiovascular: Normal S1 S2, No JVD, No murmurs, No edema  Respiratory: Lungs clear to auscultation  Abd : soft   Ext : no edema                                   10.7   9.81  )-----------( 383      ( 12 Apr 2024 15:35 )             32.0     04-12    137  |  105  |  16  ----------------------------<  113<H>  3.6   |  19<L>  |  1.12    Ca    9.0      12 Apr 2024 06:58  Phos  3.9     04-12  Mg     1.50     04-12    TPro  7.5  /  Alb  3.1<L>  /  TBili  0.2  /  DBili  x   /  AST  47<H>  /  ALT  95<H>  /  AlkPhos  189<H>  04-12    proBNP:   Lipid Profile:   HgA1c:   TSH:     Consultant(s) Notes Reviewed:  [x ] YES  [ ] NO    Care Discussed with Consultants/Other Providers [ x] YES  [ ] NO    Imaging Personally Reviewed independently:  [x] YES  [ ] NO    All labs, radiologic studies, vitals, orders and medications list reviewed. Patient is seen and examined at bedside. Case discussed with medical team.

## 2024-04-13 LAB
ALBUMIN SERPL ELPH-MCNC: 3 G/DL — LOW (ref 3.3–5)
ALP SERPL-CCNC: 184 U/L — HIGH (ref 40–120)
ALT FLD-CCNC: 92 U/L — HIGH (ref 4–41)
ANION GAP SERPL CALC-SCNC: 9 MMOL/L — SIGNIFICANT CHANGE UP (ref 7–14)
APTT BLD: 40.5 SEC — HIGH (ref 24.5–35.6)
APTT BLD: 57.7 SEC — HIGH (ref 24.5–35.6)
APTT BLD: 74 SEC — HIGH (ref 24.5–35.6)
AST SERPL-CCNC: 50 U/L — HIGH (ref 4–40)
BILIRUB SERPL-MCNC: 0.2 MG/DL — SIGNIFICANT CHANGE UP (ref 0.2–1.2)
BUN SERPL-MCNC: 16 MG/DL — SIGNIFICANT CHANGE UP (ref 7–23)
CALCIUM SERPL-MCNC: 9 MG/DL — SIGNIFICANT CHANGE UP (ref 8.4–10.5)
CHLORIDE SERPL-SCNC: 108 MMOL/L — HIGH (ref 98–107)
CO2 SERPL-SCNC: 22 MMOL/L — SIGNIFICANT CHANGE UP (ref 22–31)
CREAT SERPL-MCNC: 1.07 MG/DL — SIGNIFICANT CHANGE UP (ref 0.5–1.3)
EGFR: 83 ML/MIN/1.73M2 — SIGNIFICANT CHANGE UP
GLUCOSE SERPL-MCNC: 87 MG/DL — SIGNIFICANT CHANGE UP (ref 70–99)
HCT VFR BLD CALC: 31.8 % — LOW (ref 39–50)
HCT VFR BLD CALC: 32.5 % — LOW (ref 39–50)
HGB BLD-MCNC: 10.7 G/DL — LOW (ref 13–17)
HGB BLD-MCNC: 10.7 G/DL — LOW (ref 13–17)
MAGNESIUM SERPL-MCNC: 1.7 MG/DL — SIGNIFICANT CHANGE UP (ref 1.6–2.6)
MCHC RBC-ENTMCNC: 31.4 PG — SIGNIFICANT CHANGE UP (ref 27–34)
MCHC RBC-ENTMCNC: 31.9 PG — SIGNIFICANT CHANGE UP (ref 27–34)
MCHC RBC-ENTMCNC: 32.9 GM/DL — SIGNIFICANT CHANGE UP (ref 32–36)
MCHC RBC-ENTMCNC: 33.6 GM/DL — SIGNIFICANT CHANGE UP (ref 32–36)
MCV RBC AUTO: 94.9 FL — SIGNIFICANT CHANGE UP (ref 80–100)
MCV RBC AUTO: 95.3 FL — SIGNIFICANT CHANGE UP (ref 80–100)
NRBC # BLD: 0 /100 WBCS — SIGNIFICANT CHANGE UP (ref 0–0)
NRBC # BLD: 0 /100 WBCS — SIGNIFICANT CHANGE UP (ref 0–0)
NRBC # FLD: 0 K/UL — SIGNIFICANT CHANGE UP (ref 0–0)
NRBC # FLD: 0 K/UL — SIGNIFICANT CHANGE UP (ref 0–0)
PHOSPHATE SERPL-MCNC: 3.7 MG/DL — SIGNIFICANT CHANGE UP (ref 2.5–4.5)
PLATELET # BLD AUTO: 375 K/UL — SIGNIFICANT CHANGE UP (ref 150–400)
PLATELET # BLD AUTO: 375 K/UL — SIGNIFICANT CHANGE UP (ref 150–400)
POTASSIUM SERPL-MCNC: 4.3 MMOL/L — SIGNIFICANT CHANGE UP (ref 3.5–5.3)
POTASSIUM SERPL-SCNC: 4.3 MMOL/L — SIGNIFICANT CHANGE UP (ref 3.5–5.3)
PROT SERPL-MCNC: 7.3 G/DL — SIGNIFICANT CHANGE UP (ref 6–8.3)
RBC # BLD: 3.35 M/UL — LOW (ref 4.2–5.8)
RBC # BLD: 3.41 M/UL — LOW (ref 4.2–5.8)
RBC # FLD: 15.6 % — HIGH (ref 10.3–14.5)
RBC # FLD: 15.7 % — HIGH (ref 10.3–14.5)
SODIUM SERPL-SCNC: 139 MMOL/L — SIGNIFICANT CHANGE UP (ref 135–145)
WBC # BLD: 8.84 K/UL — SIGNIFICANT CHANGE UP (ref 3.8–10.5)
WBC # BLD: 9.16 K/UL — SIGNIFICANT CHANGE UP (ref 3.8–10.5)
WBC # FLD AUTO: 8.84 K/UL — SIGNIFICANT CHANGE UP (ref 3.8–10.5)
WBC # FLD AUTO: 9.16 K/UL — SIGNIFICANT CHANGE UP (ref 3.8–10.5)

## 2024-04-13 PROCEDURE — 93010 ELECTROCARDIOGRAM REPORT: CPT

## 2024-04-13 PROCEDURE — 99231 SBSQ HOSP IP/OBS SF/LOW 25: CPT | Mod: GC

## 2024-04-13 RX ORDER — OXYCODONE HYDROCHLORIDE 5 MG/1
5 TABLET ORAL ONCE
Refills: 0 | Status: DISCONTINUED | OUTPATIENT
Start: 2024-04-13 | End: 2024-04-13

## 2024-04-13 RX ORDER — ACETAMINOPHEN 500 MG
975 TABLET ORAL DAILY
Refills: 0 | Status: DISCONTINUED | OUTPATIENT
Start: 2024-04-13 | End: 2024-04-16

## 2024-04-13 RX ADMIN — Medication 1 PATCH: at 12:30

## 2024-04-13 RX ADMIN — BICTEGRAVIR SODIUM, EMTRICITABINE, AND TENOFOVIR ALAFENAMIDE FUMARATE 1 TABLET(S): 30; 120; 15 TABLET ORAL at 12:52

## 2024-04-13 RX ADMIN — GABAPENTIN 100 MILLIGRAM(S): 400 CAPSULE ORAL at 07:14

## 2024-04-13 RX ADMIN — Medication 650 MILLIGRAM(S): at 03:10

## 2024-04-13 RX ADMIN — Medication 250 MILLIGRAM(S): at 18:12

## 2024-04-13 RX ADMIN — LIDOCAINE 1 PATCH: 4 CREAM TOPICAL at 19:21

## 2024-04-13 RX ADMIN — HEPARIN SODIUM 1700 UNIT(S)/HR: 5000 INJECTION INTRAVENOUS; SUBCUTANEOUS at 08:20

## 2024-04-13 RX ADMIN — HEPARIN SODIUM 1700 UNIT(S)/HR: 5000 INJECTION INTRAVENOUS; SUBCUTANEOUS at 09:40

## 2024-04-13 RX ADMIN — Medication 1 PATCH: at 07:34

## 2024-04-13 RX ADMIN — Medication 1 PATCH: at 19:21

## 2024-04-13 RX ADMIN — GABAPENTIN 100 MILLIGRAM(S): 400 CAPSULE ORAL at 22:54

## 2024-04-13 RX ADMIN — Medication 650 MILLIGRAM(S): at 02:10

## 2024-04-13 RX ADMIN — LIDOCAINE 1 PATCH: 4 CREAM TOPICAL at 12:34

## 2024-04-13 RX ADMIN — HEPARIN SODIUM 1700 UNIT(S)/HR: 5000 INJECTION INTRAVENOUS; SUBCUTANEOUS at 20:19

## 2024-04-13 RX ADMIN — HEPARIN SODIUM 1700 UNIT(S)/HR: 5000 INJECTION INTRAVENOUS; SUBCUTANEOUS at 00:58

## 2024-04-13 RX ADMIN — Medication 1 PATCH: at 12:35

## 2024-04-13 RX ADMIN — HEPARIN SODIUM 1700 UNIT(S)/HR: 5000 INJECTION INTRAVENOUS; SUBCUTANEOUS at 16:00

## 2024-04-13 RX ADMIN — Medication 81 MILLIGRAM(S): at 12:52

## 2024-04-13 RX ADMIN — HEPARIN SODIUM 1700 UNIT(S)/HR: 5000 INJECTION INTRAVENOUS; SUBCUTANEOUS at 07:58

## 2024-04-13 RX ADMIN — ATORVASTATIN CALCIUM 40 MILLIGRAM(S): 80 TABLET, FILM COATED ORAL at 22:54

## 2024-04-13 RX ADMIN — OXYCODONE HYDROCHLORIDE 5 MILLIGRAM(S): 5 TABLET ORAL at 20:23

## 2024-04-13 RX ADMIN — OXYCODONE HYDROCHLORIDE 5 MILLIGRAM(S): 5 TABLET ORAL at 19:35

## 2024-04-13 RX ADMIN — Medication 250 MILLIGRAM(S): at 07:14

## 2024-04-13 RX ADMIN — GABAPENTIN 100 MILLIGRAM(S): 400 CAPSULE ORAL at 14:04

## 2024-04-13 RX ADMIN — CHLORHEXIDINE GLUCONATE 1 APPLICATION(S): 213 SOLUTION TOPICAL at 12:40

## 2024-04-13 NOTE — PROGRESS NOTE ADULT - PROBLEM SELECTOR PLAN 6
- likely sciatica given +straight leg raise  - MR L spine given patient to stay as inpatient through next week - degenerative disease  - lidocaine patch QD

## 2024-04-13 NOTE — PROGRESS NOTE ADULT - ATTENDING COMMENTS
53M hx HIV, preDM, current smoker, hx cocaine use, prior arterial thromboembolic events, a/w sepsis on admission (HR>90 and leukocytosis) 2/2  wet gangrene of L foot. Left foot xray w/ no OM, gas, or fracture  Pt seen by podiatry and s/pL foot hallux toenail removal, and excisional debridement of L foot hallux necrotic skin to the level of epidermis,    PE lungs clear, heart regular,  L foot with wound dressing  -C/w vanco, cx corynebacterium and resistant lugdunensis , dc'd unasyn per ID. trend vanco trough  - MRSA negative, blood cx ngtd  - local wound care per podiatry, plan for L foot TMA pending demarcation/ vasc recs  - Vasc following, Previous ida/pvrs concerning for small vessel disease of LLE. Xray of foot with no gas or osseous erosions.  - s/p LLE Angiogram 4/8 that showed small vessel disease. vascular surgery plan to repeat angiogram next week (tentatively monday for intervention (stent)  - f/up cardiology for clearance;  pt with normal LV fxn, but has reduced RV function, oupt workup per Cards  - resumed on eliquis post procedure  - LFT stable, hepatitis panel neg, dc'd diflucan and reduced gabapentin to 100 mg tid as they are potentially hepatotoxic, applied lidocaine patch   RUQ US normal liver and echogenic R kidney  # low back pain d/t spinal stenosis reduce gabapentin in s/o elevated LFT, MRI lumbar spine L4-L5, L5-S1 disc bulges    plan of care d/w pt   Dispo pending further optimization.   PT eval - rec home PT .

## 2024-04-13 NOTE — PROGRESS NOTE ADULT - ASSESSMENT
53M hx HIV, preDM, current smoker, hx cocaine use, prior arterial thromboembolic events, presents with wet gangrene of L foot, s/p debridement by podiatry on 4/5, angiogram on 4/8, with repeat angio tentatively 4/15. Vascular and podiatry following.

## 2024-04-13 NOTE — PROGRESS NOTE ADULT - PROBLEM SELECTOR PLAN 3
fluconazole 100mg x7 days  - dc'd fluconazole iso worsening transaminitis w/ unknown etiology and poss adverse effect of fluconazole  RESOLVED

## 2024-04-13 NOTE — PROGRESS NOTE ADULT - SUBJECTIVE AND OBJECTIVE BOX
Christopher Dejesus, PGY-1  Please contact on Teams    LEA MENDOZA  53y  Male    Reason for Admission:     SUBJECTIVE/INTERVAL EVENTS: No acute events. Pt seen and examined at bedside.    Physical Exam:   GENERAL: +thin, NAD, lying in bed comfortably  EYES: EOMI, conjunctiva and sclera clear, no nystagmus noted  ENT: Moist mucous membranes,   NECK: Supple, No JVD, trachea midline  CHEST/LUNG: Clear to auscultation bilaterally; No rales, rhonchi, wheezing, or rubs. Unlabored respirations  HEART: Regular rate and rhythm; No murmurs, rubs, or gallops, normal S1/S2  ABDOMEN: normal bowel sounds; Soft, nontender, nondistended, no organomegaly   EXTREMITIES:  +bandaged L foot; 2+ Peripheral Pulses, brisk capillary refill. No clubbing, cyanosis, or edema  Neurological:  A&Ox3, no focal deficits   SKIN: No rashes or lesions      Vital Signs Last 24 Hrs  T(C): 36.8 (13 Apr 2024 05:31), Max: 36.9 (12 Apr 2024 21:27)  T(F): 98.3 (13 Apr 2024 05:31), Max: 98.4 (12 Apr 2024 21:27)  HR: 86 (13 Apr 2024 05:31) (73 - 103)  BP: 105/68 (13 Apr 2024 05:31) (105/68 - 146/89)  BP(mean): --  RR: 19 (13 Apr 2024 05:31) (18 - 19)  SpO2: 100% (13 Apr 2024 05:31) (97% - 100%)    Parameters below as of 13 Apr 2024 05:31  Patient On (Oxygen Delivery Method): room air          Consultant(s) Notes Reviewed:  [x] YES  [ ] NO  Care Discussed with Consultants/Other Providers [x] YES  [ ] NO    LABS:                        10.7   9.16  )-----------( 375      ( 13 Apr 2024 00:23 )             32.5                         10.7   9.81  )-----------( 383      ( 12 Apr 2024 15:35 )             32.0                         10.1   11.60 )-----------( 408      ( 12 Apr 2024 06:58 )             30.2         Urinalysis Basic - ( 12 Apr 2024 06:58 )    Color: x / Appearance: x / SG: x / pH: x  Gluc: 113 mg/dL / Ketone: x  / Bili: x / Urobili: x   Blood: x / Protein: x / Nitrite: x   Leuk Esterase: x / RBC: x / WBC x   Sq Epi: x / Non Sq Epi: x / Bacteria: x        RADIOLOGY & ADDITIONAL TESTS:    Imaging Personally Reviewed:  [x] YES  [ ] NO    MEDICATIONS  (STANDING):  aspirin enteric coated 81 milliGRAM(s) Oral daily  atorvastatin 40 milliGRAM(s) Oral at bedtime  bictegravir 50 mG/emtricitabine 200 mG/tenofovir alafenamide 25 mG (BIKTARVY) 1 Tablet(s) Oral daily  chlorhexidine 2% Cloths 1 Application(s) Topical daily  gabapentin 100 milliGRAM(s) Oral three times a day  heparin  Infusion.  Unit(s)/Hr (15 mL/Hr) IV Continuous <Continuous>  influenza   Vaccine 0.5 milliLiter(s) IntraMuscular once  lidocaine   4% Patch 1 Patch Transdermal daily  nicotine -  14 mG/24Hr(s) Patch 1 Patch Transdermal daily  polyethylene glycol 3350 17 Gram(s) Oral daily  senna 2 Tablet(s) Oral at bedtime  vancomycin  IVPB 1000 milliGRAM(s) IV Intermittent every 12 hours    MEDICATIONS  (PRN):  acetaminophen     Tablet .. 650 milliGRAM(s) Oral every 6 hours PRN Temp greater or equal to 38C (100.4F), Mild Pain (1 - 3)  albuterol    90 MICROgram(s) HFA Inhaler 1 Puff(s) Inhalation every 4 hours PRN Shortness of Breath and/or Wheezing  cyclobenzaprine 5 milliGRAM(s) Oral three times a day PRN Muscle Spasm  heparin   Injectable 6500 Unit(s) IV Push every 6 hours PRN For aPTT less than 40  heparin   Injectable 3000 Unit(s) IV Push every 6 hours PRN For aPTT between 40 - 57  melatonin 3 milliGRAM(s) Oral at bedtime PRN Insomnia  nicotine  Polacrilex Gum 2 milliGRAM(s) Oral every 2 hours PRN Smoking Cessation  ondansetron Injectable 4 milliGRAM(s) IV Push every 8 hours PRN Nausea and/or Vomiting      HEALTH ISSUES - PROBLEM Dx:  Gangrenous toe    Personal history of thromboembolic disease    Oral candidiasis    History of meningitis    Renal cyst    Back pain    HIV disease    Substance use disorder    Prediabetes    Prophylactic measure    Preoperative examination

## 2024-04-14 LAB
ALBUMIN SERPL ELPH-MCNC: 3 G/DL — LOW (ref 3.3–5)
ALP SERPL-CCNC: 201 U/L — HIGH (ref 40–120)
ALT FLD-CCNC: 100 U/L — HIGH (ref 4–41)
ANION GAP SERPL CALC-SCNC: 11 MMOL/L — SIGNIFICANT CHANGE UP (ref 7–14)
APTT BLD: 102.8 SEC — HIGH (ref 24.5–35.6)
APTT BLD: 43.7 SEC — HIGH (ref 24.5–35.6)
APTT BLD: 69.4 SEC — HIGH (ref 24.5–35.6)
AST SERPL-CCNC: 48 U/L — HIGH (ref 4–40)
BILIRUB SERPL-MCNC: <0.2 MG/DL — SIGNIFICANT CHANGE UP (ref 0.2–1.2)
BUN SERPL-MCNC: 18 MG/DL — SIGNIFICANT CHANGE UP (ref 7–23)
CALCIUM SERPL-MCNC: 8.8 MG/DL — SIGNIFICANT CHANGE UP (ref 8.4–10.5)
CHLORIDE SERPL-SCNC: 105 MMOL/L — SIGNIFICANT CHANGE UP (ref 98–107)
CO2 SERPL-SCNC: 21 MMOL/L — LOW (ref 22–31)
CREAT SERPL-MCNC: 1.08 MG/DL — SIGNIFICANT CHANGE UP (ref 0.5–1.3)
EGFR: 82 ML/MIN/1.73M2 — SIGNIFICANT CHANGE UP
GLUCOSE SERPL-MCNC: 114 MG/DL — HIGH (ref 70–99)
HCT VFR BLD CALC: 31.3 % — LOW (ref 39–50)
HGB BLD-MCNC: 10.6 G/DL — LOW (ref 13–17)
MAGNESIUM SERPL-MCNC: 1.6 MG/DL — SIGNIFICANT CHANGE UP (ref 1.6–2.6)
MCHC RBC-ENTMCNC: 32 PG — SIGNIFICANT CHANGE UP (ref 27–34)
MCHC RBC-ENTMCNC: 33.9 GM/DL — SIGNIFICANT CHANGE UP (ref 32–36)
MCV RBC AUTO: 94.6 FL — SIGNIFICANT CHANGE UP (ref 80–100)
NRBC # BLD: 0 /100 WBCS — SIGNIFICANT CHANGE UP (ref 0–0)
NRBC # FLD: 0 K/UL — SIGNIFICANT CHANGE UP (ref 0–0)
PHOSPHATE SERPL-MCNC: 3.8 MG/DL — SIGNIFICANT CHANGE UP (ref 2.5–4.5)
PLATELET # BLD AUTO: 344 K/UL — SIGNIFICANT CHANGE UP (ref 150–400)
POTASSIUM SERPL-MCNC: 4 MMOL/L — SIGNIFICANT CHANGE UP (ref 3.5–5.3)
POTASSIUM SERPL-SCNC: 4 MMOL/L — SIGNIFICANT CHANGE UP (ref 3.5–5.3)
PROT SERPL-MCNC: 7.2 G/DL — SIGNIFICANT CHANGE UP (ref 6–8.3)
RBC # BLD: 3.31 M/UL — LOW (ref 4.2–5.8)
RBC # FLD: 15.7 % — HIGH (ref 10.3–14.5)
SODIUM SERPL-SCNC: 137 MMOL/L — SIGNIFICANT CHANGE UP (ref 135–145)
VANCOMYCIN TROUGH SERPL-MCNC: 12.2 UG/ML — SIGNIFICANT CHANGE UP (ref 10–20)
WBC # BLD: 8.67 K/UL — SIGNIFICANT CHANGE UP (ref 3.8–10.5)
WBC # FLD AUTO: 8.67 K/UL — SIGNIFICANT CHANGE UP (ref 3.8–10.5)

## 2024-04-14 PROCEDURE — 99231 SBSQ HOSP IP/OBS SF/LOW 25: CPT | Mod: GC

## 2024-04-14 RX ADMIN — Medication 1 PATCH: at 11:19

## 2024-04-14 RX ADMIN — BICTEGRAVIR SODIUM, EMTRICITABINE, AND TENOFOVIR ALAFENAMIDE FUMARATE 1 TABLET(S): 30; 120; 15 TABLET ORAL at 11:20

## 2024-04-14 RX ADMIN — Medication 250 MILLIGRAM(S): at 06:52

## 2024-04-14 RX ADMIN — GABAPENTIN 100 MILLIGRAM(S): 400 CAPSULE ORAL at 06:52

## 2024-04-14 RX ADMIN — Medication 975 MILLIGRAM(S): at 11:18

## 2024-04-14 RX ADMIN — Medication 1 PATCH: at 19:06

## 2024-04-14 RX ADMIN — Medication 81 MILLIGRAM(S): at 11:20

## 2024-04-14 RX ADMIN — GABAPENTIN 100 MILLIGRAM(S): 400 CAPSULE ORAL at 21:03

## 2024-04-14 RX ADMIN — LIDOCAINE 1 PATCH: 4 CREAM TOPICAL at 19:06

## 2024-04-14 RX ADMIN — HEPARIN SODIUM 1700 UNIT(S)/HR: 5000 INJECTION INTRAVENOUS; SUBCUTANEOUS at 20:32

## 2024-04-14 RX ADMIN — CHLORHEXIDINE GLUCONATE 1 APPLICATION(S): 213 SOLUTION TOPICAL at 11:20

## 2024-04-14 RX ADMIN — Medication 975 MILLIGRAM(S): at 01:23

## 2024-04-14 RX ADMIN — Medication 250 MILLIGRAM(S): at 17:29

## 2024-04-14 RX ADMIN — HEPARIN SODIUM 1700 UNIT(S)/HR: 5000 INJECTION INTRAVENOUS; SUBCUTANEOUS at 02:55

## 2024-04-14 RX ADMIN — HEPARIN SODIUM 1700 UNIT(S)/HR: 5000 INJECTION INTRAVENOUS; SUBCUTANEOUS at 19:32

## 2024-04-14 RX ADMIN — LIDOCAINE 1 PATCH: 4 CREAM TOPICAL at 00:00

## 2024-04-14 RX ADMIN — ATORVASTATIN CALCIUM 40 MILLIGRAM(S): 80 TABLET, FILM COATED ORAL at 21:03

## 2024-04-14 RX ADMIN — GABAPENTIN 100 MILLIGRAM(S): 400 CAPSULE ORAL at 14:58

## 2024-04-14 RX ADMIN — LIDOCAINE 1 PATCH: 4 CREAM TOPICAL at 11:19

## 2024-04-14 RX ADMIN — HEPARIN SODIUM 1700 UNIT(S)/HR: 5000 INJECTION INTRAVENOUS; SUBCUTANEOUS at 21:04

## 2024-04-14 RX ADMIN — Medication 1 PATCH: at 12:37

## 2024-04-14 RX ADMIN — HEPARIN SODIUM 1700 UNIT(S)/HR: 5000 INJECTION INTRAVENOUS; SUBCUTANEOUS at 12:01

## 2024-04-14 RX ADMIN — HEPARIN SODIUM 1900 UNIT(S)/HR: 5000 INJECTION INTRAVENOUS; SUBCUTANEOUS at 05:20

## 2024-04-14 RX ADMIN — Medication 1 PATCH: at 09:08

## 2024-04-14 RX ADMIN — HEPARIN SODIUM 1900 UNIT(S)/HR: 5000 INJECTION INTRAVENOUS; SUBCUTANEOUS at 08:15

## 2024-04-14 NOTE — PROGRESS NOTE ADULT - PROBLEM SELECTOR PLAN 2
Thromboses to L leg, leading to limb ischemia and gangrene  - 4/8 LLE angiogram - small vessel disease only, planning on repeat angiogram as above  - continue AC, heparin gtt with upcoming angio, transition to eliquis when no longer pending procedures

## 2024-04-14 NOTE — PROGRESS NOTE ADULT - ATTENDING COMMENTS
53M hx HIV, preDM, current smoker, hx cocaine use, prior arterial thromboembolic events, a/w sepsis on admission (HR>90 and leukocytosis) 2/2  wet gangrene of L foot. Left foot xray w/ no OM, gas, or fracture  Pt seen by podiatry and s/pL foot hallux toenail removal, and excisional debridement of L foot hallux necrotic skin to the level of epidermis,    PE lungs clear, heart regular,  L foot with wound dressing  -C/w vanco, cx corynebacterium and resistant lugdunensis , dc'd unasyn per ID. trend vanco trough  - MRSA negative, blood cx ngtd  - local wound care per podiatry, plan for L foot TMA pending demarcation/ vasc recs  - Vasc following, Previous ida/pvrs concerning for small vessel disease of LLE. Xray of foot with no gas or osseous erosions.  - s/p LLE Angiogram 4/8 that showed small vessel disease. vascular surgery plan to repeat angiogram 4/15  - f/up cardiology for clearance;  pt with normal LV fxn, but has reduced RV function, oupt workup per Cards  - LFT stable, hepatitis panel neg, dc'd diflucan and reduced gabapentin to 100 mg tid as they are potentially hepatotoxic, applied lidocaine patch   RUQ US normal liver and echogenic R kidney  # low back pain d/t spinal stenosis reduce gabapentin in s/o elevated LFT, MRI lumbar spine L4-L5, L5-S1 disc bulges    plan of care d/w pt   Dispo pending further optimization.   PT eval - rec home PT .

## 2024-04-14 NOTE — PROGRESS NOTE ADULT - SUBJECTIVE AND OBJECTIVE BOX
Christopher Dejesus, PGY-1  Please contact on Teams    LEA MENDOZA  53y  Male    Reason for Admission:     SUBJECTIVE/INTERVAL EVENTS: No acute events. Pt seen and examined at bedside.    Physical Exam:   General: NAD  HEENT: PERRL, EOMI, normal sclera and conjunctiva, no oral lesions  Neck: Supple, no JVD  Lungs: CTA bilaterally  Heart: RRR, normal S1S2, no murmurs/rubs/gallops  Abdomen: Soft, ND/NT  Extremities: 2+ peripheral pulses, bandaged L foot, +shooting pain on R straight leg raise  Skin: Warm, well-perfused  Neuro: A&O x3, no focal deficits    Vital Signs Last 24 Hrs  T(C): 36.7 (14 Apr 2024 05:54), Max: 37 (13 Apr 2024 14:39)  T(F): 98 (14 Apr 2024 05:54), Max: 98.6 (13 Apr 2024 14:39)  HR: 80 (14 Apr 2024 05:54) (78 - 99)  BP: 111/62 (14 Apr 2024 05:54) (106/77 - 121/71)  BP(mean): --  RR: 18 (14 Apr 2024 05:54) (18 - 18)  SpO2: 96% (14 Apr 2024 05:54) (96% - 100%)    Parameters below as of 14 Apr 2024 05:54  Patient On (Oxygen Delivery Method): room air          Consultant(s) Notes Reviewed:  [x] YES  [ ] NO  Care Discussed with Consultants/Other Providers [x] YES  [ ] NO    LABS:                        10.6   8.67  )-----------( 344      ( 14 Apr 2024 04:43 )             31.3                         10.7   8.84  )-----------( 375      ( 13 Apr 2024 07:05 )             31.8                         10.7   9.16  )-----------( 375      ( 13 Apr 2024 00:23 )             32.5                               137    |  105    |  18                  Calcium: 8.8   / iCa: x      (04-14 @ 04:43)    ----------------------------<  114       Magnesium: 1.60                             4.0     |  21     |  1.08             Phosphorous: 3.8      TPro  7.2    /  Alb  3.0    /  TBili  <0.2   /  DBili  x      /  AST  48     /  ALT  100    /  AlkPhos  201    14 Apr 2024 04:43    Urinalysis Basic - ( 14 Apr 2024 04:43 )    Color: x / Appearance: x / SG: x / pH: x  Gluc: 114 mg/dL / Ketone: x  / Bili: x / Urobili: x   Blood: x / Protein: x / Nitrite: x   Leuk Esterase: x / RBC: x / WBC x   Sq Epi: x / Non Sq Epi: x / Bacteria: x        RADIOLOGY & ADDITIONAL TESTS:    Imaging Personally Reviewed:  [x] YES  [ ] NO    MEDICATIONS  (STANDING):  acetaminophen     Tablet .. 975 milliGRAM(s) Oral daily  aspirin enteric coated 81 milliGRAM(s) Oral daily  atorvastatin 40 milliGRAM(s) Oral at bedtime  bictegravir 50 mG/emtricitabine 200 mG/tenofovir alafenamide 25 mG (BIKTARVY) 1 Tablet(s) Oral daily  chlorhexidine 2% Cloths 1 Application(s) Topical daily  gabapentin 100 milliGRAM(s) Oral three times a day  heparin  Infusion.  Unit(s)/Hr (15 mL/Hr) IV Continuous <Continuous>  influenza   Vaccine 0.5 milliLiter(s) IntraMuscular once  lidocaine   4% Patch 1 Patch Transdermal daily  nicotine -  14 mG/24Hr(s) Patch 1 Patch Transdermal daily  polyethylene glycol 3350 17 Gram(s) Oral daily  senna 2 Tablet(s) Oral at bedtime  vancomycin  IVPB 1000 milliGRAM(s) IV Intermittent every 12 hours    MEDICATIONS  (PRN):  acetaminophen     Tablet .. 650 milliGRAM(s) Oral every 6 hours PRN Temp greater or equal to 38C (100.4F), Mild Pain (1 - 3)  albuterol    90 MICROgram(s) HFA Inhaler 1 Puff(s) Inhalation every 4 hours PRN Shortness of Breath and/or Wheezing  cyclobenzaprine 5 milliGRAM(s) Oral three times a day PRN Muscle Spasm  heparin   Injectable 3000 Unit(s) IV Push every 6 hours PRN For aPTT between 40 - 57  heparin   Injectable 6500 Unit(s) IV Push every 6 hours PRN For aPTT less than 40  melatonin 3 milliGRAM(s) Oral at bedtime PRN Insomnia  nicotine  Polacrilex Gum 2 milliGRAM(s) Oral every 2 hours PRN Smoking Cessation  ondansetron Injectable 4 milliGRAM(s) IV Push every 8 hours PRN Nausea and/or Vomiting      HEALTH ISSUES - PROBLEM Dx:  Gangrenous toe    Personal history of thromboembolic disease    Oral candidiasis    History of meningitis    Renal cyst    Back pain    HIV disease    Substance use disorder    Prediabetes    Prophylactic measure    Preoperative examination

## 2024-04-15 ENCOUNTER — APPOINTMENT (OUTPATIENT)
Dept: VASCULAR SURGERY | Facility: HOSPITAL | Age: 54
End: 2024-04-15

## 2024-04-15 LAB
ADD ON TEST-SPECIMEN IN LAB: SIGNIFICANT CHANGE UP
ALBUMIN SERPL ELPH-MCNC: 3 G/DL — LOW (ref 3.3–5)
ALP SERPL-CCNC: 182 U/L — HIGH (ref 40–120)
ALT FLD-CCNC: 84 U/L — HIGH (ref 4–41)
ANION GAP SERPL CALC-SCNC: 11 MMOL/L — SIGNIFICANT CHANGE UP (ref 7–14)
ANISOCYTOSIS BLD QL: SLIGHT — SIGNIFICANT CHANGE UP
APTT BLD: 64.7 SEC — HIGH (ref 24.5–35.6)
APTT BLD: 85 SEC — HIGH (ref 24.5–35.6)
AST SERPL-CCNC: 34 U/L — SIGNIFICANT CHANGE UP (ref 4–40)
BASOPHILS # BLD AUTO: 0.14 K/UL — SIGNIFICANT CHANGE UP (ref 0–0.2)
BASOPHILS NFR BLD AUTO: 1.3 % — SIGNIFICANT CHANGE UP (ref 0–2)
BILIRUB SERPL-MCNC: 0.2 MG/DL — SIGNIFICANT CHANGE UP (ref 0.2–1.2)
BLD GP AB SCN SERPL QL: NEGATIVE — SIGNIFICANT CHANGE UP
BUN SERPL-MCNC: 21 MG/DL — SIGNIFICANT CHANGE UP (ref 7–23)
CALCIUM SERPL-MCNC: 8.5 MG/DL — SIGNIFICANT CHANGE UP (ref 8.4–10.5)
CHLORIDE SERPL-SCNC: 106 MMOL/L — SIGNIFICANT CHANGE UP (ref 98–107)
CO2 SERPL-SCNC: 20 MMOL/L — LOW (ref 22–31)
CREAT SERPL-MCNC: 1.3 MG/DL — SIGNIFICANT CHANGE UP (ref 0.5–1.3)
EGFR: 66 ML/MIN/1.73M2 — SIGNIFICANT CHANGE UP
EOSINOPHIL # BLD AUTO: 0.21 K/UL — SIGNIFICANT CHANGE UP (ref 0–0.5)
EOSINOPHIL NFR BLD AUTO: 2 % — SIGNIFICANT CHANGE UP (ref 0–6)
GLUCOSE SERPL-MCNC: 92 MG/DL — SIGNIFICANT CHANGE UP (ref 70–99)
HCT VFR BLD CALC: 29.3 % — LOW (ref 39–50)
HGB BLD-MCNC: 9.9 G/DL — LOW (ref 13–17)
HYPOCHROMIA BLD QL: SLIGHT — SIGNIFICANT CHANGE UP
IANC: 3.81 K/UL — SIGNIFICANT CHANGE UP (ref 1.8–7.4)
IMM GRANULOCYTES NFR BLD AUTO: 0.3 % — SIGNIFICANT CHANGE UP (ref 0–0.9)
INR BLD: 1.04 RATIO — SIGNIFICANT CHANGE UP (ref 0.85–1.18)
LYMPHOCYTES # BLD AUTO: 49.3 % — HIGH (ref 13–44)
LYMPHOCYTES # BLD AUTO: 5.3 K/UL — HIGH (ref 1–3.3)
MAGNESIUM SERPL-MCNC: 1.4 MG/DL — LOW (ref 1.6–2.6)
MANUAL SMEAR VERIFICATION: SIGNIFICANT CHANGE UP
MCHC RBC-ENTMCNC: 32 PG — SIGNIFICANT CHANGE UP (ref 27–34)
MCHC RBC-ENTMCNC: 33.8 GM/DL — SIGNIFICANT CHANGE UP (ref 32–36)
MCV RBC AUTO: 94.8 FL — SIGNIFICANT CHANGE UP (ref 80–100)
MONOCYTES # BLD AUTO: 1.25 K/UL — HIGH (ref 0–0.9)
MONOCYTES NFR BLD AUTO: 11.6 % — SIGNIFICANT CHANGE UP (ref 2–14)
NEUTROPHILS # BLD AUTO: 3.81 K/UL — SIGNIFICANT CHANGE UP (ref 1.8–7.4)
NEUTROPHILS NFR BLD AUTO: 35.5 % — LOW (ref 43–77)
NRBC # BLD: 0 /100 WBCS — SIGNIFICANT CHANGE UP (ref 0–0)
NRBC # FLD: 0 K/UL — SIGNIFICANT CHANGE UP (ref 0–0)
PHOSPHATE SERPL-MCNC: 3.4 MG/DL — SIGNIFICANT CHANGE UP (ref 2.5–4.5)
PLAT MORPH BLD: NORMAL — SIGNIFICANT CHANGE UP
PLATELET # BLD AUTO: 309 K/UL — SIGNIFICANT CHANGE UP (ref 150–400)
PLATELET COUNT - ESTIMATE: NORMAL — SIGNIFICANT CHANGE UP
POTASSIUM SERPL-MCNC: 4.1 MMOL/L — SIGNIFICANT CHANGE UP (ref 3.5–5.3)
POTASSIUM SERPL-SCNC: 4.1 MMOL/L — SIGNIFICANT CHANGE UP (ref 3.5–5.3)
PROT SERPL-MCNC: 7 G/DL — SIGNIFICANT CHANGE UP (ref 6–8.3)
PROTHROM AB SERPL-ACNC: 11.6 SEC — SIGNIFICANT CHANGE UP (ref 9.5–13)
RBC # BLD: 3.09 M/UL — LOW (ref 4.2–5.8)
RBC # FLD: 15.6 % — HIGH (ref 10.3–14.5)
RBC BLD AUTO: NORMAL — SIGNIFICANT CHANGE UP
RH IG SCN BLD-IMP: POSITIVE — SIGNIFICANT CHANGE UP
SMUDGE CELLS # BLD: PRESENT — SIGNIFICANT CHANGE UP
SODIUM SERPL-SCNC: 137 MMOL/L — SIGNIFICANT CHANGE UP (ref 135–145)
VANCOMYCIN TROUGH SERPL-MCNC: 15.7 UG/ML — SIGNIFICANT CHANGE UP (ref 10–20)
WBC # BLD: 10.69 K/UL — HIGH (ref 3.8–10.5)
WBC # FLD AUTO: 10.69 K/UL — HIGH (ref 3.8–10.5)

## 2024-04-15 PROCEDURE — 99152 MOD SED SAME PHYS/QHP 5/>YRS: CPT

## 2024-04-15 PROCEDURE — 75710 ARTERY X-RAYS ARM/LEG: CPT | Mod: 26,LT

## 2024-04-15 PROCEDURE — 99233 SBSQ HOSP IP/OBS HIGH 50: CPT | Mod: GC

## 2024-04-15 PROCEDURE — 76937 US GUIDE VASCULAR ACCESS: CPT | Mod: 26,LT

## 2024-04-15 PROCEDURE — 36247 INS CATH ABD/L-EXT ART 3RD: CPT | Mod: LT

## 2024-04-15 RX ORDER — OXYCODONE HYDROCHLORIDE 5 MG/1
5 TABLET ORAL ONCE
Refills: 0 | Status: DISCONTINUED | OUTPATIENT
Start: 2024-04-15 | End: 2024-04-15

## 2024-04-15 RX ORDER — SODIUM CHLORIDE 9 MG/ML
500 INJECTION INTRAMUSCULAR; INTRAVENOUS; SUBCUTANEOUS
Refills: 0 | Status: DISCONTINUED | OUTPATIENT
Start: 2024-04-15 | End: 2024-04-16

## 2024-04-15 RX ORDER — LIDOCAINE 4 G/100G
1 CREAM TOPICAL ONCE
Refills: 0 | Status: COMPLETED | OUTPATIENT
Start: 2024-04-15 | End: 2024-04-15

## 2024-04-15 RX ORDER — GABAPENTIN 400 MG/1
1 CAPSULE ORAL
Qty: 0 | Refills: 0 | DISCHARGE
Start: 2024-04-15

## 2024-04-15 RX ORDER — APIXABAN 2.5 MG/1
5 TABLET, FILM COATED ORAL
Refills: 0 | Status: DISCONTINUED | OUTPATIENT
Start: 2024-04-16 | End: 2024-04-16

## 2024-04-15 RX ORDER — MAGNESIUM SULFATE 500 MG/ML
2 VIAL (ML) INJECTION ONCE
Refills: 0 | Status: COMPLETED | OUTPATIENT
Start: 2024-04-15 | End: 2024-04-15

## 2024-04-15 RX ADMIN — OXYCODONE HYDROCHLORIDE 5 MILLIGRAM(S): 5 TABLET ORAL at 19:51

## 2024-04-15 RX ADMIN — LIDOCAINE 1 PATCH: 4 CREAM TOPICAL at 13:50

## 2024-04-15 RX ADMIN — Medication 1 PATCH: at 11:15

## 2024-04-15 RX ADMIN — LIDOCAINE 1 PATCH: 4 CREAM TOPICAL at 18:51

## 2024-04-15 RX ADMIN — SODIUM CHLORIDE 75 MILLILITER(S): 9 INJECTION INTRAMUSCULAR; INTRAVENOUS; SUBCUTANEOUS at 12:33

## 2024-04-15 RX ADMIN — Medication 25 GRAM(S): at 21:35

## 2024-04-15 RX ADMIN — HEPARIN SODIUM 1700 UNIT(S)/HR: 5000 INJECTION INTRAVENOUS; SUBCUTANEOUS at 08:38

## 2024-04-15 RX ADMIN — GABAPENTIN 100 MILLIGRAM(S): 400 CAPSULE ORAL at 13:52

## 2024-04-15 RX ADMIN — Medication 650 MILLIGRAM(S): at 02:27

## 2024-04-15 RX ADMIN — OXYCODONE HYDROCHLORIDE 5 MILLIGRAM(S): 5 TABLET ORAL at 21:35

## 2024-04-15 RX ADMIN — Medication 975 MILLIGRAM(S): at 14:52

## 2024-04-15 RX ADMIN — CHLORHEXIDINE GLUCONATE 1 APPLICATION(S): 213 SOLUTION TOPICAL at 13:52

## 2024-04-15 RX ADMIN — BICTEGRAVIR SODIUM, EMTRICITABINE, AND TENOFOVIR ALAFENAMIDE FUMARATE 1 TABLET(S): 30; 120; 15 TABLET ORAL at 13:53

## 2024-04-15 RX ADMIN — Medication 81 MILLIGRAM(S): at 13:51

## 2024-04-15 RX ADMIN — GABAPENTIN 100 MILLIGRAM(S): 400 CAPSULE ORAL at 05:55

## 2024-04-15 RX ADMIN — CYCLOBENZAPRINE HYDROCHLORIDE 5 MILLIGRAM(S): 10 TABLET, FILM COATED ORAL at 13:54

## 2024-04-15 RX ADMIN — Medication 1 PATCH: at 13:51

## 2024-04-15 RX ADMIN — OXYCODONE HYDROCHLORIDE 5 MILLIGRAM(S): 5 TABLET ORAL at 18:51

## 2024-04-15 RX ADMIN — OXYCODONE HYDROCHLORIDE 5 MILLIGRAM(S): 5 TABLET ORAL at 22:35

## 2024-04-15 RX ADMIN — HEPARIN SODIUM 1700 UNIT(S)/HR: 5000 INJECTION INTRAVENOUS; SUBCUTANEOUS at 04:04

## 2024-04-15 RX ADMIN — Medication 975 MILLIGRAM(S): at 13:52

## 2024-04-15 RX ADMIN — LIDOCAINE 1 PATCH: 4 CREAM TOPICAL at 19:32

## 2024-04-15 RX ADMIN — GABAPENTIN 100 MILLIGRAM(S): 400 CAPSULE ORAL at 21:35

## 2024-04-15 RX ADMIN — Medication 250 MILLIGRAM(S): at 05:55

## 2024-04-15 RX ADMIN — ATORVASTATIN CALCIUM 40 MILLIGRAM(S): 80 TABLET, FILM COATED ORAL at 21:36

## 2024-04-15 RX ADMIN — Medication 250 MILLIGRAM(S): at 18:47

## 2024-04-15 RX ADMIN — CYCLOBENZAPRINE HYDROCHLORIDE 5 MILLIGRAM(S): 10 TABLET, FILM COATED ORAL at 06:40

## 2024-04-15 RX ADMIN — Medication 3 MILLIGRAM(S): at 00:23

## 2024-04-15 NOTE — PROVIDER CONTACT NOTE (OTHER) - ASSESSMENT
Pt has pain on L shoulder and can't lift L arm. Asked pt to move rest of limbs and followed commands and without problem.

## 2024-04-15 NOTE — PROGRESS NOTE ADULT - SUBJECTIVE AND OBJECTIVE BOX
Saeid Medina MD  Interventional Cardiology / Advance Heart Failure and Cardiac Transplant Specialist  [facility] Office : [phone] [place] [address] [address]  Tel:   [place] Office : [address] [place] [address]  Tel: [phone]       Pt is lying in bed comfortable not in distress, no chest pains no SOB no palpitations  	  MEDICATIONS:  aspirin enteric coated 81 milliGRAM(s) Oral daily    bictegravir 50 mG/emtricitabine 200 mG/tenofovir alafenamide 25 mG (BIKTARVY) 1 Tablet(s) Oral daily  vancomycin  IVPB 1000 milliGRAM(s) IV Intermittent every 12 hours    albuterol    90 MICROgram(s) HFA Inhaler 1 Puff(s) Inhalation every 4 hours PRN    acetaminophen     Tablet .. 975 milliGRAM(s) Oral daily  acetaminophen     Tablet .. 650 milliGRAM(s) Oral every 6 hours PRN  cyclobenzaprine 5 milliGRAM(s) Oral three times a day PRN  gabapentin 100 milliGRAM(s) Oral three times a day  melatonin 3 milliGRAM(s) Oral at bedtime PRN  ondansetron Injectable 4 milliGRAM(s) IV Push every 8 hours PRN    polyethylene glycol 3350 17 Gram(s) Oral daily  senna 2 Tablet(s) Oral at bedtime    atorvastatin 40 milliGRAM(s) Oral at bedtime    chlorhexidine 2% Cloths 1 Application(s) Topical daily  influenza   Vaccine 0.5 milliLiter(s) IntraMuscular once  lidocaine   4% Patch 1 Patch Transdermal daily  magnesium sulfate  IVPB 2 Gram(s) IV Intermittent once  sodium chloride 0.9%. 500 milliLiter(s) IV Continuous <Continuous>      PAST MEDICAL/SURGICAL HISTORY  PAST MEDICAL & SURGICAL HISTORY:  HIV disease      H/O splenectomy          SOCIAL HISTORY: Substance Use (street drugs): ( x ) never used  (  ) other:    FAMILY HISTORY:      PHYSICAL EXAM:  T(C): 36.7 (04-15-24 @ 16:02), Max: 36.8 (04-14-24 @ 21:00)  HR: 78 (04-15-24 @ 16:02) (78 - 83)  BP: 118/84 (04-15-24 @ 16:02) (105/73 - 123/87)  RR: 18 (04-15-24 @ 16:02) (18 - 18)  SpO2: 99% (04-15-24 @ 16:02) (99% - 100%)  Wt(kg): --  I&O's Summary    14 Apr 2024 07:01  -  15 Apr 2024 07:00  --------------------------------------------------------  IN: 1304 mL / OUT: 1700 mL / NET: -396 mL          GENERAL: NAD  EYES:   PERRLA   ENMT:   Moist mucous membranes, Good dentition, No lesions  Cardiovascular: Normal S1 S2, No JVD, No murmurs, No edema  Respiratory: Lungs clear to auscultation	  Gastrointestinal:  Soft, Non-tender, + BS	  Extremities: Left foot wound covered                                    9.9    10.69 )-----------( 309      ( 15 Apr 2024 04:56 )             29.3     04-15    137  |  106  |  21  ----------------------------<  92  4.1   |  20<L>  |  1.30    Ca    8.5      15 Apr 2024 04:56  Phos  3.4     04-15  Mg     1.40     04-15    TPro  7.0  /  Alb  3.0<L>  /  TBili  0.2  /  DBili  x   /  AST  34  /  ALT  84<H>  /  AlkPhos  182<H>  04-15    proBNP:   Lipid Profile:   HgA1c:   TSH:     Consultant(s) Notes Reviewed:  [x ] YES  [ ] NO    Care Discussed with Consultants/Other Providers [ x] YES  [ ] NO    Imaging Personally Reviewed independently:  [x] YES  [ ] NO    All labs, radiologic studies, vitals, orders and medications list reviewed. Patient is seen and examined at bedside. Case discussed with medical team.

## 2024-04-15 NOTE — DIETITIAN INITIAL EVALUATION ADULT - OTHER INFO
Patient is A&O x 3-4 at baseline.  Patient is receiving a CSTCHOSN diet order in house.  Pt reports adequate PO intake with good appetite.  Food and fluid preferences discussed.  Denies difficulty chewing or swallowing.  No active GI distress such as nausea, vomit, diarrhea, constipation; on ondansetron PRN.  Pt is on bowel regimen, no BM reported per RN flowsheet.  Skin noted no edema, has left toe wound and abdomen surgical wound per RN flowsheets.  Labs notable for A1c@ 6.2 (4-4-24) with dx pre-diabetes and elevated ALT 84 and .  Dosing weight@ 179.3lbs (4-5-24), noted a unplanned and unintentional weight loss of 38.2 lbs/17.5% in 1 year from 98.9kg/ 217.5lbs(4-10-23).  Patient states weight loss is likely reflective of hospitalization x2 within a year.  HIV noted on bictegravir per medicine review.  Patient encouraged protein consumption, amenable to be served with double portion protein in house.  RDN to remain available for further nutrition intervention as indicated.

## 2024-04-15 NOTE — DIETITIAN INITIAL EVALUATION ADULT - EDUCATION DIETARY MODIFICATIONS
Encourage PO intake and honor food preferences as able./(1) partially meets; needs review/practice/verbalization

## 2024-04-15 NOTE — DIETITIAN INITIAL EVALUATION ADULT - PROBLEM SELECTOR PROBLEM 1
Central Line Type: Port  Central Line Site: Left  Port cleansed with ChloraPrep per protocol.  Accessed via: 20 gauge Welch needle  Patency Verification: good blood return  Port flushed with: 20 ml 0.9 normal saline  Welch needle removed: No  Deaccess/site appearance:   Discharged:   Comment: pt here for treatment RN will remove needle when pt is done.       Gangrenous toe

## 2024-04-15 NOTE — PROGRESS NOTE ADULT - ASSESSMENT
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 Pulse exam unchanged from previous RLE +DP/PT signal and all other pulses palpable. RLE toes not currently concerning for wet gangrene, toes dry, debridement to healthy tissue over first toe of LLE. Prior DENIS/PVRs concerning for small vessel disease of LLE. Xray of foot with no gas or osseous erosions. Vascular surgery consulted for evaluation     Recommendations:   - S/p diagnostic LLE angiogram 4/8, OR today for repeat angiogram via antegrade access      - Hold heparin ggt on call to the OR      - Pre-op labs reviewed, Keep NPO      - Consent obtained, located in chart      - Appreciate medical and cardiac risk stratification for angiogram   - Continue local wound to LLE for now   - Continue to optimize medical management, ASA and statin   - Remainder care per primary       Vascular Surgery   [id number]

## 2024-04-15 NOTE — DIETITIAN INITIAL EVALUATION ADULT - ADD RECOMMEND
1. Continue present diet order as it remains appropriate at this time.   2. Nursing to document PO in RN flow sheets and monitor weekly weights.   3. Continue to monitor skin integrity, bowel regimen, and nutrition pertinent labs.

## 2024-04-15 NOTE — DIETITIAN INITIAL EVALUATION ADULT - REASON FOR ADMISSION
Gangrene, not elsewhere classified.  Per 4/15/24 internal medicine chart review, Patient is a 53M hx HIV, preDM, current smoker, hx cocaine use, prior arterial thromboembolic events, presents with wet gangrene of L foot, s/p debridement by podiatry on 4/5, angiogram on 4/8, with repeat angio for revascularization 4/15. Vascular and podiatry following.

## 2024-04-15 NOTE — MEDICAL STUDENT PROGRESS NOTE(EDUCATION) - ASSESSMENT
Assessment: 53M hx HIV, preDM, current smoker, hx cocaine use, prior arterial thromboembolic events, presents with wet gangrene of L foot, s/p debridement by podiatry on 4/5, angiogram on 4/8, with repeat angio for revascularization 4/15. Vascular and podiatry following.      Problem/Plan - 1:  ·  Problem: Gangrenous toe.   ·  Plan: Followed by podiatry and vascular  Vasc - LLE angiogram 4/8 w/ small vessel disease, no LVO. planning for rpt angio on 4/15 to open up small vessels  Podiatry debrided to healthy tissue on 4/5 early am, considering amputation vs. local wound care pending rpt angio  ID recs appreciated -> vanc continuing, unasyn d/c'd,  2 week course until 4/18, can finish on doxy/cephalexin.    Problem/Plan - 2:  ·  Problem: Personal history of thromboembolic disease.   ·  Plan: Thromboses to L leg, leading to limb ischemia and gangrene  - 4/8 LLE angiogram - small vessel disease only, planning on repeat angiogram as above  - continue AC, heparin gtt with upcoming angio, transition to eliquis when no longer pending procedures.    Problem/Plan - 3:  ·  Problem: Oral candidiasis.   ·  Plan: fluconazole 100mg x7 days  - dc'd fluconazole iso worsening transaminitis w/ unknown etiology and poss adverse effect of fluconazole  RESOLVED.    Problem/Plan - 4:  ·  Problem: History of meningitis.   ·  Plan: Treated at [facility] with 3 weeks of IV abx, patient left AMA  Does not remember what pathogen or what antibiotics were used  No signs or symptoms at the moment  Obtaining records.    Problem/Plan - 5:  ·  Problem: Renal cyst.   ·  Plan: Seen on CT and US, solid mass not ruled out  recommended f/u MRI, noted to have 1.6cm complex cyst in R upper pole, recommended for surveillance imaging (Bosniak 2F).    Problem/Plan - 6:  ·  Problem: Back pain.   ·  Plan: - likely sciatica given +straight leg raise  - MR L spine given patient to stay as inpatient through next week - degenerative disease  - lidocaine patch QD.    Problem/Plan - 7:  ·  Problem: HIV disease.   ·  Plan: CD4 count 328 in March  Continuing biktarvy.    Problem/Plan - 8:  ·  Problem: Substance use disorder.   ·  Plan: SBIRT c/s for tobacco and cocaine use  Nicotine patch and gum PRN.    Problem/Plan - 9:  ·  Problem: Prediabetes.   ·  Plan: - CC diet.    Problem/Plan - 10:  ·  Problem: Prophylactic measure.   ·  Plan; DVT: Eliquis  Diet: CC  Dispo: pending PT eval.    Problem/Plan - 11:  ·  Problem: Preoperative examination.   ·  Plan: Preoperative Optimization:

## 2024-04-15 NOTE — DIETITIAN INITIAL EVALUATION ADULT - PERTINENT MEDS FT
MEDICATIONS  (STANDING):  acetaminophen     Tablet .. 975 milliGRAM(s) Oral daily  aspirin enteric coated 81 milliGRAM(s) Oral daily  atorvastatin 40 milliGRAM(s) Oral at bedtime  bictegravir 50 mG/emtricitabine 200 mG/tenofovir alafenamide 25 mG (BIKTARVY) 1 Tablet(s) Oral daily  chlorhexidine 2% Cloths 1 Application(s) Topical daily  gabapentin 100 milliGRAM(s) Oral three times a day  influenza   Vaccine 0.5 milliLiter(s) IntraMuscular once  lidocaine   4% Patch 1 Patch Transdermal daily  magnesium sulfate  IVPB 2 Gram(s) IV Intermittent once  nicotine -  14 mG/24Hr(s) Patch 1 Patch Transdermal daily  polyethylene glycol 3350 17 Gram(s) Oral daily  senna 2 Tablet(s) Oral at bedtime  sodium chloride 0.9%. 500 milliLiter(s) (75 mL/Hr) IV Continuous <Continuous>  vancomycin  IVPB 1000 milliGRAM(s) IV Intermittent every 12 hours    MEDICATIONS  (PRN):  acetaminophen     Tablet .. 650 milliGRAM(s) Oral every 6 hours PRN Temp greater or equal to 38C (100.4F), Mild Pain (1 - 3)  albuterol    90 MICROgram(s) HFA Inhaler 1 Puff(s) Inhalation every 4 hours PRN Shortness of Breath and/or Wheezing  cyclobenzaprine 5 milliGRAM(s) Oral three times a day PRN Muscle Spasm  melatonin 3 milliGRAM(s) Oral at bedtime PRN Insomnia  nicotine  Polacrilex Gum 2 milliGRAM(s) Oral every 2 hours PRN Smoking Cessation  ondansetron Injectable 4 milliGRAM(s) IV Push every 8 hours PRN Nausea and/or Vomiting

## 2024-04-15 NOTE — PROGRESS NOTE ADULT - ATTENDING COMMENTS
53M hx HIV, preDM, current smoker, hx cocaine use, prior arterial thromboembolic events, a/w sepsis on admission (HR>90 and leukocytosis) 2/2  wet gangrene of L foot. Left foot xray w/ no OM, gas, or fracture  Pt seen by podiatry and s/pL foot hallux toenail removal, and excisional debridement of L foot hallux necrotic skin to the level of epidermis,    Foot Wound:  -C/w vanco, cx corynebacterium and resistant lugdunensis , dc'd unasyn per ID. trend vanco trough  - MRSA negative, blood cx ngtd  - local wound care per podiatry, plan for L foot TMA pending demarcation/ vasc recs  - Vasc following, Previous ida/pvrs concerning for small vessel disease of LLE. Xray of foot with no gas or osseous erosions.  - s/p LLE Angiogram 4/8 that showed small vessel disease. vascular surgery plan to repeat angiogram today    Elevated LFTs  - LFT stable, hepatitis panel neg, dc'd diflucan and reduced gabapentin to 100 mg tid as they are potentially hepatotoxic, applied lidocaine patch   RUQ US normal liver and echogenic R kidney.  Improving  # low back pain d/t spinal stenosis reduce gabapentin in s/o elevated LFT, MRI lumbar spine L4-L5, L5-S1 disc bulges    Podiatry follow-up in AM, DC planning, will need wound care and close follow-up

## 2024-04-15 NOTE — DIETITIAN INITIAL EVALUATION ADULT - PROBLEM SELECTOR PLAN 4
Treated at [facility] with 3 weeks of IV abx, patient left AMA  Does not remember what pathogen or what antibiotics were used  No signs or symptoms at the moment  Obtaining records

## 2024-04-15 NOTE — PROGRESS NOTE ADULT - SUBJECTIVE AND OBJECTIVE BOX
VASCULAR SURGERY DAILY PROGRESS NOTE:     SUBJECTIVE/ROS:   Patient seen and evaluated on AM rounds.        OBJECTIVE:  Vital Signs Last 24 Hrs  T(C): 36.8 (14 Apr 2024 21:00), Max: 36.8 (14 Apr 2024 21:00)  T(F): 98.2 (14 Apr 2024 21:00), Max: 98.2 (14 Apr 2024 21:00)  HR: 83 (14 Apr 2024 21:00) (80 - 88)  BP: 105/73 (14 Apr 2024 21:00) (102/79 - 111/62)  RR: 18 (14 Apr 2024 21:00) (18 - 18)  SpO2: 100% (14 Apr 2024 21:00) (96% - 100%)    Parameters below as of 14 Apr 2024 21:00  Patient On (Oxygen Delivery Method): room air      I&O's Detail    13 Apr 2024 07:01  -  14 Apr 2024 07:00  --------------------------------------------------------  IN:    Heparin Infusion: 204 mL    IV PiggyBack: 500 mL    Oral Fluid: 800 mL  Total IN: 1504 mL    OUT:    Voided (mL): 1050 mL  Total OUT: 1050 mL    Total NET: 454 mL      14 Apr 2024 07:01  -  15 Apr 2024 05:35  --------------------------------------------------------  IN:    Oral Fluid: 750 mL  Total IN: 750 mL    OUT:    Voided (mL): 900 mL  Total OUT: 900 mL    Total NET: -150 mL      Daily   MEDICATIONS  (STANDING):  acetaminophen     Tablet .. 975 milliGRAM(s) Oral daily  aspirin enteric coated 81 milliGRAM(s) Oral daily  atorvastatin 40 milliGRAM(s) Oral at bedtime  bictegravir 50 mG/emtricitabine 200 mG/tenofovir alafenamide 25 mG (BIKTARVY) 1 Tablet(s) Oral daily  chlorhexidine 2% Cloths 1 Application(s) Topical daily  gabapentin 100 milliGRAM(s) Oral three times a day  heparin  Infusion.  Unit(s)/Hr (15 mL/Hr) IV Continuous <Continuous>  influenza   Vaccine 0.5 milliLiter(s) IntraMuscular once  lidocaine   4% Patch 1 Patch Transdermal daily  nicotine -  14 mG/24Hr(s) Patch 1 Patch Transdermal daily  polyethylene glycol 3350 17 Gram(s) Oral daily  senna 2 Tablet(s) Oral at bedtime  vancomycin  IVPB 1000 milliGRAM(s) IV Intermittent every 12 hours    MEDICATIONS  (PRN):  acetaminophen     Tablet .. 650 milliGRAM(s) Oral every 6 hours PRN Temp greater or equal to 38C (100.4F), Mild Pain (1 - 3)  albuterol    90 MICROgram(s) HFA Inhaler 1 Puff(s) Inhalation every 4 hours PRN Shortness of Breath and/or Wheezing  cyclobenzaprine 5 milliGRAM(s) Oral three times a day PRN Muscle Spasm  heparin   Injectable 3000 Unit(s) IV Push every 6 hours PRN For aPTT between 40 - 57  heparin   Injectable 6500 Unit(s) IV Push every 6 hours PRN For aPTT less than 40  melatonin 3 milliGRAM(s) Oral at bedtime PRN Insomnia  nicotine  Polacrilex Gum 2 milliGRAM(s) Oral every 2 hours PRN Smoking Cessation  ondansetron Injectable 4 milliGRAM(s) IV Push every 8 hours PRN Nausea and/or Vomiting      Labs:                        9.9    10.69 )-----------( 309      ( 15 Apr 2024 04:56 )             29.3     04-15    137  |  106  |  21  ----------------------------<  92  4.1   |  20<L>  |  1.30    Ca    8.5      15 Apr 2024 04:56  Phos  3.4     04-15  Mg     1.40     04-15    TPro  7.0  /  Alb  3.0<L>  /  TBili  0.2  /  DBili  x   /  AST  34  /  ALT  84<H>  /  AlkPhos  182<H>  04-15    PT/INR - ( 15 Apr 2024 04:56 )   PT: 11.6 sec;   INR: 1.04 ratio         PTT - ( 15 Apr 2024 04:56 )  PTT:85.0 sec  Urinalysis Basic - ( 15 Apr 2024 04:56 )    Color: x / Appearance: x / SG: x / pH: x  Gluc: 92 mg/dL / Ketone: x  / Bili: x / Urobili: x   Blood: x / Protein: x / Nitrite: x   Leuk Esterase: x / RBC: x / WBC x   Sq Epi: x / Non Sq Epi: x / Bacteria: x          Physical Exam:  General: NAD  Cardiovascular: appears well perfused   Respiratory: respirations non labored on RA  Gastrointestinal: soft, nontender, nondistended  Extremities: B/L palpable femoral pulses.  LLE ischemic changes to all digits. LLE palpable PT/AT pulse  Neurological: A+Ox3

## 2024-04-15 NOTE — PROGRESS NOTE ADULT - SUBJECTIVE AND OBJECTIVE BOX
Christopher Dejesus, PGY-1  Please contact on Teams    LEA MENDOZA  53y  Male    Reason for Admission:     SUBJECTIVE/INTERVAL EVENTS: No acute events. Pt seen and examined at bedside.    Physical Exam:   General: NAD  HEENT: PERRL, EOMI, normal sclera and conjunctiva, no oral lesions  Neck: Supple, no JVD  Lungs: CTA bilaterally  Heart: RRR, normal S1S2, no murmurs/rubs/gallops  Abdomen: Soft, ND/NT  Extremities: 2+ peripheral pulses, no cyanosis/clubbing/edema, full ROM, bandaged L foot  Skin: Warm, well-perfused  Neuro: A&O x3, no focal deficits      Vital Signs Last 24 Hrs  T(C): 36.7 (15 Apr 2024 05:08), Max: 36.8 (14 Apr 2024 21:00)  T(F): 98 (15 Apr 2024 05:08), Max: 98.2 (14 Apr 2024 21:00)  HR: 79 (15 Apr 2024 05:08) (79 - 88)  BP: 123/87 (15 Apr 2024 05:08) (102/79 - 123/87)  BP(mean): --  RR: 18 (15 Apr 2024 05:08) (18 - 18)  SpO2: 100% (15 Apr 2024 05:08) (98% - 100%)    Parameters below as of 15 Apr 2024 05:08  Patient On (Oxygen Delivery Method): room air          Consultant(s) Notes Reviewed:  [x] YES  [ ] NO  Care Discussed with Consultants/Other Providers [x] YES  [ ] NO    LABS:                        9.9    10.69 )-----------( 309      ( 15 Apr 2024 04:56 )             29.3                         10.6   8.67  )-----------( 344      ( 14 Apr 2024 04:43 )             31.3                         10.7   8.84  )-----------( 375      ( 13 Apr 2024 07:05 )             31.8                               137    |  106    |  21                  Calcium: 8.5   / iCa: x      (04-15 @ 04:56)    ----------------------------<  92        Magnesium: 1.40                             4.1     |  20     |  1.30             Phosphorous: 3.4      TPro  7.0    /  Alb  3.0    /  TBili  0.2    /  DBili  x      /  AST  34     /  ALT  84     /  AlkPhos  182    15 Apr 2024 04:56    Urinalysis Basic - ( 15 Apr 2024 04:56 )    Color: x / Appearance: x / SG: x / pH: x  Gluc: 92 mg/dL / Ketone: x  / Bili: x / Urobili: x   Blood: x / Protein: x / Nitrite: x   Leuk Esterase: x / RBC: x / WBC x   Sq Epi: x / Non Sq Epi: x / Bacteria: x        RADIOLOGY & ADDITIONAL TESTS:    Imaging Personally Reviewed:  [x] YES  [ ] NO    MEDICATIONS  (STANDING):  acetaminophen     Tablet .. 975 milliGRAM(s) Oral daily  aspirin enteric coated 81 milliGRAM(s) Oral daily  atorvastatin 40 milliGRAM(s) Oral at bedtime  bictegravir 50 mG/emtricitabine 200 mG/tenofovir alafenamide 25 mG (BIKTARVY) 1 Tablet(s) Oral daily  chlorhexidine 2% Cloths 1 Application(s) Topical daily  gabapentin 100 milliGRAM(s) Oral three times a day  heparin  Infusion.  Unit(s)/Hr (15 mL/Hr) IV Continuous <Continuous>  influenza   Vaccine 0.5 milliLiter(s) IntraMuscular once  lidocaine   4% Patch 1 Patch Transdermal daily  magnesium sulfate  IVPB 2 Gram(s) IV Intermittent once  nicotine -  14 mG/24Hr(s) Patch 1 Patch Transdermal daily  polyethylene glycol 3350 17 Gram(s) Oral daily  senna 2 Tablet(s) Oral at bedtime  vancomycin  IVPB 1000 milliGRAM(s) IV Intermittent every 12 hours    MEDICATIONS  (PRN):  acetaminophen     Tablet .. 650 milliGRAM(s) Oral every 6 hours PRN Temp greater or equal to 38C (100.4F), Mild Pain (1 - 3)  albuterol    90 MICROgram(s) HFA Inhaler 1 Puff(s) Inhalation every 4 hours PRN Shortness of Breath and/or Wheezing  cyclobenzaprine 5 milliGRAM(s) Oral three times a day PRN Muscle Spasm  heparin   Injectable 3000 Unit(s) IV Push every 6 hours PRN For aPTT between 40 - 57  heparin   Injectable 6500 Unit(s) IV Push every 6 hours PRN For aPTT less than 40  melatonin 3 milliGRAM(s) Oral at bedtime PRN Insomnia  nicotine  Polacrilex Gum 2 milliGRAM(s) Oral every 2 hours PRN Smoking Cessation  ondansetron Injectable 4 milliGRAM(s) IV Push every 8 hours PRN Nausea and/or Vomiting      HEALTH ISSUES - PROBLEM Dx:  Gangrenous toe    Personal history of thromboembolic disease    Oral candidiasis    History of meningitis    Renal cyst    Back pain    HIV disease    Substance use disorder    Prediabetes    Prophylactic measure    Preoperative examination

## 2024-04-15 NOTE — PROGRESS NOTE ADULT - ASSESSMENT
53M hx HIV, preDM, current smoker, hx cocaine use, prior arterial thromboembolic events, presents with wet gangrene of L foot, s/p debridement by podiatry on 4/5, angiogram on 4/8, with repeat angio for revascularization 4/15. Vascular and podiatry following.

## 2024-04-15 NOTE — CHART NOTE - NSCHARTNOTEFT_GEN_A_CORE
General Surgery Post op Check    Pt seen and examined without complaints. Pain is controlled. Denies SOB/CP/N/V.     Vital Signs Last 24 Hrs  T(C): 36.4 (08 Apr 2024 15:09), Max: 36.9 (07 Apr 2024 22:08)  T(F): 97.6 (08 Apr 2024 15:09), Max: 98.4 (07 Apr 2024 22:08)  HR: 66 (08 Apr 2024 15:09) (62 - 73)  BP: 148/84 (08 Apr 2024 15:09) (136/79 - 153/88)  BP(mean): --  RR: 18 (08 Apr 2024 15:09) (18 - 18)  SpO2: 98% (08 Apr 2024 15:09) (98% - 99%)    Parameters below as of 08 Apr 2024 15:09  Patient On (Oxygen Delivery Method): room air        I&O's Summary    07 Apr 2024 07:01  -  08 Apr 2024 07:00  --------------------------------------------------------  IN: 340 mL / OUT: 1000 mL / NET: -660 mL    08 Apr 2024 07:01  -  08 Apr 2024 17:41  --------------------------------------------------------  IN: 0 mL / OUT: 500 mL / NET: -500 mL        Physical Exam  Gen: NAD, A&Ox3  Pulm: No respiratory distress, no subcostal retractions  CV: RRR, no JVD  Abd: Soft, non-tender, non-distended. right groin without selling or hematoma.   Extremities: Palpable DP and PT pulses bilaterally        A/P: 53y Male s/p LLE diagnostic angio with 3 vessel runoff that disrupts at the level of the plantar arch, right groin access, mynx closure. Patient feeling well. Lower extremities well perfused. Hemodynamically stable recovering well post-procedure.     - Continue local wound care   - Pain control as needed  - May resume eliquis for evening dose today   - Monitor groin for bleed or hematoma    - Rest of care per primary team     Vascular Surgery,   [id number]
Provider was contacted by RN as pt reported chest tightness and requested inhaler treatment. VS: HR 76 /85 )2 98% T 98.7  Provider came to assess pt at bedside and pt reports that chest tightness is not associated with any pain or radiation. Pt reports he experiences this chest tightness when he is under stress and reports he has been experiencing increased stress with his events associated with his children and pending amputation. Pt denies CP, SOB, abdominal pain, N/V. Provider offered therapy and when pt was asked what has relieved chest tightness in the past, pt reports albuterol inhaler has helped in the past. Pt declines therapy or talking to a /other assistance at this time. Pt reports wife is on her way and will feel better once wife arrives.  Provider ordered EKG. Pending.      Bharati Mai  PGY-2
STATUS POST:  antegrade LLE angiogram    POST OPERATIVE DAY #: 0    SUBJECTIVE: Patient seen and examined without complaints. Sleeping comfortably on arrival to bedside. Reports he has no pain in foot or groin. Denies fevers, CP, SOB, N/V.      Vital Signs Last 24 Hrs  T(C): 36.7 (15 Apr 2024 16:02), Max: 36.8 (14 Apr 2024 21:00)  T(F): 98.1 (15 Apr 2024 16:02), Max: 98.2 (14 Apr 2024 21:00)  HR: 78 (15 Apr 2024 16:02) (78 - 83)  BP: 118/84 (15 Apr 2024 16:02) (105/73 - 123/87)  BP(mean): --  RR: 18 (15 Apr 2024 16:02) (18 - 18)  SpO2: 99% (15 Apr 2024 16:02) (99% - 100%)    Parameters below as of 15 Apr 2024 16:02  Patient On (Oxygen Delivery Method): room air      MEDICATIONS  (STANDING):  acetaminophen     Tablet .. 975 milliGRAM(s) Oral daily  aspirin enteric coated 81 milliGRAM(s) Oral daily  atorvastatin 40 milliGRAM(s) Oral at bedtime  bictegravir 50 mG/emtricitabine 200 mG/tenofovir alafenamide 25 mG (BIKTARVY) 1 Tablet(s) Oral daily  chlorhexidine 2% Cloths 1 Application(s) Topical daily  gabapentin 100 milliGRAM(s) Oral three times a day  influenza   Vaccine 0.5 milliLiter(s) IntraMuscular once  lidocaine   4% Patch 1 Patch Transdermal daily  magnesium sulfate  IVPB 2 Gram(s) IV Intermittent once  nicotine -  14 mG/24Hr(s) Patch 1 Patch Transdermal daily  polyethylene glycol 3350 17 Gram(s) Oral daily  senna 2 Tablet(s) Oral at bedtime  sodium chloride 0.9%. 500 milliLiter(s) (75 mL/Hr) IV Continuous <Continuous>  vancomycin  IVPB 1000 milliGRAM(s) IV Intermittent every 12 hours    MEDICATIONS  (PRN):  acetaminophen     Tablet .. 650 milliGRAM(s) Oral every 6 hours PRN Temp greater or equal to 38C (100.4F), Mild Pain (1 - 3)  albuterol    90 MICROgram(s) HFA Inhaler 1 Puff(s) Inhalation every 4 hours PRN Shortness of Breath and/or Wheezing  cyclobenzaprine 5 milliGRAM(s) Oral three times a day PRN Muscle Spasm  melatonin 3 milliGRAM(s) Oral at bedtime PRN Insomnia  nicotine  Polacrilex Gum 2 milliGRAM(s) Oral every 2 hours PRN Smoking Cessation  ondansetron Injectable 4 milliGRAM(s) IV Push every 8 hours PRN Nausea and/or Vomiting      Physical Exam  Gen: NAD, A&Ox3  Pulm: Nonlabored breathing on room air  CV: RRR, no JVD  Abd: Soft, NT, ND  Extremities: L groin dressing C/D/I, minimally tender, no palpable hematoma. L foot forefoot dressing C/D/I, palpable AT/PT    LABS:                        9.9    10.69 )-----------( 309      ( 15 Apr 2024 04:56 )             29.3     04-15    137  |  106  |  21  ----------------------------<  92  4.1   |  20<L>  |  1.30    Ca    8.5      15 Apr 2024 04:56  Phos  3.4     04-15  Mg     1.40     04-15    TPro  7.0  /  Alb  3.0<L>  /  TBili  0.2  /  DBili  x   /  AST  34  /  ALT  84<H>  /  AlkPhos  182<H>  04-15  PT/INR - ( 15 Apr 2024 04:56 )   PT: 11.6 sec;   INR: 1.04 ratio    PTT - ( 15 Apr 2024 04:56 )  PTT:85.0 sec    A/P: 53y Male now several hours s/p diagnostic angiogram via L groin. Unable to open pedal arch. No evidence of hematoma at access site, denies pain.    Recs:  - Resume heparin gtt  - No further vascular surgical interventions before planned podiatric procedures  - Stable for discharge from vascular standpoint  - Remainder of care per primary  - Please call back with any questions/concerns    Vascular Surgery  [id number]
s/p LLE angiogram with left lower extremity small vessel disease; RFA mynx closure device; may resume Eliquis for evening dose tonight 4/8 if groin site check stable with no bleed and no hematoma    if PO AC not indicated at this time due to any other needed procedures, resume Heparin drip (no bolus) at 1700     Primary team 7, Dr Dejesus advised to determine
Called to bedside by nurse for L shoulder pain and weakness since OR today. Assessed patient, who stated he has been having worsening L shoulder pain since OR today. On exam, patient is neurologically intact without focal deficits. 4/5 bilateral strength in , elbow flexion, elbow extension, shoulder shrug, SCM contraction. Cranial nerves intact. Has reduced active and passive ROM of the L shoulder in abduction and flexion due to pain. Pain localizes to anterior and superior portion of shoulder joint, near where ball of humerus meets glenoid fossa. No palpable dislocation. Likely related to poor positioning either in the OR earlier today, or afterwards while he was asleep in hospital bed. Low concern for acute stroke. Vascular notified.

## 2024-04-15 NOTE — DIETITIAN INITIAL EVALUATION ADULT - PERTINENT LABORATORY DATA
04-15    137  |  106  |  21  ----------------------------<  92  4.1   |  20<L>  |  1.30    Ca    8.5      15 Apr 2024 04:56  Phos  3.4     04-15  Mg     1.40     04-15    TPro  7.0  /  Alb  3.0<L>  /  TBili  0.2  /  DBili  x   /  AST  34  /  ALT  84<H>  /  AlkPhos  182<H>  04-15  A1C with Estimated Average Glucose Result: 6.2 % (04-04-24 @ 23:55)  A1C with Estimated Average Glucose Result: 5.9 % (03-16-24 @ 06:05)

## 2024-04-16 ENCOUNTER — TRANSCRIPTION ENCOUNTER (OUTPATIENT)
Age: 54
End: 2024-04-16

## 2024-04-16 VITALS
SYSTOLIC BLOOD PRESSURE: 115 MMHG | OXYGEN SATURATION: 98 % | TEMPERATURE: 98 F | DIASTOLIC BLOOD PRESSURE: 78 MMHG | HEART RATE: 72 BPM | RESPIRATION RATE: 18 BRPM

## 2024-04-16 DIAGNOSIS — M25.512 PAIN IN LEFT SHOULDER: ICD-10-CM

## 2024-04-16 LAB
ALBUMIN SERPL ELPH-MCNC: 3.2 G/DL — LOW (ref 3.3–5)
ALP SERPL-CCNC: 174 U/L — HIGH (ref 40–120)
ALT FLD-CCNC: 73 U/L — HIGH (ref 4–41)
ANION GAP SERPL CALC-SCNC: 10 MMOL/L — SIGNIFICANT CHANGE UP (ref 7–14)
APTT BLD: 33.7 SEC — SIGNIFICANT CHANGE UP (ref 24.5–35.6)
AST SERPL-CCNC: 27 U/L — SIGNIFICANT CHANGE UP (ref 4–40)
BASOPHILS # BLD AUTO: 0.11 K/UL — SIGNIFICANT CHANGE UP (ref 0–0.2)
BASOPHILS NFR BLD AUTO: 1.2 % — SIGNIFICANT CHANGE UP (ref 0–2)
BILIRUB SERPL-MCNC: 0.2 MG/DL — SIGNIFICANT CHANGE UP (ref 0.2–1.2)
BUN SERPL-MCNC: 20 MG/DL — SIGNIFICANT CHANGE UP (ref 7–23)
CALCIUM SERPL-MCNC: 9 MG/DL — SIGNIFICANT CHANGE UP (ref 8.4–10.5)
CHLORIDE SERPL-SCNC: 104 MMOL/L — SIGNIFICANT CHANGE UP (ref 98–107)
CO2 SERPL-SCNC: 23 MMOL/L — SIGNIFICANT CHANGE UP (ref 22–31)
CREAT SERPL-MCNC: 1.32 MG/DL — HIGH (ref 0.5–1.3)
EGFR: 64 ML/MIN/1.73M2 — SIGNIFICANT CHANGE UP
EOSINOPHIL # BLD AUTO: 0.13 K/UL — SIGNIFICANT CHANGE UP (ref 0–0.5)
EOSINOPHIL NFR BLD AUTO: 1.5 % — SIGNIFICANT CHANGE UP (ref 0–6)
GLUCOSE SERPL-MCNC: 73 MG/DL — SIGNIFICANT CHANGE UP (ref 70–99)
HCT VFR BLD CALC: 29.1 % — LOW (ref 39–50)
HGB BLD-MCNC: 9.8 G/DL — LOW (ref 13–17)
IANC: 3.31 K/UL — SIGNIFICANT CHANGE UP (ref 1.8–7.4)
IMM GRANULOCYTES NFR BLD AUTO: 0.3 % — SIGNIFICANT CHANGE UP (ref 0–0.9)
LYMPHOCYTES # BLD AUTO: 3.99 K/UL — HIGH (ref 1–3.3)
LYMPHOCYTES # BLD AUTO: 44.6 % — HIGH (ref 13–44)
MAGNESIUM SERPL-MCNC: 2 MG/DL — SIGNIFICANT CHANGE UP (ref 1.6–2.6)
MCHC RBC-ENTMCNC: 32.1 PG — SIGNIFICANT CHANGE UP (ref 27–34)
MCHC RBC-ENTMCNC: 33.7 GM/DL — SIGNIFICANT CHANGE UP (ref 32–36)
MCV RBC AUTO: 95.4 FL — SIGNIFICANT CHANGE UP (ref 80–100)
MONOCYTES # BLD AUTO: 1.37 K/UL — HIGH (ref 0–0.9)
MONOCYTES NFR BLD AUTO: 15.3 % — HIGH (ref 2–14)
NEUTROPHILS # BLD AUTO: 3.31 K/UL — SIGNIFICANT CHANGE UP (ref 1.8–7.4)
NEUTROPHILS NFR BLD AUTO: 37.1 % — LOW (ref 43–77)
NRBC # BLD: 0 /100 WBCS — SIGNIFICANT CHANGE UP (ref 0–0)
NRBC # FLD: 0 K/UL — SIGNIFICANT CHANGE UP (ref 0–0)
PHOSPHATE SERPL-MCNC: 4.2 MG/DL — SIGNIFICANT CHANGE UP (ref 2.5–4.5)
PLATELET # BLD AUTO: 308 K/UL — SIGNIFICANT CHANGE UP (ref 150–400)
POTASSIUM SERPL-MCNC: 4.1 MMOL/L — SIGNIFICANT CHANGE UP (ref 3.5–5.3)
POTASSIUM SERPL-SCNC: 4.1 MMOL/L — SIGNIFICANT CHANGE UP (ref 3.5–5.3)
PROT SERPL-MCNC: 7.4 G/DL — SIGNIFICANT CHANGE UP (ref 6–8.3)
RBC # BLD: 3.05 M/UL — LOW (ref 4.2–5.8)
RBC # FLD: 15.6 % — HIGH (ref 10.3–14.5)
SODIUM SERPL-SCNC: 137 MMOL/L — SIGNIFICANT CHANGE UP (ref 135–145)
VANCOMYCIN TROUGH SERPL-MCNC: 14.9 UG/ML — SIGNIFICANT CHANGE UP (ref 10–20)
WBC # BLD: 8.94 K/UL — SIGNIFICANT CHANGE UP (ref 3.8–10.5)
WBC # FLD AUTO: 8.94 K/UL — SIGNIFICANT CHANGE UP (ref 3.8–10.5)

## 2024-04-16 PROCEDURE — 99239 HOSP IP/OBS DSCHRG MGMT >30: CPT | Mod: GC

## 2024-04-16 PROCEDURE — 73020 X-RAY EXAM OF SHOULDER: CPT | Mod: 26,59,LT

## 2024-04-16 PROCEDURE — 73030 X-RAY EXAM OF SHOULDER: CPT | Mod: 26,LT

## 2024-04-16 RX ORDER — BICTEGRAVIR SODIUM, EMTRICITABINE, AND TENOFOVIR ALAFENAMIDE FUMARATE 30; 120; 15 MG/1; MG/1; MG/1
1 TABLET ORAL
Refills: 0 | DISCHARGE

## 2024-04-16 RX ORDER — CEPHALEXIN 500 MG
1 CAPSULE ORAL
Qty: 10 | Refills: 0
Start: 2024-04-16

## 2024-04-16 RX ORDER — GABAPENTIN 400 MG/1
1 CAPSULE ORAL
Qty: 90 | Refills: 0
Start: 2024-04-16 | End: 2024-05-15

## 2024-04-16 RX ORDER — LIDOCAINE 4 G/100G
1 CREAM TOPICAL
Qty: 2 | Refills: 0
Start: 2024-04-16

## 2024-04-16 RX ORDER — ACETAMINOPHEN 500 MG
1000 TABLET ORAL ONCE
Refills: 0 | Status: COMPLETED | OUTPATIENT
Start: 2024-04-16 | End: 2024-04-16

## 2024-04-16 RX ORDER — ACETAMINOPHEN 500 MG
1 TABLET ORAL
Qty: 32 | Refills: 0
Start: 2024-04-16 | End: 2024-04-23

## 2024-04-16 RX ORDER — BICTEGRAVIR SODIUM, EMTRICITABINE, AND TENOFOVIR ALAFENAMIDE FUMARATE 30; 120; 15 MG/1; MG/1; MG/1
1 TABLET ORAL
Qty: 60 | Refills: 0
Start: 2024-04-16

## 2024-04-16 RX ORDER — NICOTINE POLACRILEX 2 MG
1 GUM BUCCAL
Qty: 2 | Refills: 0
Start: 2024-04-16

## 2024-04-16 RX ORDER — GABAPENTIN 400 MG/1
1 CAPSULE ORAL
Refills: 0
Start: 2024-04-16

## 2024-04-16 RX ADMIN — LIDOCAINE 1 PATCH: 4 CREAM TOPICAL at 14:05

## 2024-04-16 RX ADMIN — APIXABAN 5 MILLIGRAM(S): 2.5 TABLET, FILM COATED ORAL at 06:16

## 2024-04-16 RX ADMIN — Medication 975 MILLIGRAM(S): at 14:07

## 2024-04-16 RX ADMIN — CHLORHEXIDINE GLUCONATE 1 APPLICATION(S): 213 SOLUTION TOPICAL at 14:33

## 2024-04-16 RX ADMIN — LIDOCAINE 1 PATCH: 4 CREAM TOPICAL at 01:51

## 2024-04-16 RX ADMIN — Medication 81 MILLIGRAM(S): at 14:08

## 2024-04-16 RX ADMIN — Medication 3 MILLIGRAM(S): at 02:12

## 2024-04-16 RX ADMIN — BICTEGRAVIR SODIUM, EMTRICITABINE, AND TENOFOVIR ALAFENAMIDE FUMARATE 1 TABLET(S): 30; 120; 15 TABLET ORAL at 14:06

## 2024-04-16 RX ADMIN — LIDOCAINE 1 PATCH: 4 CREAM TOPICAL at 05:38

## 2024-04-16 RX ADMIN — GABAPENTIN 100 MILLIGRAM(S): 400 CAPSULE ORAL at 14:32

## 2024-04-16 RX ADMIN — Medication 250 MILLIGRAM(S): at 06:16

## 2024-04-16 RX ADMIN — Medication 1000 MILLIGRAM(S): at 03:10

## 2024-04-16 RX ADMIN — Medication 1 PATCH: at 06:44

## 2024-04-16 RX ADMIN — GABAPENTIN 100 MILLIGRAM(S): 400 CAPSULE ORAL at 06:16

## 2024-04-16 RX ADMIN — Medication 400 MILLIGRAM(S): at 02:12

## 2024-04-16 NOTE — PROGRESS NOTE ADULT - PROBLEM SELECTOR PLAN 11
Preoperative Optimization:    I personally reviewed the patient's labs.  I personally reviewed the patient's EKG and my interpretation is: NSR with PACs, incomplete RBBB  I personally reviewed the patient's CXR/imaging and my interpretation is: clear lungs  The patient's RCRI score is: 0  No Cardiovascular Contraindication to surgery  Continue medications as ordered.  The patient is considered low-risk for low- risk procedure
DVT: Eliquis  Diet: CC  Dispo: pending PT eval
Preoperative Optimization:    I personally reviewed the patient's labs.  I personally reviewed the patient's EKG and my interpretation is: NSR with PACs, incomplete RBBB  I personally reviewed the patient's CXR/imaging and my interpretation is: clear lungs  The patient's RCRI score is: 0  No Cardiovascular Contraindication to surgery  Continue medications as ordered.  The patient is considered low-risk for low- risk procedure
Preoperative Optimization:    I personally reviewed the patient's labs.  I personally reviewed the patient's EKG and my interpretation is: NSR with PACs, incomplete RBBB  I personally reviewed the patient's CXR/imaging and my interpretation is: clear lungs  The patient's RCRI score is: 0  No Cardiovascular Contraindication to surgery  Continue medications as ordered.  The patient is considered low-risk for low- risk procedure

## 2024-04-16 NOTE — PROGRESS NOTE ADULT - PROBLEM SELECTOR PLAN 9
DVT: Eliquis  Diet: CC  Dispo: pending PT eval
- CC diet
DVT: Eliquis  Diet: CC  Dispo: pending PT eval
- CC diet
- CC diet
DVT: Eliquis  Diet: CC  Dispo: pending PT eval
SBIRT c/s for tobacco and cocaine use  Nicotine patch and gum PRN
- CC diet

## 2024-04-16 NOTE — PROGRESS NOTE ADULT - PROBLEM SELECTOR PROBLEM 5
Renal cyst
History of meningitis
Renal cyst
Renal cyst

## 2024-04-16 NOTE — DISCHARGE NOTE NURSING/CASE MANAGEMENT/SOCIAL WORK - NSDCFUADDAPPT_GEN_ALL_CORE_FT
APPTS ARE READY TO BE MADE: [X] YES    Best Family or Patient Contact (if needed):  [phone]    Additional Information about above appointments (if needed):    1: Please follow up with primary care within 2 weeks of being discharged from the hospital.  2: Please follow up with cardiology within 2 weeks of being discharged from the hospital to assess your reduced heart function.   3: Please follow up with podiatry within 2 weeks of being discharged from the hospital.  4: Please follow up with orthopedics within 1 week of being discharged from the hospital. (Dr. Diehl)    Other comments or requests:     Podiatry Discharge Instructions:  Follow up: Please follow up with Dr. Michele Zimmerman within 1 week of discharge from the hospital, please call [phone] for appointment and discuss that you recently were seen in the hospital.  Wound Care: Please apply betadine to the left foot followed by 4x4 gauze and una daily.  Weight bearing: Please weight bear as tolerated in a surgical shoe to the left foot.  Antibiotics: Please continue as instructed.

## 2024-04-16 NOTE — PROGRESS NOTE ADULT - PROBLEM SELECTOR PLAN 10
DVT: Eliquis  Diet: CC  Dispo: pending PT eval
Preoperative Optimization:    I personally reviewed the patient's labs.  I personally reviewed the patient's EKG and my interpretation is: NSR with PACs, incomplete RBBB  I personally reviewed the patient's CXR/imaging and my interpretation is: clear lungs  The patient's RCRI score is: 0  No Cardiovascular Contraindication to surgery  Continue medications as ordered.  The patient is considered low-risk for low- risk procedure
Preoperative Optimization:    I personally reviewed the patient's labs.  I personally reviewed the patient's EKG and my interpretation is: NSR with PACs, incomplete RBBB  I personally reviewed the patient's CXR/imaging and my interpretation is: clear lungs  The patient's RCRI score is: 0  No Cardiovascular Contraindication to surgery  Continue medications as ordered.  The patient is considered low-risk for low- risk procedure
DVT: Eliquis  Diet: CC  Dispo: pending PT eval
Preoperative Optimization:    I personally reviewed the patient's labs.  I personally reviewed the patient's EKG and my interpretation is: NSR with PACs, incomplete RBBB  I personally reviewed the patient's CXR/imaging and my interpretation is: clear lungs  The patient's RCRI score is: 0  No Cardiovascular Contraindication to surgery  Continue medications as ordered.  The patient is considered low-risk for low- risk procedure
DVT: Eliquis  Diet: CC  Dispo: pending PT eval
DVT: Eliquis  Diet: CC  Dispo: pending PT eval
- CC diet

## 2024-04-16 NOTE — PROGRESS NOTE ADULT - PROBLEM SELECTOR PROBLEM 1
Gangrenous toe

## 2024-04-16 NOTE — PROGRESS NOTE ADULT - PROBLEM SELECTOR PROBLEM 2
Personal history of thromboembolic disease

## 2024-04-16 NOTE — PROGRESS NOTE ADULT - SUBJECTIVE AND OBJECTIVE BOX
VASCULAR SURGERY DAILY PROGRESS NOTE:     SUBJECTIVE/ROS:   Patient seen and evaluated on AM rounds.        OBJECTIVE:  Vital Signs Last 24 Hrs  T(C): 37.3 (15 Apr 2024 21:47), Max: 37.3 (15 Apr 2024 21:47)  T(F): 99.2 (15 Apr 2024 21:47), Max: 99.2 (15 Apr 2024 21:47)  HR: 86 (15 Apr 2024 21:47) (78 - 86)  BP: 117/81 (15 Apr 2024 21:47) (117/81 - 118/84)  RR: 18 (15 Apr 2024 21:47) (18 - 18)  SpO2: 97% (15 Apr 2024 21:47) (97% - 99%)    Parameters below as of 15 Apr 2024 21:47  Patient On (Oxygen Delivery Method): room air      I&O's Detail    14 Apr 2024 07:01  -  15 Apr 2024 07:00  --------------------------------------------------------  IN:    Heparin Infusion: 204 mL    IV PiggyBack: 100 mL    Oral Fluid: 1000 mL  Total IN: 1304 mL    OUT:    Voided (mL): 1700 mL  Total OUT: 1700 mL    Total NET: -396 mL        Daily   MEDICATIONS  (STANDING):  acetaminophen     Tablet .. 975 milliGRAM(s) Oral daily  apixaban 5 milliGRAM(s) Oral two times a day  aspirin enteric coated 81 milliGRAM(s) Oral daily  atorvastatin 40 milliGRAM(s) Oral at bedtime  bictegravir 50 mG/emtricitabine 200 mG/tenofovir alafenamide 25 mG (BIKTARVY) 1 Tablet(s) Oral daily  chlorhexidine 2% Cloths 1 Application(s) Topical daily  gabapentin 100 milliGRAM(s) Oral three times a day  influenza   Vaccine 0.5 milliLiter(s) IntraMuscular once  lidocaine   4% Patch 1 Patch Transdermal daily  nicotine -  14 mG/24Hr(s) Patch 1 Patch Transdermal daily  polyethylene glycol 3350 17 Gram(s) Oral daily  senna 2 Tablet(s) Oral at bedtime  sodium chloride 0.9%. 500 milliLiter(s) (75 mL/Hr) IV Continuous <Continuous>  vancomycin  IVPB 1000 milliGRAM(s) IV Intermittent every 12 hours    MEDICATIONS  (PRN):  acetaminophen     Tablet .. 650 milliGRAM(s) Oral every 6 hours PRN Temp greater or equal to 38C (100.4F), Mild Pain (1 - 3)  albuterol    90 MICROgram(s) HFA Inhaler 1 Puff(s) Inhalation every 4 hours PRN Shortness of Breath and/or Wheezing  cyclobenzaprine 5 milliGRAM(s) Oral three times a day PRN Muscle Spasm  melatonin 3 milliGRAM(s) Oral at bedtime PRN Insomnia  nicotine  Polacrilex Gum 2 milliGRAM(s) Oral every 2 hours PRN Smoking Cessation  ondansetron Injectable 4 milliGRAM(s) IV Push every 8 hours PRN Nausea and/or Vomiting      Labs:                        9.9    10.69 )-----------( 309      ( 15 Apr 2024 04:56 )             29.3     04-15    137  |  106  |  21  ----------------------------<  92  4.1   |  20<L>  |  1.30    Ca    8.5      15 Apr 2024 04:56  Phos  3.4     04-15  Mg     1.40     04-15    TPro  7.0  /  Alb  3.0<L>  /  TBili  0.2  /  DBili  x   /  AST  34  /  ALT  84<H>  /  AlkPhos  182<H>  04-15    PT/INR - ( 15 Apr 2024 04:56 )   PT: 11.6 sec;   INR: 1.04 ratio         PTT - ( 15 Apr 2024 04:56 )  PTT:85.0 sec  Urinalysis Basic - ( 15 Apr 2024 04:56 )    Color: x / Appearance: x / SG: x / pH: x  Gluc: 92 mg/dL / Ketone: x  / Bili: x / Urobili: x   Blood: x / Protein: x / Nitrite: x   Leuk Esterase: x / RBC: x / WBC x   Sq Epi: x / Non Sq Epi: x / Bacteria: x          Physical Exam:  General: NAD  Cardiovascular: appears well perfused   Respiratory: respirations non labored on RA  Gastrointestinal: soft, nontender, nondistended  Extremities: B/L palpable femoral pulses.  LLE ischemic changes to all digits. LLE palpable PT/AT pulse. L groin dressing C/D/I, minimally tender, no palpable hematoma  Neurological: A+Ox3

## 2024-04-16 NOTE — PROGRESS NOTE ADULT - REASON FOR ADMISSION
wet gangrene

## 2024-04-16 NOTE — PROGRESS NOTE ADULT - SUBJECTIVE AND OBJECTIVE BOX
Christopher Dejesus, PGY-1  Please contact on Teams    LEA MENDOZA  53y  Male    Reason for Admission:     SUBJECTIVE/INTERVAL EVENTS: No acute events. Pt seen and examined at bedside.    Physical Exam:   General: NAD  HEENT: PERRL, EOMI, normal sclera and conjunctiva, no oral lesions  Neck: Supple, no JVD  Lungs: CTA bilaterally  Heart: RRR, normal S1S2, no murmurs/rubs/gallops  Abdomen: Soft, ND/NT  Extremities: 2+ peripheral pulses, no cyanosis/clubbing/edema, full ROM, bandaged L foot  Skin: Warm, well-perfused  Neuro: A&O x3, no focal deficits      Vital Signs Last 24 Hrs  T(C): 36.8 (16 Apr 2024 06:31), Max: 37.3 (15 Apr 2024 21:47)  T(F): 98.3 (16 Apr 2024 06:31), Max: 99.2 (15 Apr 2024 21:47)  HR: 76 (16 Apr 2024 06:31) (76 - 86)  BP: 112/74 (16 Apr 2024 06:31) (112/74 - 118/84)  BP(mean): --  RR: 18 (16 Apr 2024 06:31) (18 - 18)  SpO2: 97% (16 Apr 2024 06:31) (97% - 99%)    Parameters below as of 16 Apr 2024 06:31  Patient On (Oxygen Delivery Method): room air          Consultant(s) Notes Reviewed:  [x] YES  [ ] NO  Care Discussed with Consultants/Other Providers [x] YES  [ ] NO    LABS:                        9.9    10.69 )-----------( 309      ( 15 Apr 2024 04:56 )             29.3                         10.6   8.67  )-----------( 344      ( 14 Apr 2024 04:43 )             31.3         Urinalysis Basic - ( 15 Apr 2024 04:56 )    Color: x / Appearance: x / SG: x / pH: x  Gluc: 92 mg/dL / Ketone: x  / Bili: x / Urobili: x   Blood: x / Protein: x / Nitrite: x   Leuk Esterase: x / RBC: x / WBC x   Sq Epi: x / Non Sq Epi: x / Bacteria: x        RADIOLOGY & ADDITIONAL TESTS:    Imaging Personally Reviewed:  [x] YES  [ ] NO    MEDICATIONS  (STANDING):  acetaminophen     Tablet .. 975 milliGRAM(s) Oral daily  apixaban 5 milliGRAM(s) Oral two times a day  aspirin enteric coated 81 milliGRAM(s) Oral daily  atorvastatin 40 milliGRAM(s) Oral at bedtime  bictegravir 50 mG/emtricitabine 200 mG/tenofovir alafenamide 25 mG (BIKTARVY) 1 Tablet(s) Oral daily  chlorhexidine 2% Cloths 1 Application(s) Topical daily  gabapentin 100 milliGRAM(s) Oral three times a day  influenza   Vaccine 0.5 milliLiter(s) IntraMuscular once  lidocaine   4% Patch 1 Patch Transdermal daily  nicotine -  14 mG/24Hr(s) Patch 1 Patch Transdermal daily  polyethylene glycol 3350 17 Gram(s) Oral daily  senna 2 Tablet(s) Oral at bedtime  sodium chloride 0.9%. 500 milliLiter(s) (75 mL/Hr) IV Continuous <Continuous>  vancomycin  IVPB 1000 milliGRAM(s) IV Intermittent every 12 hours    MEDICATIONS  (PRN):  acetaminophen     Tablet .. 650 milliGRAM(s) Oral every 6 hours PRN Temp greater or equal to 38C (100.4F), Mild Pain (1 - 3)  albuterol    90 MICROgram(s) HFA Inhaler 1 Puff(s) Inhalation every 4 hours PRN Shortness of Breath and/or Wheezing  cyclobenzaprine 5 milliGRAM(s) Oral three times a day PRN Muscle Spasm  melatonin 3 milliGRAM(s) Oral at bedtime PRN Insomnia  nicotine  Polacrilex Gum 2 milliGRAM(s) Oral every 2 hours PRN Smoking Cessation  ondansetron Injectable 4 milliGRAM(s) IV Push every 8 hours PRN Nausea and/or Vomiting      HEALTH ISSUES - PROBLEM Dx:  Gangrenous toe    Personal history of thromboembolic disease    Oral candidiasis    History of meningitis    Renal cyst    Back pain    HIV disease    Substance use disorder    Prediabetes    Prophylactic measure    Preoperative examination

## 2024-04-16 NOTE — PROGRESS NOTE ADULT - ATTENDING COMMENTS
53M hx HIV, preDM, current smoker, hx cocaine use, prior arterial thromboembolic events, a/w sepsis on admission (HR>90 and leukocytosis) 2/2  wet gangrene of L foot. Left foot xray w/ no OM, gas, or fracture  Pt seen by podiatry and s/p L foot hallux toenail removal, and excisional debridement of L foot hallux necrotic skin to the level of epidermis,    Foot Wound:  -C/w vanco, cx corynebacterium and resistant lugdunensis , dc'd unasyn per ID. trend vanco trough  - MRSA negative, blood cx ngtd  - local wound care per podiatry, plan for O/P L foot TMA pending demarcation/ vasc recs  - Vasc following, Previous ida/pvrs concerning for small vessel disease of LLE. Xray of foot with no gas or osseous erosions.  - s/p LLE Angiogram 4/8 that showed small vessel disease, repeat 4/15    Shoulder pain.  XRAYs completed, ortho eval--o/p follow-up    DC planning for today, will complete short course ABX, wound care per podiatry, close follow-up with podiatry.  DC time 40 minutes

## 2024-04-16 NOTE — PROGRESS NOTE ADULT - PROBLEM SELECTOR PROBLEM 8
Substance use disorder
Prediabetes
HIV disease
Prediabetes
Substance use disorder
Prediabetes

## 2024-04-16 NOTE — PROGRESS NOTE ADULT - PROBLEM SELECTOR PLAN 7
CD4 count 328 in March  Continuing biktarvy
SBIRT c/s for tobacco and cocaine use
CD4 count 328 in March  Continuing biktarvy
CD4 count 328 in March  Continuing biktarvy
SBIRT c/s for tobacco and cocaine use
CD4 count 328 in March  Continuing biktarvy
SBIRT c/s for tobacco and cocaine use
CD4 count 328 in March  Continuing biktarvy
- likely sciatica given +straight leg raise  - MR L spine given patient to stay as inpatient through next week - degenerative disease  - lidocaine patch QD

## 2024-04-16 NOTE — PROGRESS NOTE ADULT - SUBJECTIVE AND OBJECTIVE BOX
Patient is a 53y old  Male who presents with a chief complaint of wet gangrene (16 Apr 2024 07:56)       INTERVAL HPI/OVERNIGHT EVENTS:  Patient seen and evaluated at bedside.  Pt is resting comfortable in NAD. Denies N/V/F/C.    Allergies    No Known Allergies    Intolerances        Vital Signs Last 24 Hrs  T(C): 36.8 (16 Apr 2024 06:31), Max: 37.3 (15 Apr 2024 21:47)  T(F): 98.3 (16 Apr 2024 06:31), Max: 99.2 (15 Apr 2024 21:47)  HR: 76 (16 Apr 2024 06:31) (76 - 86)  BP: 112/74 (16 Apr 2024 06:31) (112/74 - 118/84)  BP(mean): --  RR: 18 (16 Apr 2024 06:31) (18 - 18)  SpO2: 97% (16 Apr 2024 06:31) (97% - 99%)    Parameters below as of 16 Apr 2024 06:31  Patient On (Oxygen Delivery Method): room air        LABS:                        9.8    8.94  )-----------( 308      ( 16 Apr 2024 05:10 )             29.1     04-16    137  |  104  |  20  ----------------------------<  73  4.1   |  23  |  1.32<H>    Ca    9.0      16 Apr 2024 05:10  Phos  4.2     04-16  Mg     2.00     04-16    TPro  7.4  /  Alb  3.2<L>  /  TBili  0.2  /  DBili  x   /  AST  27  /  ALT  73<H>  /  AlkPhos  174<H>  04-16    PT/INR - ( 15 Apr 2024 04:56 )   PT: 11.6 sec;   INR: 1.04 ratio         PTT - ( 16 Apr 2024 05:10 )  PTT:33.7 sec  Urinalysis Basic - ( 16 Apr 2024 05:10 )    Color: x / Appearance: x / SG: x / pH: x  Gluc: 73 mg/dL / Ketone: x  / Bili: x / Urobili: x   Blood: x / Protein: x / Nitrite: x   Leuk Esterase: x / RBC: x / WBC x   Sq Epi: x / Non Sq Epi: x / Bacteria: x      CAPILLARY BLOOD GLUCOSE          Lower Extremity Physical Exam:  Vascular: DP/PT 2/4, B/L, CFT <3 seconds B/L, Temperature gradient warm to cool, B/L.   Neuro: Epicritic sensation diminished to the level of digits, B/L.  Musculoskeletal/Ortho: unremarkable   Skin: 4/4 s/p bedside left foot hallux total nail avulsion, wound to dermis, no drainage, no purulence. Left forefoot ischemic changes to digits 1-5 extends from distal ellen to dorsal metatarsal heads 1-5, not fully demarcated, no malodor. Right foot no open wounds, no acute signs of infection.    RADIOLOGY & ADDITIONAL TESTS:

## 2024-04-16 NOTE — PROGRESS NOTE ADULT - ASSESSMENT
53M presents with left forefoot dry gangrene and ischemic changes.  - Patient seen and evaluated.  - Afebrile, no leukocytosis.    - 4/4 s/p bedside left foot hallux total nail avulsion, wound to dermis, no drainage, no purulence. Left forefoot ischemic changes to digits 1-5 extends from distal ellen to dorsal metatarsal heads 1-5, not fully demarcated, no malodor. Right foot no open wounds, no acute signs of infection.  - Left Foot X-Ray: no OM, no gas, no fracture.   - Left Foot Wound Culture: Corynebacterium striatum, Staph lugdunensis, Finegoldia magna.  - Vasc recs, appreciated   - ID recs appreciated.   - Will follow demarcation outpt and plan for outpt left foot TMA/SHAKILA once demarcation is complete.   - Pt is stable for discharge from the podiatry standpoint today.  - Wound care instructions and followup information listed in the discharge provider note.  - Discussed with attending.

## 2024-04-16 NOTE — CONSULT NOTE ADULT - SUBJECTIVE AND OBJECTIVE BOX
HPI  53yMale w/ PMH of HIV, wet gangrene on bilateral legs (s/p LLE angio yesterday 4/15) c/o L shoulder pain for 1 day. Denies fevers/chills. Denies tingling in the LUE. Endorses some numbness over lateral aspect of shoulder. Denies any recent trauma/injuries/sugery/injections. States his shoulder hurt a little prior to angio, but has had decreased use of arm 2/2 after angio.    ROS  Negative unless otherwise specified in HPI.    PAST MEDICAL & SURGICAL Hx  PAST MEDICAL & SURGICAL HISTORY:  HIV disease      H/O splenectomy          MEDICATIONS  Home Medications:  gabapentin 100 mg oral capsule: 1 cap(s) orally 3 times a day (15 Apr 2024 14:29)      ALLERGIES  No Known Allergies      FAMILY Hx  FAMILY HISTORY:      SOCIAL Hx  Social History:  cocaine 3 months prior, current smoker, cutting back, 20 pack year (05 Apr 2024 11:42)      VITALS  Vital Signs Last 24 Hrs  T(C): 36.8 (16 Apr 2024 06:31), Max: 37.3 (15 Apr 2024 21:47)  T(F): 98.3 (16 Apr 2024 06:31), Max: 99.2 (15 Apr 2024 21:47)  HR: 76 (16 Apr 2024 06:31) (76 - 86)  BP: 112/74 (16 Apr 2024 06:31) (112/74 - 118/84)  BP(mean): --  RR: 18 (16 Apr 2024 06:31) (18 - 18)  SpO2: 97% (16 Apr 2024 06:31) (97% - 99%)    Parameters below as of 16 Apr 2024 06:31  Patient On (Oxygen Delivery Method): room air        PHYSICAL EXAM  Gen: Lying in bed, non-toxic appearing, NAD  Resp: No increased WOB  LUE:  Skin intact, no notable effusion or erythema over L shoulder  +TTP over bicipital groove of L shoulder, minimal TTP over posterior aspect of shoulder, compartments soft  Active ROM limited 2/2 pain  L shoulder passive ROM FF 0-110, abd 0-90 deg, ext rot 50, limited 2/2 terminal ROM pain  Motor: Ax/Musc/Med/Rad/AIN/PIN/U intact, weak  Sensory: Musc/Med/Rad/U SILT, some sensory decrease over lateral aspect of shoulder  + hawkings  +Rad pulse, [facility]    LABS                        9.8    8.94  )-----------( 308      ( 16 Apr 2024 05:10 )             29.1     04-16    137  |  104  |  20  ----------------------------<  73  4.1   |  23  |  1.32<H>    Ca    9.0      16 Apr 2024 05:10  Phos  4.2     04-16  Mg     2.00     04-16    TPro  7.4  /  Alb  3.2<L>  /  TBili  0.2  /  DBili  x   /  AST  27  /  ALT  73<H>  /  AlkPhos  174<H>  04-16    PT/INR - ( 15 Apr 2024 04:56 )   PT: 11.6 sec;   INR: 1.04 ratio         PTT - ( 16 Apr 2024 05:10 )  PTT:33.7 sec        IMAGING  XRs: No acute fx or dislocation (personal read)

## 2024-04-16 NOTE — PROGRESS NOTE ADULT - PROBLEM SELECTOR PROBLEM 7
Back pain
HIV disease
Substance use disorder
HIV disease
Substance use disorder
Substance use disorder
HIV disease

## 2024-04-16 NOTE — PROGRESS NOTE ADULT - PROBLEM SELECTOR PROBLEM 6
HIV disease
Back pain
Back pain
Renal cyst
Back pain
Back pain
HIV disease
Back pain
HIV disease
Back pain
Back pain

## 2024-04-16 NOTE — CONSULT NOTE ADULT - CONSULT REASON
L shoulder pain
Gangrene, HIV
LLE dry gangrene of toe s/p pods debridement
Angio planning
L foot wet gangrene
clearance

## 2024-04-16 NOTE — PROGRESS NOTE ADULT - PROBLEM SELECTOR PLAN 3
- ddx dislocation vs. malpositioning on table causing muscle strain  - lido patch, pain control prn  - XR shoulder

## 2024-04-16 NOTE — CONSULT NOTE ADULT - CONSULT REQUESTED DATE/TIME
05-Apr-2024 14:27
05-Apr-2024 03:25
05-Apr-2024 06:17
06-Apr-2024 11:07
16-Apr-2024 12:24
06-Apr-2024 14:37

## 2024-04-16 NOTE — PROGRESS NOTE ADULT - TIME BILLING
Face to face encounter. Reviewed labs, vitals, imaging. Reviewed consultant recs. Discussed plan of care with patient. Discussed case at [facility]. Documentation in [facility].
Review of records, labs, vitals, imaging, discussion with care team.  Examination of patient and discussion of case with the patient
Review of records, labs, vitals, imaging, discussion with care team.  Examination of patient and discussion of case with the patient

## 2024-04-16 NOTE — PROGRESS NOTE ADULT - PROBLEM SELECTOR PROBLEM 3
No
Oral candidiasis
Left shoulder pain
Oral candidiasis

## 2024-04-16 NOTE — DISCHARGE NOTE NURSING/CASE MANAGEMENT/SOCIAL WORK - PATIENT PORTAL LINK FT
You can access the FollowMyHealth Patient Portal offered by [facility] by registering at the following website: [email or web address]. By joining [facility]’s FollowMyHealth portal, you will also be able to view your health information using other applications (apps) compatible with our system.

## 2024-04-16 NOTE — DISCHARGE NOTE NURSING/CASE MANAGEMENT/SOCIAL WORK - NSDCPNINST_GEN_ALL_CORE
Patient A&Ox4, no c/o pain, patient discharged to home this afternoon awaiting on transport service for , no distress noted at this time.

## 2024-04-16 NOTE — PROGRESS NOTE ADULT - PROBLEM SELECTOR PLAN 4
Treated at [facility] with 3 weeks of IV abx, patient left AMA  Does not remember what pathogen or what antibiotics were used  No signs or symptoms at the moment  Obtaining records
Treated at [facility] with 3 weeks of IV abx, patient left AMA  Does not remember what pathogen or what antibiotics were used  No signs or symptoms at the moment  Obtaining records
Treated at [facility] with 3 weeks of IV abx, patient left AMA  Does not remember what pathogen or what antibiotics were used  No signs or symptoms at the moment  Obtaining records
Treated at [facility] with 3 weeks of IV abx, patient left AMA  Does not remember what pathogen or what antibiotics were used  No signs or symptoms at the moment  Obtaining records
Treated at [facility] with 3 weeks of IV abx, patient left AMA  Does not remember what pathogen or what antibiotics were used  No signs or symptoms at the moment  Obtaining records
Treated at [facility] with 3 weeks of IV abx, patient left AMA  Does not remember what pathogen or what antibiotics were used  No signs or symptoms at the moment  Obtaining records
Treated at [facility] with 3 weeks of IV abx, patient left AMA  Does not remember what pathogen or what antibiotics were used  No signs or symptoms at the moment  Obtaining records
Treated at [facility] with 3 weeks of IV abx, patient left AMA  Does not remember what pathogen or what antibiotics were used  No signs or symptoms at the moment  Obtaining records
Treated at [facility] with 3 weeks of IV abx, patient left AMA  Does not remember what pathogen or what antibiotics were used  No signs or symptoms at the moment  Obtaining records
Treated at [facility] with 3 weeks of IV abx, patient left AMA  Does not remember what pathogen or what antibiotics were used  No signs or symptoms at the moment  Obtaining records
fluconazole 100mg x7 days  - dc'd fluconazole iso worsening transaminitis w/ unknown etiology and poss adverse effect of fluconazole  RESOLVED

## 2024-04-16 NOTE — PROGRESS NOTE ADULT - PROBLEM SELECTOR PROBLEM 11
Preoperative examination
Prophylactic measure
Preoperative examination

## 2024-04-16 NOTE — PROGRESS NOTE ADULT - PROBLEM SELECTOR PROBLEM 10
Prophylactic measure
Preoperative examination
Prophylactic measure
Prophylactic measure
Prediabetes
Preoperative examination
Prophylactic measure
Preoperative examination
Prophylactic measure

## 2024-04-16 NOTE — PROGRESS NOTE ADULT - ASSESSMENT
EKG - NSR     Echo - Normal LV function reduced RV function + bubble study after 8 beats possible extracardiac shunt     a/p     1) Preop clearance - no cp no SOB, has normal LV function but has reduced RV function, CT pulm angiogram shows no PE in march but was suboptimal study r/o pulmonary shunt as op  , will do out pt workup for RV dysfunction, pt is moderate risk for TMA and LE angiogram, s/p angiogram doing well    2) h/o thromboembolic disease - on eliquis     3) Left foot gangrene - on vancomycin     4) DVT prophylasix - on sc heparin

## 2024-04-16 NOTE — PROGRESS NOTE ADULT - PROBLEM SELECTOR PROBLEM 9
Prophylactic measure
Prediabetes
Substance use disorder
Prediabetes
Prophylactic measure
Prediabetes
Prophylactic measure

## 2024-04-16 NOTE — PROGRESS NOTE ADULT - PROBLEM SELECTOR PLAN 5
Treated at [facility] with 3 weeks of IV abx, patient left AMA  Does not remember what pathogen or what antibiotics were used  No signs or symptoms at the moment  Obtaining records

## 2024-04-16 NOTE — PROGRESS NOTE ADULT - ASSESSMENT
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 Pulse exam unchanged from previous RLE +DP/PT signal and all other pulses palpable. RLE toes not currently concerning for wet gangrene, toes dry, debridement to healthy tissue over first toe of LLE. Prior DENIS/PVRs concerning for small vessel disease of LLE. Xray of foot with no gas or osseous erosions. Vascular surgery consulted for evaluation     Recommendations:   - S/p diagnostic LLE angiogram 4/8 and 4/15. B/L groins soft, no evidence of hematoma at access sites   - Can resume therapeutic AC   - No further vascular surgical interventions before planned podiatric procedures  - Patient may f/u w/ Dr. Rubalcava outpatient   - Remainder of care per primary  - Please call back with any questions/concerns    Vascular Surgery  [id number]

## 2024-04-16 NOTE — MEDICAL STUDENT PROGRESS NOTE(EDUCATION) - ASSESSMENT
Assessment: Mr. Hester is a 53 year old, current smoker (20 pack year history), male with a past medical history of HIV (VL undetected, CD, prior cocaine use, prior arterial thromboembolic disease, recent admission for meningitis at [place] in 2/2024 received 3 weeks IV abx, left AMA) presenting with left foot gangrene and lower back pain.         Problem 1: Gangrenous toe.     Plan:   -Followed by podiatry and vascular  -Vascular planning for LLE angiogram  -Podiatry debrided to healthy tissue on 4/5 early am, considering amputation vs. local wound care  -Abx: usasyn, will add vanc to cover MRSA given healthcare exposures  -ID recs appreciated -> vanc/unasyn for gangrene.  -LLE angiogram scheduled for 4/8 (NPO at [facility], stop DVT prophylaxis) - small vessel disease noted   -Waiting on podiatry for final recommendation regarding plan for the toe   -Repeat LLE angiogram scheduled for 4/15 - open small vessel disease ahead of amputation   -abx for 2 weeks (-4/18) - continue on vanc and can finish on doxy/cephalexin        Problem 2: Personal history of thromboembolic disease.     Plan:   -Thromboses to L leg, leading to limb ischemia and gangrene  -full AC w/ Eliquis, transitioned to heparin in anticipation of LLE angiogram by vascular    Problem 3: Elevated AST and ALT     Plan:  -Elevated liver enzymes could be in the context of new antibiotic use - continue to monitor   -Hepatitis panel, liver panel  -d/c fluconazole given transaminitis of unknown origin and potential adverse drug side effect     Problem 4: Left shoulder pain     Plan:   - ddx dislocation vs. malpositioning on table causing muscle strain   - lido patch, pain control prn  - XR shoulder    Problem 5: Lower back pain    Plan:  -MRI of lumbar spine   -Most likely follow up outpatient  -Order additional Flexeril   -Acetaminophen 650 mg q6h - avoid NSAIDs due to decreased renal function   -Repeat MRI 3 months   -Lidocaine patch daily     Problem 6: Chest Pain     Plan:  -ECG  -Troponin/CKMB - negative   -BNP  -Alprazolam for anxiety   -Nebulizer treatment   -Continue to monitor for any changes     Problem 7: CKD    Plan:  -Parenchymal disease noted on ultrasound with echogenic right kidney - continue to monitor Cr and GFR for worsening kidney function   -Monitor H&H as anemia likely due to CKD    Problem 8: Oral candidiasis    Plan:   -fluconazole 100mg x7 days  -Stop fluconazole given transaminitis       Problem 9: History of meningitis.     Plan:   -Treated at [facility] with 3 weeks of IV abx, patient left AMA  -Does not remember what pathogen or what antibiotics were used  -No signs or symptoms at the moment  -Obtaining records.      Problem 10: Renal cyst.     Plan:   -Seen on CT and US, solid mass not ruled out  -recommended f/u MRI, noted to have 1.6cm complex cyst in R upper pole, recommended for surveillance imaging (Bosniak 2F).      Problem 11: HIV disease.     Plan:   -CD4 count 595 up from 328 in March  -Continuing biktarvy.  -f/u with social work regarding HASA      Problem 12: Substance use disorder    Plan:   -SBIRT c/s for tobacco and cocaine use  -Patient expressed interest in methods to help quit smoking   -Started nicotine patch and gum     Problem 13: Prediabetes    Plan:   -CC diet  -Continue education and lifestyle modification      Problem 14: Prophylactic measure    Plan:   -DVT: Eliquis  -Diet: Regular CC  -Dispo: pending PT eval         Electronic Signatures:  Christopher Dejesus)   (Signed 12-Apr-2024 08:49)  	Co-Signer: Subjective and Objective, Assessment and Plan  Elvira Ying (Medical Student_3rd Year)   (Signed 12-Apr-2024 08:10)  	Authored: Subjective and Objective, Assessment and Plan    Last Updated: 12-Apr-2024 08:49 by Christopher Dejesus)   Assessment: Mr. Hester is a 53 year old, current smoker (20 pack year history), male with a past medical history of HIV (VL undetected, CD, prior cocaine use, prior arterial thromboembolic disease, recent admission for meningitis at [place] in 2/2024 received 3 weeks IV abx, left AMA) presenting with left foot gangrene and lower back pain.         Problem 1: Gangrenous toe.     Plan:   -Followed by podiatry and vascular  -Vascular planning for LLE angiogram  -Podiatry debrided to healthy tissue on 4/5 early am, considering amputation vs. local wound care  -Abx: usasyn, will add vanc to cover MRSA given healthcare exposures  -ID recs appreciated -> vanc/unasyn for gangrene.  -LLE angiogram scheduled for 4/8 (NPO at [facility], stop DVT prophylaxis) - small vessel disease noted   -Waiting on podiatry for final recommendation regarding plan for the toe   -Repeat LLE angiogram scheduled for 4/15 - open small vessel disease ahead of amputation   -abx for 2 weeks (-4/18) - continue on vanc and can finish on doxy/cephalexin        Problem 2: Personal history of thromboembolic disease.     Plan:   -Thromboses to L leg, leading to limb ischemia and gangrene  -full AC w/ Eliquis, transitioned to heparin in anticipation of LLE angiogram by vascular    Problem 3: Elevated AST and ALT     Plan:  -Elevated liver enzymes could be in the context of new antibiotic use - continue to monitor   -Hepatitis panel, liver panel  -d/c fluconazole given transaminitis of unknown origin and potential adverse drug side effect     Problem 4: Left shoulder pain     Plan:   - ddx dislocation vs. malpositioning on table causing muscle strain   - lido patch, pain control prn  - XR shoulder    Problem 5: Lower back pain    Plan:  -MRI of lumbar spine   -Most likely follow up outpatient  -Order additional Flexeril   -Acetaminophen 650 mg q6h - avoid NSAIDs due to decreased renal function   -Repeat MRI 3 months   -Lidocaine patch daily     Problem 6: Chest Pain     Plan:  -ECG  -Troponin/CKMB - negative   -BNP  -Alprazolam for anxiety   -Nebulizer treatment   -Continue to monitor for any changes     Problem 7: CKD    Plan:  -Parenchymal disease noted on ultrasound with echogenic right kidney - continue to monitor Cr and GFR for worsening kidney function   -Monitor H&H as anemia likely due to CKD    Problem 8: Oral candidiasis    Plan:   -fluconazole 100mg x7 days  -Stop fluconazole given transaminitis       Problem 9: History of meningitis.     Plan:   -Treated at [facility] with 3 weeks of IV abx, patient left AMA  -Does not remember what pathogen or what antibiotics were used  -No signs or symptoms at the moment  -Obtaining records.      Problem 10: Renal cyst.     Plan:   -Seen on CT and US, solid mass not ruled out  -recommended f/u MRI, noted to have 1.6cm complex cyst in R upper pole, recommended for surveillance imaging (Bosniak 2F).      Problem 11: HIV disease.     Plan:   -CD4 count 595 up from 328 in March  -Continuing biktarvy.  -f/u with social work regarding HASA      Problem 12: Substance use disorder    Plan:   -SBIRT c/s for tobacco and cocaine use  -Patient expressed interest in methods to help quit smoking   -Started nicotine patch and gum     Problem 13: Prediabetes    Plan:   -CC diet  -Continue education and lifestyle modification      Problem 14: Prophylactic measure    Plan:   -DVT: Eliquis  -Diet: Regular CC  -Dispo: pending PT paul

## 2024-04-16 NOTE — CONSULT NOTE ADULT - ASSESSMENT
ASSESSMENT & PLAN  53yMale w/ L shoulder pain consistent with suspected rotator cuff arthropathy    - WBAT LUE  - FU XR L shoulder axillary view prior to discharge  - pain control  - ice/cold compress    Patient shoulder follow up with Dr Diehl 1 week after discharge, call office to make an appointment    For all questions related to patient care, please reach out to the on-call team via the pager.     Jacey Mar, PGY 2  Orthopaedic Surgery  [facility] [phone]  [facility] [id number]  [facility] [phone]   ASSESSMENT & PLAN  53yMale w/ L shoulder pain consistent with suspected rotator cuff arthropathy    - WBAT LUE  - FU XR L shoulder axillary view prior to discharge  - pain control  - ice/cold compress  - Recommend a cervical spine workup due to sensory disturbance in axillary distribution    Patient shoulder follow up with Dr Diehl 1 week after discharge, call office to make an appointment    For all questions related to patient care, please reach out to the on-call team via the pager.     Jacey Mar, PGY 2  Orthopaedic Surgery  [facility] [phone]  [facility] [phone]  [facility] [phone]

## 2024-04-16 NOTE — PROGRESS NOTE ADULT - PROVIDER SPECIALTY LIST ADULT
Cardiology
Vascular Surgery
Cardiology
Infectious Disease
Infectious Disease
Internal Medicine
Podiatry
Vascular Surgery
Vascular Surgery
Cardiology
Infectious Disease
Infectious Disease
Podiatry
Vascular Surgery
Cardiology
Cardiology
Infectious Disease
Podiatry
Vascular Surgery
Internal Medicine

## 2024-04-16 NOTE — PROGRESS NOTE ADULT - PROBLEM SELECTOR PROBLEM 4
History of meningitis
Oral candidiasis

## 2024-04-16 NOTE — PROGRESS NOTE ADULT - SUBJECTIVE AND OBJECTIVE BOX
Saeid Medina MD  Interventional Cardiology / Advance Heart Failure and Cardiac Transplant Specialist  [place] Office : [address] [place] [address] [place] [address]  Tel:   [place] Office : [address] [facility] [place] [address]  Tel: [phone]     Pt is lying in bed comfortable not in distress, no chest pains no SOB no palpitations    MEDICATIONS:  apixaban 5 milliGRAM(s) Oral two times a day  aspirin enteric coated 81 milliGRAM(s) Oral daily    bictegravir 50 mG/emtricitabine 200 mG/tenofovir alafenamide 25 mG (BIKTARVY) 1 Tablet(s) Oral daily  vancomycin  IVPB 1000 milliGRAM(s) IV Intermittent every 12 hours    albuterol    90 MICROgram(s) HFA Inhaler 1 Puff(s) Inhalation every 4 hours PRN    acetaminophen     Tablet .. 975 milliGRAM(s) Oral daily  acetaminophen     Tablet .. 650 milliGRAM(s) Oral every 6 hours PRN  cyclobenzaprine 5 milliGRAM(s) Oral three times a day PRN  gabapentin 100 milliGRAM(s) Oral three times a day  melatonin 3 milliGRAM(s) Oral at bedtime PRN  ondansetron Injectable 4 milliGRAM(s) IV Push every 8 hours PRN    polyethylene glycol 3350 17 Gram(s) Oral daily  senna 2 Tablet(s) Oral at bedtime    atorvastatin 40 milliGRAM(s) Oral at bedtime    chlorhexidine 2% Cloths 1 Application(s) Topical daily  influenza   Vaccine 0.5 milliLiter(s) IntraMuscular once  lidocaine   4% Patch 1 Patch Transdermal daily  sodium chloride 0.9%. 500 milliLiter(s) IV Continuous <Continuous>      PAST MEDICAL/SURGICAL HISTORY  PAST MEDICAL & SURGICAL HISTORY:  HIV disease      H/O splenectomy          SOCIAL HISTORY: Substance Use (street drugs): ( x ) never used  (  ) other:    FAMILY HISTORY:      REVIEW OF SYSTEMS:  CONSTITUTIONAL: No fever, weight loss, or fatigue  EYES: No eye pain, visual disturbances, or discharge  ENMT:  No difficulty hearing, tinnitus, vertigo; No sinus or throat pain  BREASTS: No pain, masses, or nipple discharge  GASTROINTESTINAL: No abdominal or epigastric pain. No nausea, vomiting, or hematemesis; No diarrhea or constipation. No melena or hematochezia.  GENITOURINARY: No dysuria, frequency, hematuria, or incontinence  NEUROLOGICAL: No headaches, memory loss, loss of strength, numbness, or tremors  ENDOCRINE: No heat or cold intolerance; No hair loss  MUSCULOSKELETAL: No joint pain or swelling; No muscle, back, or extremity pain  PSYCHIATRIC: No depression, anxiety, mood swings, or difficulty sleeping  HEME/LYMPH: No easy bruising, or bleeding gums       PHYSICAL EXAM:  T(C): 36.8 (04-16-24 @ 06:31), Max: 37.3 (04-15-24 @ 21:47)  HR: 76 (04-16-24 @ 06:31) (76 - 86)  BP: 112/74 (04-16-24 @ 06:31) (112/74 - 118/84)  RR: 18 (04-16-24 @ 06:31) (18 - 18)  SpO2: 97% (04-16-24 @ 06:31) (97% - 99%)  Wt(kg): --  I&O's Summary    15 Apr 2024 07:01  -  16 Apr 2024 07:00  --------------------------------------------------------  IN: 0 mL / OUT: 500 mL / NET: -500 mL          GENERAL: NAD  EYES:   PERRLA   ENMT:   Moist mucous membranes, Good dentition, No lesions  Cardiovascular: Normal S1 S2, No JVD, No murmurs, No edema  Respiratory: Lungs clear to auscultation	  Gastrointestinal:  Soft, Non-tender, + BS	  Extremities: Left foot wound covered                                    9.8    8.94  )-----------( 308      ( 16 Apr 2024 05:10 )             29.1     04-16    137  |  104  |  20  ----------------------------<  73  4.1   |  23  |  1.32<H>    Ca    9.0      16 Apr 2024 05:10  Phos  4.2     04-16  Mg     2.00     04-16    TPro  7.4  /  Alb  3.2<L>  /  TBili  0.2  /  DBili  x   /  AST  27  /  ALT  73<H>  /  AlkPhos  174<H>  04-16    proBNP:   Lipid Profile:   HgA1c:   TSH:     Consultant(s) Notes Reviewed:  [x ] YES  [ ] NO    Care Discussed with Consultants/Other Providers [ x] YES  [ ] NO    Imaging Personally Reviewed independently:  [x] YES  [ ] NO    All labs, radiologic studies, vitals, orders and medications list reviewed. Patient is seen and examined at bedside. Case discussed with medical team.

## 2024-05-14 ENCOUNTER — APPOINTMENT (OUTPATIENT)
Dept: ORTHOPEDIC SURGERY | Facility: CLINIC | Age: 54
End: 2024-05-14
Payer: MEDICAID

## 2024-05-14 VITALS
SYSTOLIC BLOOD PRESSURE: 138 MMHG | WEIGHT: 175 LBS | BODY MASS INDEX: 26.52 KG/M2 | DIASTOLIC BLOOD PRESSURE: 81 MMHG | HEART RATE: 81 BPM | HEIGHT: 68 IN

## 2024-05-14 DIAGNOSIS — I96 GANGRENE, NOT ELSEWHERE CLASSIFIED: ICD-10-CM

## 2024-05-14 PROCEDURE — 73630 X-RAY EXAM OF FOOT: CPT | Mod: LT

## 2024-05-14 PROCEDURE — 99203 OFFICE O/P NEW LOW 30 MIN: CPT | Mod: 25

## 2024-05-31 NOTE — PATIENT PROFILE ADULT - NSPROHMSYMPCOND_GEN_A_NUR
no chills/no decreased eating/drinking/no dizziness/no fever/no nausea/no pain/no tingling/no vomiting/no weakness diabetes

## 2024-09-03 NOTE — PHYSICAL THERAPY INITIAL EVALUATION ADULT - LEVEL OF INDEPENDENCE: SIT/STAND, REHAB EVAL
Spoke with patient who stated he has been feeling very emotional during the waiting process and feels like he cannot control his emotions at times and has been ongoing for weeks. Pt states he feels bad for his family and putting them through this as well as himself. He lost his daughter to [place] sarcoma ~20 years ago and is getting traumatic memories of going through that with his new diagnosis. Emotional support provided. Pt says he has a great support system with family and friends and does many activities to try and keep himself busy. Denies SI/HI. Pt would like to be in contact with social work, open to any resources that may help him cope. Pt would prefer resources and talking through things before starting any new medications.   Pt states his pain is always there but well-controlled with the oxycodone and tylenol. No changes to urination or Bms, advised to have stool softener at home in case of constipation. Reports eating and drinking well at this time. ]  Best call back number: [phone]   contact guard

## 2024-09-17 ENCOUNTER — APPOINTMENT (OUTPATIENT)
Dept: VASCULAR SURGERY | Facility: CLINIC | Age: 54
End: 2024-09-17

## 2024-09-17 VITALS
WEIGHT: 222 LBS | TEMPERATURE: 97.6 F | BODY MASS INDEX: 32.88 KG/M2 | HEIGHT: 69 IN | HEART RATE: 62 BPM | SYSTOLIC BLOOD PRESSURE: 125 MMHG | DIASTOLIC BLOOD PRESSURE: 87 MMHG

## 2024-09-17 DIAGNOSIS — F17.210 NICOTINE DEPENDENCE, CIGARETTES, UNCOMPLICATED: ICD-10-CM

## 2024-09-17 PROCEDURE — 99214 OFFICE O/P EST MOD 30 MIN: CPT

## 2024-09-17 RX ORDER — GABAPENTIN 100 MG
100 TABLET ORAL
Refills: 0 | Status: ACTIVE | COMMUNITY

## 2024-09-17 RX ORDER — METOPROLOL SUCCINATE 25 MG/1
25 TABLET, EXTENDED RELEASE ORAL
Refills: 0 | Status: ACTIVE | COMMUNITY

## 2024-09-17 RX ORDER — ASPIRIN 81 MG
81 TABLET,CHEWABLE ORAL
Refills: 0 | Status: ACTIVE | COMMUNITY

## 2024-09-17 RX ORDER — APIXABAN 5 MG/1
5 TABLET, FILM COATED ORAL
Refills: 0 | Status: ACTIVE | COMMUNITY

## 2024-09-24 ENCOUNTER — APPOINTMENT (OUTPATIENT)
Dept: VASCULAR SURGERY | Facility: HOSPITAL | Age: 54
End: 2024-09-24

## 2024-09-30 PROBLEM — I73.9 PERIPHERAL VASCULAR DISEASE: Status: ACTIVE | Noted: 2024-09-30

## 2024-10-07 ENCOUNTER — TRANSCRIPTION ENCOUNTER (OUTPATIENT)
Age: 54
End: 2024-10-07

## 2024-10-07 ENCOUNTER — OUTPATIENT (OUTPATIENT)
Dept: OUTPATIENT SERVICES | Facility: HOSPITAL | Age: 54
LOS: 1 days | Discharge: HOME | End: 2024-10-07

## 2024-10-07 ENCOUNTER — APPOINTMENT (OUTPATIENT)
Dept: VASCULAR SURGERY | Facility: HOSPITAL | Age: 54
End: 2024-10-07

## 2024-10-07 VITALS
TEMPERATURE: 98 F | HEART RATE: 76 BPM | WEIGHT: 214.95 LBS | HEIGHT: 68 IN | DIASTOLIC BLOOD PRESSURE: 70 MMHG | RESPIRATION RATE: 16 BRPM | SYSTOLIC BLOOD PRESSURE: 122 MMHG | OXYGEN SATURATION: 98 %

## 2024-10-07 VITALS
SYSTOLIC BLOOD PRESSURE: 132 MMHG | RESPIRATION RATE: 16 BRPM | OXYGEN SATURATION: 95 % | DIASTOLIC BLOOD PRESSURE: 88 MMHG | HEART RATE: 60 BPM

## 2024-10-07 DIAGNOSIS — Z98.62 PERIPHERAL VASCULAR ANGIOPLASTY STATUS: Chronic | ICD-10-CM

## 2024-10-07 DIAGNOSIS — Z90.81 ACQUIRED ABSENCE OF SPLEEN: Chronic | ICD-10-CM

## 2024-10-07 DIAGNOSIS — I96 GANGRENE, NOT ELSEWHERE CLASSIFIED: ICD-10-CM

## 2024-10-07 LAB
ANION GAP SERPL CALC-SCNC: 11 MMOL/L — SIGNIFICANT CHANGE UP (ref 7–14)
BLD GP AB SCN SERPL QL: NEGATIVE — SIGNIFICANT CHANGE UP
BUN SERPL-MCNC: 19 MG/DL — SIGNIFICANT CHANGE UP (ref 7–23)
CALCIUM SERPL-MCNC: 9.2 MG/DL — SIGNIFICANT CHANGE UP (ref 8.4–10.5)
CHLORIDE SERPL-SCNC: 105 MMOL/L — SIGNIFICANT CHANGE UP (ref 98–107)
CO2 SERPL-SCNC: 23 MMOL/L — SIGNIFICANT CHANGE UP (ref 22–31)
CREAT SERPL-MCNC: 1.06 MG/DL — SIGNIFICANT CHANGE UP (ref 0.5–1.3)
EGFR: 84 ML/MIN/1.73M2 — SIGNIFICANT CHANGE UP
GLUCOSE SERPL-MCNC: 96 MG/DL — SIGNIFICANT CHANGE UP (ref 70–99)
HCT VFR BLD CALC: 43.5 % — SIGNIFICANT CHANGE UP (ref 39–50)
HGB BLD-MCNC: 14.2 G/DL — SIGNIFICANT CHANGE UP (ref 13–17)
MCHC RBC-ENTMCNC: 30.8 PG — SIGNIFICANT CHANGE UP (ref 27–34)
MCHC RBC-ENTMCNC: 32.6 GM/DL — SIGNIFICANT CHANGE UP (ref 32–36)
MCV RBC AUTO: 94.4 FL — SIGNIFICANT CHANGE UP (ref 80–100)
NRBC # BLD: 0 /100 WBCS — SIGNIFICANT CHANGE UP (ref 0–0)
NRBC # FLD: 0 K/UL — SIGNIFICANT CHANGE UP (ref 0–0)
PLATELET # BLD AUTO: 245 K/UL — SIGNIFICANT CHANGE UP (ref 150–400)
POTASSIUM SERPL-MCNC: 5 MMOL/L — SIGNIFICANT CHANGE UP (ref 3.5–5.3)
POTASSIUM SERPL-SCNC: 5 MMOL/L — SIGNIFICANT CHANGE UP (ref 3.5–5.3)
RBC # BLD: 4.61 M/UL — SIGNIFICANT CHANGE UP (ref 4.2–5.8)
RBC # FLD: 17 % — HIGH (ref 10.3–14.5)
RH IG SCN BLD-IMP: POSITIVE — SIGNIFICANT CHANGE UP
SODIUM SERPL-SCNC: 139 MMOL/L — SIGNIFICANT CHANGE UP (ref 135–145)
WBC # BLD: 7.81 K/UL — SIGNIFICANT CHANGE UP (ref 3.8–10.5)
WBC # FLD AUTO: 7.81 K/UL — SIGNIFICANT CHANGE UP (ref 3.8–10.5)

## 2024-10-07 PROCEDURE — 75716 ARTERY X-RAYS ARMS/LEGS: CPT | Mod: 26

## 2024-10-07 RX ORDER — APIXABAN 2.5 MG/1
1 TABLET, FILM COATED ORAL
Qty: 60 | Refills: 0 | DISCHARGE
Start: 2024-10-07 | End: 2024-11-05

## 2024-10-07 RX ORDER — APIXABAN 5 MG/1
1 TABLET, FILM COATED ORAL
Qty: 60 | Refills: 0
Start: 2024-10-07 | End: 2024-11-05

## 2024-10-07 RX ORDER — SODIUM CHLORIDE 0.9 % (FLUSH) 0.9 %
3 SYRINGE (ML) INJECTION EVERY 8 HOURS
Refills: 0 | Status: DISCONTINUED | OUTPATIENT
Start: 2024-10-07 | End: 2024-10-07

## 2024-10-07 NOTE — ASU DISCHARGE PLAN (ADULT/PEDIATRIC) - NS MD DC FALL RISK RISK
For information on Fall & Injury Prevention, visit: [email or web address] OR  [email or web address] OR  https://www.cdc.gov/steadi/patient.html

## 2024-10-07 NOTE — H&P CARDIOLOGY - NSICDXPASTMEDICALHX_GEN_ALL_CORE_FT
PAST MEDICAL HISTORY:  Asthma     Borderline diabetes     HIV disease     HLD (hyperlipidemia)     HTN (hypertension), benign     PAD (peripheral artery disease)

## 2024-10-07 NOTE — H&P CARDIOLOGY - HISTORY OF PRESENT ILLNESS
53 y.o. male presents today for elective LLE angiogram/angioplasty. The patient c/o LLE claudication. The patient denies chest pain, SOB, palpitations, dizziness, presyncope, syncope,  headache, visual disturbances, CVA, PE, DVT, CARSON, abdominal pain, N/V/D/C, hematochezia, melena, dysuria, hematuria, fever, chills.  The patient was evaluated by a vascular surgeon recommended to have LLE angiogram/angioplasty.

## 2024-10-07 NOTE — ASU DISCHARGE PLAN (ADULT/PEDIATRIC) - ASU DC SPECIAL INSTRUCTIONSFT
Your procedure was performed through the [facility],  For the next 5 days:  - Limit climbing stairs.  - Do not soak in a bathtub or pool.  - Avoid strenuous activities (pushing, pulling, straining).  - Do not lift anything more than 10 pounds.    MEDICATION:   - Take your medications as explained (see attached medication reconciliation on discharge paperwork).    ADDITIONAL INSTRUCTIONS:  - Follow a heart healthy diet recommended by your doctor.   - If you smoke, we encourage smoking cessation. You may call [phone] for center of tobacco control if you need assistance.  - Call your doctor to make appointment within 2 weeks.    ***CALL YOUR DOCTOR***  - If you experience fever, chills, body aches, or severe pain, swelling, redness, heat, or yellow discharge from your incision site.  - If you notice bleeding or significant swelling at the procedural site.  - If you experience chest pain.  - If you experience extremity numbness, tingling, or temperature change.    If you are unable to get in contact with your doctor, you may contact the Interventional Recovery Suite at [phone].  Please reference your discharge instructions for any questions or concerns.

## 2024-10-07 NOTE — ASU DISCHARGE PLAN (ADULT/PEDIATRIC) - CARE PROVIDER_API CALL
Ulisses Rubalcava  Vascular Surgery  [address] [place] [address]  Phone: [phone]  Fax: [phone]  Follow Up Time:

## 2024-10-07 NOTE — H&P CARDIOLOGY - COMMENTS
Pre-sedation evaluation    Dentures: none  Last PO intake: 10/7/24 18:00    Level of consciousness: 1  Obstructive sleep apnea: No  Aspiration risk: No  Mallampati score: 2  ASA Classification: 2  Prior Sedative or Anesthesia Experience: No complications  Informed consent by responsible adult: Yes  Responsible adult escort: Yes  Based on today's assessment, anesthesia consult requested: No

## 2024-10-07 NOTE — ASU PATIENT PROFILE, ADULT - FALL HARM RISK - RISK INTERVENTIONS

## 2024-10-16 ENCOUNTER — APPOINTMENT (OUTPATIENT)
Dept: VASCULAR SURGERY | Facility: CLINIC | Age: 54
End: 2024-10-16

## 2024-10-16 VITALS
WEIGHT: 210 LBS | HEIGHT: 69 IN | SYSTOLIC BLOOD PRESSURE: 115 MMHG | HEART RATE: 73 BPM | TEMPERATURE: 97.3 F | BODY MASS INDEX: 31.1 KG/M2 | DIASTOLIC BLOOD PRESSURE: 66 MMHG

## 2024-10-16 PROCEDURE — 99214 OFFICE O/P EST MOD 30 MIN: CPT

## 2024-10-24 ENCOUNTER — INPATIENT (INPATIENT)
Facility: HOSPITAL | Age: 54
LOS: 0 days | Discharge: HOME | DRG: 301 | End: 2024-10-25
Attending: STUDENT IN AN ORGANIZED HEALTH CARE EDUCATION/TRAINING PROGRAM | Admitting: STUDENT IN AN ORGANIZED HEALTH CARE EDUCATION/TRAINING PROGRAM
Payer: MEDICAID

## 2024-10-24 ENCOUNTER — APPOINTMENT (OUTPATIENT)
Dept: VASCULAR SURGERY | Facility: HOSPITAL | Age: 54
End: 2024-10-24

## 2024-10-24 VITALS
HEART RATE: 62 BPM | DIASTOLIC BLOOD PRESSURE: 68 MMHG | HEIGHT: 68 IN | TEMPERATURE: 98 F | SYSTOLIC BLOOD PRESSURE: 120 MMHG | RESPIRATION RATE: 16 BRPM | WEIGHT: 210.54 LBS | OXYGEN SATURATION: 99 %

## 2024-10-24 DIAGNOSIS — Z90.81 ACQUIRED ABSENCE OF SPLEEN: Chronic | ICD-10-CM

## 2024-10-24 DIAGNOSIS — Z98.62 PERIPHERAL VASCULAR ANGIOPLASTY STATUS: Chronic | ICD-10-CM

## 2024-10-24 DIAGNOSIS — I96 GANGRENE, NOT ELSEWHERE CLASSIFIED: ICD-10-CM

## 2024-10-24 LAB — GLUCOSE BLDC GLUCOMTR-MCNC: 74 MG/DL — SIGNIFICANT CHANGE UP (ref 70–99)

## 2024-10-24 PROCEDURE — 99223 1ST HOSP IP/OBS HIGH 75: CPT

## 2024-10-24 PROCEDURE — 93010 ELECTROCARDIOGRAM REPORT: CPT

## 2024-10-24 RX ORDER — HEPARIN SODIUM 10000 [USP'U]/ML
5000 INJECTION INTRAVENOUS; SUBCUTANEOUS EVERY 8 HOURS
Refills: 0 | Status: DISCONTINUED | OUTPATIENT
Start: 2024-10-24 | End: 2024-10-25

## 2024-10-24 RX ORDER — HYDROMORPHONE HCL/0.9% NACL/PF 6 MG/30 ML
0.5 PATIENT CONTROLLED ANALGESIA SYRINGE INTRAVENOUS
Refills: 0 | Status: DISCONTINUED | OUTPATIENT
Start: 2024-10-24 | End: 2024-10-24

## 2024-10-24 RX ORDER — APIXABAN 5 MG/1
5 TABLET, FILM COATED ORAL
Refills: 0 | Status: DISCONTINUED | OUTPATIENT
Start: 2024-10-24 | End: 2024-10-24

## 2024-10-24 RX ORDER — OXYCODONE HYDROCHLORIDE 30 MG/1
10 TABLET ORAL EVERY 4 HOURS
Refills: 0 | Status: DISCONTINUED | OUTPATIENT
Start: 2024-10-24 | End: 2024-10-25

## 2024-10-24 RX ORDER — ONDANSETRON HYDROCHLORIDE 2 MG/ML
4 INJECTION, SOLUTION INTRAMUSCULAR; INTRAVENOUS ONCE
Refills: 0 | Status: DISCONTINUED | OUTPATIENT
Start: 2024-10-24 | End: 2024-10-24

## 2024-10-24 RX ORDER — OXYCODONE HYDROCHLORIDE 30 MG/1
5 TABLET ORAL EVERY 4 HOURS
Refills: 0 | Status: DISCONTINUED | OUTPATIENT
Start: 2024-10-24 | End: 2024-10-25

## 2024-10-24 RX ORDER — FENTANYL CITRAT/DEXTROSE 5%/PF 1250MCG/50
25 PATIENT CONTROLLED ANALGESIA SYRINGE INTRAVENOUS
Refills: 0 | Status: DISCONTINUED | OUTPATIENT
Start: 2024-10-24 | End: 2024-10-24

## 2024-10-24 RX ORDER — GABAPENTIN 300 MG/1
100 CAPSULE ORAL DAILY
Refills: 0 | Status: DISCONTINUED | OUTPATIENT
Start: 2024-10-24 | End: 2024-10-25

## 2024-10-24 RX ORDER — BICTEGRAVIR SODIUM, EMTRICITABINE, AND TENOFOVIR ALAFENAMIDE FUMARATE 50; 200; 25 MG/1; MG/1; MG/1
1 TABLET ORAL DAILY
Refills: 0 | Status: DISCONTINUED | OUTPATIENT
Start: 2024-10-24 | End: 2024-10-25

## 2024-10-24 RX ORDER — CEFAZOLIN SODIUM 1 G
2000 VIAL (EA) INJECTION ONCE
Refills: 0 | Status: COMPLETED | OUTPATIENT
Start: 2024-10-24 | End: 2024-10-24

## 2024-10-24 RX ORDER — ACETAMINOPHEN 500 MG
975 TABLET ORAL EVERY 8 HOURS
Refills: 0 | Status: DISCONTINUED | OUTPATIENT
Start: 2024-10-24 | End: 2024-10-25

## 2024-10-24 RX ORDER — SODIUM CHLORIDE 9 MG/ML
3 INJECTION, SOLUTION INTRAMUSCULAR; INTRAVENOUS; SUBCUTANEOUS EVERY 8 HOURS
Refills: 0 | Status: DISCONTINUED | OUTPATIENT
Start: 2024-10-24 | End: 2024-10-24

## 2024-10-24 RX ORDER — ASPIRIN/MAG CARB/ALUMINUM AMIN 325 MG
81 TABLET ORAL DAILY
Refills: 0 | Status: DISCONTINUED | OUTPATIENT
Start: 2024-10-24 | End: 2024-10-25

## 2024-10-24 RX ADMIN — OXYCODONE HYDROCHLORIDE 5 MILLIGRAM(S): 30 TABLET ORAL at 22:43

## 2024-10-24 RX ADMIN — OXYCODONE HYDROCHLORIDE 5 MILLIGRAM(S): 30 TABLET ORAL at 21:43

## 2024-10-24 RX ADMIN — HEPARIN SODIUM 5000 UNIT(S): 10000 INJECTION INTRAVENOUS; SUBCUTANEOUS at 21:44

## 2024-10-24 NOTE — PATIENT PROFILE ADULT - FALL HARM RISK - HARM RISK INTERVENTIONS

## 2024-10-24 NOTE — ASU PATIENT PROFILE, ADULT - FALL HARM RISK - RISK INTERVENTIONS

## 2024-10-24 NOTE — H&P ADULT - HISTORY OF PRESENT ILLNESS
GENERAL SURGERY CONSULT NOTE    HPI: 53 y old male with h/o PVD s/p LLE angio 4/8/2024, lost to follow up. Now presenting for LLE angio with Dr. Rubalcava.       PMH/PSH: HIV disease    Borderline diabetes    PAD (peripheral artery disease)    HTN (hypertension), benign    HLD (hyperlipidemia)    Asthma    H/O splenectomy    S/P peripheral artery angioplasty        MEDS:    ALLERGIES: NKDA    REVIEW OF SYSTEMS: All ROS negative except as per HPI.  ____________________________________________    VITALS:T(C): --  T(F): --  HR: --  BP: --  BP(mean): --  RR: --  SpO2: --      PHYSICAL EXAM:  General: AAOx3, no acute distress.  Respiratory: breathing comfortably, no increased WOB   Abdomen: soft, nontender, nondistended, no rebound, no guarding  Extremities: Moves all four.   ____________________________________________    LABS:    ____________________________________________    RADIOLOGY & ADDITIONAL STUDIES:

## 2024-10-24 NOTE — H&P ADULT - ATTENDING COMMENTS
53 year old man with peripheral arterial disease  chronic limb threatening ischemia of the left lower extremity, Piero 5  s/p diagnostic angiogram  LLE TADV only option left for revascularization  OR today for LLE angio, possible TADV  will admit following OR today  maintain NPO status

## 2024-10-24 NOTE — CHART NOTE - NSCHARTNOTEFT_GEN_A_CORE
Pt evaluated at bedside after case aborted for possible MOBITZ heart block with rate low 50s during procedure. No ECG clip printed clearly showing, one short strip with 1 likely APC with compensatory pause. Currently Sinus without dropped beats on tele. Pt currently asymptomatic without dizziness, light-headedness, chest pain, SOB, or palpitations. ECG sinus bradycardia in 50s with normal [place] interval. Advised team to admit pt to tele and to monitor for any incedence of heart block, and to keep atropine at bedside and avoid AV rain blockers for now. Pt to be seen by Dr. Saeid Medina.        -Angel Vazquez, PGY-5  Cardiology Fellow
New onset heart block in the OR. No hypotension. Case canceled and will be rescheduled pending EP work up. Cardiology contacted.

## 2024-10-24 NOTE — PRE-ANESTHESIA EVALUATION ADULT - NSANTHPMHFT_GEN_ALL_CORE
52 yo M w/ PMHx of HTN, HLD, asthma, HIV, DM, PVD s/p LLE angio 4/8/2024, lost to follow up. Now presenting for LLE angio. 52 yo M w/ PMHx of HTN, HLD, asthma, tobacco abuse, HIV, pre-DM, PVD s/p LLE angio 4/8/2024, lost to follow up. Now presenting for LLE angio.    Denies active CP/SOB/Orthopnea  Denies hx of MI/CHF

## 2024-10-24 NOTE — PRE-ANESTHESIA EVALUATION ADULT - NSANTHPEFT_GEN_ALL_CORE
EXAM: Single view of the chest



HISTORY:   Mediport placement



COMPARISON: 9/3/2019



FINDINGS: Single view of the chest shows a normal sized cardiomediastinal silhouette.  There is a rig
ht IJ Mediport with its tip in the superior vena cava. No pneumothorax is seen.  There is no

evidence of consolidation, mass, or pleural effusion. The bones are unremarkable.



IMPRESSION: Status post Mediport placement without complication.



Reported By: Sukhi Sung 

Electronically Signed:  10/3/2019 9:13 AM
Gen: NAD, AAOx3  Resp: CTA B/L ant  CVS: RRR, S1/S2+

## 2024-10-24 NOTE — H&P ADULT - ASSESSMENT
53 y old male with h/o PVD s/p LLE angio 4/8/2024, lost to follow up. Now presenting for LLE angio with Dr. Rubalcava.         [facility] Surgery   [id number]

## 2024-10-25 ENCOUNTER — TRANSCRIPTION ENCOUNTER (OUTPATIENT)
Age: 54
End: 2024-10-25

## 2024-10-25 VITALS
DIASTOLIC BLOOD PRESSURE: 77 MMHG | HEART RATE: 61 BPM | TEMPERATURE: 98 F | OXYGEN SATURATION: 99 % | SYSTOLIC BLOOD PRESSURE: 128 MMHG | RESPIRATION RATE: 18 BRPM

## 2024-10-25 LAB
ANION GAP SERPL CALC-SCNC: 9 MMOL/L — SIGNIFICANT CHANGE UP (ref 5–17)
APTT BLD: 32.2 SEC — SIGNIFICANT CHANGE UP (ref 24.5–35.6)
BUN SERPL-MCNC: 26 MG/DL — HIGH (ref 7–23)
CALCIUM SERPL-MCNC: 8.9 MG/DL — SIGNIFICANT CHANGE UP (ref 8.4–10.5)
CHLORIDE SERPL-SCNC: 108 MMOL/L — SIGNIFICANT CHANGE UP (ref 96–108)
CO2 SERPL-SCNC: 21 MMOL/L — LOW (ref 22–31)
CREAT SERPL-MCNC: 1.17 MG/DL — SIGNIFICANT CHANGE UP (ref 0.5–1.3)
EGFR: 75 ML/MIN/1.73M2 — SIGNIFICANT CHANGE UP
GLUCOSE BLDC GLUCOMTR-MCNC: 115 MG/DL — HIGH (ref 70–99)
GLUCOSE SERPL-MCNC: 105 MG/DL — HIGH (ref 70–99)
HCT VFR BLD CALC: 38.7 % — LOW (ref 39–50)
HGB BLD-MCNC: 12.7 G/DL — LOW (ref 13–17)
INR BLD: 0.88 RATIO — SIGNIFICANT CHANGE UP (ref 0.85–1.16)
MAGNESIUM SERPL-MCNC: 1.8 MG/DL — SIGNIFICANT CHANGE UP (ref 1.6–2.6)
MCHC RBC-ENTMCNC: 30.9 PG — SIGNIFICANT CHANGE UP (ref 27–34)
MCHC RBC-ENTMCNC: 32.8 GM/DL — SIGNIFICANT CHANGE UP (ref 32–36)
MCV RBC AUTO: 94.2 FL — SIGNIFICANT CHANGE UP (ref 80–100)
NRBC # BLD: 0 /100 WBCS — SIGNIFICANT CHANGE UP (ref 0–0)
PHOSPHATE SERPL-MCNC: 4.1 MG/DL — SIGNIFICANT CHANGE UP (ref 2.5–4.5)
PLATELET # BLD AUTO: 252 K/UL — SIGNIFICANT CHANGE UP (ref 150–400)
POTASSIUM SERPL-MCNC: 4.2 MMOL/L — SIGNIFICANT CHANGE UP (ref 3.5–5.3)
POTASSIUM SERPL-SCNC: 4.2 MMOL/L — SIGNIFICANT CHANGE UP (ref 3.5–5.3)
PROTHROM AB SERPL-ACNC: 10.1 SEC — SIGNIFICANT CHANGE UP (ref 9.9–13.4)
RBC # BLD: 4.11 M/UL — LOW (ref 4.2–5.8)
RBC # FLD: 16.7 % — HIGH (ref 10.3–14.5)
SODIUM SERPL-SCNC: 138 MMOL/L — SIGNIFICANT CHANGE UP (ref 135–145)
WBC # BLD: 8.81 K/UL — SIGNIFICANT CHANGE UP (ref 3.8–10.5)
WBC # FLD AUTO: 8.81 K/UL — SIGNIFICANT CHANGE UP (ref 3.8–10.5)

## 2024-10-25 PROCEDURE — 85610 PROTHROMBIN TIME: CPT

## 2024-10-25 PROCEDURE — 84100 ASSAY OF PHOSPHORUS: CPT

## 2024-10-25 PROCEDURE — 80048 BASIC METABOLIC PNL TOTAL CA: CPT

## 2024-10-25 PROCEDURE — 83735 ASSAY OF MAGNESIUM: CPT

## 2024-10-25 PROCEDURE — 93005 ELECTROCARDIOGRAM TRACING: CPT

## 2024-10-25 PROCEDURE — 85027 COMPLETE CBC AUTOMATED: CPT

## 2024-10-25 PROCEDURE — 99222 1ST HOSP IP/OBS MODERATE 55: CPT

## 2024-10-25 PROCEDURE — 82962 GLUCOSE BLOOD TEST: CPT

## 2024-10-25 PROCEDURE — 85730 THROMBOPLASTIN TIME PARTIAL: CPT

## 2024-10-25 RX ORDER — MELATONIN 5 MG
5 TABLET ORAL ONCE
Refills: 0 | Status: COMPLETED | OUTPATIENT
Start: 2024-10-25 | End: 2024-10-25

## 2024-10-25 RX ADMIN — OXYCODONE HYDROCHLORIDE 10 MILLIGRAM(S): 30 TABLET ORAL at 02:38

## 2024-10-25 RX ADMIN — HEPARIN SODIUM 5000 UNIT(S): 10000 INJECTION INTRAVENOUS; SUBCUTANEOUS at 13:14

## 2024-10-25 RX ADMIN — OXYCODONE HYDROCHLORIDE 10 MILLIGRAM(S): 30 TABLET ORAL at 05:57

## 2024-10-25 RX ADMIN — Medication 140 MILLILITER(S): at 00:23

## 2024-10-25 RX ADMIN — Medication 81 MILLIGRAM(S): at 11:19

## 2024-10-25 RX ADMIN — OXYCODONE HYDROCHLORIDE 10 MILLIGRAM(S): 30 TABLET ORAL at 01:38

## 2024-10-25 RX ADMIN — Medication 5 MILLIGRAM(S): at 00:24

## 2024-10-25 RX ADMIN — Medication 140 MILLILITER(S): at 11:19

## 2024-10-25 RX ADMIN — HEPARIN SODIUM 5000 UNIT(S): 10000 INJECTION INTRAVENOUS; SUBCUTANEOUS at 05:58

## 2024-10-25 RX ADMIN — BICTEGRAVIR SODIUM, EMTRICITABINE, AND TENOFOVIR ALAFENAMIDE FUMARATE 1 TABLET(S): 50; 200; 25 TABLET ORAL at 11:19

## 2024-10-25 RX ADMIN — OXYCODONE HYDROCHLORIDE 10 MILLIGRAM(S): 30 TABLET ORAL at 06:57

## 2024-10-25 RX ADMIN — GABAPENTIN 100 MILLIGRAM(S): 300 CAPSULE ORAL at 11:19

## 2024-10-25 NOTE — CONSULT NOTE ADULT - ASSESSMENT
53-year-old male with a past medical history of peripheral vascular disease status post left lower extremity angiogram on 4/8/2024, then did not follow-up with vascular surgery and now admitted for a repeat left lower extremity angiogram.    #Left lower extremity wound, PVD  #HIV on ART    Recommendations 53-year-old male with a past medical history of peripheral vascular disease status post left lower extremity angiogram on 4/8/2024, then did not follow-up with vascular surgery and now admitted for a repeat left lower extremity angiogram.    #Left lower extremity wound, PVD  #HIV on ART    Recommendations  No infection within wound, no erythema, no drainage - advised for close podiarty and wound care follow up  Otherwise afebrile, no leukocytosis   Would monitor off antibiotics  Continue BIktarvy for HIV  Advised close follow-up with ID doctor at [place], gave card as option if decides to see us in ID clinic  Follow fever curve and WBC count    ID to sign off. Please contact as issues arise.    Cole Ventura MD  Division of Infectious Diseases

## 2024-10-25 NOTE — CONSULT NOTE ADULT - SUBJECTIVE AND OBJECTIVE BOX
Patient is a 53y old  Male who presents with a chief complaint of LLE angio (25 Oct 2024 14:54)    HPI:  53-year-old male with a past medical history of peripheral vascular disease status post left lower extremity angiogram on 4/8/2024, then did not follow-up with vascular surgery and now admitted for a repeat left lower extremity angiogram.    Upon admission, taken to the OR for angiogram however case was aborted due to concern for heart block after developing bradycardia and hypotension.  Further evaluation by cardiology determined no heart block present on EKG.    ID consulted due to a left foot wound.  Patient afebrile since admission.  Otherwise hemodynamically stable on room air.  Latest labs show no leukocytosis, anemia 12.7/38.7, BMP with renal function within normal limits.    Patient living with HIV.  Patient is on Biktarvy.      prior hospital charts reviewed [x  ]  primary team notes reviewed [x  ]  other consultant notes reviewed [x  ]    PAST MEDICAL & SURGICAL HISTORY:  HIV disease      Borderline diabetes      PAD (peripheral artery disease)      HTN (hypertension), benign      HLD (hyperlipidemia)      Asthma      H/O splenectomy      S/P peripheral artery angioplasty          Allergies  No Known Allergies    ANTIMICROBIALS (past 90 days)  MEDICATIONS  (STANDING):  bictegravir 50 mG/emtricitabine 200 mG/tenofovir alafenamide 25 mG (BIKTARVY)   1 Tablet(s) Oral (10-25-24 @ 11:19)        bictegravir 50 mG/emtricitabine 200 mG/tenofovir alafenamide 25 mG (BIKTARVY) 1 daily    MEDICATIONS  (STANDING):  acetaminophen     Tablet .. 975 every 8 hours  aspirin enteric coated 81 daily  gabapentin 100 daily  heparin   Injectable 5000 every 8 hours  oxyCODONE    IR 5 every 4 hours PRN  oxyCODONE    IR 10 every 4 hours PRN    SOCIAL HISTORY:     Lives at home.    FAMILY HISTORY:  No pertinent family history in first degree relatives      REVIEW OF SYSTEMS  [  ] ROS unobtainable because:    [ x ] All other systems negative except as noted below:	    Constitutional:  [ ] fever [ ] chills  [ ] weight loss  [ ] weakness  Skin:  [ ] rash [ ] phlebitis	  Eyes: [ ] icterus [ ] pain  [ ] discharge	  ENMT: [ ] sore throat  [ ] thrush [ ] ulcers [ ] exudates  Respiratory: [ ] dyspnea [ ] hemoptysis [ ] cough [ ] sputum	  Cardiovascular:  [ ] chest pain [ ] palpitations [ ] edema	  Gastrointestinal:  [ ] nausea [ ] vomiting [ ] diarrhea [ ] constipation [ ] pain	  Genitourinary:  [ ] dysuria [ ] frequency [ ] hematuria [ ] discharge [ ] flank pain  [ ] incontinence  Musculoskeletal:  [ ] myalgias [ ] arthralgias [ ] arthritis  [ ] back pain  Neurological:  [ ] headache [ ] seizures  [ ] confusion/altered mental status  Psychiatric:  [ ] anxiety [ ] depression	  Hematology/Lymphatics:  [ ] lymphadenopathy  Endocrine:  [ ] adrenal [ ] thyroid  Allergic/Immunologic:	 [ ] transplant [ ] seasonal    Vital Signs Last 24 Hrs  T(F): 98 (10-25-24 @ 14:43), Max: 98.1 (10-24-24 @ 19:00)  Vital Signs Last 24 Hrs  HR: 62 (10-25-24 @ 14:43) (46 - 79)  BP: 144/84 (10-25-24 @ 14:43) (117/71 - 144/84)  RR: 18 (10-25-24 @ 14:43)  SpO2: 95% (10-25-24 @ 14:43) (94% - 100%)  Wt(kg): --    PHYSICAL EXAM:  Constitutional: non-toxic, no distress  HEAD/EYES: anicteric, no conjunctival injection  ENT:  supple, no thrush  Cardiovascular:   normal S1, S2, no murmur, no edema  Respiratory:  clear BS bilaterally, no wheezes, no rales  GI:  soft, non-tender, normal bowel sounds  :  no flores, no CVA tenderness  Musculoskeletal:  no synovitis, normal ROM  Neurologic: awake and alert, normal strength, no focal findings  Skin:  no rash, no erythema, no phlebitis  Heme/Onc: no lymphadenopathy   Psychiatric:  awake, alert, appropriate mood                            12.7   8.81  )-----------( 252      ( 25 Oct 2024 06:20 )             38.7   10-25    138  |  108  |  26[H]  ----------------------------<  105[H]  4.2   |  21[L]  |  1.17    Ca    8.9      25 Oct 2024 06:20  Phos  4.1     10-25  Mg     1.8     10-25      Urinalysis Basic - ( 25 Oct 2024 06:20 )    Color: x / Appearance: x / SG: x / pH: x  Gluc: 105 mg/dL / Ketone: x  / Bili: x / Urobili: x   Blood: x / Protein: x / Nitrite: x   Leuk Esterase: x / RBC: x / WBC x   Sq Epi: x / Non Sq Epi: x / Bacteria: x    MICROBIOLOGY:        HIV-1 RNA Quantitative, Viral Load Log: DET.  <1.47 lg /mL (03-16-24 @ 06:05)  CMV IgG Antibody: >10.00 U/mL (03-16-24 @ 06:05)        RADIOLOGY:  imaging below personally reviewed and agree with findings Patient is a 53y old  Male who presents with a chief complaint of LLE angio (25 Oct 2024 14:54)    HPI:  53-year-old male with a past medical history of peripheral vascular disease status post left lower extremity angiogram on 4/8/2024, then did not follow-up with vascular surgery and now admitted for a repeat left lower extremity angiogram.    Upon admission, taken to the OR for angiogram however case was aborted due to concern for heart block after developing bradycardia and hypotension.  Further evaluation by cardiology determined no heart block present on EKG.    ID consulted due to a left foot wound.  Patient afebrile since admission.  Otherwise hemodynamically stable on room air.  Latest labs show no leukocytosis, anemia 12.7/38.7, BMP with renal function within normal limits.    Patient living with HIV.  Patient is on Biktarvy.      prior hospital charts reviewed [x  ]  primary team notes reviewed [x  ]  other consultant notes reviewed [x  ]    PAST MEDICAL & SURGICAL HISTORY:  HIV disease      Borderline diabetes      PAD (peripheral artery disease)      HTN (hypertension), benign      HLD (hyperlipidemia)      Asthma      H/O splenectomy      S/P peripheral artery angioplasty          Allergies  No Known Allergies    ANTIMICROBIALS (past 90 days)  MEDICATIONS  (STANDING):  bictegravir 50 mG/emtricitabine 200 mG/tenofovir alafenamide 25 mG (BIKTARVY)   1 Tablet(s) Oral (10-25-24 @ 11:19)        bictegravir 50 mG/emtricitabine 200 mG/tenofovir alafenamide 25 mG (BIKTARVY) 1 daily    MEDICATIONS  (STANDING):  acetaminophen     Tablet .. 975 every 8 hours  aspirin enteric coated 81 daily  gabapentin 100 daily  heparin   Injectable 5000 every 8 hours  oxyCODONE    IR 5 every 4 hours PRN  oxyCODONE    IR 10 every 4 hours PRN    SOCIAL HISTORY:     Lives at home.    FAMILY HISTORY:  No pertinent family history in first degree relatives      REVIEW OF SYSTEMS  [  ] ROS unobtainable because:    [ x ] All other systems negative except as noted below:	    Constitutional:  [ ] fever [ ] chills  [ ] weight loss  [ ] weakness  Skin:  [ ] rash [ ] phlebitis	  Eyes: [ ] icterus [ ] pain  [ ] discharge	  ENMT: [ ] sore throat  [ ] thrush [ ] ulcers [ ] exudates  Respiratory: [ ] dyspnea [ ] hemoptysis [ ] cough [ ] sputum	  Cardiovascular:  [ ] chest pain [ ] palpitations [ ] edema	  Gastrointestinal:  [ ] nausea [ ] vomiting [ ] diarrhea [ ] constipation [ ] pain	  Genitourinary:  [ ] dysuria [ ] frequency [ ] hematuria [ ] discharge [ ] flank pain  [ ] incontinence  Musculoskeletal:  [ ] myalgias [ ] arthralgias [ ] arthritis  [ ] back pain  Neurological:  [ ] headache [ ] seizures  [ ] confusion/altered mental status  Psychiatric:  [ ] anxiety [ ] depression	  Hematology/Lymphatics:  [ ] lymphadenopathy  Endocrine:  [ ] adrenal [ ] thyroid  Allergic/Immunologic:	 [ ] transplant [ ] seasonal    Vital Signs Last 24 Hrs  T(F): 98 (10-25-24 @ 14:43), Max: 98.1 (10-24-24 @ 19:00)  Vital Signs Last 24 Hrs  HR: 62 (10-25-24 @ 14:43) (46 - 79)  BP: 144/84 (10-25-24 @ 14:43) (117/71 - 144/84)  RR: 18 (10-25-24 @ 14:43)  SpO2: 95% (10-25-24 @ 14:43) (94% - 100%)  Wt(kg): --    PHYSICAL EXAM:  Constitutional: non-toxic, no distress  HEAD/EYES: anicteric, no conjunctival injection  ENT:  supple, no thrush  Cardiovascular:   normal S1, S2, no murmur, no edema  Respiratory:  clear BS bilaterally, no wheezes, no rales  GI:  soft, non-tender, normal bowel sounds  :  no flores, no CVA tenderness  Musculoskeletal:  no synovitis, normal ROM  Neurologic: awake and alert, normal strength, no focal findings  Skin:  foot wound without evidence for infection, no drainage, no ertyehma, no tenderness  Heme/Onc: no lymphadenopathy   Psychiatric:  awake, alert, appropriate mood                            12.7   8.81  )-----------( 252      ( 25 Oct 2024 06:20 )             38.7   10-25    138  |  108  |  26[H]  ----------------------------<  105[H]  4.2   |  21[L]  |  1.17    Ca    8.9      25 Oct 2024 06:20  Phos  4.1     10-25  Mg     1.8     10-25      Urinalysis Basic - ( 25 Oct 2024 06:20 )    Color: x / Appearance: x / SG: x / pH: x  Gluc: 105 mg/dL / Ketone: x  / Bili: x / Urobili: x   Blood: x / Protein: x / Nitrite: x   Leuk Esterase: x / RBC: x / WBC x   Sq Epi: x / Non Sq Epi: x / Bacteria: x    MICROBIOLOGY:        HIV-1 RNA Quantitative, Viral Load Log: DET.  <1.47 lg /mL (03-16-24 @ 06:05)  CMV IgG Antibody: >10.00 U/mL (03-16-24 @ 06:05)        RADIOLOGY:  imaging below personally reviewed and agree with findings

## 2024-10-25 NOTE — DISCHARGE NOTE PROVIDER - NSDCFUADDINST_GEN_ALL_CORE_FT
Please take all recommended antibiotic medications as prescribed.   Please follow wound care instructions.  Schedule follow up with Dr. Rubalcava at your earliest convenience.

## 2024-10-25 NOTE — CONSULT NOTE ADULT - SUBJECTIVE AND OBJECTIVE BOX
Name of Patient : LEA MENDOZA  MRN: [id number]  Date of visit: 10-25-24 @ 16:27    Patient is a 54 y/o old male with h/o PVD s/p LLE angio 4/8/2024, lost to follow up. Now presenting for LLE angio with Dr. Rubalcava. Pt had an episode of bradycardia yesterday ? heart block , pts procedure aborted. Pt denies any chest pains SOB or dizziness. no h/o syncope in the past , overnight no evidence of heart block on telemoniter.       Subjective: Patient seen and examined. No new events except as noted.     REVIEW OF SYSTEMS:    CONSTITUTIONAL: No weakness, fevers or chills  EYES/ENT: No visual changes;  No vertigo or throat pain   NECK: No pain or stiffness  RESPIRATORY: No cough, wheezing, hemoptysis; No shortness of breath  CARDIOVASCULAR: No chest pain or palpitations  GASTROINTESTINAL: No abdominal or epigastric pain. No nausea, vomiting, or hematemesis; No diarrhea or constipation. No melena or hematochezia.  GENITOURINARY: No dysuria, frequency or hematuria  NEUROLOGICAL: No numbness or weakness  SKIN: No itching, burning, rashes, or lesions   All other review of systems is negative unless indicated above.    MEDICATIONS:  MEDICATIONS  (STANDING):  acetaminophen     Tablet .. 975 milliGRAM(s) Oral every 8 hours  aspirin enteric coated 81 milliGRAM(s) Oral daily  bictegravir 50 mG/emtricitabine 200 mG/tenofovir alafenamide 25 mG (BIKTARVY) 1 Tablet(s) Oral daily  gabapentin 100 milliGRAM(s) Oral daily  heparin   Injectable 5000 Unit(s) SubCutaneous every 8 hours  lactated ringers. 1000 milliLiter(s) (140 mL/Hr) IV Continuous <Continuous>      Home Medications:  gabapentin 100 mg oral capsule: 1 cap(s) orally once a day (07 Oct 2024 13:16)    PAST MEDICAL & SURGICAL HISTORY:  HIV disease      Borderline diabetes      PAD (peripheral artery disease)      HTN (hypertension), benign      HLD (hyperlipidemia)      Asthma      H/O splenectomy      S/P peripheral artery angioplasty            PHYSICAL EXAM:  T(C): 36.7 (10-25-24 @ 14:43), Max: 36.7 (10-24-24 @ 19:00)  HR: 62 (10-25-24 @ 14:43) (46 - 79)  BP: 144/84 (10-25-24 @ 14:43) (117/71 - 144/84)  RR: 18 (10-25-24 @ 14:43) (16 - 18)  SpO2: 95% (10-25-24 @ 14:43) (94% - 100%)  Wt(kg): --  I&O's Summary    24 Oct 2024 07:01  -  25 Oct 2024 07:00  --------------------------------------------------------  IN: 1120 mL / OUT: 1100 mL / NET: 20 mL    25 Oct 2024 07:01  -  25 Oct 2024 16:27  --------------------------------------------------------  IN: 1400 mL / OUT: 950 mL / NET: 450 mL          Appearance: Normal	  HEENT:  PERRLA   Lymphatic: No lymphadenopathy   Cardiovascular: Normal S1 S2, no JVD  Respiratory: normal effort , clear  Gastrointestinal:  Soft, Non-tender  Skin: No rashes,  warm to touch  Psychiatry:  Mood & affect appropriate  Musculuskeletal: No edema    recent labs, Imaging and EKGs personally reviewed     10-24-24 @ 07:01  -  10-25-24 @ 07:00  --------------------------------------------------------  IN: 1120 mL / OUT: 1100 mL / NET: 20 mL    10-25-24 @ 07:01  -  10-25-24 @ 16:27  --------------------------------------------------------  IN: 1400 mL / OUT: 950 mL / NET: 450 mL                          12.7   8.81  )-----------( 252      ( 25 Oct 2024 06:20 )             38.7               10-25    138  |  108  |  26[H]  ----------------------------<  105[H]  4.2   |  21[L]  |  1.17    Ca    8.9      25 Oct 2024 06:20  Phos  4.1     10-25  Mg     1.8     10-25      PT/INR - ( 25 Oct 2024 06:20 )   PT: 10.1 sec;   INR: 0.88 ratio         PTT - ( 25 Oct 2024 06:20 )  PTT:32.2 sec                   Urinalysis Basic - ( 25 Oct 2024 06:20 )    Color: x / Appearance: x / SG: x / pH: x  Gluc: 105 mg/dL / Ketone: x  / Bili: x / Urobili: x   Blood: x / Protein: x / Nitrite: x   Leuk Esterase: x / RBC: x / WBC x   Sq Epi: x / Non Sq Epi: x / Bacteria: x

## 2024-10-25 NOTE — DISCHARGE NOTE PROVIDER - NSDCCPCAREPLAN_GEN_ALL_CORE_FT
PRINCIPAL DISCHARGE DIAGNOSIS  Diagnosis: Lower limb ischemia  Assessment and Plan of Treatment:

## 2024-10-25 NOTE — CONSULT NOTE ADULT - ASSESSMENT
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  - No culture taken 2/2 no acute signs of infection  - [facility] recs, appreciated; will be discharged today/tomorrow and readmitted next week for angio  - Recommend betadine, 4x4 gauze and una daily  - Weight bearing as tolerated in surgical shoe  - No acute podiatric surgical intervention at this time; will consider procedure after revascularization  - Patient is stable for discharge from podiatry standpoint  - Discussed with attending

## 2024-10-25 NOTE — DISCHARGE NOTE PROVIDER - CARE PROVIDER_API CALL
Ulisses Rubalcava  Vascular Surgery  [address] [place] [address]  Phone: [phone]  Fax: [phone]  Established Patient  Follow Up Time:

## 2024-10-25 NOTE — DISCHARGE NOTE NURSING/CASE MANAGEMENT/SOCIAL WORK - PATIENT PORTAL LINK FT
You can access the FollowMyHealth Patient Portal offered by [facility] by registering at the following website: [email or web address]. By joining [employer]’s FollowMyHealth portal, you will also be able to view your health information using other applications (apps) compatible with our system.

## 2024-10-25 NOTE — PROGRESS NOTE ADULT - ASSESSMENT
53 y old male with h/o PVD s/p LLE angio 4/8/2024, lost to follow up. Now presenting for LLE angio with Dr. Rubalcava. New onset heart block in the OR. No hypotension. Case canceled and will be rescheduled pending EP work up. Cardiology contacted.    Plan:  - F/u cardiology recs   -   - Monitor I/Os  - ASA, SQH  - LLE angio today   - reg diet, NPO from midnight   - Analgesics as needed     Vascular   [id number]

## 2024-10-25 NOTE — DISCHARGE NOTE PROVIDER - HOSPITAL COURSE
53 y old male with h/o PVD s/p LLE angio 4/8/2024, lost to follow up. Now presenting for LLE angio with Dr. Rubalcava. New onset heart block in the OR. Case canceled. Cardiology follow up and cleared. Due to patient prior obligations, case for 10/25 also cancelled. Pt able to be discharged with close follow up.

## 2024-10-25 NOTE — CONSULT NOTE ADULT - SUBJECTIVE AND OBJECTIVE BOX
Saeid Medina MD  Interventional Cardiology / Advance Heart Failure and Cardiac Transplant Specialist  [place] Office : [phone] [place] [address] [place]. [address]  Tel:   [place] Office : [address] [facility] [place] [address]  Tel: [phone]         HISTORY OF PRESENTING ILLNESS:  HPI:  HPI: 53 y old male with h/o PVD s/p LLE angio 4/8/2024, lost to follow up. Now presenting for LLE angio with Dr. Rubalcava. Pt had an episode of bradycardia yesterday ? heart block , pts procedure aborted. Pt denies any chest pains SOB or dizziness. no h/o syncope in the past , overnight no evidence of heart block on telemoniter.    PAST MEDICAL & SURGICAL HISTORY:  HIV disease      Borderline diabetes      PAD (peripheral artery disease)      HTN (hypertension), benign      HLD (hyperlipidemia)      Asthma      H/O splenectomy      S/P peripheral artery angioplasty          SOCIAL HISTORY: Substance Use (street drugs): ( x ) never used  (  ) other:    FAMILY HISTORY:  No pertinent family history in first degree relatives         MEDICATIONS:  aspirin enteric coated 81 milliGRAM(s) Oral daily  heparin   Injectable 5000 Unit(s) SubCutaneous every 8 hours    bictegravir 50 mG/emtricitabine 200 mG/tenofovir alafenamide 25 mG (BIKTARVY) 1 Tablet(s) Oral daily      acetaminophen     Tablet .. 975 milliGRAM(s) Oral every 8 hours  gabapentin 100 milliGRAM(s) Oral daily  oxyCODONE    IR 5 milliGRAM(s) Oral every 4 hours PRN  oxyCODONE    IR 10 milliGRAM(s) Oral every 4 hours PRN        lactated ringers. 1000 milliLiter(s) IV Continuous <Continuous>      FAMILY HISTORY:  No pertinent family history in first degree relatives          Allergies    No Known Allergies    Intolerances    	      PHYSICAL EXAM:  T(C): 36.5 (10-25-24 @ 08:54), Max: 36.7 (10-24-24 @ 19:00)  HR: 60 (10-25-24 @ 08:54) (46 - 79)  BP: 142/90 (10-25-24 @ 08:54) (117/71 - 142/90)  RR: 18 (10-25-24 @ 08:54) (16 - 18)  SpO2: 95% (10-25-24 @ 08:54) (94% - 100%)  Wt(kg): --  I&O's Summary    24 Oct 2024 07:01  -  25 Oct 2024 07:00  --------------------------------------------------------  IN: 1120 mL / OUT: 1100 mL / NET: 20 mL    25 Oct 2024 07:01  -  25 Oct 2024 13:57  --------------------------------------------------------  IN: 0 mL / OUT: 450 mL / NET: -450 mL        GENERAL: NAD   EYES: EOMI, PERRLA, conjunctiva and sclera clear  ENMT: No tonsillar erythema, exudates, or enlargement; Moist mucous membranes, Good dentition, No lesions  Cardiovascular: Normal S1 S2, No JVD, No murmurs, No edema  Respiratory: Lungs clear to auscultation	  Gastrointestinal:  Soft, Non-tender, + BS	  Extremities: left 1st and 2nd toe gangrene      LABS:	 	    CARDIAC MARKERS:                                  12.7   8.81  )-----------( 252      ( 25 Oct 2024 06:20 )             38.7     10-25    138  |  108  |  26[H]  ----------------------------<  105[H]  4.2   |  21[L]  |  1.17    Ca    8.9      25 Oct 2024 06:20  Phos  4.1     10-25  Mg     1.8     10-25      proBNP:   Lipid Profile:   HgA1c:   TSH:     Consultant(s) Notes Reviewed:  [x ] YES  [ ] NO    Care Discussed with Consultants/Other Providers [ x] YES  [ ] NO    Imaging Personally Reviewed independently:  [x] YES  [ ] NO    All labs, radiologic studies, vitals, orders and medications list reviewed. Patient is seen and examined at bedside. Case discussed with medical team.    ASSESSMENT/PLAN:

## 2024-10-25 NOTE — PROGRESS NOTE ADULT - SUBJECTIVE AND OBJECTIVE BOX
VASCULAR SURGERY DAILY PROGRESS NOTE:       SUBJECTIVE/ROS: Patient seen and evaluated on AM rounds.   Denies nausea, vomiting, chest pain, shortness of breath       OBJECTIVE:  Vital Signs Last 24 Hrs  T(C): 36.6 (25 Oct 2024 05:08), Max: 36.7 (24 Oct 2024 19:00)  T(F): 97.9 (25 Oct 2024 05:08), Max: 98.1 (24 Oct 2024 19:00)  HR: 62 (25 Oct 2024 05:08) (46 - 79)  BP: 122/74 (25 Oct 2024 05:08) (117/71 - 141/96)  BP(mean): 100 (24 Oct 2024 20:00) (89 - 112)  RR: 18 (25 Oct 2024 05:08) (16 - 18)  SpO2: 96% (25 Oct 2024 05:08) (94% - 100%)    Parameters below as of 25 Oct 2024 05:08  Patient On (Oxygen Delivery Method): room air      I&O's Detail    24 Oct 2024 07:01  -  25 Oct 2024 07:00  --------------------------------------------------------  IN:    Lactated Ringers: 1120 mL  Total IN: 1120 mL    OUT:    Voided (mL): 1100 mL  Total OUT: 1100 mL    Total NET: 20 mL        Daily Height in cm: 172.72 (24 Oct 2024 15:29)    Daily   MEDICATIONS  (STANDING):  acetaminophen     Tablet .. 975 milliGRAM(s) Oral every 8 hours  aspirin enteric coated 81 milliGRAM(s) Oral daily  bictegravir 50 mG/emtricitabine 200 mG/tenofovir alafenamide 25 mG (BIKTARVY) 1 Tablet(s) Oral daily  gabapentin 100 milliGRAM(s) Oral daily  heparin   Injectable 5000 Unit(s) SubCutaneous every 8 hours  lactated ringers. 1000 milliLiter(s) (140 mL/Hr) IV Continuous <Continuous>    MEDICATIONS  (PRN):  oxyCODONE    IR 5 milliGRAM(s) Oral every 4 hours PRN Moderate Pain (4 - 6)  oxyCODONE    IR 10 milliGRAM(s) Oral every 4 hours PRN Severe Pain (7 - 10)      LABS:                        12.7   8.81  )-----------( 252      ( 25 Oct 2024 06:20 )             38.7     10-25    138  |  108  |  26[H]  ----------------------------<  105[H]  4.2   |  21[L]  |  1.17    Ca    8.9      25 Oct 2024 06:20  Phos  4.1     10-25  Mg     1.8     10-25      PT/INR - ( 25 Oct 2024 06:20 )   PT: 10.1 sec;   INR: 0.88 ratio         PTT - ( 25 Oct 2024 06:20 )  PTT:32.2 sec  Urinalysis Basic - ( 25 Oct 2024 06:20 )    Color: x / Appearance: x / SG: x / pH: x  Gluc: 105 mg/dL / Ketone: x  / Bili: x / Urobili: x   Blood: x / Protein: x / Nitrite: x   Leuk Esterase: x / RBC: x / WBC x   Sq Epi: x / Non Sq Epi: x / Bacteria: x              PHYSICAL EXAM:  General: AAOx3, no acute distress.  Respiratory: breathing comfortably, no increased WOB   Abdomen: soft, nontender, nondistended, no rebound, no guarding  Extremities: Moves all four.

## 2024-10-25 NOTE — DISCHARGE NOTE PROVIDER - NSDCMRMEDTOKEN_GEN_ALL_CORE_FT
aspirin 81 mg oral delayed release tablet: 1 tab(s) orally once a day  Biktarvy 50 mg-200 mg-25 mg oral tablet: 1 tab(s) orally once a day  Eliquis 5 mg oral tablet: 1 tab(s) orally 2 times a day HOLD BASSAMQUIS CHARISSA, RESTART ON 10/8/24 IN THE MORNING  gabapentin 100 mg oral capsule: 1 cap(s) orally once a day

## 2024-10-25 NOTE — DISCHARGE NOTE NURSING/CASE MANAGEMENT/SOCIAL WORK - NSDCPEFALRISK_GEN_ALL_CORE
For information on Fall & Injury Prevention, visit: [email or web address] OR  [email or web address] OR  https://www.cdc.gov/steadi/patient.html

## 2024-10-25 NOTE — CONSULT NOTE ADULT - SUBJECTIVE AND OBJECTIVE BOX
Patient is a 53y old  Male who presents with a chief complaint of LLE angio (25 Oct 2024 14:54)      HPI:  GENERAL SURGERY CONSULT NOTE    HPI: 53 y old male with h/o PVD s/p LLE angio 4/8/2024, lost to follow up. Now presenting for LLE angio with Dr. Rubalcava.       PMH/PSH: HIV disease    Borderline diabetes    PAD (peripheral artery disease)    HTN (hypertension), benign    HLD (hyperlipidemia)    Asthma    H/O splenectomy    S/P peripheral artery angioplasty        MEDS:    ALLERGIES: NKDA    REVIEW OF SYSTEMS: All ROS negative except as per HPI.  ____________________________________________    VITALS:T(C): --  T(F): --  HR: --  BP: --  BP(mean): --  RR: --  SpO2: --      PHYSICAL EXAM:  General: AAOx3, no acute distress.  Respiratory: breathing comfortably, no increased WOB   Abdomen: soft, nontender, nondistended, no rebound, no guarding  Extremities: Moves all four.   ____________________________________________    LABS:    ____________________________________________    RADIOLOGY & ADDITIONAL STUDIES: (24 Oct 2024 12:30)      PAST MEDICAL & SURGICAL HISTORY:  HIV disease      Borderline diabetes      PAD (peripheral artery disease)      HTN (hypertension), benign      HLD (hyperlipidemia)      Asthma      H/O splenectomy      S/P peripheral artery angioplasty          MEDICATIONS  (STANDING):  acetaminophen     Tablet .. 975 milliGRAM(s) Oral every 8 hours  aspirin enteric coated 81 milliGRAM(s) Oral daily  bictegravir 50 mG/emtricitabine 200 mG/tenofovir alafenamide 25 mG (BIKTARVY) 1 Tablet(s) Oral daily  gabapentin 100 milliGRAM(s) Oral daily  heparin   Injectable 5000 Unit(s) SubCutaneous every 8 hours  lactated ringers. 1000 milliLiter(s) (140 mL/Hr) IV Continuous <Continuous>    MEDICATIONS  (PRN):  oxyCODONE    IR 5 milliGRAM(s) Oral every 4 hours PRN Moderate Pain (4 - 6)  oxyCODONE    IR 10 milliGRAM(s) Oral every 4 hours PRN Severe Pain (7 - 10)      Allergies    No Known Allergies    Intolerances        VITALS:    Vital Signs Last 24 Hrs  T(C): 36.7 (25 Oct 2024 14:43), Max: 36.7 (24 Oct 2024 19:00)  T(F): 98 (25 Oct 2024 14:43), Max: 98.1 (24 Oct 2024 19:00)  HR: 62 (25 Oct 2024 14:43) (46 - 79)  BP: 144/84 (25 Oct 2024 14:43) (117/71 - 144/84)  BP(mean): 100 (24 Oct 2024 20:00) (89 - 112)  RR: 18 (25 Oct 2024 14:43) (16 - 18)  SpO2: 95% (25 Oct 2024 14:43) (94% - 100%)    Parameters below as of 25 Oct 2024 14:43  Patient On (Oxygen Delivery Method): room air        LABS:                          12.7   8.81  )-----------( 252      ( 25 Oct 2024 06:20 )             38.7       10-25    138  |  108  |  26[H]  ----------------------------<  105[H]  4.2   |  21[L]  |  1.17    Ca    8.9      25 Oct 2024 06:20  Phos  4.1     10-25  Mg     1.8     10-25        CAPILLARY BLOOD GLUCOSE      POCT Blood Glucose.: 115 mg/dL (24 Oct 2024 19:40)  POCT Blood Glucose.: 74 mg/dL (24 Oct 2024 18:11)      PT/INR - ( 25 Oct 2024 06:20 )   PT: 10.1 sec;   INR: 0.88 ratio         PTT - ( 25 Oct 2024 06:20 )  PTT:32.2 sec    LOWER EXTREMITY PHYSICAL EXAM:  Vascular: DP/PT 2/4, R, DP/PT 0/4, CFT <3 seconds B/L, Temperature gradient warm to cool, B/L.   Neuro: Epicritic sensation diminished to the level of digits, B/L.  Musculoskeletal/Ortho: unremarkable   Skin: Left foot distal hallux tuft full thickness gangrene, early ischemic changes to 2nd digit, no malodor, no drainage, no acute signs of infection.    RADIOLOGY & ADDITIONAL STUDIES:

## 2024-10-25 NOTE — CONSULT NOTE ADULT - ASSESSMENT
EKG - Sinus bradycardia @ 55bpm   tele strip - dropped QRS sec to premature PAC  tele moniter -  dropped QRS sec to premature PAC    a/p     1) Preop clearance - no heart block pt has frequent noncompensatory pause sec to PAC, Echo 3/24 shows normal LV, RV enlarged with possible extracardiac shunt, pt for TADV, no h/o syncope dizziness, electrolytes ok not on BBlockers, QRS narrowpt is moderate risk for TADV avoid AV rain blocking agents    2) HIV - on BIKTARVY    3) DVT prophylasix - sc heparin

## 2024-10-25 NOTE — DISCHARGE NOTE NURSING/CASE MANAGEMENT/SOCIAL WORK - FINANCIAL ASSISTANCE
Department of Obstetrics and Gynecology  Labor and Delivery   Post Partum Progress Note      SUBJECTIVE:  Doing well with no complaints. Reports bleeding is decreasing and pain is well controlled with medication. Has voided without difficulty. Has not had BM, but + flatus. Eating and drinking well. Denies HA/visual changes/epigastric pain. Breastfeeding is going well. Reports good social support. Denies emotional concerns. OBJECTIVE:      Vitals:  /65   Pulse 65   Temp 97.9 °F (36.6 °C) (Oral)   Resp 16   Ht 5' 7\" (1.702 m)   Wt 270 lb (122.5 kg)   LMP 2021   SpO2 99%   Breastfeeding Unknown   BMI 42.29 kg/m²   Lab Results   Component Value Date    WBC 13.6 (H) 2022    HGB 11.6 (L) 2022    HCT 37.2 2022    MCV 85.1 2022     2022       ABDOMEN:  Soft, non-tender. Fundus firm at u-1. BS present x 4 quadrants. LOCHIA: Normal per pt  LUNGS: CTAB  HEART: RRR  EXTREMITIES: No calf tenderness, erythema or swelling bilaterally       ASSESSMENT:      PPD # 1  S/p   Breastfeeding well    PLAN:     Will plan for discharge PP Day # 2. Discharge teaching completed including counseling on warning signs (heavy vaginal bleeding, s/s of preeclampsia, fever >100.4, ACHES, s/s of PPD). Rx for colace and ibuprofen. Pt to schedule f/u pp visit at 6 weeks in the office.        SYLVIA Poe CNM
Department of Obstetrics and Gynecology  Labor and Delivery   Post Partum Progress Note      SUBJECTIVE:  Doing well with no complaints. Reports bleeding is decreasing and pain is well controlled with medication. Has voided without difficulty. Has not had BM, but + flatus. Eating and drinking well. Denies HA/visual changes/epigastric pain. Breastfeeding is going well. Reports good social support. Denies emotional concerns. OBJECTIVE:      Vitals:  /74   Pulse 58   Temp 98.4 °F (36.9 °C) (Oral)   Resp 18   Ht 5' 7\" (1.702 m)   Wt 270 lb (122.5 kg)   LMP 2021   SpO2 99%   Breastfeeding Unknown   BMI 42.29 kg/m²   Lab Results   Component Value Date    WBC 13.6 (H) 2022    HGB 11.6 (L) 2022    HCT 37.2 2022    MCV 85.1 2022     2022       ABDOMEN:  Soft, non-tender. Fundus firm at u-1. BS present x 4 quadrants. LOCHIA: Normal per pt  LUNGS: CTAB  HEART: RRR  EXTREMITIES: No calf tenderness, erythema or swelling bilaterally       ASSESSMENT:      PPD # 1  S/p   Bottlefeeding well  GBS- Negative   O+/(Baby)O-    PLAN:     Will plan for discharge on PPD2. Discharge teaching completed including counseling on warning signs (heavy vaginal bleeding, s/s of preeclampsia, fever >100.4, ACHES, s/s of PPD). Rx for colace and ibuprofen. Pt to schedule f/u pp visit at 6 weeks in the office.        SYLVIA Cui CNM
[facility] provides services at a reduced cost to those who are determined to be eligible through [facility]’s financial assistance program. Information regarding [facility]’s financial assistance program can be found by going to [email or web address] or by calling [phone].

## 2024-11-08 PROBLEM — I10 ESSENTIAL (PRIMARY) HYPERTENSION: Chronic | Status: ACTIVE | Noted: 2024-10-07

## 2024-11-08 PROBLEM — E78.5 HYPERLIPIDEMIA, UNSPECIFIED: Chronic | Status: ACTIVE | Noted: 2024-10-07

## 2024-11-08 PROBLEM — I73.9 PERIPHERAL VASCULAR DISEASE, UNSPECIFIED: Chronic | Status: ACTIVE | Noted: 2024-10-07

## 2024-11-08 PROBLEM — J45.909 UNSPECIFIED ASTHMA, UNCOMPLICATED: Chronic | Status: ACTIVE | Noted: 2024-10-07

## 2024-11-13 PROBLEM — R73.03 PREDIABETES: Chronic | Status: ACTIVE | Noted: 2024-10-07

## 2024-11-25 ENCOUNTER — OUTPATIENT (OUTPATIENT)
Dept: OUTPATIENT SERVICES | Facility: HOSPITAL | Age: 54
LOS: 1 days | End: 2024-11-25
Payer: MEDICAID

## 2024-11-25 VITALS
HEART RATE: 52 BPM | SYSTOLIC BLOOD PRESSURE: 131 MMHG | HEIGHT: 68 IN | RESPIRATION RATE: 18 BRPM | TEMPERATURE: 98 F | OXYGEN SATURATION: 98 % | DIASTOLIC BLOOD PRESSURE: 73 MMHG | WEIGHT: 213.41 LBS

## 2024-11-25 DIAGNOSIS — I73.9 PERIPHERAL VASCULAR DISEASE, UNSPECIFIED: ICD-10-CM

## 2024-11-25 DIAGNOSIS — Z98.62 PERIPHERAL VASCULAR ANGIOPLASTY STATUS: Chronic | ICD-10-CM

## 2024-11-25 DIAGNOSIS — I96 GANGRENE, NOT ELSEWHERE CLASSIFIED: ICD-10-CM

## 2024-11-25 DIAGNOSIS — Z79.01 LONG TERM (CURRENT) USE OF ANTICOAGULANTS: ICD-10-CM

## 2024-11-25 DIAGNOSIS — B20 HUMAN IMMUNODEFICIENCY VIRUS [HIV] DISEASE: ICD-10-CM

## 2024-11-25 DIAGNOSIS — Z01.818 ENCOUNTER FOR OTHER PREPROCEDURAL EXAMINATION: ICD-10-CM

## 2024-11-25 DIAGNOSIS — Z90.81 ACQUIRED ABSENCE OF SPLEEN: Chronic | ICD-10-CM

## 2024-11-25 LAB
ANION GAP SERPL CALC-SCNC: 12 MMOL/L — SIGNIFICANT CHANGE UP (ref 5–17)
BLD GP AB SCN SERPL QL: NEGATIVE — SIGNIFICANT CHANGE UP
BUN SERPL-MCNC: 19 MG/DL — SIGNIFICANT CHANGE UP (ref 7–23)
CALCIUM SERPL-MCNC: 9.2 MG/DL — SIGNIFICANT CHANGE UP (ref 8.4–10.5)
CHLORIDE SERPL-SCNC: 107 MMOL/L — SIGNIFICANT CHANGE UP (ref 96–108)
CO2 SERPL-SCNC: 22 MMOL/L — SIGNIFICANT CHANGE UP (ref 22–31)
CREAT SERPL-MCNC: 1.01 MG/DL — SIGNIFICANT CHANGE UP (ref 0.5–1.3)
EGFR: 88 ML/MIN/1.73M2 — SIGNIFICANT CHANGE UP
GLUCOSE SERPL-MCNC: 96 MG/DL — SIGNIFICANT CHANGE UP (ref 70–99)
HCT VFR BLD CALC: 42 % — SIGNIFICANT CHANGE UP (ref 39–50)
HGB BLD-MCNC: 13.7 G/DL — SIGNIFICANT CHANGE UP (ref 13–17)
MCHC RBC-ENTMCNC: 31.2 PG — SIGNIFICANT CHANGE UP (ref 27–34)
MCHC RBC-ENTMCNC: 32.6 G/DL — SIGNIFICANT CHANGE UP (ref 32–36)
MCV RBC AUTO: 95.7 FL — SIGNIFICANT CHANGE UP (ref 80–100)
NRBC # BLD: 0 /100 WBCS — SIGNIFICANT CHANGE UP (ref 0–0)
PLATELET # BLD AUTO: 243 K/UL — SIGNIFICANT CHANGE UP (ref 150–400)
POTASSIUM SERPL-MCNC: 3.9 MMOL/L — SIGNIFICANT CHANGE UP (ref 3.5–5.3)
POTASSIUM SERPL-SCNC: 3.9 MMOL/L — SIGNIFICANT CHANGE UP (ref 3.5–5.3)
RBC # BLD: 4.39 M/UL — SIGNIFICANT CHANGE UP (ref 4.2–5.8)
RBC # FLD: 16.9 % — HIGH (ref 10.3–14.5)
RH IG SCN BLD-IMP: POSITIVE — SIGNIFICANT CHANGE UP
SODIUM SERPL-SCNC: 141 MMOL/L — SIGNIFICANT CHANGE UP (ref 135–145)
WBC # BLD: 9.64 K/UL — SIGNIFICANT CHANGE UP (ref 3.8–10.5)
WBC # FLD AUTO: 9.64 K/UL — SIGNIFICANT CHANGE UP (ref 3.8–10.5)

## 2024-11-25 RX ORDER — CEFAZOLIN SODIUM 10 G
2000 VIAL (EA) INJECTION ONCE
Refills: 0 | Status: COMPLETED | OUTPATIENT
Start: 2024-11-26 | End: 2024-11-26

## 2024-11-25 RX ORDER — SODIUM CHLORIDE 9 MG/ML
3 INJECTION, SOLUTION INTRAMUSCULAR; INTRAVENOUS; SUBCUTANEOUS EVERY 8 HOURS
Refills: 0 | Status: DISCONTINUED | OUTPATIENT
Start: 2024-11-26 | End: 2024-11-26

## 2024-11-25 RX ORDER — CHLORHEXIDINE GLUCONATE 1.2 MG/ML
1 RINSE ORAL ONCE
Refills: 0 | Status: DISCONTINUED | OUTPATIENT
Start: 2024-11-26 | End: 2024-11-26

## 2024-11-25 RX ORDER — LIDOCAINE HCL 20 MG/ML
0.2 VIAL (ML) INJECTION ONCE
Refills: 0 | Status: DISCONTINUED | OUTPATIENT
Start: 2024-11-26 | End: 2024-11-26

## 2024-11-25 NOTE — H&P PST ADULT - NSSTREETDRUGFR_GEN_ALL_CORE_SD
Breath sounds clear and equal bilaterally. Normal work of breathing. No respiratory distress. 2-3 times/wk

## 2024-11-25 NOTE — H&P PST ADULT - ASSESSMENT
DASI score: 4.64  DASI activity: 1 block w/ cane, 1 flight of stairs has to stop because sob/leg pain, ADLs  Loose teeth or denture: denies DASI score: 4.64  DASI activity: 1 block w/ cane, 1 flight of stairs has to stop because sob/leg pain, ADLs  Loose teeth or denture: denies

## 2024-11-25 NOTE — H&P PST ADULT - HISTORY OF PRESENT ILLNESS
[age] yo male presents to [facility] prior to scheduled left leg angiogram, possible transcatheter arterialization of the deep veins, possible angioplasty, possible stent placement on 11/26/24 with Dr. Ulisses Rubalcava. Pmhx obesity, HTN, HLD, HIV (on Biktarvy), pre-diabetes, CKD, PAD s/p   , cigarette smoker (10 pack years).  Endorses pain in left foot, discoloration of toes (reports has gotten better since last vascular procedure, previously all toes were darkened, now great toe and 4th toe only). Endorses numbness/tingling in both feet. Denies falls. Ambulates with cane.  55 yo male pmhx obesity, HTN, HLD, HIV (on Biktarvy), pre-diabetes, meningitis at [place] in 2/2024 (received 3 weeks IV abx, left AMA), CKD, kidney cyst, DDD, PAD, cigarette smoker (10 pack years). Endorses pain in left foot, discoloration of toes (reports has gotten better since last vascular procedure, previously all toes were darkened, now great toe and 4th toe only). Endorses numbness/tingling in both feet. Denies falls. Ambulates with cane. Hx of noncompliance, poor historian.    Admitted to [facility] 4/5/24-4/16/24 for wet gangrene; Debrided by podiatry on admission. Went for traditional angiogram on 4/8, which discovered no large vessel disease, but significant small vessel disease, with plan on return to  angio lab in 1 week to revascularize. Podiatry to provide further recommendations regarding possible TMA after the second angiogram. Treated with unasyn, escalated to vanc on recommendation of ID. To be discharged on  doxy/cephalexin until 4/18. Patient failed to f/u for repeat angiogram.    Patient was scheduled for above surgery on 10/24/24 - New onset heart block in the OR. Case canceled. Cardiology follow up and cleared. Due to patient prior obligations, case for 10/25 also cancelled, rescheduled for 11/26/24.   As per cardiac note on 10/25/24 ([facility]), "Preop clearance - no heart block pt has frequent noncompensatory pause sec to PAC, Echo 3/24 shows normal LV, RV enlarged with possible extracardiac shunt, pt for TADV, no h/o syncope dizziness, electrolytes ok not on BBlockers, QRS narrowpt is moderate risk for TADV avoid AV rain blocking agents".    Now presents to PST for  left leg angiogram, possible transcatheter arterialization of the deep veins, possible angioplasty, possible stent placement on 11/26/24 with Dr. Ulisses Rubalcava.   55 yo male pmhx obesity, HTN, HLD, HIV (on Biktarvy), pre-diabetes, meningitis at [place] in 2/2024 (received 3 weeks IV abx, left AMA), CKD, kidney cyst, DDD, PAD, cigarette smoker (10 pack years). Endorses pain in left foot, discoloration of toes (reports has gotten better since last vascular procedure, previously all toes were darkened, now great toe and 4th toe only). Endorses numbness/tingling in both feet. Denies falls. Ambulates with cane. Hx of noncompliance, poor historian.    Admitted to [facility] 4/5/24-4/16/24 for wet gangrene; Debrided by podiatry on admission. Went for traditional angiogram on 4/8, which discovered no large vessel disease, but significant small vessel disease, with plan on return to angio lab in 1 week to revascularize. Podiatry to provide further recommendations regarding possible TMA after the second angiogram. Treated with unasyn, escalated to vanc on recommendation of ID. To be discharged on doxy/cephalexin until 4/18. Patient failed to f/u for repeat angiogram.    Patient was scheduled for above surgery on 10/24/24 - New onset heart block in the OR. Case canceled. Cardiology follow up and cleared. Due to patient prior obligations, case for 10/25 also cancelled, rescheduled for 11/26/24.   As per cardiac note on 10/25/24 ([facility]), "Preop clearance - no heart block pt has frequent noncompensatory pause sec to PAC, Echo 3/24 shows normal LV, RV enlarged with possible extracardiac shunt, pt for TADV, no h/o syncope dizziness, electrolytes ok not on BBlockers, QRS narrow pt is moderate risk for TADV avoid AV rain blocking agents".    Now presents to PST for left leg angiogram, possible transcatheter arterialization of the deep veins, possible angioplasty, possible stent placement on 11/26/24 with Dr. Ulisses Rubalcava. Discussed case w/ Dr. Escobar, ok to proceed.

## 2024-11-25 NOTE — H&P PST ADULT - NSICDXPASTMEDICALHX_GEN_ALL_CORE_FT
PAST MEDICAL HISTORY:  Asthma     Borderline diabetes     Chronic kidney disease, unspecified CKD stage     HIV disease     HLD (hyperlipidemia)     HTN (hypertension), benign     PAD (peripheral artery disease)      PAST MEDICAL HISTORY:  Asthma     Borderline diabetes     Bradycardia     Chronic kidney disease, unspecified CKD stage     H/O degenerative disc disease     H/O heart block     HIV disease     HLD (hyperlipidemia)     HTN (hypertension), benign     Meningitis     PAD (peripheral artery disease)

## 2024-11-25 NOTE — H&P PST ADULT - PROBLEM SELECTOR PLAN 2
On Eliquis. Last dose on 11/24/24 evening. Discussed w/ Dr. Rubalcava. Patient to continue to hold. Will take aspirin 81 mg tomorrow AM.

## 2024-11-25 NOTE — H&P PST ADULT - LAST ECHOCARDIOGRAM
~2024 "one side of the heart bigger than the other" ~3/2024 " Enlarged right ventricular cavity size and reduced systolic function. Trace MR. EF 60%. possible extracardiac shunt via bubble study"

## 2024-11-25 NOTE — H&P PST ADULT - NSICDXFAMILYHX_GEN_ALL_CORE_FT
(4) excellent
FAMILY HISTORY:  Grandparent  Still living? Unknown  FH: CAD (coronary artery disease), Age at diagnosis: Age Unknown

## 2024-11-25 NOTE — H&P PST ADULT - PROBLEM SELECTOR PLAN 1
- - -
Left leg angiogram, possible transcatheter arterialization of the deep veins, possible angioplasty, possible stent placement on 11/26/24 with Dr. Ulisses Rubalcava.  Pre-op instructions given. Questions answered.

## 2024-11-26 ENCOUNTER — OUTPATIENT (OUTPATIENT)
Dept: OUTPATIENT SERVICES | Facility: HOSPITAL | Age: 54
LOS: 1 days | End: 2024-11-26
Payer: MEDICAID

## 2024-11-26 ENCOUNTER — APPOINTMENT (OUTPATIENT)
Dept: VASCULAR SURGERY | Facility: HOSPITAL | Age: 54
End: 2024-11-26

## 2024-11-26 VITALS
OXYGEN SATURATION: 97 % | RESPIRATION RATE: 18 BRPM | TEMPERATURE: 98 F | WEIGHT: 213.41 LBS | DIASTOLIC BLOOD PRESSURE: 81 MMHG | HEART RATE: 51 BPM | SYSTOLIC BLOOD PRESSURE: 115 MMHG | HEIGHT: 68 IN

## 2024-11-26 DIAGNOSIS — I96 GANGRENE, NOT ELSEWHERE CLASSIFIED: ICD-10-CM

## 2024-11-26 DIAGNOSIS — Z90.81 ACQUIRED ABSENCE OF SPLEEN: Chronic | ICD-10-CM

## 2024-11-26 DIAGNOSIS — Z98.62 PERIPHERAL VASCULAR ANGIOPLASTY STATUS: Chronic | ICD-10-CM

## 2024-11-26 LAB — GLUCOSE BLDC GLUCOMTR-MCNC: 148 MG/DL — HIGH (ref 70–99)

## 2024-11-26 PROCEDURE — 76000 FLUOROSCOPY <1 HR PHYS/QHP: CPT

## 2024-11-26 PROCEDURE — 93010 ELECTROCARDIOGRAM REPORT: CPT

## 2024-11-26 PROCEDURE — 86901 BLOOD TYPING SEROLOGIC RH(D): CPT

## 2024-11-26 PROCEDURE — 0620T EVASC VEN ARTLZ TIBL/PRNL VN: CPT | Mod: 82

## 2024-11-26 PROCEDURE — 80048 BASIC METABOLIC PNL TOTAL CA: CPT

## 2024-11-26 PROCEDURE — 86900 BLOOD TYPING SEROLOGIC ABO: CPT

## 2024-11-26 PROCEDURE — G0463: CPT

## 2024-11-26 PROCEDURE — 86850 RBC ANTIBODY SCREEN: CPT

## 2024-11-26 PROCEDURE — 85027 COMPLETE CBC AUTOMATED: CPT

## 2024-11-26 PROCEDURE — 0620T EVASC VEN ARTLZ TIBL/PRNL VN: CPT | Mod: LT

## 2024-11-26 DEVICE — SET ACCESS MICROPUNCTURE PEDAL 5F: Type: IMPLANTABLE DEVICE | Site: LEFT | Status: FUNCTIONAL

## 2024-11-26 DEVICE — INTRO GLIDESHEATH SLENDER FLEX STRT 21G 5FX10CM: Type: IMPLANTABLE DEVICE | Site: LEFT | Status: FUNCTIONAL

## 2024-11-26 DEVICE — CATH BALLOON STERILING OTW 6 X 40MM X 135CM: Type: IMPLANTABLE DEVICE | Site: LEFT | Status: FUNCTIONAL

## 2024-11-26 DEVICE — IMPLANTABLE DEVICE: Type: IMPLANTABLE DEVICE | Site: LEFT | Status: FUNCTIONAL

## 2024-11-26 DEVICE — CATH SUPPORT CXI 2.3X150CM ST TIP: Type: IMPLANTABLE DEVICE | Site: LEFT | Status: FUNCTIONAL

## 2024-11-26 DEVICE — INTRODUCER SET MICROPUNCTURE ACCESS 21G X 7CM: Type: IMPLANTABLE DEVICE | Site: LEFT | Status: FUNCTIONAL

## 2024-11-26 DEVICE — INTRO SHEATH ANSEL 7F .035X55CM: Type: IMPLANTABLE DEVICE | Site: LEFT | Status: FUNCTIONAL

## 2024-11-26 DEVICE — SHEATH PRELUDE IDEAL 6F 7X4CM: Type: IMPLANTABLE DEVICE | Site: LEFT | Status: FUNCTIONAL

## 2024-11-26 DEVICE — GWIRE RUNTHRU NS .014X300CM: Type: IMPLANTABLE DEVICE | Site: LEFT | Status: FUNCTIONAL

## 2024-11-26 DEVICE — KIT SYS LIMFLOW: Type: IMPLANTABLE DEVICE | Site: LEFT | Status: FUNCTIONAL

## 2024-11-26 DEVICE — CATH VENOUS V CEIVER 23: Type: IMPLANTABLE DEVICE | Site: LEFT | Status: FUNCTIONAL

## 2024-11-26 DEVICE — GUIDEWIRE ADVANTAGE .014INX300CM: Type: IMPLANTABLE DEVICE | Site: LEFT | Status: FUNCTIONAL

## 2024-11-26 DEVICE — GWIRE BENSTON X260: Type: IMPLANTABLE DEVICE | Site: LEFT | Status: FUNCTIONAL

## 2024-11-26 DEVICE — WIRE GUIDE COMMAND 300CM: Type: IMPLANTABLE DEVICE | Site: LEFT | Status: FUNCTIONAL

## 2024-11-26 DEVICE — GUIDEWIRE AMPLATZ SUPER-STIFF SHORT TAPER .035" X 260CM: Type: IMPLANTABLE DEVICE | Site: LEFT | Status: FUNCTIONAL

## 2024-11-26 RX ORDER — GABAPENTIN 300 MG/1
100 CAPSULE ORAL DAILY
Refills: 0 | Status: DISCONTINUED | OUTPATIENT
Start: 2024-11-26 | End: 2024-12-10

## 2024-11-26 RX ORDER — 0.9 % SODIUM CHLORIDE 0.9 %
1000 INTRAVENOUS SOLUTION INTRAVENOUS
Refills: 0 | Status: DISCONTINUED | OUTPATIENT
Start: 2024-11-26 | End: 2024-12-10

## 2024-11-26 RX ORDER — GLUCAGON INJECTION, SOLUTION 0.5 MG/.1ML
1 INJECTION, SOLUTION SUBCUTANEOUS ONCE
Refills: 0 | Status: DISCONTINUED | OUTPATIENT
Start: 2024-11-26 | End: 2024-12-10

## 2024-11-26 RX ORDER — HEPARIN SODIUM,PORCINE 1000/ML
5000 VIAL (ML) INJECTION EVERY 8 HOURS
Refills: 0 | Status: DISCONTINUED | OUTPATIENT
Start: 2024-11-26 | End: 2024-11-27

## 2024-11-26 RX ORDER — HYDROMORPHONE HYDROCHLORIDE 2 MG/1
0.25 TABLET ORAL
Refills: 0 | Status: DISCONTINUED | OUTPATIENT
Start: 2024-11-26 | End: 2024-11-27

## 2024-11-26 RX ORDER — ACETAMINOPHEN 500MG 500 MG/1
975 TABLET, COATED ORAL EVERY 6 HOURS
Refills: 0 | Status: DISCONTINUED | OUTPATIENT
Start: 2024-11-26 | End: 2024-11-27

## 2024-11-26 RX ORDER — BICTEGRAVIR SODIUM, EMTRICITABINE, AND TENOFOVIR ALAFENAMIDE FUMARATE 50; 200; 25 MG/1; MG/1; MG/1
1 TABLET ORAL DAILY
Refills: 0 | Status: DISCONTINUED | OUTPATIENT
Start: 2024-11-26 | End: 2024-12-10

## 2024-11-26 RX ADMIN — Medication 5000 UNIT(S): at 21:36

## 2024-11-26 RX ADMIN — GABAPENTIN 100 MILLIGRAM(S): 300 CAPSULE ORAL at 21:36

## 2024-11-26 NOTE — PATIENT PROFILE ADULT - COMPLETE THE FOLLOWING IF THE PATIENT REFUSES THE INFLUENZA VACCINE:
Risks/benefits discussed with patient/surrogate Benzoyl Peroxide Pregnancy And Lactation Text: This medication is Pregnancy Category C. It is unknown if benzoyl peroxide is excreted in breast milk.

## 2024-11-26 NOTE — PRE-OP CHECKLIST - CHLOROHEXIDINE WASH IN ASU
Assessment:  1. Acute hypercapnic and hypoxic respiratory failure  2. Viral bronchitis  3. Human Metapneumovirus infection  4. Obstructive sleep apnea   5. Class 3 Obesity     Plan:  - Bipap support at night time   - ABG improving   - Echo noted   - Cont. on PO steroids  - Change to PRN albuterol  - Monitor respiratory status  - NC oxygen support when off bipap  - Keep oxygen > 92%  - DVT and stress ulcer prophylaxis  - Contact isolation for 5 days   - Outpatient records reviewed, was diagnosed with Sleep apnea, and prescribed Bipap. States company called him as equipment is ready to be delivered  - Transfer out of ICU today Assessment:  1. Acute hypercapnic and hypoxic respiratory failure  2. Viral bronchitis  3. Human Metapneumovirus infection  4. Obstructive sleep apnea   5. Class 3 Obesity     Plan:  - Bipap support  - ABG improving   - Echo noted   - Cont. on PO steroids  - Bronchodilators RTC for now  - Monitor respiratory status  - NC oxygen support when off bipap  - Keep oxygen > 92%  - DVT and stress ulcer prophylaxis  - Contact isolation  - Outpatient records reviewed, was diagnosed with Sleep apnea, and prescribed Bipap. States has not obtained delivery yet.   - Transfer out of ICU today Assessment:  1. Acute hypercapnic and hypoxic respiratory failure  2. Viral bronchitis  3. Human Metapneumovirus infection  4. Obstructive sleep apnea   5. Class 3 Obesity     Plan:  - Bipap support  - Check ABG   - Check cardiac function  - Maintain euvolemia  - Cont. Steroids  - Bronchodilators RTC for now  - Monitor respiratory status  - NPO while on bipap  - NC oxygen support when off bipap  - Keep oxygen > 92%  - DVT and stress ulcer prophylaxis  - Contact isolation  - Cont. ICU care for now 26-Nov-2024 11:02

## 2024-11-26 NOTE — PRE-OP CHECKLIST - WEIGHT IN LBS
213.4 Alert-The patient is alert, awake and responds to voice. The patient is oriented to time, place, and person. The triage nurse is able to obtain subjective information.

## 2024-11-26 NOTE — PRE-ANESTHESIA EVALUATION ADULT - NSANTHADDINFOFT_GEN_ALL_CORE
The patient was seen and evaluated in the holding area prior to entering the operating room. The anesthetic plan, including risks and benefits, were discussed with the patient.  The patient conveyed understanding and all questions answered.

## 2024-11-26 NOTE — PROVIDER CONTACT NOTE (OTHER) - BACKGROUND
Patient s/p L leg angio Limb flow procedure. Patient admit to PACU 1810. Patient has L groin & L plantar surgical sites. Sites are clean dry and intact without evidence of bleeding or hematoma.

## 2024-11-26 NOTE — PATIENT PROFILE ADULT - FALL HARM RISK - HARM RISK INTERVENTIONS

## 2024-11-26 NOTE — CHART NOTE - NSCHARTNOTEFT_GEN_A_CORE
POST-OPERATIVE NOTE    Subjective:  Patient is s/p Left lower extremity angiogram, transcatheter arterialization of a deep vein (LimFlow). Recovering appropriately. Feels well. Denies nausea, vomiting, chest pain, shortness of breath. Has not had anything to eat since surgery, but has had some water.     Vital Signs Last 24 Hrs  T(C): 36.5 (26 Nov 2024 22:00), Max: 36.5 (26 Nov 2024 10:59)  T(F): 97.7 (26 Nov 2024 22:00), Max: 97.7 (26 Nov 2024 10:59)  HR: 65 (26 Nov 2024 22:00) (51 - 67)  BP: 124/93 (26 Nov 2024 22:00) (115/81 - 138/86)  BP(mean): 105 (26 Nov 2024 22:00) (93 - 106)  RR: 16 (26 Nov 2024 22:00) (14 - 18)  SpO2: 98% (26 Nov 2024 22:00) (94% - 100%)    Parameters below as of 26 Nov 2024 22:00  Patient On (Oxygen Delivery Method): room air      I&O's Detail    26 Nov 2024 07:01  -  26 Nov 2024 23:15  --------------------------------------------------------  IN:    Oral Fluid: 480 mL  Total IN: 480 mL    OUT:    Indwelling Catheter - Urethral (mL): 425 mL  Total OUT: 425 mL    Total NET: 55 mL        bictegravir 50 mG/emtricitabine 200 mG/tenofovir alafenamide 25 mG (BIKTARVY) 1  aspirin enteric coated 81  bictegravir 50 mG/emtricitabine 200 mG/tenofovir alafenamide 25 mG (BIKTARVY) 1  heparin   Injectable 5000    PAST MEDICAL & SURGICAL HISTORY:  HIV disease      Borderline diabetes      PAD (peripheral artery disease)      HTN (hypertension), benign      HLD (hyperlipidemia)      Asthma      Chronic kidney disease, unspecified CKD stage      Bradycardia      H/O heart block      Meningitis      H/O degenerative disc disease      H/O splenectomy      S/P peripheral artery angioplasty            Physical Exam:  General: NAD, resting comfortably in bed  Pulmonary: Nonlabored breathing, no respiratory distress, on RA  Cardiovascular: regular rate on monitor   Groin: L groin soft and appropriately tender to palpation. No masses or skin changes. Dressing in place with no strikethrough.   Extremities: Warm. L foot dressing in place with no strikethrough.   Vasc: L DP palpable. L PT with strong doppler signal.       LABS:                        13.7   9.64  )-----------( 243      ( 25 Nov 2024 19:10 )             42.0     11-25    141  |  107  |  19  ----------------------------<  96  3.9   |  22  |  1.01    Ca    9.2      25 Nov 2024 19:10        CAPILLARY BLOOD GLUCOSE      POCT Blood Glucose.: 148 mg/dL (26 Nov 2024 20:30)      Radiology and Additional Studies:    Assessment:  The patient is a 54y Male who is now several hours post-op from a     Plan:  - Pain control as needed  - DVT ppx  - IV abx   - PACU observation  - F/u AM labs    Vascular Surgery [id number] POST-OPERATIVE NOTE    Subjective:  Patient is s/p Left lower extremity angiogram, transcatheter arterialization of a deep vein (LimFlow). Recovering appropriately. Feels well. Denies nausea, vomiting, chest pain, shortness of breath. Has not had anything to eat since surgery, but has had some water.     Vital Signs Last 24 Hrs  T(C): 36.5 (26 Nov 2024 22:00), Max: 36.5 (26 Nov 2024 10:59)  T(F): 97.7 (26 Nov 2024 22:00), Max: 97.7 (26 Nov 2024 10:59)  HR: 65 (26 Nov 2024 22:00) (51 - 67)  BP: 124/93 (26 Nov 2024 22:00) (115/81 - 138/86)  BP(mean): 105 (26 Nov 2024 22:00) (93 - 106)  RR: 16 (26 Nov 2024 22:00) (14 - 18)  SpO2: 98% (26 Nov 2024 22:00) (94% - 100%)    Parameters below as of 26 Nov 2024 22:00  Patient On (Oxygen Delivery Method): room air      I&O's Detail    26 Nov 2024 07:01  -  26 Nov 2024 23:15  --------------------------------------------------------  IN:    Oral Fluid: 480 mL  Total IN: 480 mL    OUT:    Indwelling Catheter - Urethral (mL): 425 mL  Total OUT: 425 mL    Total NET: 55 mL        bictegravir 50 mG/emtricitabine 200 mG/tenofovir alafenamide 25 mG (BIKTARVY) 1  aspirin enteric coated 81  bictegravir 50 mG/emtricitabine 200 mG/tenofovir alafenamide 25 mG (BIKTARVY) 1  heparin   Injectable 5000    PAST MEDICAL & SURGICAL HISTORY:  HIV disease      Borderline diabetes      PAD (peripheral artery disease)      HTN (hypertension), benign      HLD (hyperlipidemia)      Asthma      Chronic kidney disease, unspecified CKD stage      Bradycardia      H/O heart block      Meningitis      H/O degenerative disc disease      H/O splenectomy      S/P peripheral artery angioplasty            Physical Exam:  General: NAD, resting comfortably in bed  Pulmonary: Nonlabored breathing, no respiratory distress, on RA  Cardiovascular: regular rate on monitor   Groin: L groin soft and appropriately tender to palpation. No masses or skin changes. Dressing in place with no strikethrough.   Extremities: Warm. L foot dressing in place with no strikethrough.   Vasc: L DP palpable. L PT with strong doppler signal.       LABS:                        13.7   9.64  )-----------( 243      ( 25 Nov 2024 19:10 )             42.0     11-25    141  |  107  |  19  ----------------------------<  96  3.9   |  22  |  1.01    Ca    9.2      25 Nov 2024 19:10        CAPILLARY BLOOD GLUCOSE      POCT Blood Glucose.: 148 mg/dL (26 Nov 2024 20:30)      Radiology and Additional Studies:    Assessment:  The patient is a 54y Male who is now several hours post-op from a Left lower extremity angiogram, transcatheter arterialization of a deep vein (LimFlow)    Plan:  - Pain control as needed  - DVT ppx  - IV abx   - PACU observation  - F/u AM labs    Vascular Surgery [id number] POST-OPERATIVE NOTE    Subjective:  Patient is s/p Left lower extremity angiogram, transcatheter arterialization of a deep vein (LimFlow). Recovering appropriately. Feels well. Denies nausea, vomiting, chest pain, shortness of breath. Has not had anything to eat since surgery, but has had some water.     Vital Signs Last 24 Hrs  T(C): 36.5 (26 Nov 2024 22:00), Max: 36.5 (26 Nov 2024 10:59)  T(F): 97.7 (26 Nov 2024 22:00), Max: 97.7 (26 Nov 2024 10:59)  HR: 65 (26 Nov 2024 22:00) (51 - 67)  BP: 124/93 (26 Nov 2024 22:00) (115/81 - 138/86)  BP(mean): 105 (26 Nov 2024 22:00) (93 - 106)  RR: 16 (26 Nov 2024 22:00) (14 - 18)  SpO2: 98% (26 Nov 2024 22:00) (94% - 100%)    Parameters below as of 26 Nov 2024 22:00  Patient On (Oxygen Delivery Method): room air      I&O's Detail    26 Nov 2024 07:01  -  26 Nov 2024 23:15  --------------------------------------------------------  IN:    Oral Fluid: 480 mL  Total IN: 480 mL    OUT:    Indwelling Catheter - Urethral (mL): 425 mL  Total OUT: 425 mL    Total NET: 55 mL        bictegravir 50 mG/emtricitabine 200 mG/tenofovir alafenamide 25 mG (BIKTARVY) 1  aspirin enteric coated 81  bictegravir 50 mG/emtricitabine 200 mG/tenofovir alafenamide 25 mG (BIKTARVY) 1  heparin   Injectable 5000    PAST MEDICAL & SURGICAL HISTORY:  HIV disease      Borderline diabetes      PAD (peripheral artery disease)      HTN (hypertension), benign      HLD (hyperlipidemia)      Asthma      Chronic kidney disease, unspecified CKD stage      Bradycardia      H/O heart block      Meningitis      H/O degenerative disc disease      H/O splenectomy      S/P peripheral artery angioplasty            Physical Exam:  General: NAD, resting comfortably in bed  Pulmonary: Nonlabored breathing, no respiratory distress, on RA  Cardiovascular: regular rate on monitor   Groin: L groin soft and appropriately tender to palpation. No masses or skin changes. Dressing in place with no strikethrough.   Extremities: Warm. L foot dressing in place with no strikethrough.   Vasc: L DP palpable. L PT with strong doppler signal.       LABS:                        13.7   9.64  )-----------( 243      ( 25 Nov 2024 19:10 )             42.0     11-25    141  |  107  |  19  ----------------------------<  96  3.9   |  22  |  1.01    Ca    9.2      25 Nov 2024 19:10        CAPILLARY BLOOD GLUCOSE      POCT Blood Glucose.: 148 mg/dL (26 Nov 2024 20:30)      Radiology and Additional Studies:    Assessment:  The patient is a 54y Male who is now several hours post-op from a Left lower extremity angiogram, transcatheter arterialization of a deep vein (LimFlow)    Plan:  - Pain control as needed  - DVT ppx  - PACU observation  - F/u AM labs    Vascular Surgery [id number]

## 2024-11-26 NOTE — BRIEF OPERATIVE NOTE - NSICDXBRIEFPROCEDURE_GEN_ALL_CORE_FT
PROCEDURES:  Endovascular venous arterialization 26-Nov-2024 18:17:25  Kavita Marr  Angiogram, lower extremity, left 26-Nov-2024 18:17:46  Kavita Marr

## 2024-11-26 NOTE — PRE-OP CHECKLIST - HAIR REMOVAL
PROCEDURE  ECG 12 Lead Documentation Only    Date/Time: 7/22/2021 8:10 PM  Performed by: Honey Redman MD  Authorized by:  Honey Redman MD     Indications / Diagnosis:  Elevated blood pressure  ECG reviewed by me, the ED Provider: yes    Patient location:  ED  Previous ECG:     Previous ECG:  Unavailable  Interpretation:     Interpretation: normal    Rate:     ECG rate:  88    ECG rate assessment: normal    Rhythm:     Rhythm: sinus rhythm    QRS:     QRS axis:  Normal    QRS intervals:  Normal  Conduction:     Conduction: normal    ST segments:     ST segments:  Normal  T waves:     T waves: normal    Comments:      NO acute ischemia or infarction         Honey Redman MD  07/22/21 2015 hair removal not indicated

## 2024-11-26 NOTE — PROVIDER CONTACT NOTE (OTHER) - SITUATION
Patient s/p L leg angio Limb flow procedure. Patient has PMH sinus bradycardia. on 11/26/24 @ 2020 patient has 3 episodes of bradycardia with heart rate 38BPM. Blood pressure 126/72, SATS 97% on RA

## 2024-11-26 NOTE — PRE-ANESTHESIA EVALUATION ADULT - NSANTHPMHFT_GEN_ALL_CORE
55 yo male pmhx obesity, HTN, HLD, HIV (on Biktarvy), pre-diabetes, meningitis at [place] in 2/2024 (received 3 weeks IV abx, left AMA), CKD, kidney cyst, DDD, PAD, cigarette smoker and current marijuana user for leg angiogram and possible stent.  Medical history reviewed and cardiology note appreciated. Pt currently without any active complaints.

## 2024-11-26 NOTE — PRE-ANESTHESIA EVALUATION ADULT - LAST ECHOCARDIOGRAM
~3/2024 " Enlarged right ventricular cavity size and reduced systolic function. Trace MR. EF 60%. possible extracardiac shunt via bubble study"

## 2024-11-26 NOTE — PATIENT PROFILE ADULT - NSPROPASSIVESMOKEEXPOSURE_GEN_A_NUR
Progress Notes by Kunal Nino DO at 08/14/18 08:25 AM     Author:  Kunal Nino DO Service:  (none) Author Type:  Physician     Filed:  08/14/18 03:57 PM Encounter Date:  8/14/2018 Status:  Signed     :  Kunal Nino DO (Physician)            Interventional Cardiology  Progress Note    Reason for Visit: Cardiovascular revisit  Last visit:[MN1.1C] 2/19/2018[MN1.1M]  PCP: Dr. Saman Bedolla     The patient was seen and examined and the chart was reviewed this date.  Thank you for your referral.  My impressions and recommendations are as follows:    IMPRESSIONS:  1. Venous Insufficiency CEAP 4  LLE chronic edema and leg pain  - Multiple LLE DVT with thrombophlebitis in the past  - Continued daily compression stocking use, but still has venous stasis changes and pain of the ankle  - Jan 11, 2016 Vein mapping with severe reflux of the Right Great Saphenous Vein and left Great Saphenous Vein mid to prox calf  * worse since right hip surgery 12/2017  *[MN1.1C] did not tolerate[MN1.2M] lymphatic pump therapy[MN1.1C]  * Consistent with[MN1.2M] compression therapy long standing (more than 12 months) with lymphedema tarda and hyperplasia  * s/p Left GSV (knee to prox thigh) Clarivein 4/16/2018  * no active wounds  2. Coronary artery disease  - 2009 distal LAD disease not amenable for PCI due to size  - March 3, 2014 and May 2016 Lexiscan stress test negative  - Oct 21, 2014 ECHO EF 60%  - No angina  * If has CP once more, will then proceed with Cardiac catheterization  3. Factor V Leiden deficiency on chronic Coumadin  4. Hypertension with HCVD Diastolic CHF NYHA 1 at goal on Toprol, Imdur  5. HANNAH did not tolerate CPAP  6. Heart Murmur  - Oct 2014 ECHO mildly sclerotic AVR  7. Paroxsymal atrial fibrillation post op hip surgery 11/2017  - On coumadin   8. Fall with s/p right hip and wrist fracture repair 11/2017  9. Dyslipidemia not treated given age, will recheck in 2018[MN1.1C]   10.   Fatigue  Suspect either age mediated or beta-blocker use  We will reduce Toprol 100 mg to 50 mg daily  We will reassess in 2 weeks 8/14/2018[MN1.2M]    RECOMMENDATIONS:  1. Regarding her Venous insufficiency; Leg pain, CVD status[MN1.1C]  Given her issues with fatigue as mentioned above will reduce Toprol from 100 mill grams daily to 50 mg daily reassess in 2 weeks    Continue with compression therapy  She did not tolerate home lymphedema pump, fortunately she has had no issues with wounds/ulcers  Advised to monitor for fall precautions and use her walker[MN1.2M]    - If syncope, chest pain with exertion, or worsening symptoms occur advised to notify office or go to nearest ER  2. Medications and labs reviewed  - No changes; not on ASA with Plavix and Coumadin  - Coumadin management per [facility] with goal INR 2 for Factor V Leiden with recurrent LLE DVT as well as Paroxsymal atrial fibrillation    - Continue all other medications; Plavix 75 mg[MN1.1C]; no ASA to avoid triple therapy and bleeding risk[MN1.3M]  - Antihypertensives; Toprol[MN1.1C] 50[MN1.3M] mg, Imdur 30 mg, Aldactone 25 mg  - Dyslipidemia; no history and not treated given age  - Last known vaccines; Influenza 2017 and Pneumovax 2006, 2005 Prevnar Vaccines  3. Exercise at least 150 minutes aerobic activity per week as tolerated.   4. Emphasized the need for low salt, low fat, low cholesterol diet.  5. Return to clinic in[MN1.1C] March 2019[MN1.2M] [facility] [place] site upon completion of aforementioned studies or sooner for concerns.  All questions were answered to the patient's satisfaction and to the best of my abilities.     SUBJECTIVE:    Demi Ratliff is a[MN1.1C] 85 year old[MN1.4T] female who presents today for a revisit.  She  was previously followed by my colleague Dr. Anny Soto. She has a history of 2009 CAD with noted distal LAD disease not amenable for PCI, Factor V Leiden deficiency with multiple DVTs of the LLE, chronic  anticoagulation with Coumadin, HTN, HANNAH.[MN1.1C] Venous Insufficiency CEAP[MN1.2T] 4[MN1.2M]  s/p Left GSV (knee to prox thigh) René 4/16/2018[MN1.1C]    Since her last visit post left greater saphenous vein intervention, she has had no significant issues.  She did not tolerate lymphatic pump therapy but continues with daily compression therapy.  She has had no new issues or concerns with regards her venous insufficiency.  She does use her walker faithfully with no falls.  She has no new cardiac concerns today.[MN1.2M]    Denies any chest pain or shortness of breath at rest or with ambulation. Denies any dizziness or lightheadedness. Denies any palpitations, skipped beats or fluttering sensation. Denies any PND or orthopnea. Denies being awoken in the middle of the night with chest pain, shortness of breath or chest pain. Appetite is good.  Energy level good.    Of note, her  89 y/o and daughter did[MN1.1C] not[MN1.2M] accompany her today  65 th wedding anniversary Sept 2016  She enjoys the [employer]    Use of Aspirin for Primary Prevention: Patient is already on aspirin, risks and benefits discussed.    Does patient smoke: No     Does patient exercise? Yes - Type: Walking Frequency: Daily  Was counseling given: Yes    Patient was assessed for falls risk? Yes.  Patient is at risk. Counseled accordingly on risk reduction.[MN1.1C]    Demi's BMI is 30.99, which is[MN1.2T] within normal parameters.(Ages 18-64 >/= 18.5 and <25; Over age 65 >/= 23 and <30)[MN1.2M]    Appointed medical health care power of  is her .[MN1.1C]      Past Medical History:     Diagnosis  Date   • Acquired coagulation factor deficiency     Factor V Leiden    • Chronic anticoagulation     Coumadin for Factor V leiden, DVT and PAF    • Coronary atherosclerosis of unspecified type of vessel, native or graft    • Diaphragmatic hernia without mention of obstruction or gangrene    • Esophageal reflux     Gastroesophageal  reflux    • Generalized osteoarthrosis, unspecified site    • Mixed hyperlipidemia     Hyperlipidemia    • Obstructive sleep apnea (adult) (pediatric)    • Osteoporosis, unspecified    • Other acute embolism veins     8/11 last time restarted coumadin    • Other convulsions    • PAF (paroxysmal atrial fibrillation)    • Synovial cyst of popliteal space    • Unspecified essential hypertension     Essential hypertension    • Unspecified hypothyroidism     Hypothyroidism    • Venous insufficiency     s/p Left GSV (knee to prox thigh) Clarivein 4/16/2018           Past Surgical History:      Procedure  Laterality Date   • APPENDECTOMY     • BIOPSY OF BREAST, INCISIONAL     • DILATION AND CURETTAGE      D & C     • HEMORRHOIDECTOMY EXT 2 OR MORE COLUMNS/GRPS     • LEFT HEART CATH,PERCUTANEOUS  2009    LAD disease, noninterventional     • REMOVAL GALLBLADDER      Cholecystectomy     • REMOVE TONSILS/ADENOIDS,<13 Y/O     • SCLEROTHERAPY OF A FLUID COLLECTION, PERCUTANEOUS Left 04/16/2018    s/p Left GSV (knee to prox thigh) Clarivein 4/16/2018 at [facility] Dr. Nino[MN1.4T]      .    Allergies:[MN1.1C]   Allergies      Allergen   Reactions   • Blue Dyes (Parenteral)  Itching   • Iodinated Diagnostic Agents  Hives     No swelling of mouth/throat/tongue    • Macrobid  Anaphylaxis   • Macrodantin  Anaphylaxis   • Yellow Dye  Hives     # 6 and #10    • Ace Inhibitors     • Acetaminophen-Codeine  Other - See Comments     hallucinating    • Aspirin       NSAIA    • Celebrex [Celecoxib]     • Cephalosporins     • Diovan [Valsartan]       Cramping      • Erythromycin Base     • Gabapentin  Other - See Comments     abd pain, diarrhea.      • Hydralazine  Other - See Comments     Face got red, eye got bothered    • Iodides       BLUE & YELLOW DYE    • Lidocaine     • Lopressor Hct  Cough   • Morphine  Other - See Comments     hallucinating      • Ms Contin [Morphine Sulfate Cr]  Other - See Comments     hallucinations    • Norco  [Hydrocodone-Acetaminophen]  Other - See Comments     hallucinations    • Penicillins     • Phenobarbital     • Phenytoin     • Ranitidine  Other - See Comments     Upset stomach/churning/burning.  Name brand Zantac is ok    • Sulfa Antibiotics     • Terazosin Hcl  Swelling   • Fosamax  Muscle/Joint Ache   • Statins  Muscle/Joint Ache     Intolerant to multiple statins[MN1.4T]         Medications:[MN1.1C]   Current Outpatient Prescriptions     Medication  Sig   • metoprolol (TOPROL XL) 50 MG 24 hr tablet Take 1 Tab by mouth daily.   • Isosorbide Mononitrate CR (IMDUR) 30 MG 24 hr tablet Take one tab every am   • clopidogrel (PLAVIX) 75 MG tablet Take 1 Tab by mouth daily.   • amlodipine (NORVASC) 5 MG tablet Take 1 Tab by mouth daily.   • warfarin (COUMADIN) 1 MG tablet Take 4 tabs (4 mg) Monday/Wednesday/Saturday and 3 tabs (3 mg) all other days and as directed. (Patient taking differently: Take 4 tabs (4 mg) every day accept wednesday as directed.)   • risedronate (ACTONEL) 35 MG tablet Take 1 Tab by mouth every 7 days. with a full glass of water on empty stomach do not take anything else by mouth or lie down for next 30 min   • cyclobenzaprine (FLEXERIL) 5 MG tablet Use at night as needed   • spironolactone (ALDACTONE) 25 MG tablet Take 1 Tab by mouth as needed.   • Vitamin D, Cholecalciferol, 1000 UNITS TABS Take  by mouth daily.   • oxybutynin (DITROPAN) 5 MG tablet Take 1 Tab by mouth as needed.   • mometasone (ELOCON) 0.1 % cream Apply a thin layer to the right ear twice a day for 1 week   • hydrOXYzine (ATARAX) 10 MG tablet Take 1 Tab by mouth 3 (three) times daily as needed for Itching.   • Magnesium 100 MG TABS 250 mg. 1 tab daily   • Probiotic Product (PROBIOTIC DAILY) CAPS 1 tab daily   • triamcinolone (KENALOG) 0.1 % cream Apply to aa BID   • metronidazole (METROCREAM) 0.75 % cream Apply to aa BID   • Calcium Carb-Cholecalciferol (CALCIUM 1000 + D) 1000-800 MG-UNIT TABS 1 tab BID   • nitroGLYCERIN  (NITROSTAT) 0.4 MG SL tablet Place 1 Tab under the tongue every 5 (five) minutes as needed for Chest pain. Seek emergency care if you need more than 3 tabs in 15 minutes   • Cranberry 300 MG tablet Take 300 mg by mouth daily. Taking 2 daily in morning   • CLARITIN 10 MG OR TABS 1 tablet daily as needed[MN1.4T]       Social History:[MN1.1C]   History     Alcohol Use     • Yes      Comment: one beer 3-4 x per week     History    Smoking Status    • Never Smoker   Smokeless Tobacco    • Never Used     History    Drug Use No[MN1.4T]               Family History:[MN1.1C]   Family History       Problem   Relation Age of Onset   • Cancer  Father      STOMACH     • Diabetes  Father    • High Blood Pressure  Father    • Heart  Father      MI     • High Blood Pressure  Mother    • Stroke  Mother[MN1.4T]         Review of Systems   Constitutional:[MN1.1T] Negative[MN1.1C].  Negative for[MN1.1T] chills[MN1.1C],[MN1.1T] diaphoresis[MN1.1C],[MN1.1T] fever[MN1.1C],[MN1.1T] malaise/fatigue[MN1.1C] and[MN1.1T] weight loss[MN1.1C].   HENT:[MN1.1T] Negative[MN1.1C].  Negative for[MN1.1T] hearing loss[MN1.1C] and[MN1.1T] nosebleeds[MN1.1C].    Eyes:[MN1.1T] Negative[MN1.1C].  Negative for[MN1.1T] blurred vision[MN1.1C],[MN1.1T] double vision[MN1.1C],[MN1.1T] photophobia[MN1.1C] and[MN1.1T] pain[MN1.1C].   Respiratory:[MN1.1T] Negative[MN1.1C].  Negative for[MN1.1T] cough[MN1.1C],[MN1.1T] hemoptysis[MN1.1C],[MN1.1T] sputum production[MN1.1C],[MN1.1T] shortness of breath[MN1.1C] and[MN1.1T] wheezing[MN1.1C].    Cardiovascular: Positive for[MN1.1T] leg swelling[MN1.1C]. Negative for[MN1.1T] chest pain[MN1.1C],[MN1.1T] palpitations[MN1.1C],[MN1.1T] orthopnea[MN1.1C],[MN1.1T] claudication[MN1.1C] and[MN1.1T] PND[MN1.1C].   Gastrointestinal:[MN1.1T] Negative[MN1.1C].  Negative for[MN1.1T] abdominal pain[MN1.1C],[MN1.1T] blood in stool[MN1.1C],[MN1.1T] heartburn[MN1.1C],[MN1.1T] melena[MN1.1C],[MN1.1T] nausea[MN1.1C] and[MN1.1T]  vomiting[MN1.1C].   Genitourinary:[MN1.1T] Negative[MN1.1C].  Negative for[MN1.1T] frequency[MN1.1C],[MN1.1T] hematuria[MN1.1C] and[MN1.1T] urgency[MN1.1C].   Musculoskeletal:[MN1.1T] Negative[MN1.1C].  Negative for[MN1.1T] back pain[MN1.1C],[MN1.1T] joint pain[MN1.1C],[MN1.1T] myalgias[MN1.1C] and[MN1.1T] neck pain[MN1.1C].   Skin:[MN1.1T] Negative[MN1.1C].  Negative for[MN1.1T] itching[MN1.1C] and[MN1.1T] rash[MN1.1C].   Neurological:[MN1.1T] Negative[MN1.1C].  Negative for[MN1.1T] dizziness[MN1.1C],[MN1.1T] tingling[MN1.1C],[MN1.1T] tremors[MN1.1C],[MN1.1T] sensory change[MN1.1C],[MN1.1T] speech change[MN1.1C],[MN1.1T] focal weakness[MN1.1C],[MN1.1T] loss of consciousness[MN1.1C],[MN1.1T] weakness[MN1.1C] and[MN1.1T] headaches[MN1.1C].   Endo/Heme/Allergies:[MN1.1T] Negative[MN1.1C].[MN1.1T]  Does not bruise/bleed easily[MN1.1C].   Psychiatric/Behavioral:[MN1.1T] Negative[MN1.1C].  Negative for[MN1.1T] depression[MN1.1C],[MN1.1T] memory loss[MN1.1C],[MN1.1T] substance abuse[MN1.1C] and[MN1.1T] suicidal ideas[MN1.1C]. The patient[MN1.1T] is not nervous/anxious[MN1.1C] and[MN1.1T] does not have insomnia[MN1.1C].[MN1.1T]        Is the patient diabetic? No    Patient was assessed for falls risk? Yes. Patient is at risk. Counseled accordingly on risk reduction.    Depression Screening:  Over the past 2 weeks, has patient felt down, depressed or hopeless? No  Over the past 2 weeks, has patient felt little interest or pleasure in doing things? No    OBJECTIVE:[MN1.1C]  /68 (BP Location: Right arm, Patient Position: Sitting, Cuff size: Regular)  Pulse 60  Resp 14  Ht 5' 6\" (1.676 m)  Wt 192 lb (87.1 kg)  SpO2 96%  BMI 30.99 kg/m2[MN1.4T]    Physical Exam   Constitutional: She is[MN1.1T] oriented to person, place, and time[MN1.1C]. She appears[MN1.1T] well-developed[MN1.1C] and[MN1.1T] well-nourished[MN1.1C].[MN1.1T]  Non-toxic appearance[MN1.1C].[MN1.1T] No distress[MN1.1C].   HENT:   Head:[MN1.1T]  Normocephalic[MN1.1C] and[MN1.1T] atraumatic[MN1.1C].   Mouth/Throat:[MN1.1T] Oropharynx is clear and moist[MN1.1C] and[MN1.1T] mucous membranes are normal[MN1.1C]. No[MN1.1T] oropharyngeal exudate[MN1.1C].   Eyes:[MN1.1T] Pupils are equal, round, and reactive to light[MN1.1C].   Neck:[MN1.1T] Normal range of motion[MN1.1C].[MN1.1T] Neck supple[MN1.1C].[MN1.1T] No hepatojugular reflux[MN1.1C] and[MN1.1T] no JVD[MN1.1C] present.[MN1.1T] Carotid bruit is not present[MN1.1C].[MN1.1T] No tracheal deviation[MN1.1C] present.   Cardiovascular:[MN1.1T] Normal rate[MN1.1C],[MN1.1T] regular rhythm[MN1.1C],[MN1.1T] S1 normal[MN1.1C],[MN1.1T] S2 normal[MN1.1C],[MN1.1T] normal heart sounds[MN1.1C],[MN1.1T] intact distal pulses[MN1.1C] and[MN1.1T] normal pulses[MN1.1C].[MN1.1T]  PMI is not displaced[MN1.1C].  Exam reveals[MN1.1T] no gallop[MN1.1C].[MN1.1T]    No murmur[MN1.1C] heard.  Pulses:       Carotid pulses are[MN1.1T] 2+[MN1.1C] on the right side, and[MN1.1T] 2+[MN1.1C] on the left side.       Radial pulses are[MN1.1T] 2+[MN1.1C] on the right side, and[MN1.1T] 2+[MN1.1C] on the left side.[MN1.1T]        Right dorsalis pedis pulse not accessible[MN1.1C] and[MN1.1T] left dorsalis pedis pulse not accessible[MN1.1C].[MN1.1T]        Right posterior tibial pulse not accessible[MN1.1C] and[MN1.1T] left posterior tibial pulse not accessible[MN1.1C].[MN1.1T]   BLE compression stockings in place  LLE with chronic Edema, RLE stable[MN1.1C]   Pulmonary/Chest:[MN1.1T] Effort normal[MN1.1C] and[MN1.1T] breath sounds normal[MN1.1C]. No[MN1.1T] respiratory distress[MN1.1C].   Abdominal:[MN1.1T] Soft[MN1.1C].[MN1.1T] Bowel sounds are normal[MN1.1C]. She exhibits[MN1.1T] no distension[MN1.1C]. There is no[MN1.1T] hepatosplenomegaly[MN1.1C]. There is[MN1.1T] no tenderness[MN1.1C].   Musculoskeletal:[MN1.1T] Normal range of motion[MN1.1C]. She exhibits no[MN1.1T] edema[MN1.1C] or[MN1.1T] tenderness[MN1.1C].   Lymphadenopathy:     She  has[MN1.1T] no cervical adenopathy[MN1.1C].   Neurological: She is[MN1.1T] alert[MN1.1C] and[MN1.1T] oriented to person, place, and time[MN1.1C]. She has[MN1.1T] normal strength[MN1.1C]. No[MN1.1T] cranial nerve deficit[MN1.1C] or[MN1.1T] sensory deficit[MN1.1C].[MN1.1T] Gait[MN1.1C] normal.[MN1.1T]   Using walker for ambulation[MN1.5M]   Skin: Skin is[MN1.1T] warm[MN1.1C] and[MN1.1T] dry[MN1.1C].[MN1.1T] No rash[MN1.1C] noted. She is[MN1.1T] not diaphoretic[MN1.1C]. No[MN1.1T] cyanosis[MN1.1C] or[MN1.1T] erythema[MN1.1C]. Nails show[MN1.1T] no clubbing[MN1.1C].   Psychiatric: She has a[MN1.1T] normal mood and affect[MN1.1C]. Her[MN1.1T] speech is normal[MN1.1C] and[MN1.1T] behavior is normal[MN1.1C].[MN1.1T] Thought content[MN1.1C] normal.[MN1.1T]   Nursing note[MN1.1C] and[MN1.1T] vitals[MN1.1C] reviewed.[MN1.1T]      Lab / Testing:  The following labs and diagnostic studies were reviewed with me independently and discussed with the patient. Refer to individual report(s) for complete details.[MN1.1C]    Lab Results      Component  Value Date    GLUCOSE 93 02/08/2018    BUN 16 02/08/2018    CREAT 0.9 02/08/2018    CA 9.6 02/08/2018    BILI 1.4 06/20/2017    BILIDIR 0.2 03/08/2010    ALT 24 06/20/2017    AST 21 06/20/2017    ALKPHOS 96 06/20/2017    PROT 8.1 06/20/2017    ALB 4.4 06/20/2017     02/08/2018    K 4.2 02/08/2018     02/08/2018    CO2 28 02/08/2018       Lab Results      Component  Value Date    HGB 11.6 02/08/2018    HGB 13.7 11/23/2017    HCT 35.9 02/08/2018    HCT 40.4 11/23/2017    MCV 87.6 02/08/2018    WBC 5.2 02/08/2018    ANC 2.7 02/08/2018    ALC 1.8 02/08/2018     02/08/2018        Lab Results      Component  Value Date    TSH 3.04 02/08/2018       No results found for: A1C    Lab Results      Component  Value Date    CHOL 186 02/08/2018    HDL 54 02/08/2018     02/08/2018     06/09/2003    TRIG 102 02/08/2018[MN1.2T]     EKG[MN1.6T] 8/14/2018[MN1.2M]  Sinus  bradycardia HR[MN1.2T] 54[MN1.2M]    EKG 12/11/2017  NSR 72[MN1.1C]    ECHO[MN1.2T] 11/28/2017[MN1.2M]  Normal left ventricular size and systolic function. Ejection     fraction is estimated at 65%.     No evidence of any left ventricular regional wall motion     abnormalities.     Mild concentric left ventricular hypertrophy.     Diastolic measurement was not done due to abnormal rhythm.     Biatrial enlargement.     Mild mitral regurgitation.     Mild tricuspid regurgitation. Pulmonary artery systolic pressure is     estimated at 45 - 50 mmHg.  [MN1.2C]     ECHO 12/21/2016  Limited Echo.     Normal left ventricular size and systolic function. EF 60%.     Mild, concentric left ventricular hypertrophy.           ECHO Oct 21, 2014  * Normal LV size with normal function. LVEF=60%.   * Grade 2 diastolic dysfunction.  * There is no left ventricular hypertrophy.  * Normal right ventricular size with normal function.  * Moderately dilated left atrium. Slightly dilated right atrium.  * Mildly sclerotic, trileaflet aortic valve without stenosis or  regurgitation.  * The mitral valve appear myxomatous with mild thickening of the  leaflet tips. There is also mild prolapse of the anterior mitral  valve with trace mitral regurgitation. Mild mitral annular  calcification.  * Normal tricuspid valve. There is trace tricuspid regurgitation.  * Due to inadequate tricuspid regurgitation jet, unable to assess  PASP.    Lexiscan nuclear stress test May 18, 2016  1. Normal myocardial perfusion examination.   2. The overall quality of the study is good.  3. Normal perfusion imaging without evidence of inducible  ischemia or infarct.  4. Normal left ventricular volume with normal systolic thickening  and function and an ejection fraction of 83%.  5. No significant change from previous study.     Lexiscan nuclear stress test March 3, 2014  1. Normal myocardial perfusion examination.   2. The overall quality of the study is good.  3. Normal  perfusion imaging without evidence of inducible  ischemia or infarct.  4. Normal left ventricular volume with normal systolic thickening  and function and an ejection fraction of 83%     Screening TOM Dec 15, 2015  TOM on right  Posterior tibial: 1.15 with biphasic waveform  Dorsalis pedis: 1.08 with monophasic waveform    TOM on left:   Posterior tibial: 1.30 with triphasic waveform  Dorsalis pedis: 1.15 with biphasic waveform    Conclusion:  Probably Normal bilateral screening TOM    Arterial doppler Jan 13, 2016  Right Lower Extremity                  Left Lower Extremity            Edil (cm/s) Waveform  % Sten           Edil (cm/s) Waveform  % Sten  CFA       194        Triphasic         CFA      147        Triphasic  PFA       164        Triphasic         PFA      94         Triphasic  SFA prox  156        Triphasic         SFA_prox 119        Triphasic  SFA mid   135        Triphasic         SFA mid  112        Triphasic  SFA dist  131        Triphasic         SFA dist 92         Triphasic  Pop prox  136        Triphasic         Pop prox 140        Biphasic  Pop dist  111        Triphasic         Pop dist 94         Biphasic  PT prox   222        Biphasic          PT prox  95         Biphasic  PT mid    83         Biphasic          PT mid   107        Biphasic  PT dist   112        Biphasic          PT dist  102        Biphasic  AT prox   60         Biphasic          AT prox  67         Biphasic  AT mid    60         Biphasic          AT mid   65         Biphasic  AT dist   215        Biphasic          AT dist  46         Biphasic  Interpretation Summary    * Atherosclerotic changes noted bilaterally in both the lower      extremities.      * Multi segment disease with a accelerated velocities right CFA,PFA,      SFA,PTA,PAL and left CFA, POP artery. Possible 1-49% stenosis,      however stenosis >50% can not be ruled out.    * Biphasic doppler waveforms bilateral tibial arteries.    * Clinical correlation is  recommended.    Venous mapping with reflux Jan 11, 2016  Right                                      Left                    Reflux Comp Gely* Time**                    Reflux Comp Gely* Time **  CFV                      Yes               CFV                      Yes  SFJ                      Yes  6.3          SFJ                      Yes  6.2  MFV                      Yes               MFV               Severe Yes       2376  Pop                      Yes               Pop               Severe Yes       2264  GSV prox thigh           Yes  8.1          GSV prox thigh           Yes  6.6  GSV mid thigh     Severe Yes  4.0  262     GSV mid thigh            Yes  4.7  GSV distal thigh  Severe Yes  4.7  3052    GSV distal thigh         Yes  4.9  GSV @ knee        Severe Yes  4.8  2312    GSV @ knee               Yes  3.4  GSV prox calf     Severe Yes  4.3  2960    GSV prox calf     Severe Yes  3.7  2704  GSV mid calf      Severe Yes  3.4  900     GSV mid calf      Severe Yes  2.2  2480  GSV distal calf          Yes  3.7          GSV distal calf   Severe Yes  2.5  SPJ               Severe Yes  3.3  2136    SPJ                      Yes  3.2  SSV prox                 Yes  2.2          SSV Prox                 Yes  2.3  SSV mid                  Yes  3.2          SSV Mid                  Yes  4.0  SSV distal               Yes  3.4          SSV distal               Yes  3.4        *=mm and **=ms     Interpretation Summary     Right Leg:     * Severe reflux GSV and SPJ     * The right lower extremity veins do not show any evidence of acute or chronic  thrombosis.     Left Leg:     * Severe reflux MFV, POPV, and GSV.     * The left lower extremity veins do not show any evidence of acute or chronic  thrombosis    Venous ultrasound right leg 2/7/2018  No evidence of deep venous thrombosis of the visualized right lower  extremity veins. If symptoms persist or progress, follow up venous  Doppler examination recommended.     Carotid  Arterial Doppler 5/10/2017    * Right ICA stenosis: <50%.    * Left ICA stenosis: <50%.    * There is antegrade flow in both the right and left vertebral      arteries.[MN1.1C]        Electronically Signed by:    Kunal Nino DO , 8/14/2018[MN1.4T]                         Revision History        User Key Date/Time User Provider Type Action    > MN1.5 08/14/18 03:57 PM Kunal Nino DO Physician Sign     MN1.4 08/14/18 03:54 PM Kunal Nino DO Physician      MN1.2 08/14/18 03:39 PM Kunal Nino DO Physician      MN1.3 08/14/18 09:49 AM Kunal Nino DO Physician      MN1.6 08/14/18 09:26 AM Kunal Nino DO Physician      MN1.1 08/14/18 08:25 AM Kunal Nino DO Physician     C - Copied, M - Manual, T - Template             No

## 2024-11-26 NOTE — BRIEF OPERATIVE NOTE - OPERATION/FINDINGS
Left lower extremity angiogram, transcatheter arterialization of a deep vein (LimFlow). Left groin arterial antegrade stick, left plantar foot venous stick.

## 2024-11-26 NOTE — PRE-ANESTHESIA EVALUATION ADULT - BP NONINVASIVE SYSTOLIC (MM HG)
115 Erythromycin Counseling:  I discussed with the patient the risks of erythromycin including but not limited to GI upset, allergic reaction, drug rash, diarrhea, increase in liver enzymes, and yeast infections.

## 2024-11-26 NOTE — PROVIDER CONTACT NOTE (OTHER) - RECOMMENDATIONS
12 LEAD EKG COMPLETED and reviewed by ASHLEE Benitez. Vascular resident Md Miranda notified. No further treatment ordered.

## 2024-11-26 NOTE — PROVIDER CONTACT NOTE (OTHER) - ASSESSMENT
Patient s/p L leg angio Limb flow procedure w/ transcatheter arterialization. Patient has PMH sinus bradycardia. on 11/26/24 @ 2020 patient has 3 episodes of bradycardia with heart rate 38BPM. Blood pressure 126/72, SATS 97% on RA. patient RR 14-16 breaths per minute. Patient denies c/o chest pain, dizziness and shortness of breath. Patient has L groin & L plantar surgical sites. Sites are clean dry and intact without evidence of bleeding or hematoma. Anesthesiologist MD marks and ASHLEE benitez notified regarding patient's heart rate and called to bedside. MD Marks and Ashlee Benitez at bedside assessing patient. Vascular resident MD jenna Miranda notified regarding patient situation. on 11/26/24 @ 2120 Patient heart rate currently 66 BPM, blood pressure 126/72. Patient SATS 96% on ROOM AIR

## 2024-11-26 NOTE — PATIENT PROFILE ADULT - HAS THE PATIENT USED TOBACCO IN THE PAST 30 DAYS?
Attempted to contact pt, error message the number you are trying to call can not be reached right now please try your call again later.    Yes

## 2024-11-27 ENCOUNTER — TRANSCRIPTION ENCOUNTER (OUTPATIENT)
Age: 54
End: 2024-11-27

## 2024-11-27 VITALS
TEMPERATURE: 98 F | RESPIRATION RATE: 18 BRPM | DIASTOLIC BLOOD PRESSURE: 66 MMHG | SYSTOLIC BLOOD PRESSURE: 140 MMHG | OXYGEN SATURATION: 96 % | HEART RATE: 73 BPM

## 2024-11-27 LAB
A1C WITH ESTIMATED AVERAGE GLUCOSE RESULT: 5.8 % — HIGH (ref 4–5.6)
ANION GAP SERPL CALC-SCNC: 10 MMOL/L — SIGNIFICANT CHANGE UP (ref 5–17)
BUN SERPL-MCNC: 19 MG/DL — SIGNIFICANT CHANGE UP (ref 7–23)
CALCIUM SERPL-MCNC: 8.5 MG/DL — SIGNIFICANT CHANGE UP (ref 8.4–10.5)
CHLORIDE SERPL-SCNC: 107 MMOL/L — SIGNIFICANT CHANGE UP (ref 96–108)
CO2 SERPL-SCNC: 22 MMOL/L — SIGNIFICANT CHANGE UP (ref 22–31)
CREAT SERPL-MCNC: 1.01 MG/DL — SIGNIFICANT CHANGE UP (ref 0.5–1.3)
EGFR: 88 ML/MIN/1.73M2 — SIGNIFICANT CHANGE UP
ESTIMATED AVERAGE GLUCOSE: 120 MG/DL — HIGH (ref 68–114)
GLUCOSE BLDC GLUCOMTR-MCNC: 104 MG/DL — HIGH (ref 70–99)
GLUCOSE BLDC GLUCOMTR-MCNC: 125 MG/DL — HIGH (ref 70–99)
GLUCOSE SERPL-MCNC: 118 MG/DL — HIGH (ref 70–99)
HCT VFR BLD CALC: 39.6 % — SIGNIFICANT CHANGE UP (ref 39–50)
HGB BLD-MCNC: 13.3 G/DL — SIGNIFICANT CHANGE UP (ref 13–17)
MAGNESIUM SERPL-MCNC: 1.8 MG/DL — SIGNIFICANT CHANGE UP (ref 1.6–2.6)
MCHC RBC-ENTMCNC: 32.4 PG — SIGNIFICANT CHANGE UP (ref 27–34)
MCHC RBC-ENTMCNC: 33.6 G/DL — SIGNIFICANT CHANGE UP (ref 32–36)
MCV RBC AUTO: 96.6 FL — SIGNIFICANT CHANGE UP (ref 80–100)
NRBC # BLD: 0 /100 WBCS — SIGNIFICANT CHANGE UP (ref 0–0)
PHOSPHATE SERPL-MCNC: 2.9 MG/DL — SIGNIFICANT CHANGE UP (ref 2.5–4.5)
PLATELET # BLD AUTO: 268 K/UL — SIGNIFICANT CHANGE UP (ref 150–400)
POTASSIUM SERPL-MCNC: 4.1 MMOL/L — SIGNIFICANT CHANGE UP (ref 3.5–5.3)
POTASSIUM SERPL-SCNC: 4.1 MMOL/L — SIGNIFICANT CHANGE UP (ref 3.5–5.3)
RBC # BLD: 4.1 M/UL — LOW (ref 4.2–5.8)
RBC # FLD: 16.8 % — HIGH (ref 10.3–14.5)
SODIUM SERPL-SCNC: 139 MMOL/L — SIGNIFICANT CHANGE UP (ref 135–145)
WBC # BLD: 13 K/UL — HIGH (ref 3.8–10.5)
WBC # FLD AUTO: 13 K/UL — HIGH (ref 3.8–10.5)

## 2024-11-27 PROCEDURE — 75820 VEIN X-RAY ARM/LEG: CPT | Mod: LT,XU

## 2024-11-27 PROCEDURE — 80048 BASIC METABOLIC PNL TOTAL CA: CPT

## 2024-11-27 PROCEDURE — 83036 HEMOGLOBIN GLYCOSYLATED A1C: CPT

## 2024-11-27 PROCEDURE — C9399: CPT

## 2024-11-27 PROCEDURE — 85027 COMPLETE CBC AUTOMATED: CPT

## 2024-11-27 PROCEDURE — C1894: CPT

## 2024-11-27 PROCEDURE — C1725: CPT

## 2024-11-27 PROCEDURE — 83735 ASSAY OF MAGNESIUM: CPT

## 2024-11-27 PROCEDURE — 82962 GLUCOSE BLOOD TEST: CPT

## 2024-11-27 PROCEDURE — 37248 TRLUML BALO ANGIOP 1ST VEIN: CPT | Mod: LT

## 2024-11-27 PROCEDURE — C1889: CPT

## 2024-11-27 PROCEDURE — 84100 ASSAY OF PHOSPHORUS: CPT

## 2024-11-27 PROCEDURE — 36012 PLACE CATHETER IN VEIN: CPT | Mod: LT

## 2024-11-27 PROCEDURE — C1887: CPT

## 2024-11-27 PROCEDURE — C1769: CPT

## 2024-11-27 PROCEDURE — 93005 ELECTROCARDIOGRAM TRACING: CPT

## 2024-11-27 RX ORDER — OXYCODONE HYDROCHLORIDE 30 MG/1
1 TABLET ORAL
Qty: 10 | Refills: 0
Start: 2024-11-27

## 2024-11-27 RX ORDER — OXYCODONE HYDROCHLORIDE 30 MG/1
10 TABLET ORAL EVERY 4 HOURS
Refills: 0 | Status: DISCONTINUED | OUTPATIENT
Start: 2024-11-27 | End: 2024-11-27

## 2024-11-27 RX ORDER — OXYCODONE HYDROCHLORIDE 30 MG/1
5 TABLET ORAL EVERY 4 HOURS
Refills: 0 | Status: DISCONTINUED | OUTPATIENT
Start: 2024-11-27 | End: 2024-11-27

## 2024-11-27 RX ORDER — APIXABAN 2.5 MG/1
5 TABLET, FILM COATED ORAL EVERY 12 HOURS
Refills: 0 | Status: DISCONTINUED | OUTPATIENT
Start: 2024-11-27 | End: 2024-12-10

## 2024-11-27 RX ORDER — ACETAMINOPHEN 500MG 500 MG/1
975 TABLET, COATED ORAL EVERY 6 HOURS
Refills: 0 | Status: DISCONTINUED | OUTPATIENT
Start: 2024-11-27 | End: 2024-12-10

## 2024-11-27 RX ADMIN — Medication 100 GRAM(S): at 10:19

## 2024-11-27 RX ADMIN — Medication 5000 UNIT(S): at 06:25

## 2024-11-27 RX ADMIN — OXYCODONE HYDROCHLORIDE 10 MILLIGRAM(S): 30 TABLET ORAL at 10:00

## 2024-11-27 RX ADMIN — OXYCODONE HYDROCHLORIDE 10 MILLIGRAM(S): 30 TABLET ORAL at 08:54

## 2024-11-27 RX ADMIN — APIXABAN 5 MILLIGRAM(S): 2.5 TABLET, FILM COATED ORAL at 08:32

## 2024-11-27 RX ADMIN — HYDROMORPHONE HYDROCHLORIDE 0.25 MILLIGRAM(S): 2 TABLET ORAL at 02:30

## 2024-11-27 RX ADMIN — HYDROMORPHONE HYDROCHLORIDE 0.25 MILLIGRAM(S): 2 TABLET ORAL at 00:00

## 2024-11-27 RX ADMIN — HYDROMORPHONE HYDROCHLORIDE 0.25 MILLIGRAM(S): 2 TABLET ORAL at 00:15

## 2024-11-27 RX ADMIN — GABAPENTIN 100 MILLIGRAM(S): 300 CAPSULE ORAL at 11:27

## 2024-11-27 RX ADMIN — BICTEGRAVIR SODIUM, EMTRICITABINE, AND TENOFOVIR ALAFENAMIDE FUMARATE 1 TABLET(S): 50; 200; 25 TABLET ORAL at 11:26

## 2024-11-27 RX ADMIN — ACETAMINOPHEN 500MG 975 MILLIGRAM(S): 500 TABLET, COATED ORAL at 11:27

## 2024-11-27 RX ADMIN — Medication 81 MILLIGRAM(S): at 11:27

## 2024-11-27 RX ADMIN — HYDROMORPHONE HYDROCHLORIDE 0.25 MILLIGRAM(S): 2 TABLET ORAL at 02:09

## 2024-11-27 NOTE — ASU DISCHARGE PLAN (ADULT/PEDIATRIC) - CARE PROVIDER_API CALL
Ulisses Rubalcava  Vascular Surgery  [address] [place] [address]  Phone: [phone]  Fax: [phone]  Established Patient  Follow Up Time: 2 weeks

## 2024-11-27 NOTE — ASU DISCHARGE PLAN (ADULT/PEDIATRIC) - FINANCIAL ASSISTANCE
[facility] provides services at a reduced cost to those who are determined to be eligible through [facility]’s financial assistance program. Information regarding [facility]’s financial assistance program can be found by going to [email or web address] or by calling [phone].

## 2024-11-27 NOTE — ASU DISCHARGE PLAN (ADULT/PEDIATRIC) - NS MD DC FALL RISK RISK
For information on Fall & Injury Prevention, visit: [email or web address] OR  [email or web address] OR  https://www.cdc.gov/steadi/patient.html

## 2024-11-27 NOTE — PROGRESS NOTE ADULT - SUBJECTIVE AND OBJECTIVE BOX
SURGERY DAILY PROGRESS NOTE:     Overnight Events:  No acute events overnight.    SUBJECTIVE: Patient seen and evaluated on AM rounds. Pt is resting comfortably in chair with no complaints. Tolerating diet. Pain is adequately controlled on current regimen.    OBJECTIVE:  Vital Signs Last 24 Hrs  T(C): 36.5 (26 Nov 2024 22:00), Max: 36.5 (26 Nov 2024 10:59)  T(F): 97.7 (26 Nov 2024 22:00), Max: 97.7 (26 Nov 2024 10:59)  HR: 66 (27 Nov 2024 07:00) (42 - 99)  BP: 150/83 (27 Nov 2024 07:00) (115/81 - 150/83)  BP(mean): 109 (27 Nov 2024 07:00) (91 - 109)  RR: 14 (27 Nov 2024 07:00) (14 - 18)  SpO2: 98% (27 Nov 2024 07:00) (94% - 100%)    Parameters below as of 27 Nov 2024 07:00  Patient On (Oxygen Delivery Method): room air      I&O's Detail    26 Nov 2024 07:01  -  27 Nov 2024 07:00  --------------------------------------------------------  IN:    Oral Fluid: 1060 mL  Total IN: 1060 mL    OUT:    Indwelling Catheter - Urethral (mL): 700 mL    Voided (mL): 350 mL  Total OUT: 1050 mL    Total NET: 10 mL        Daily Height in cm: 172.72 (26 Nov 2024 13:29)    Daily     LABS:                        13.3   13.00 )-----------( 268      ( 27 Nov 2024 06:12 )             39.6     11-27    139  |  107  |  19  ----------------------------<  118[H]  4.1   |  22  |  1.01    Ca    8.5      27 Nov 2024 06:12  Phos  2.9     11-27  Mg     1.8     11-27        Urinalysis Basic - ( 27 Nov 2024 06:12 )    Color: x / Appearance: x / SG: x / pH: x  Gluc: 118 mg/dL / Ketone: x  / Bili: x / Urobili: x   Blood: x / Protein: x / Nitrite: x   Leuk Esterase: x / RBC: x / WBC x   Sq Epi: x / Non Sq Epi: x / Bacteria: x      Physical Exam:  General: NAD, resting comfortably in bed  Pulmonary: Nonlabored breathing, no respiratory distress, on RA  Cardiovascular: regular rate on monitor   Groin: L groin soft and appropriately tender to palpation. No masses or skin changes. Dressing in place with no strikethrough.   Extremities: Warm. L foot dressing in place with no strikethrough.   Vasc: L DP palpable. L PT with strong doppler signal. Left pedal arch signal present.

## 2024-11-27 NOTE — ASU DISCHARGE PLAN (ADULT/PEDIATRIC) - PROCEDURE
Left lower extremity angiogram, transcatheter arterialization of a deep vein (LimFlow). doplerable PT signal Left lower extremity angiogram, transcatheter arterialization of a deep vein (LimFlow). doplerable PT vein signal

## 2024-12-07 PROBLEM — Z87.39 PERSONAL HISTORY OF OTHER DISEASES OF THE MUSCULOSKELETAL SYSTEM AND CONNECTIVE TISSUE: Chronic | Status: ACTIVE | Noted: 2024-11-25

## 2024-12-07 PROBLEM — G03.9 MENINGITIS, UNSPECIFIED: Chronic | Status: ACTIVE | Noted: 2024-11-25

## 2024-12-07 PROBLEM — R00.1 BRADYCARDIA, UNSPECIFIED: Chronic | Status: ACTIVE | Noted: 2024-11-25

## 2024-12-07 PROBLEM — Z86.79 PERSONAL HISTORY OF OTHER DISEASES OF THE CIRCULATORY SYSTEM: Chronic | Status: ACTIVE | Noted: 2024-11-25

## 2024-12-10 ENCOUNTER — APPOINTMENT (OUTPATIENT)
Dept: VASCULAR SURGERY | Facility: CLINIC | Age: 54
End: 2024-12-10
Payer: MEDICAID

## 2024-12-10 VITALS
TEMPERATURE: 97.8 F | DIASTOLIC BLOOD PRESSURE: 83 MMHG | HEIGHT: 68 IN | WEIGHT: 213 LBS | HEART RATE: 68 BPM | SYSTOLIC BLOOD PRESSURE: 118 MMHG | BODY MASS INDEX: 32.28 KG/M2

## 2024-12-10 DIAGNOSIS — I73.9 PERIPHERAL VASCULAR DISEASE, UNSPECIFIED: ICD-10-CM

## 2024-12-10 PROCEDURE — 99214 OFFICE O/P EST MOD 30 MIN: CPT

## 2024-12-10 PROCEDURE — 93926 LOWER EXTREMITY STUDY: CPT | Mod: LT

## 2024-12-10 PROCEDURE — 93922 UPR/L XTREMITY ART 2 LEVELS: CPT

## 2024-12-12 ENCOUNTER — APPOINTMENT (OUTPATIENT)
Dept: VASCULAR SURGERY | Facility: HOSPITAL | Age: 54
End: 2024-12-12

## 2024-12-24 ENCOUNTER — APPOINTMENT (OUTPATIENT)
Dept: VASCULAR SURGERY | Facility: CLINIC | Age: 54
End: 2024-12-24

## 2024-12-24 VITALS
BODY MASS INDEX: 32.28 KG/M2 | DIASTOLIC BLOOD PRESSURE: 90 MMHG | TEMPERATURE: 97.7 F | HEART RATE: 68 BPM | SYSTOLIC BLOOD PRESSURE: 131 MMHG | HEIGHT: 68 IN | WEIGHT: 213 LBS

## 2024-12-24 PROCEDURE — 99214 OFFICE O/P EST MOD 30 MIN: CPT

## 2024-12-27 ENCOUNTER — OUTPATIENT (OUTPATIENT)
Dept: OUTPATIENT SERVICES | Facility: HOSPITAL | Age: 54
LOS: 1 days | End: 2024-12-27
Payer: MEDICAID

## 2024-12-27 VITALS
TEMPERATURE: 98 F | WEIGHT: 214.95 LBS | DIASTOLIC BLOOD PRESSURE: 75 MMHG | SYSTOLIC BLOOD PRESSURE: 113 MMHG | OXYGEN SATURATION: 98 % | HEIGHT: 68 IN | RESPIRATION RATE: 18 BRPM | HEART RATE: 59 BPM

## 2024-12-27 DIAGNOSIS — Z98.62 PERIPHERAL VASCULAR ANGIOPLASTY STATUS: Chronic | ICD-10-CM

## 2024-12-27 DIAGNOSIS — Z90.81 ACQUIRED ABSENCE OF SPLEEN: Chronic | ICD-10-CM

## 2024-12-27 DIAGNOSIS — Z01.818 ENCOUNTER FOR OTHER PREPROCEDURAL EXAMINATION: ICD-10-CM

## 2024-12-27 DIAGNOSIS — I96 GANGRENE, NOT ELSEWHERE CLASSIFIED: ICD-10-CM

## 2024-12-27 DIAGNOSIS — Z95.820 PERIPHERAL VASCULAR ANGIOPLASTY STATUS WITH IMPLANTS AND GRAFTS: Chronic | ICD-10-CM

## 2024-12-27 DIAGNOSIS — F17.210 NICOTINE DEPENDENCE, CIGARETTES, UNCOMPLICATED: ICD-10-CM

## 2024-12-27 LAB
ANION GAP SERPL CALC-SCNC: 10 MMOL/L — SIGNIFICANT CHANGE UP (ref 5–17)
BLD GP AB SCN SERPL QL: NEGATIVE — SIGNIFICANT CHANGE UP
BUN SERPL-MCNC: 21 MG/DL — SIGNIFICANT CHANGE UP (ref 7–23)
CALCIUM SERPL-MCNC: 9.4 MG/DL — SIGNIFICANT CHANGE UP (ref 8.4–10.5)
CHLORIDE SERPL-SCNC: 105 MMOL/L — SIGNIFICANT CHANGE UP (ref 96–108)
CO2 SERPL-SCNC: 24 MMOL/L — SIGNIFICANT CHANGE UP (ref 22–31)
CREAT SERPL-MCNC: 0.98 MG/DL — SIGNIFICANT CHANGE UP (ref 0.5–1.3)
EGFR: 92 ML/MIN/1.73M2 — SIGNIFICANT CHANGE UP
GLUCOSE SERPL-MCNC: 79 MG/DL — SIGNIFICANT CHANGE UP (ref 70–99)
HCT VFR BLD CALC: 43.6 % — SIGNIFICANT CHANGE UP (ref 39–50)
HGB BLD-MCNC: 14.4 G/DL — SIGNIFICANT CHANGE UP (ref 13–17)
MCHC RBC-ENTMCNC: 32.2 PG — SIGNIFICANT CHANGE UP (ref 27–34)
MCHC RBC-ENTMCNC: 33 G/DL — SIGNIFICANT CHANGE UP (ref 32–36)
MCV RBC AUTO: 97.5 FL — SIGNIFICANT CHANGE UP (ref 80–100)
NRBC # BLD: 0 /100 WBCS — SIGNIFICANT CHANGE UP (ref 0–0)
PLATELET # BLD AUTO: 250 K/UL — SIGNIFICANT CHANGE UP (ref 150–400)
POTASSIUM SERPL-MCNC: 4.5 MMOL/L — SIGNIFICANT CHANGE UP (ref 3.5–5.3)
POTASSIUM SERPL-SCNC: 4.5 MMOL/L — SIGNIFICANT CHANGE UP (ref 3.5–5.3)
RBC # BLD: 4.47 M/UL — SIGNIFICANT CHANGE UP (ref 4.2–5.8)
RBC # FLD: 15.8 % — HIGH (ref 10.3–14.5)
RH IG SCN BLD-IMP: POSITIVE — SIGNIFICANT CHANGE UP
SODIUM SERPL-SCNC: 139 MMOL/L — SIGNIFICANT CHANGE UP (ref 135–145)
WBC # BLD: 6.29 K/UL — SIGNIFICANT CHANGE UP (ref 3.8–10.5)
WBC # FLD AUTO: 6.29 K/UL — SIGNIFICANT CHANGE UP (ref 3.8–10.5)

## 2024-12-27 NOTE — H&P PST ADULT - HISTORY OF PRESENT ILLNESS
53 yo male pmhx obesity, HTN, HLD, HIV (on Biktarvy), pre-diabetes, meningitis at [place] in 2/2024 (received 3 weeks IV abx, left AMA), CKD, kidney cyst, DDD, PAD, cigarette smoker (10 pack years). Endorses pain in left foot, discoloration of toes (reports has gotten better since last vascular procedure, previously all toes were darkened, now great toe only). Endorses numbness/tingling in both feet. Denies falls. Ambulates with cane. Hx of noncompliance, poor historian.    Admitted to [facility] 4/5/24-4/16/24 for wet gangrene; Debrided by podiatry on admission. Went for traditional angiogram on 4/8, which discovered no large vessel disease, but significant small vessel disease, with plan on return to angio lab in 1 week to revascularize. Podiatry to provide further recommendations regarding possible TMA after the second angiogram. Treated with unasyn, escalated to vanc on recommendation of ID. To be discharged on doxy/cephalexin until 4/18. Patient failed to f/u for repeat angiogram.    Patient was scheduled for above surgery on 10/24/24 - New onset heart block in the OR. Case canceled. Cardiology follow up and cleared. Due to patient prior obligations, case for 10/25 also cancelled, rescheduled for 11/26/24. Patient was unable to get ride for surgery on 11/26 - was advised to go to ER - pt preferred to have case rescheduled. Case now rescheduled for 1/3/25.  As per cardiac note on 10/25/24 ([facility]), "Preop clearance - no heart block pt has frequent noncompensatory pause sec to PAC, Echo 3/24 shows normal LV, RV enlarged with possible extracardiac shunt, pt for TADV, no h/o syncope dizziness, electrolytes ok not on BBlockers, QRS narrow pt is moderate risk for TADV avoid AV rain blocking agents".    Now presents to PST for left leg angiogram, possible transcatheter arterialization of the deep veins, possible angioplasty, possible stent placement on 1/3/25 with Dr. Ulisses Rubalcava.  55 yo male pmhx obesity, HTN, HLD, HIV (on Biktarvy), pre-diabetes, meningitis at [place] in 2/2024 (received 3 weeks IV abx, left AMA), CKD, kidney cyst, DDD, PAD, cigarette smoker (10 pack years). Endorses pain in left foot, discoloration of toes (reports has gotten better since last vascular procedure, previously all toes were darkened, now great toe only). Endorses numbness/tingling in both feet. Denies falls. Ambulates with cane. Hx of noncompliance, poor historian. Today he is accompanied by his caregiver.    Admitted to [facility] 4/5/24-4/16/24 for wet gangrene; Debrided by podiatry on admission. Went for traditional angiogram on 4/8, which discovered no large vessel disease, but significant small vessel disease, with plan on return to angio lab in 1 week to revascularize. Podiatry to provide further recommendations regarding possible TMA after the second angiogram. Treated with unasyn, escalated to vanc on recommendation of ID. To be discharged on doxy/cephalexin until 4/18. Patient failed to f/u for repeat angiogram.    Patient was scheduled for above surgery on 10/24/24 - New onset heart block in the OR. Case canceled. Cardiology follow up and cleared. Due to patient prior obligations, case for 10/25. Underwent left leg angiogram, possible transcatheter arterialization of the deep veins, possible angioplasty, Limflow stent  on 11/26/24. Patient had f/u on 12/24 with Dr. Rubalcava in office - "s/p LLE TADV, now occluded". Pt endorses pain in left foot and discolored great toe - reports 2nd toe has improved since intervention in November. Pt was advised to go to ER from Dr. Rubalcava's office - however he opted to have surgery scheduled for 1/3/24.    As per cardiac note on 10/25/24 ([facility]), "Preop clearance - no heart block pt has frequent noncompensatory pause sec to PAC, Echo 3/24 shows normal LV, RV enlarged with possible extracardiac shunt, pt for TADV, no h/o syncope dizziness, electrolytes ok not on BBlockers, QRS narrow pt is moderate risk for TADV avoid AV rain blocking agents".    Now presents to PST for left leg angiogram, possible thrombectomy, possible angioplasty, possible stent on 1/3/25 with Dr. Ulisses Rubalcava.  55 yo male pmhx obesity, HTN, HLD, HIV (on Biktarvy), pre-diabetes, meningitis at [place] in 2/2024 (received 3 weeks IV abx, left AMA), CKD, kidney cyst, DDD, PAD, cigarette smoker (10 pack years). Endorses pain in left foot, discoloration of toes (reports has gotten better since last vascular procedure, previously all toes were darkened, now great toe only). Endorses numbness/tingling in both feet. Denies falls. Ambulates with cane. Hx of noncompliance, poor historian. Today he is accompanied by his caregiver.    Admitted to [facility] 4/5/24-4/16/24 for wet gangrene; Debrided by podiatry on admission. Went for traditional angiogram on 4/8, which discovered no large vessel disease, but significant small vessel disease, with plan on return to angio lab in 1 week to revascularize. Podiatry to provide further recommendations regarding possible TMA after the second angiogram. Treated with unasyn, escalated to vanc on recommendation of ID. To be discharged on doxy/cephalexin until 4/18. Patient failed to f/u for repeat angiogram.    Patient was scheduled for above surgery on 10/24/24 - New onset heart block in the OR. Case canceled. Cardiology follow up and cleared. Due to patient prior obligations, case for 10/25. Underwent left leg angiogram, Limflow stent  on 11/26/24. Patient had f/u on 12/24 with Dr. Rubalcava in office - "s/p LLE TADV, now occluded". Pt endorses pain in left foot and discolored great toe - reports 2nd toe has improved since intervention in November. Pt was advised to go to ER from Dr. Rubalcava's office - however he opted to have surgery scheduled for 1/3/24.    As per cardiac note on 10/25/24 ([facility]), "Preop clearance - no heart block pt has frequent noncompensatory pause sec to PAC, Echo 3/24 shows normal LV, RV enlarged with possible extracardiac shunt, pt for TADV, no h/o syncope dizziness, electrolytes ok not on BBlockers, QRS narrow pt is moderate risk for TADV avoid AV rain blocking agents".    Now presents to PST for left leg angiogram, possible thrombectomy, possible angioplasty, possible stent on 1/3/25 with Dr. Ulisses Rubalcava.

## 2024-12-27 NOTE — H&P PST ADULT - ASSESSMENT
DASI score: 4.64  DASI activity: 1 block w/ cane, 1 flight of stairs has to stop because sob/leg pain, ADLs  Loose teeth or denture: no teeth no dentures

## 2024-12-27 NOTE — H&P PST ADULT - MUSCULOSKELETAL
negative strength 5/5 bilateral upper extremities/strength 5/5 bilateral lower extremities/abnormal gait

## 2024-12-27 NOTE — H&P PST ADULT - NSICDXPASTMEDICALHX_GEN_ALL_CORE_FT
PAST MEDICAL HISTORY:  Asthma     Borderline diabetes     Bradycardia     Chronic kidney disease, unspecified CKD stage     H/O degenerative disc disease     H/O heart block     HIV disease     HLD (hyperlipidemia)     HTN (hypertension), benign     Meningitis     PAD (peripheral artery disease)

## 2024-12-27 NOTE — H&P PST ADULT - NSICDXPASTSURGICALHX_GEN_ALL_CORE_FT
PAST SURGICAL HISTORY:  H/O splenectomy     S/P peripheral artery angioplasty      PAST SURGICAL HISTORY:  H/O splenectomy     S/P peripheral artery angioplasty     S/P peripheral artery angioplasty with stent placement

## 2024-12-27 NOTE — H&P PST ADULT - PROBLEM SELECTOR PLAN 1
left leg angiogram, possible thrombectomy, possible angioplasty, possible stent on 1/3/25 with Dr. Ulisses Rubalcava.   Pre-op instructions given. Questions answered.  Last dose Eliquis on 12/30/24 - pt to confirm with Dr. Rubalcava.

## 2024-12-30 ENCOUNTER — APPOINTMENT (OUTPATIENT)
Dept: VASCULAR SURGERY | Facility: CLINIC | Age: 54
End: 2024-12-30
Payer: SUBSIDIZED

## 2024-12-30 PROCEDURE — 93922 UPR/L XTREMITY ART 2 LEVELS: CPT

## 2025-01-03 ENCOUNTER — OUTPATIENT (OUTPATIENT)
Dept: INPATIENT UNIT | Facility: HOSPITAL | Age: 55
LOS: 1 days | End: 2025-01-03
Payer: MEDICAID

## 2025-01-03 ENCOUNTER — RESULT REVIEW (OUTPATIENT)
Age: 55
End: 2025-01-03

## 2025-01-03 ENCOUNTER — TRANSCRIPTION ENCOUNTER (OUTPATIENT)
Age: 55
End: 2025-01-03

## 2025-01-03 ENCOUNTER — APPOINTMENT (OUTPATIENT)
Dept: VASCULAR SURGERY | Facility: HOSPITAL | Age: 55
End: 2025-01-03

## 2025-01-03 VITALS
OXYGEN SATURATION: 98 % | HEART RATE: 68 BPM | SYSTOLIC BLOOD PRESSURE: 123 MMHG | RESPIRATION RATE: 17 BRPM | DIASTOLIC BLOOD PRESSURE: 75 MMHG

## 2025-01-03 VITALS
TEMPERATURE: 98 F | HEART RATE: 80 BPM | OXYGEN SATURATION: 97 % | SYSTOLIC BLOOD PRESSURE: 126 MMHG | WEIGHT: 214.95 LBS | HEIGHT: 68 IN | RESPIRATION RATE: 18 BRPM | DIASTOLIC BLOOD PRESSURE: 84 MMHG

## 2025-01-03 DIAGNOSIS — Z98.62 PERIPHERAL VASCULAR ANGIOPLASTY STATUS: Chronic | ICD-10-CM

## 2025-01-03 DIAGNOSIS — Z95.820 PERIPHERAL VASCULAR ANGIOPLASTY STATUS WITH IMPLANTS AND GRAFTS: Chronic | ICD-10-CM

## 2025-01-03 DIAGNOSIS — Z01.818 ENCOUNTER FOR OTHER PREPROCEDURAL EXAMINATION: ICD-10-CM

## 2025-01-03 DIAGNOSIS — Z90.81 ACQUIRED ABSENCE OF SPLEEN: Chronic | ICD-10-CM

## 2025-01-03 DIAGNOSIS — I96 GANGRENE, NOT ELSEWHERE CLASSIFIED: ICD-10-CM

## 2025-01-03 LAB — GLUCOSE BLDC GLUCOMTR-MCNC: 101 MG/DL — HIGH (ref 70–99)

## 2025-01-03 PROCEDURE — 80048 BASIC METABOLIC PNL TOTAL CA: CPT

## 2025-01-03 PROCEDURE — C1769: CPT

## 2025-01-03 PROCEDURE — G0463: CPT

## 2025-01-03 PROCEDURE — 93926 LOWER EXTREMITY STUDY: CPT | Mod: 26,LT

## 2025-01-03 PROCEDURE — 93926 LOWER EXTREMITY STUDY: CPT

## 2025-01-03 PROCEDURE — 85027 COMPLETE CBC AUTOMATED: CPT

## 2025-01-03 PROCEDURE — 86901 BLOOD TYPING SEROLOGIC RH(D): CPT

## 2025-01-03 PROCEDURE — 36247 INS CATH ABD/L-EXT ART 3RD: CPT | Mod: LT

## 2025-01-03 PROCEDURE — 86850 RBC ANTIBODY SCREEN: CPT

## 2025-01-03 PROCEDURE — 82962 GLUCOSE BLOOD TEST: CPT

## 2025-01-03 PROCEDURE — C1894: CPT

## 2025-01-03 PROCEDURE — C1887: CPT

## 2025-01-03 PROCEDURE — 75710 ARTERY X-RAYS ARM/LEG: CPT | Mod: 26,LT

## 2025-01-03 PROCEDURE — 36415 COLL VENOUS BLD VENIPUNCTURE: CPT

## 2025-01-03 PROCEDURE — 86900 BLOOD TYPING SEROLOGIC ABO: CPT

## 2025-01-03 PROCEDURE — C1760: CPT

## 2025-01-03 PROCEDURE — 75625 CONTRAST EXAM ABDOMINL AORTA: CPT | Mod: 26

## 2025-01-03 PROCEDURE — 76000 FLUOROSCOPY <1 HR PHYS/QHP: CPT

## 2025-01-03 DEVICE — GUIDEWIRE AMPLATZ SUPER-STIFF STRAIGHT .035" X 180CM: Type: IMPLANTABLE DEVICE | Site: LEFT | Status: FUNCTIONAL

## 2025-01-03 DEVICE — CATH OMNI FLSH 0.035IN 5FRX65: Type: IMPLANTABLE DEVICE | Site: LEFT | Status: FUNCTIONAL

## 2025-01-03 DEVICE — SHEATH INTRODUCER TERUMO PINNACLE PERIPHERAL 5FR X 10CM: Type: IMPLANTABLE DEVICE | Site: LEFT | Status: FUNCTIONAL

## 2025-01-03 DEVICE — DEVICE CLOSURE 5F MYNX GRIP MUST ORDER MIN OF 10 EA: Type: IMPLANTABLE DEVICE | Site: LEFT | Status: FUNCTIONAL

## 2025-01-03 DEVICE — INTRODUCER MICROPUNCTURE STIFF 5FR X 10CM: Type: IMPLANTABLE DEVICE | Site: LEFT | Status: FUNCTIONAL

## 2025-01-03 DEVICE — GUIDEWIRE RADIFOCUS GLIDEWIRE STANDARD ANGLED TIP 0.035" X 260CM: Type: IMPLANTABLE DEVICE | Site: LEFT | Status: FUNCTIONAL

## 2025-01-03 RX ORDER — SODIUM CHLORIDE 9 MG/ML
3 INJECTION, SOLUTION INTRAMUSCULAR; INTRAVENOUS; SUBCUTANEOUS EVERY 8 HOURS
Refills: 0 | Status: DISCONTINUED | OUTPATIENT
Start: 2025-01-03 | End: 2025-01-03

## 2025-01-03 RX ORDER — ONDANSETRON 4 MG/1
4 TABLET ORAL ONCE
Refills: 0 | Status: DISCONTINUED | OUTPATIENT
Start: 2025-01-03 | End: 2025-01-03

## 2025-01-03 RX ORDER — FENTANYL 75 UG/H
25 PATCH, EXTENDED RELEASE TRANSDERMAL
Refills: 0 | Status: DISCONTINUED | OUTPATIENT
Start: 2025-01-03 | End: 2025-01-03

## 2025-01-03 RX ORDER — ACETAMINOPHEN 80 MG/.8ML
975 SOLUTION/ DROPS ORAL EVERY 6 HOURS
Refills: 0 | Status: ACTIVE | OUTPATIENT
Start: 2025-01-03 | End: 2025-12-02

## 2025-01-03 RX ORDER — CEFAZOLIN SODIUM 1 G
2000 VIAL (EA) INJECTION ONCE
Refills: 0 | Status: COMPLETED | OUTPATIENT
Start: 2025-01-03 | End: 2025-01-03

## 2025-01-03 RX ORDER — LIDOCAINE HYDROCHLORIDE 10 MG/ML
0.2 INJECTION INFILTRATION; PERINEURAL ONCE
Refills: 0 | Status: DISCONTINUED | OUTPATIENT
Start: 2025-01-03 | End: 2025-01-03

## 2025-01-03 RX ORDER — CHLORHEXIDINE GLUCONATE 1.2 MG/ML
1 RINSE ORAL ONCE
Refills: 0 | Status: DISCONTINUED | OUTPATIENT
Start: 2025-01-03 | End: 2025-01-03

## 2025-01-03 RX ADMIN — FENTANYL 25 MICROGRAM(S): 75 PATCH, EXTENDED RELEASE TRANSDERMAL at 15:35

## 2025-01-03 RX ADMIN — FENTANYL 25 MICROGRAM(S): 75 PATCH, EXTENDED RELEASE TRANSDERMAL at 15:20

## 2025-01-03 NOTE — ASU DISCHARGE PLAN (ADULT/PEDIATRIC) - FINANCIAL ASSISTANCE
[facility] provides services at a reduced cost to those who are determined to be eligible through [facility]’s financial assistance program. Information regarding [facility]’s financial assistance program can be found by going to [email or web address] or by calling [phone].

## 2025-01-03 NOTE — ASU DISCHARGE PLAN (ADULT/PEDIATRIC) - CARE PROVIDER_API CALL
Ulisses Rubalcava  Vascular Surgery  [address] [place] [address]  Phone: [phone]  Fax: [phone]  Established Patient  Follow Up Time: 2 weeks

## 2025-01-03 NOTE — ASU PREOP CHECKLIST - HAND OFF
Well Woman Visit     CC:    Chief Complaint   Patient presents with   • Gyn Exam     Annual exam    • Office Visit       HISTORY OF PRESENT ILLNESS:  Rosa is a 41 year old       female, seen today for annual gyn exam.       Notes mood swings, sleep disturbances at times.  She feels she is going thru perimenopause. She isn't sure she wants to do anything yet.  Her sister is 43 and going thru it, 3 periods a year.  Their mother  at 40 in a car crash so her hx is unknown regarding menopause.     Patient's last menstrual period was 07/15/2024 (exact date).    Menses: q month, sometimes spots for 1-2 days a week before period.  Period lasts 5 days  Contraception: nothing   Last pap smear:  wnl and hr hpv neg  History of abnormal paps: no  History of STI: no  Last colonoscopy:n/a  Last mammogram: utd      Review of Systems   ROS     Constitutional: Negative.    Respiratory: Negative.    Cardiovascular: Negative.    Gastrointestinal: Negative.    Endocrine: Negative.    Genitourinary: Negative.    Musculoskeletal: Negative.    Neurological: Negative.    Hematological: Negative.    Psychiatric/Behavioral: Negative.      OB History    Para Term  AB Living   3 2 2 0 1 2   SAB IAB Ectopic Molar Multiple Live Births   1 0 0 0 0 2         PMHx:    No pertinent past medical history                             PSHx:    APPENDECTOMY                                                  D AND C                                                       FAMHx:  Family History     Relation Problem Comments    Father Patient is unaware of any medical problems        Mother Motor Vehicle Accident            Family History   Problem Relation Age of Onset   • Motor Vehicle Accident Mother    • Patient is unaware of any medical problems Father    • Cancer, Colon Neg Hx    • Cancer, Breast Neg Hx    • Cancer, Ovarian Neg Hx          SOCIAL Hx:  Social History     Tobacco Use   • Smoking status: Never   • Smokeless  tobacco: Never   Substance Use Topics   • Alcohol use: Yes     Comment: socially   • Drug use: Never       The patient's past medical, surgical, obstetrical, family and social histories were reviewed, and are noted in the EHR.  Additionally, medications and allergies were reviewed and updated.    PHYSICAL EXAM:  OBGyn Exam     Blood pressure 125/84, pulse 82, height 5' 8\" (1.727 m), weight 111.6 kg (246 lb 0.5 oz), last menstrual period 07/15/2024., Body mass index is 37.41 kg/m²., Patient's last menstrual period was 07/15/2024 (exact date).  General: well developed, well nourished, in no acute distress  Psych: alert and cooperative; normal mood and affect  LUNGS: normal respiratory effort, nonlabored  Breast:  Symmetric.  1-2 cm non tender lump in right breast UOQ mid depth. There are no skin changes, nipple discharge, or axillary lymphadenopathy bilaterally.  Abdomen: abdomen soft and non-tender without masses  MSK: appropriate range of motion  Pelvic Exam:  External genitalia  appear normal  Speculum was inserted:  Vaginal mucosa appears normal Cervix appears normal  Urethra and Bladder:   well supported and no tenderness  Vagina:  no palpable lesions  Cervix:   normal size and shape and no cervical motion tenderness  Uterus:   anteverted, no palpable masses, and no adnexal masses  Anus and Perineum:  shows no lesions  Exam was limited secondary to patient's body habitus    ASSESSMENT/PLAN:    1. Mass of upper outer quadrant of right breast    - MA MAMMO DIAGNOSTIC BILATERAL W OBIE; Future    2. Encounter for well woman exam with routine gynecological exam  -pap utd    Discussed perimenopause, sx, etc.  Pt will monitor and lmk if wants to try something.     Well Woman Visit   Pap smears and HPV testing as according to ACOG guidelines.  Mammograms yearly  Also recommend the following:  Healthy eating habits, Increase exercise, Adequate intake of dietary calcium, Breast awareness, and STD screening with each new  partner or potential exposure  Continue with yearly breast and pelvic exams.  Continue on  nothing for BC/timing  Cont with pcp       All questions answered and pt verbalized understanding of plan.     Fabiana García MD       Holding RN to OR RN

## 2025-01-03 NOTE — BRIEF OPERATIVE NOTE - ELECTIVE PROCEDURE
Pt AOX4 - pt denied pain, n/v, sob, diarrhea - Pt denied any further needs at this time - bed in lowest and locked position with call light and bed side table within reach - non skid socks on      Yes

## 2025-01-03 NOTE — ASU PATIENT PROFILE, ADULT - NSICDXPASTSURGICALHX_GEN_ALL_CORE_FT
PAST SURGICAL HISTORY:  H/O splenectomy     S/P peripheral artery angioplasty     S/P peripheral artery angioplasty with stent placement

## 2025-01-28 ENCOUNTER — APPOINTMENT (OUTPATIENT)
Dept: VASCULAR SURGERY | Facility: CLINIC | Age: 55
End: 2025-01-28

## 2025-02-11 ENCOUNTER — APPOINTMENT (OUTPATIENT)
Dept: VASCULAR SURGERY | Facility: CLINIC | Age: 55
End: 2025-02-11
Payer: MEDICAID

## 2025-02-11 VITALS
HEIGHT: 68 IN | DIASTOLIC BLOOD PRESSURE: 85 MMHG | WEIGHT: 218 LBS | BODY MASS INDEX: 33.04 KG/M2 | HEART RATE: 64 BPM | SYSTOLIC BLOOD PRESSURE: 123 MMHG | TEMPERATURE: 97.3 F

## 2025-02-11 DIAGNOSIS — I73.9 PERIPHERAL VASCULAR DISEASE, UNSPECIFIED: ICD-10-CM

## 2025-02-11 PROCEDURE — 93922 UPR/L XTREMITY ART 2 LEVELS: CPT

## 2025-02-11 PROCEDURE — 99214 OFFICE O/P EST MOD 30 MIN: CPT

## 2025-02-11 PROCEDURE — 93926 LOWER EXTREMITY STUDY: CPT | Mod: LT

## 2025-03-21 NOTE — DISCHARGE NOTE PROVIDER - NSDCQMCOGNITION_NEU_ALL_CORE
Vision screening done today (03/21/25) as part of a preventative care visit.  Results vision results: normal   [facility] [place] [facility] Vision Screener - Potential condition: none    Vision Screen     Right Eye -  OD           SE: +0.25  DS: +0.75  DC: -0.75  Axis: @1    Distance in between: 49 mm    Left Eye - OS  SE: +0.50  DS: +0.75  DC: -0.50  Axis: @180    No difficulties

## 2025-06-10 ENCOUNTER — APPOINTMENT (OUTPATIENT)
Dept: VASCULAR SURGERY | Facility: CLINIC | Age: 55
End: 2025-06-10

## 2025-06-11 ENCOUNTER — APPOINTMENT (OUTPATIENT)
Dept: VASCULAR SURGERY | Facility: CLINIC | Age: 55
End: 2025-06-11

## (undated) DEVICE — SYR LUER LOK 20CC

## (undated) DEVICE — ELCTR BOVIE PENCIL HANDPIECE

## (undated) DEVICE — NDL COUNTER FOAM AND MAGNET 40-70

## (undated) DEVICE — NDL PERC BASEPLT 18GX7CM

## (undated) DEVICE — SOL IRR POUR H2O 250ML

## (undated) DEVICE — DRAPE 3/4 SHEET W REINFORCEMENT 56X77"

## (undated) DEVICE — VENODYNE/SCD SLEEVE CALF MEDIUM

## (undated) DEVICE — DRAPE MAYO STAND 30"

## (undated) DEVICE — TUBING HIGH POWER CONTRAST INJ

## (undated) DEVICE — DRAPE IOBAN 23" X 23"

## (undated) DEVICE — SUCTION YANKAUER NO CONTROL VENT

## (undated) DEVICE — BLADE SCALPEL SAFETYLOCK #15

## (undated) DEVICE — NDL ENTRY PERC MCKNIGHT 18G

## (undated) DEVICE — DRAPE 1/2 SHEET 40X57"

## (undated) DEVICE — MARKING PEN W RULER

## (undated) DEVICE — PACK BASIN SPECIAL PROCEDURE

## (undated) DEVICE — TAPE GLO-N-TELL RADIOPAQUE 20 STRIPS

## (undated) DEVICE — DRAPE INSTRUMENT POUCH 6.75" X 11"

## (undated) DEVICE — STAPLER SKIN VISI-STAT 35 WIDE

## (undated) DEVICE — BLADE SCALPEL SAFETYLOCK #11

## (undated) DEVICE — DRAPE LIGHT HANDLE COVER (BLUE)

## (undated) DEVICE — BLADE SCALPEL SAFETYLOCK #10

## (undated) DEVICE — DRSG OPSITE 13.75 X 4"

## (undated) DEVICE — SUT PROLENE 7-0 24" BV175-6

## (undated) DEVICE — PREP CHLORAPREP HI-LITE ORANGE 26ML

## (undated) DEVICE — POSITIONER FOAM EGG CRATE ULNAR 2PCS (PINK)

## (undated) DEVICE — DRSG TEGADERM 6 X 8"

## (undated) DEVICE — SPECIMEN CONTAINER 100ML

## (undated) DEVICE — Device

## (undated) DEVICE — WARMING BLANKET UPPER ADULT

## (undated) DEVICE — SYR CONTRAST 10ML

## (undated) DEVICE — GOWN XL

## (undated) DEVICE — PACK GENERAL MINOR

## (undated) DEVICE — SOL IRR POUR NS 0.9% 500ML

## (undated) DEVICE — GLV 7 PROTEXIS (WHITE)

## (undated) DEVICE — CONN DUAL HOSE

## (undated) DEVICE — SUT BIOSYN 4-0 18" P-12

## (undated) DEVICE — TORQUE DEVICE FOR GUIDEWIRE 0.0100.038"

## (undated) DEVICE — DRSG STERISTRIPS 0.5 X 4"

## (undated) DEVICE — DRAPE EQUIPMENT BANDED BAG 30 X 30" (SHOWER CAP)

## (undated) DEVICE — MEDICATION LABELS W MARKER

## (undated) DEVICE — INFLATION DEVICE BASIXCOMPAK

## (undated) DEVICE — DRAPE TOWEL BLUE 17" X 24"

## (undated) DEVICE — LAP PAD 18 X 18"

## (undated) DEVICE — DRAPE FEMORAL ANGIOGRAPHY W TROUGH

## (undated) DEVICE — SOL IRR BAG NS 0.9% 1000ML

## (undated) DEVICE — VISITEC 4X4

## (undated) DEVICE — DRSG TEGADERM 6"X8"